# Patient Record
Sex: FEMALE | Race: WHITE | NOT HISPANIC OR LATINO | Employment: OTHER | URBAN - METROPOLITAN AREA
[De-identification: names, ages, dates, MRNs, and addresses within clinical notes are randomized per-mention and may not be internally consistent; named-entity substitution may affect disease eponyms.]

---

## 2017-05-10 RX ORDER — ASPIRIN 81 MG/1
81 TABLET ORAL
COMMUNITY

## 2017-05-10 RX ORDER — FENOFIBRATE 160 MG/1
160 TABLET ORAL EVERY EVENING
COMMUNITY
End: 2019-12-20 | Stop reason: SDUPTHER

## 2017-05-10 RX ORDER — IBUPROFEN 200 MG
200 TABLET ORAL EVERY 8 HOURS PRN
COMMUNITY
End: 2018-08-30

## 2017-05-10 RX ORDER — OLMESARTAN MEDOXOMIL AND HYDROCHLOROTHIAZIDE 40/25 40; 25 MG/1; MG/1
1 TABLET ORAL EVERY MORNING
COMMUNITY
End: 2020-01-14 | Stop reason: SDUPTHER

## 2017-05-10 RX ORDER — DIAPER,BRIEF,INFANT-TODD,DISP
EACH MISCELLANEOUS 2 TIMES DAILY
COMMUNITY
End: 2018-08-30

## 2017-05-10 RX ORDER — AMLODIPINE BESYLATE 5 MG/1
5 TABLET ORAL EVERY MORNING
COMMUNITY
End: 2019-12-20 | Stop reason: SDUPTHER

## 2017-05-15 ENCOUNTER — ANESTHESIA EVENT (OUTPATIENT)
Dept: PERIOP | Facility: AMBULARY SURGERY CENTER | Age: 77
End: 2017-05-15
Payer: MEDICARE

## 2017-05-15 ENCOUNTER — ANESTHESIA (OUTPATIENT)
Dept: PERIOP | Facility: AMBULARY SURGERY CENTER | Age: 77
End: 2017-05-15
Payer: MEDICARE

## 2017-05-15 ENCOUNTER — HOSPITAL ENCOUNTER (OUTPATIENT)
Facility: AMBULARY SURGERY CENTER | Age: 77
Setting detail: OUTPATIENT SURGERY
Discharge: HOME | End: 2017-05-15
Attending: OPHTHALMOLOGY | Admitting: OPHTHALMOLOGY
Payer: MEDICARE

## 2017-05-15 VITALS
HEART RATE: 75 BPM | WEIGHT: 170 LBS | OXYGEN SATURATION: 96 % | RESPIRATION RATE: 20 BRPM | HEIGHT: 69 IN | TEMPERATURE: 96.3 F | DIASTOLIC BLOOD PRESSURE: 68 MMHG | BODY MASS INDEX: 25.18 KG/M2 | SYSTOLIC BLOOD PRESSURE: 154 MMHG

## 2017-05-15 LAB — GLUCOSE SERPL-MCNC: 120 MG/DL (ref 65–140)

## 2017-05-15 PROCEDURE — V2632 POST CHMBR INTRAOCULAR LENS: HCPCS | Performed by: OPHTHALMOLOGY

## 2017-05-15 PROCEDURE — 82948 REAGENT STRIP/BLOOD GLUCOSE: CPT

## 2017-05-15 DEVICE — IOL SN60WF 22.0: Type: IMPLANTABLE DEVICE | Site: EYE | Status: FUNCTIONAL

## 2017-05-15 RX ORDER — TETRACAINE HYDROCHLORIDE 5 MG/ML
1 SOLUTION OPHTHALMIC ONCE
Status: COMPLETED | OUTPATIENT
Start: 2017-05-15 | End: 2017-05-15

## 2017-05-15 RX ORDER — KETOROLAC TROMETHAMINE 5 MG/ML
1 SOLUTION OPHTHALMIC 4 TIMES DAILY
Qty: 6 ML | Refills: 0
Start: 2017-05-15 | End: 2017-06-26 | Stop reason: HOSPADM

## 2017-05-15 RX ORDER — MIDAZOLAM HYDROCHLORIDE 1 MG/ML
INJECTION INTRAMUSCULAR; INTRAVENOUS AS NEEDED
Status: DISCONTINUED | OUTPATIENT
Start: 2017-05-15 | End: 2017-05-15 | Stop reason: SURG

## 2017-05-15 RX ORDER — LIDOCAINE HYDROCHLORIDE 20 MG/ML
1 JELLY TOPICAL
Status: COMPLETED | OUTPATIENT
Start: 2017-05-15 | End: 2017-05-15

## 2017-05-15 RX ORDER — LIDOCAINE HYDROCHLORIDE 10 MG/ML
INJECTION, SOLUTION EPIDURAL; INFILTRATION; INTRACAUDAL; PERINEURAL AS NEEDED
Status: DISCONTINUED | OUTPATIENT
Start: 2017-05-15 | End: 2017-05-15 | Stop reason: HOSPADM

## 2017-05-15 RX ORDER — CIPROFLOXACIN HYDROCHLORIDE 3.5 MG/ML
1 SOLUTION/ DROPS TOPICAL 4 TIMES DAILY
Qty: 6 ML | Refills: 0
Start: 2017-05-15 | End: 2017-06-14

## 2017-05-15 RX ORDER — GATIFLOXACIN 5 MG/ML
SOLUTION/ DROPS OPHTHALMIC AS NEEDED
Status: DISCONTINUED | OUTPATIENT
Start: 2017-05-15 | End: 2017-05-15 | Stop reason: HOSPADM

## 2017-05-15 RX ORDER — TETRACAINE HYDROCHLORIDE 5 MG/ML
SOLUTION OPHTHALMIC AS NEEDED
Status: DISCONTINUED | OUTPATIENT
Start: 2017-05-15 | End: 2017-05-15 | Stop reason: HOSPADM

## 2017-05-15 RX ORDER — KETOROLAC TROMETHAMINE 5 MG/ML
1 SOLUTION OPHTHALMIC
Status: DISCONTINUED | OUTPATIENT
Start: 2017-05-15 | End: 2017-05-15 | Stop reason: HOSPADM

## 2017-05-15 RX ORDER — CYCLOPENTOLATE HYDROCHLORIDE 10 MG/ML
1 SOLUTION/ DROPS OPHTHALMIC
Status: COMPLETED | OUTPATIENT
Start: 2017-05-15 | End: 2017-05-15

## 2017-05-15 RX ORDER — PHENYLEPHRINE HCL 2.5 %
1 DROPS OPHTHALMIC (EYE)
Status: COMPLETED | OUTPATIENT
Start: 2017-05-15 | End: 2017-05-15

## 2017-05-15 RX ADMIN — PHENYLEPHRINE HYDROCHLORIDE 1 DROP: 25 SOLUTION/ DROPS OPHTHALMIC at 09:21

## 2017-05-15 RX ADMIN — LIDOCAINE HYDROCHLORIDE 1 APPLICATION: 20 JELLY TOPICAL at 09:36

## 2017-05-15 RX ADMIN — LIDOCAINE HYDROCHLORIDE 1 APPLICATION: 20 JELLY TOPICAL at 10:06

## 2017-05-15 RX ADMIN — MIDAZOLAM HYDROCHLORIDE 2 MG: 1 INJECTION, SOLUTION INTRAMUSCULAR; INTRAVENOUS at 10:17

## 2017-05-15 RX ADMIN — CYCLOPENTOLATE HYDROCHLORIDE 1 DROP: 10 SOLUTION/ DROPS OPHTHALMIC at 09:36

## 2017-05-15 RX ADMIN — TETRACAINE HYDROCHLORIDE 1 DROP: 5 SOLUTION OPHTHALMIC at 09:21

## 2017-05-15 RX ADMIN — CYCLOPENTOLATE HYDROCHLORIDE 1 DROP: 10 SOLUTION/ DROPS OPHTHALMIC at 10:06

## 2017-05-15 RX ADMIN — KETOROLAC TROMETHAMINE 1 DROP: 5 SOLUTION OPHTHALMIC at 09:21

## 2017-05-15 RX ADMIN — LIDOCAINE HYDROCHLORIDE 1 APPLICATION: 20 JELLY TOPICAL at 09:51

## 2017-05-15 RX ADMIN — CYCLOPENTOLATE HYDROCHLORIDE 1 DROP: 10 SOLUTION/ DROPS OPHTHALMIC at 09:51

## 2017-05-15 RX ADMIN — LIDOCAINE HYDROCHLORIDE 1 APPLICATION: 20 JELLY TOPICAL at 09:21

## 2017-05-15 RX ADMIN — PHENYLEPHRINE HYDROCHLORIDE 1 DROP: 25 SOLUTION/ DROPS OPHTHALMIC at 09:51

## 2017-05-15 RX ADMIN — KETOROLAC TROMETHAMINE 1 DROP: 5 SOLUTION OPHTHALMIC at 09:51

## 2017-05-15 RX ADMIN — KETOROLAC TROMETHAMINE 1 DROP: 5 SOLUTION OPHTHALMIC at 09:36

## 2017-05-15 RX ADMIN — KETOROLAC TROMETHAMINE 1 DROP: 5 SOLUTION OPHTHALMIC at 10:06

## 2017-05-15 RX ADMIN — CYCLOPENTOLATE HYDROCHLORIDE 1 DROP: 10 SOLUTION/ DROPS OPHTHALMIC at 09:21

## 2017-05-15 RX ADMIN — PHENYLEPHRINE HYDROCHLORIDE 1 DROP: 25 SOLUTION/ DROPS OPHTHALMIC at 10:06

## 2017-05-15 RX ADMIN — PHENYLEPHRINE HYDROCHLORIDE 1 DROP: 25 SOLUTION/ DROPS OPHTHALMIC at 09:36

## 2017-05-30 ENCOUNTER — ALLSCRIPTS OFFICE VISIT (OUTPATIENT)
Dept: OTHER | Facility: OTHER | Age: 77
End: 2017-05-30

## 2017-05-30 ENCOUNTER — HOSPITAL ENCOUNTER (OUTPATIENT)
Dept: RADIOLOGY | Facility: CLINIC | Age: 77
Discharge: HOME | End: 2017-05-30
Payer: MEDICARE

## 2017-05-30 DIAGNOSIS — S39.012A STRAIN OF MUSCLE, FASCIA AND TENDON OF LOWER BACK, INITIAL ENCOUNTER: ICD-10-CM

## 2017-05-30 DIAGNOSIS — M54.50 LOW BACK PAIN: ICD-10-CM

## 2017-05-30 PROCEDURE — 72100 X-RAY EXAM L-S SPINE 2/3 VWS: CPT

## 2017-06-07 ENCOUNTER — GENERIC CONVERSION - ENCOUNTER (OUTPATIENT)
Dept: OTHER | Facility: OTHER | Age: 77
End: 2017-06-07

## 2017-06-07 ENCOUNTER — APPOINTMENT (OUTPATIENT)
Dept: PHYSICAL THERAPY | Facility: CLINIC | Age: 77
End: 2017-06-07
Payer: MEDICARE

## 2017-06-07 DIAGNOSIS — S39.012A STRAIN OF MUSCLE, FASCIA AND TENDON OF LOWER BACK, INITIAL ENCOUNTER: ICD-10-CM

## 2017-06-07 PROCEDURE — G8979 MOBILITY GOAL STATUS: HCPCS

## 2017-06-07 PROCEDURE — G8978 MOBILITY CURRENT STATUS: HCPCS

## 2017-06-07 PROCEDURE — 97161 PT EVAL LOW COMPLEX 20 MIN: CPT

## 2017-06-13 ENCOUNTER — APPOINTMENT (OUTPATIENT)
Dept: PHYSICAL THERAPY | Facility: CLINIC | Age: 77
End: 2017-06-13
Payer: MEDICARE

## 2017-06-13 PROCEDURE — 97110 THERAPEUTIC EXERCISES: CPT

## 2017-06-16 ENCOUNTER — APPOINTMENT (OUTPATIENT)
Dept: PHYSICAL THERAPY | Facility: CLINIC | Age: 77
End: 2017-06-16
Payer: MEDICARE

## 2017-06-16 PROCEDURE — 97140 MANUAL THERAPY 1/> REGIONS: CPT

## 2017-06-16 PROCEDURE — 97110 THERAPEUTIC EXERCISES: CPT

## 2017-06-21 ENCOUNTER — APPOINTMENT (OUTPATIENT)
Dept: PHYSICAL THERAPY | Facility: CLINIC | Age: 77
End: 2017-06-21
Payer: MEDICARE

## 2017-06-21 PROCEDURE — 97110 THERAPEUTIC EXERCISES: CPT

## 2017-06-21 PROCEDURE — 97140 MANUAL THERAPY 1/> REGIONS: CPT

## 2017-06-23 ENCOUNTER — APPOINTMENT (OUTPATIENT)
Dept: PHYSICAL THERAPY | Facility: CLINIC | Age: 77
End: 2017-06-23
Payer: MEDICARE

## 2017-06-26 ENCOUNTER — HOSPITAL ENCOUNTER (OUTPATIENT)
Facility: AMBULARY SURGERY CENTER | Age: 77
Setting detail: OUTPATIENT SURGERY
Discharge: HOME | End: 2017-06-26
Attending: OPHTHALMOLOGY | Admitting: OPHTHALMOLOGY
Payer: MEDICARE

## 2017-06-26 ENCOUNTER — ANESTHESIA EVENT (OUTPATIENT)
Dept: PERIOP | Facility: AMBULARY SURGERY CENTER | Age: 77
End: 2017-06-26
Payer: MEDICARE

## 2017-06-26 ENCOUNTER — ANESTHESIA (OUTPATIENT)
Dept: PERIOP | Facility: AMBULARY SURGERY CENTER | Age: 77
End: 2017-06-26
Payer: MEDICARE

## 2017-06-26 VITALS
DIASTOLIC BLOOD PRESSURE: 60 MMHG | RESPIRATION RATE: 18 BRPM | TEMPERATURE: 97.2 F | OXYGEN SATURATION: 94 % | SYSTOLIC BLOOD PRESSURE: 126 MMHG | HEART RATE: 67 BPM

## 2017-06-26 LAB — GLUCOSE SERPL-MCNC: 133 MG/DL (ref 65–140)

## 2017-06-26 PROCEDURE — V2632 POST CHMBR INTRAOCULAR LENS: HCPCS | Performed by: OPHTHALMOLOGY

## 2017-06-26 PROCEDURE — 82948 REAGENT STRIP/BLOOD GLUCOSE: CPT

## 2017-06-26 DEVICE — IOL SN60WF 22.5: Type: IMPLANTABLE DEVICE | Site: EYE | Status: FUNCTIONAL

## 2017-06-26 RX ORDER — TETRACAINE HYDROCHLORIDE 5 MG/ML
1 SOLUTION OPHTHALMIC ONCE
Status: COMPLETED | OUTPATIENT
Start: 2017-06-26 | End: 2017-06-26

## 2017-06-26 RX ORDER — CIPROFLOXACIN HYDROCHLORIDE 3.5 MG/ML
1 SOLUTION/ DROPS TOPICAL 4 TIMES DAILY
Qty: 5 ML | Refills: 0
Start: 2017-06-26 | End: 2018-10-24

## 2017-06-26 RX ORDER — CYCLOPENTOLATE HYDROCHLORIDE 10 MG/ML
1 SOLUTION/ DROPS OPHTHALMIC
Status: COMPLETED | OUTPATIENT
Start: 2017-06-26 | End: 2017-06-26

## 2017-06-26 RX ORDER — MIDAZOLAM HYDROCHLORIDE 1 MG/ML
INJECTION INTRAMUSCULAR; INTRAVENOUS AS NEEDED
Status: DISCONTINUED | OUTPATIENT
Start: 2017-06-26 | End: 2017-06-26 | Stop reason: SURG

## 2017-06-26 RX ORDER — GATIFLOXACIN 5 MG/ML
SOLUTION/ DROPS OPHTHALMIC AS NEEDED
Status: DISCONTINUED | OUTPATIENT
Start: 2017-06-26 | End: 2017-06-26 | Stop reason: HOSPADM

## 2017-06-26 RX ORDER — TETRACAINE HYDROCHLORIDE 5 MG/ML
SOLUTION OPHTHALMIC AS NEEDED
Status: DISCONTINUED | OUTPATIENT
Start: 2017-06-26 | End: 2017-06-26 | Stop reason: HOSPADM

## 2017-06-26 RX ORDER — BALANCED SALT SOLUTION 6.4; .75; .48; .3; 3.9; 1.7 MG/ML; MG/ML; MG/ML; MG/ML; MG/ML; MG/ML
SOLUTION OPHTHALMIC AS NEEDED
Status: DISCONTINUED | OUTPATIENT
Start: 2017-06-26 | End: 2017-06-26 | Stop reason: HOSPADM

## 2017-06-26 RX ORDER — PHENYLEPHRINE HCL 2.5 %
1 DROPS OPHTHALMIC (EYE)
Status: COMPLETED | OUTPATIENT
Start: 2017-06-26 | End: 2017-06-26

## 2017-06-26 RX ORDER — KETOROLAC TROMETHAMINE 5 MG/ML
1 SOLUTION OPHTHALMIC
Status: DISCONTINUED | OUTPATIENT
Start: 2017-06-27 | End: 2017-06-26 | Stop reason: HOSPADM

## 2017-06-26 RX ORDER — KETOROLAC TROMETHAMINE 5 MG/ML
1 SOLUTION OPHTHALMIC 4 TIMES DAILY
Qty: 5 ML | Refills: 0
Start: 2017-06-26 | End: 2018-10-24

## 2017-06-26 RX ORDER — LIDOCAINE HYDROCHLORIDE 20 MG/ML
1 JELLY TOPICAL
Status: COMPLETED | OUTPATIENT
Start: 2017-06-26 | End: 2017-06-26

## 2017-06-26 RX ORDER — LIDOCAINE HYDROCHLORIDE 10 MG/ML
INJECTION, SOLUTION EPIDURAL; INFILTRATION; INTRACAUDAL; PERINEURAL AS NEEDED
Status: DISCONTINUED | OUTPATIENT
Start: 2017-06-26 | End: 2017-06-26 | Stop reason: HOSPADM

## 2017-06-26 RX ADMIN — CYCLOPENTOLATE HYDROCHLORIDE 1 DROP: 10 SOLUTION/ DROPS OPHTHALMIC at 09:00

## 2017-06-26 RX ADMIN — KETOROLAC TROMETHAMINE 1 DROP: 5 SOLUTION OPHTHALMIC at 09:41

## 2017-06-26 RX ADMIN — LIDOCAINE HYDROCHLORIDE 1 APPLICATION: 20 JELLY TOPICAL at 09:00

## 2017-06-26 RX ADMIN — KETOROLAC TROMETHAMINE 1 DROP: 5 SOLUTION OPHTHALMIC at 09:00

## 2017-06-26 RX ADMIN — LIDOCAINE HYDROCHLORIDE 1 APPLICATION: 20 JELLY TOPICAL at 09:30

## 2017-06-26 RX ADMIN — KETOROLAC TROMETHAMINE 1 DROP: 5 SOLUTION OPHTHALMIC at 09:30

## 2017-06-26 RX ADMIN — KETOROLAC TROMETHAMINE 1 DROP: 5 SOLUTION OPHTHALMIC at 09:15

## 2017-06-26 RX ADMIN — PHENYLEPHRINE HYDROCHLORIDE 1 DROP: 25 SOLUTION/ DROPS OPHTHALMIC at 09:15

## 2017-06-26 RX ADMIN — CYCLOPENTOLATE HYDROCHLORIDE 1 DROP: 10 SOLUTION/ DROPS OPHTHALMIC at 09:30

## 2017-06-26 RX ADMIN — PHENYLEPHRINE HYDROCHLORIDE 1 DROP: 25 SOLUTION/ DROPS OPHTHALMIC at 09:00

## 2017-06-26 RX ADMIN — TETRACAINE HYDROCHLORIDE 1 DROP: 5 SOLUTION OPHTHALMIC at 09:00

## 2017-06-26 RX ADMIN — LIDOCAINE HYDROCHLORIDE 1 APPLICATION: 20 JELLY TOPICAL at 09:15

## 2017-06-26 RX ADMIN — CYCLOPENTOLATE HYDROCHLORIDE 1 DROP: 10 SOLUTION/ DROPS OPHTHALMIC at 09:41

## 2017-06-26 RX ADMIN — LIDOCAINE HYDROCHLORIDE 1 APPLICATION: 20 JELLY TOPICAL at 09:41

## 2017-06-26 RX ADMIN — PHENYLEPHRINE HYDROCHLORIDE 1 DROP: 25 SOLUTION/ DROPS OPHTHALMIC at 09:29

## 2017-06-26 RX ADMIN — PHENYLEPHRINE HYDROCHLORIDE 1 DROP: 25 SOLUTION/ DROPS OPHTHALMIC at 09:41

## 2017-06-26 RX ADMIN — CYCLOPENTOLATE HYDROCHLORIDE 1 DROP: 10 SOLUTION/ DROPS OPHTHALMIC at 09:15

## 2017-06-26 RX ADMIN — MIDAZOLAM HYDROCHLORIDE 2 MG: 1 INJECTION, SOLUTION INTRAMUSCULAR; INTRAVENOUS at 09:56

## 2017-07-03 ENCOUNTER — APPOINTMENT (OUTPATIENT)
Dept: PHYSICAL THERAPY | Facility: CLINIC | Age: 77
End: 2017-07-03
Payer: MEDICARE

## 2017-07-05 ENCOUNTER — APPOINTMENT (OUTPATIENT)
Dept: PHYSICAL THERAPY | Facility: CLINIC | Age: 77
End: 2017-07-05
Payer: MEDICARE

## 2017-07-05 PROCEDURE — 97110 THERAPEUTIC EXERCISES: CPT

## 2017-07-10 ENCOUNTER — APPOINTMENT (OUTPATIENT)
Dept: PHYSICAL THERAPY | Facility: CLINIC | Age: 77
End: 2017-07-10
Payer: MEDICARE

## 2017-07-10 PROCEDURE — G8980 MOBILITY D/C STATUS: HCPCS

## 2017-07-10 PROCEDURE — 97110 THERAPEUTIC EXERCISES: CPT

## 2017-07-10 PROCEDURE — 97112 NEUROMUSCULAR REEDUCATION: CPT

## 2017-07-10 PROCEDURE — G8979 MOBILITY GOAL STATUS: HCPCS

## 2017-07-12 ENCOUNTER — APPOINTMENT (OUTPATIENT)
Dept: PHYSICAL THERAPY | Facility: CLINIC | Age: 77
End: 2017-07-12
Payer: MEDICARE

## 2017-07-14 ENCOUNTER — ALLSCRIPTS OFFICE VISIT (OUTPATIENT)
Dept: OTHER | Facility: OTHER | Age: 77
End: 2017-07-14

## 2017-10-25 ENCOUNTER — ALLSCRIPTS OFFICE VISIT (OUTPATIENT)
Dept: OTHER | Facility: OTHER | Age: 77
End: 2017-10-25

## 2017-10-27 NOTE — PROGRESS NOTES
Assessment  1  Osteoarthritis of left knee (715 96) (M17 12)   2  Primary osteoarthritis of both knees (715 16) (M17 0)    Plan      Radha Kim has several complaints of joint pain at multiple levels  We had a lengthy discussion and I feel that this is related to her recent diagnosis of hypothyroidism  She has also been evaluated by Rheumatology but deferred their treatment as she fears for side effects of the medication that she offered  She is requesting prednisone for her joint pain but I do not recommend this due to the active urinary tract infection  She does have severe arthritis in both the left and right knee  The left wrist is most likely arthritic as well  I offered to provide her a prescription for physical therapy for her knees and wrist  She deferred this as well as she does not feel physical therapy helps her  In addition I offered her injections for her knee arthritis  but she deferred this as well  I recommended that she return to Rheumatology and reconsider that treatment  Discussion/Summary  The patient was counseled regarding diagnostic results,-- instructions for management,-- risk factor reductions,-- prognosis,-- patient and family education,-- impressions,-- risks and benefits of treatment options,-- importance of compliance with treatment  Chief Complaint  1  Pain    History of Present Illness      Radha Kim is a 79-year-old female visiting today with complaints of multiple joint pain  She states last week she needed to carry to heavy bags of groceries and the next day she felt as though she could not get out of bed  She had pain in bilateral shoulders, hips, thighs, knees and left wrist   she also has complaints of recent weight gain  She recently started the medication of levathyroxine sodium prescribed by her PCP for a thyroid disorder  Her mild-to-moderate joint pain is described as a persistent ache and weakness    She is somewhat better with rest and worse with physical activity  Adi Sweet is requesting prednisone for her body aches  Her medical history includes an active urinary tract infection  Review of Systems    Constitutional: No fever, no chills, feels well, no tiredness, no recent weight gain or loss  Eyes: No complaints of eyesight problems, no red eyes  ENT: no loss of hearing, no nosebleeds, no sore throat  Cardiovascular: No complaints of chest pain, no palpitations, no leg claudication or lower extremity edema  Respiratory: no compliants of shortness of breath, no wheezing, no cough  Gastrointestinal: no complaints of abdominal pain, no constipation, no nausea or diarrhea, no vomiting, no bloody stools  Genitourinary: no complaints of dysuria, no incontinence  Musculoskeletal: as noted in HPI  Integumentary: no complaints of skin rash or lesion, no itching or dry skin, no skin wounds  Neurological: no complaints of headache, no confusion, no numbness or tingling, no dizziness  Endocrine: No complaints of muscle weakness, no feelings of weakness, no frequent urination, no excessive thirst    Psychiatric: No suicidal thoughts, no anxiety, no feelings of depression  ROS reviewed  Active Problems  1  Arthritis (716 90)   2  Back strain, initial encounter (847 9) (S39 012A)   3  Benign essential hypertension (401 1)   4  Diabetes (250 00)   5  Knee pain, chronic, left (445 18,029 98) (M25 562,G89 29)   6  Leg weakness, bilateral (729 89) (R29 898)   7  Low back pain (724 2) (M54 5)   8  Osteoarthritis of left knee (715 96) (M17 12)   9  Primary osteoarthritis of both knees (715 16) (M17 0)   10  Trigger finger (727 03) (M65 30)    Past Medical History    The active problems and past medical history were reviewed and updated today  Surgical History   · History of Appendectomy   · History of Cataract Surgery   · History of Tonsillectomy    The surgical history was reviewed and updated today         Family History  Mother    · Family history of Osteoporosis (733 00) (M81 0)  Maternal Grandmother    · Family history of Arthritis (266 90) (M19 90)    The family history was reviewed and updated today  Social History   · Alcohol use (V49 89) (Z78 9)   · Never a smoker  The social history was reviewed and updated today  Current Meds   1  Aspir-Low 81 MG Oral Tablet Delayed Release Recorded   2  Benicar HCT 40-25 MG Oral Tablet; Therapy: [id number] to (Evaluate:67Inu9481) Recorded   3  Keflex 500 MG Oral Capsule Recorded   4  Levaquin 500 MG Oral Tablet Recorded   5  MetFORMIN HCl - 500 MG Oral Tablet Recorded   6  Norvasc 5 MG Oral Tablet Recorded   7  Tricor 145 MG Oral Tablet Recorded    The medication list was reviewed and updated today  Allergies  1  No Known Drug Allergies    Vitals  Signs   Heart Rate: 83, L Brachial Artery  Systolic: 855, LUE, Sitting  Diastolic: 61, LUE, Sitting  Height: 5 ft 9 in  Weight: 180 lb   BMI Calculated: 26 58  BSA Calculated: 1 98    Physical Exam    Left Wrist: Appearance: Normal except swelling (posterior )  Tenderness: None  ROM: Full except as noted: Flexion: restricted AROM 45 degrees  Extension: restricted AROM Forty-five degrees  Motor: Normal  Special Tests: Negative except  Left Knee: Appearance: Normal except swelling  Tenderness: None except the lateral joint line-- and-- medial joint line  ROM: Full  Motor: Normal  Flexion was 5/5  Extension was 5/5  Special Test: Negative except positive laxity on Varus Stress (1), but-- no laxity on Valgus Stress  Right Knee: Appearance: Normal except swelling  Tenderness: None except the lateral joint line-- and-- medial joint line  ROM: Full except as noted: Motor: Normal except as noted: Special Test: Negative except     Constitutional - General appearance: Normal    Musculoskeletal - Gait and station: Normal -- Digits and nails: Normal -- Muscle strength/tone: Normal -- Lower extremity compartments: Normal    Cardiovascular - Pulses: Normal -- Examination of extremities for edema and/or varicosities: Normal    Skin - Skin and subcutaneous tissue: Normal    Neurologic - Sensation: Normal -- Lower extremity peripheral neuro exam: Normal    Psychiatric - Orientation to person, place, and time: Normal -- Mood and affect: Normal    Eyes   Conjunctiva and lids: Normal     Pupils and irises: Normal        Results/Data  I personally reviewed the films/images/results in the office today  My interpretation follows  X-ray Review Previous x-rays of both knees taken in 2016 were reviewed and demonstrate severe osteoarthritis  Attending Note  Scribe Attestation:   Scribe Attestation Shubham Peoples am acting as a scribe in the presence of the attending physician while the attending physician examines the patient  Physician Attestation:   Albert Cochran MD personally performed the services described in this documentation as scribed in my presence, and it is both accurate and complete        Signatures   Electronically signed by : Tiffanie Peoples, LAT; Oct 25 2017 12:45PM EST                       (Author)    Electronically signed by : ELSIE Neves ; Oct 26 2017  8:18AM EST                       (Author)

## 2018-01-12 VITALS
WEIGHT: 180 LBS | DIASTOLIC BLOOD PRESSURE: 61 MMHG | HEIGHT: 69 IN | HEART RATE: 83 BPM | SYSTOLIC BLOOD PRESSURE: 151 MMHG | BODY MASS INDEX: 26.66 KG/M2

## 2018-01-16 NOTE — PROGRESS NOTES
Assessment    1  Primary osteoarthritis of both knees (227 16) (M17 0)    Plan  Primary osteoarthritis of both knees    · Dexamethasone Sodium Phosphate 120 MG/30ML Injection Solution      Miki Haines was given a steroid injection for her left knee today, as this one is the worst  She was offered one for the right as well, but was not interested in this  We did discuss physical therapy for her knees, but since she has an inguinal hernia she is not able to do exercises that cause her to strain, and thus this is not a good option  She can continue advil as needed  We will see her back as needed for this  Discussion/Summary  The patient was counseled regarding diagnostic results, instructions for management, prognosis, patient and family education, impressions, importance of compliance with treatment  Chief Complaint    1  Knee Pain    History of Present Illness   Miki Haines is a 67year old female who presents today with a new issue of bilateral knee pain, the left being worse than the right  She states this began many years ago  SHe used to see another provider for her left knee and had both steroid and joint lubricant injections  These did help  She thinks her last injection was probably 4-5 years ago  She notes pain diffusely about both knees, which is worse with activity and is better with rest and advil  Her pain does not radiate  SHe note swelling about the knees as well  She has trouble getting up from a seated position as well due to her knees  Review of Systems    Constitutional: No fever, no chills, feels well, no tiredness, no recent weight gain or loss  Eyes: No complaints of eyesight problems, no red eyes  ENT: no loss of hearing, no nosebleeds, no sore throat  Cardiovascular: No complaints of chest pain, no palpitations, no leg claudication or lower extremity edema  Respiratory: no compliants of shortness of breath, no wheezing, no cough     Gastrointestinal: no complaints of abdominal pain, no constipation, no nausea or diarrhea, no vomiting, no bloody stools  Genitourinary: no complaints of dysuria, no incontinence  Musculoskeletal: as noted in HPI  Integumentary: no complaints of skin rash or lesion, no itching or dry skin, no skin wounds  Neurological: no complaints of headache, no confusion, no numbness or tingling, no dizziness  Endocrine: No complaints of muscle weakness, no feelings of weakness, no frequent urination, no excessive thirst    Psychiatric: No suicidal thoughts, no anxiety, no feelings of depression  Active Problems    1  Arthritis (716 90)   2  Benign essential hypertension (401 1)   3  Diabetes (250 00)   4  Knee pain, chronic, left (652 58,850 68) (M25 562,G89 29)   5  Leg weakness, bilateral (729 89) (M62 81)   6  Trigger finger (727 03) (M65 30)    Past Medical History    The active problems and past medical history were reviewed and updated today  Surgical History    · History of Appendectomy   · History of Tonsillectomy    The surgical history was reviewed and updated today  Family History    · Family history of Osteoporosis (733 00) (M81 0)    · Family history of Arthritis (716 90) (M19 90)    The family history was reviewed and updated today  Social History    · Alcohol use (V49 89) (F10 99)   · Never a smoker  The social history was reviewed and updated today  Current Meds   1  Aspir-Low 81 MG Oral Tablet Delayed Release Recorded   2  Benicar HCT 40-25 MG Oral Tablet; Therapy: [id number] to (Evaluate:64Yrh3616) Recorded   3  Keflex 500 MG Oral Capsule Recorded   4  MetFORMIN HCl - 500 MG Oral Tablet Recorded   5  Norvasc 5 MG Oral Tablet Recorded   6  Tricor 145 MG Oral Tablet Recorded    The medication list was reviewed and updated today  Allergies    1  No Known Drug Allergies    Physical Exam    Right Knee: Appearance: Normal except an effusion grade 1+ and joint hypertrophy   Tenderness: lateral joint line and medial joint line  Palpatory findings include crepitus  ROM: Full  Motor: Normal  Special Test: no laxity on Valgus Stress and no laxity on Varus Stress  Left Knee: Appearance: Normal except an effusion grade 1+ and joint hypertrophy  Tenderness: lateral joint line and medial joint line  Palpatory findings include crepitus  ROM: Full  Motor: Normal  Special Test: no laxity on Valgus Stress and no laxity on Varus Stress  Constitutional - General appearance: Normal    Musculoskeletal - Gait and station: Normal  Digits and nails: Normal  Muscle strength/tone: Normal  Lower extremity compartments: Normal    Cardiovascular - Pulses: Normal  Examination of extremities for edema and/or varicosities: Normal    Lymphatic - Palpation of lymph nodes in other areas: Normal  no right femoral node enlargement and no left femoral node enlargement  Skin - Skin and subcutaneous tissue: Normal    Neurologic - Sensation: Normal  Lower extremity peripheral neuro exam: Normal    Psychiatric - Orientation to person, place, and time: Normal  Mood and affect: Normal    Eyes   Conjunctiva and lids: Normal     Pupils and irises: Normal        Results/Data    Views: AP and lateral views and a sunrise view of both knees  Findings:  Bilateral knee arthritis, worse about the lateral compartment than the medial compartment  Procedure    Procedure: Injection of the left knee joint  Indication:  Joint pain and Osteoarthritis  Potential complications include bleeding, infection and allergic reaction  Risk, benefits and alternatives were discussed with the patient  Verbal consent was obtained prior to the procedure  Betadine was used to prep the area  ethyl chloride spray was used as a topical anesthetic  Using sterile technique, the aspiration/injection needle was then directed from a Anterolateral aspect  A 22-gauge was used to inject 4cc of 1% Lidocaine and 1mL of 4 mg/mL dexamethasone  A bandage was applied   the patient tolerated the procedure well  Complications: none  Follow-up in the office as needed  Attending Note  Collaborating Physician Note: Collaborating Note: I interviewed and examined the patient, I supervised the Advanced Practitioner and I agree with the Advanced Practitioner note  I discussed the case with the Advanced Practitioner and reviewed the AP note      Signatures   Electronically signed by :  Irineo Mccallum [facility]; Jan 20 2016 10:38AM EST                       (Author)    Electronically signed by : ELSIE Kirkpatrick ; Jan 20 2016  1:22PM EST                       (Author)

## 2018-01-24 NOTE — PROGRESS NOTES
Assessment    1  Primary osteoarthritis of both knees (715 16) (M17 0)   2  Trigger finger (727 03) (M65 30)    Plan  Trigger finger    · Kenalog 40 MG/ML Injection Suspension (Triamcinolone Acetonide)      Molly Leo unfortunately is continuing to struggle with her knee arthritis  We gave her some home exercises for this and also advised ice  As for the trigger finger, she was given another steroid injection today as she had responded well to her last one about year ago  We will see her back as needed for both issues  She is not interested in any surgery at this point  Discussion/Summary  The patient was counseled regarding diagnostic results, instructions for management, prognosis, patient and family education, impressions  Chief Complaint    1  Finger Problem   2  Knee Pain    History of Present Illness    Molly Leo presents today for follow-up of her right knee  She had euflexxa injections back in June and had done well up until about 3 weeks ago when she took a trip to [address] [place] [address] [place] and was doing a lot of walking and getting up and down at the casino  He knee started to bother her again  She notes pain diffusely about the knee, which is worse with getting up from a seated position  Walking does not bother her much  She notes swelling and stiffness but denies any mechanical symptoms about the knee  She has been taking advil as the mobic did not help her  The advil does help some  She also notes a return of her right ring trigger finger  She last had a steroid injection for this almost a year ago, which helped her  She did well up until recently when her pain and triggering returned  Review of Systems    Constitutional: No fever, no chills, feels well, no tiredness, no recent weight gain or loss  Eyes: No complaints of eyesight problems, no red eyes  ENT: no loss of hearing, no nosebleeds, no sore throat     Cardiovascular: No complaints of chest pain, no palpitations, no leg claudication or lower extremity edema  Respiratory: no compliants of shortness of breath, no wheezing, no cough  Gastrointestinal: no complaints of abdominal pain, no constipation, no nausea or diarrhea, no vomiting, no bloody stools  Genitourinary: no complaints of dysuria, no incontinence  Musculoskeletal: as noted in HPI  Integumentary: no complaints of skin rash or lesion, no itching or dry skin, no skin wounds  Neurological: no complaints of headache, no confusion, no numbness or tingling, no dizziness  Endocrine: No complaints of muscle weakness, no feelings of weakness, no frequent urination, no excessive thirst    Psychiatric: anxiety, but not suicidal and no depression  Active Problems    1  Arthritis (716 90)   2  Benign essential hypertension (401 1)   3  Diabetes (250 00)   4  Knee pain, chronic, left (743 96,283 82) (M25 562,G89 29)   5  Leg weakness, bilateral (729 89) (M62 81)   6  Primary osteoarthritis of both knees (715 16) (M17 0)   7  Trigger finger (727 03) (M65 30)    Past Medical History    The active problems and past medical history were reviewed and updated today  Surgical History    · History of Appendectomy   · History of Tonsillectomy    The surgical history was reviewed and updated today  Family History  Mother    · Family history of Osteoporosis (733 00) (M81 0)  Maternal Grandmother    · Family history of Arthritis (716 90) (M19 90)    The family history was reviewed and updated today  Social History    · Alcohol use (V49 89) (Z78 9)   · Never a smoker  The social history was reviewed and updated today  Current Meds   1  Aspir-Low 81 MG Oral Tablet Delayed Release Recorded   2  Benicar HCT 40-25 MG Oral Tablet; Therapy: [id number] to (Evaluate:64Oge6677) Recorded   3  Keflex 500 MG Oral Capsule Recorded   4  Levaquin 500 MG Oral Tablet Recorded   5  MetFORMIN HCl - 500 MG Oral Tablet Recorded   6  Norvasc 5 MG Oral Tablet Recorded   7   Tricor 145 MG Oral Tablet Recorded    The medication list was reviewed and updated today  Allergies    1  No Known Drug Allergies    Vitals  Signs    Systolic: 659, Sitting  Diastolic: 66, Sitting  Heart Rate: 84  Pulse Quality: Normal  Height: 5 ft 6 in  Weight: 163 lb   BMI Calculated: 26 31  BSA Calculated: 1 83    Physical Exam    Right Knee: Appearance: an effusion grade 1+  Tenderness: None not over the lateral joint line and not over the medial joint line  Flexion: restricted AROM 110 degrees  Extension: restricted AROM -2 degrees  Motor: Normal  Special Test: no laxity on Valgus Stress and no laxity on Varus Stress  Right Fourth Digit/Hand: Appearance: Normal except 4th digit Trigger finger  Tenderness over A1 pulley  ROM: Full except as noted: MCP flexion: painful restricted active range of motion  PIP flexion: painful restricted AROM  Motor: Normal    Constitutional - General appearance: Normal    Musculoskeletal - Gait and station: Normal  Muscle strength/tone: Normal  Lower extremity compartments: Normal    Cardiovascular - Pulses: Normal  Examination of extremities for edema and/or varicosities: Normal    Lymphatic - Palpation of lymph nodes in other areas: Normal  no right femoral node enlargement  Skin - Skin and subcutaneous tissue: Normal    Neurologic - Sensation: Normal  Lower extremity peripheral neuro exam: Normal    Psychiatric - Orientation to person, place, and time: Normal  Mood and affect: Normal    Eyes   Conjunctiva and lids: Normal     Pupils and irises: Normal        Procedure    Procedure: Injection of the on the right Ring finger flexor tendon sheath  Indication: Trigger finger  Risk, benefits and alternatives were discussed with the patient  Verbal consent was obtained prior to the procedure  Prior to the start of the procedure a time out was taken and the identity of the patient was confirmed via name and date of birth with the patient   The correct site and the procedure to be performed were confirmed and the site marked as appropriate  The correct side was confirmed if applicable  The positioning of the patient was verified  The availability of the correct equipment was verified  Procedure Note: A 25-gauge was used to inject 1/2 mL of 1% Lidocaine and 1/2 mL of 40mg/mL triamcinolone  A bandage was applied  Post-Procedure: the patient tolerated the procedure well  Complications: None  Follow-up in the office as needed  Attending Note  Collaborating Physician Note: Collaborating Note: I interviewed and examined the patient, I supervised the Advanced Practitioner and I agree with the Advanced Practitioner note  I discussed the case with the Advanced Practitioner and reviewed the AP note      Signatures   Electronically signed by :  Susan Mello [facility]; Jul 27 2016  2:01PM EST                       (Author)    Electronically signed by : ELSIE Boyle ; Jul 27 2016  5:14PM EST                       (Author)

## 2018-02-12 ENCOUNTER — TELEPHONE (OUTPATIENT)
Dept: OBGYN CLINIC | Facility: HOSPITAL | Age: 78
End: 2018-02-12

## 2018-02-12 NOTE — TELEPHONE ENCOUNTER
Patient sees Dr Marie Brought  She is having spasms in her back and wants a call back from the doctor  I offered her an appointment tomorrow in [address] [place] [address] or Friday in [place] and she declined both

## 2018-02-13 NOTE — TELEPHONE ENCOUNTER
Patient states she had this before, she said pain goes underneather the shoulder blade, down to left lower back to the waist   States its a spasm pain, (like a wave) coming and going quickly, states it lasts 5-6 secs then goes away and comes back again  Explains she is a little better today  She does not know how to help her spasm pain  She took left of percocet  She said that is the only thing that takes it away  Heating pad helps  I advised possibly maybe seeing Dr Santy Lawler? She had an XRAY which showed an old fracture  She said sometimes the spams she won't get for a year but when she does get it, she is in extreme pain  Please advise

## 2018-02-13 NOTE — TELEPHONE ENCOUNTER
Tried to call patient back and left message to advised Libby wanted me to get some more information on what is going on with her back  Advised that the Dr himself will not be able to call her for some time due to his tight schedule  I advised for her to call me back to give me a little more information on whats going on with her back

## 2018-02-13 NOTE — TELEPHONE ENCOUNTER
Can you please call her to ask her what her issue is with her back    I will unfortunately not be able to speak with her for quite some time due to a very tight schedule

## 2018-02-14 NOTE — TELEPHONE ENCOUNTER
She should do the exercises she was previously taught from physical therapy for the muscle spasms around the scapula  She also should put some moist heat on her shoulder blade to help with spasms

## 2018-02-15 NOTE — TELEPHONE ENCOUNTER
Spoke with the patient and she is aware and she stated she never went to PT for this issue and was wondering if you have a print out of exercises she can do and ?

## 2018-02-16 NOTE — TELEPHONE ENCOUNTER
We can give her exercises from our patient rooms for her shoulder  We can also give her a Thera-Band

## 2018-02-25 ENCOUNTER — APPOINTMENT (EMERGENCY)
Dept: RADIOLOGY | Facility: HOSPITAL | Age: 78
End: 2018-02-25
Payer: MEDICARE

## 2018-02-25 ENCOUNTER — HOSPITAL ENCOUNTER (EMERGENCY)
Facility: HOSPITAL | Age: 78
Discharge: HOME | End: 2018-02-25
Admitting: EMERGENCY MEDICINE
Payer: MEDICARE

## 2018-02-25 VITALS
SYSTOLIC BLOOD PRESSURE: 191 MMHG | DIASTOLIC BLOOD PRESSURE: 86 MMHG | TEMPERATURE: 98.3 F | OXYGEN SATURATION: 95 % | BODY MASS INDEX: 25.92 KG/M2 | WEIGHT: 175 LBS | HEART RATE: 80 BPM | HEIGHT: 69 IN | RESPIRATION RATE: 16 BRPM

## 2018-02-25 DIAGNOSIS — M62.830 MUSCLE SPASM OF BACK: Primary | ICD-10-CM

## 2018-02-25 PROCEDURE — 99283 EMERGENCY DEPT VISIT LOW MDM: CPT

## 2018-02-25 PROCEDURE — 72100 X-RAY EXAM L-S SPINE 2/3 VWS: CPT

## 2018-02-25 RX ORDER — TRAMADOL HYDROCHLORIDE 50 MG/1
50 TABLET ORAL EVERY 6 HOURS PRN
Qty: 8 TABLET | Refills: 0 | Status: SHIPPED | OUTPATIENT
Start: 2018-02-25 | End: 2018-02-27

## 2018-02-25 RX ORDER — TRAMADOL HYDROCHLORIDE 50 MG/1
50 TABLET ORAL ONCE
Status: COMPLETED | OUTPATIENT
Start: 2018-02-25 | End: 2018-02-25

## 2018-02-25 RX ADMIN — TRAMADOL HYDROCHLORIDE 50 MG: 50 TABLET, FILM COATED ORAL at 16:29

## 2018-02-25 NOTE — ED PROVIDER NOTES
History  Chief Complaint   Patient presents with    Back Pain     c/o left sided back pain that started today     Patient is a 69-year-old female presenting today with left-sided back pain that started today that comes and goes and is spasm like ranking 6/10  States that the pain feels better after heating pad as well as massaging the area  Has had this pain before in the past however typically presents itself on the right part of her back  Otherwise has been feeling well without any complaints  Denies any falls  Lives at home with her  gets around without difficulty  Denies changes in urination, bladder or bowel retention or incontinence, numbness, paresthesias, saddle anesthesia, weakness, fevers, nausea, vomiting, diarrhea, abdominal pain  Addendum: patient states that she remembers the pain beginning after she was attempting to get out of a chair while at a restaurant and had some difficulty as there were no arm rests  Prior to Admission Medications   Prescriptions Last Dose Informant Patient Reported? Taking?    Cranberry-Vitamin C-Probiotic (AZO CRANBERRY PO)   Yes No   Sig: Take by mouth every morning   Multiple Vitamins-Minerals (CENTRUM SILVER ADULT 50+ PO)   Yes No   Sig: Take by mouth daily with breakfast   amLODIPine (NORVASC) 5 mg tablet   Yes No   Sig: Take 5 mg by mouth every morning   aspirin (ECOTRIN LOW STRENGTH) 81 mg EC tablet   Yes No   Sig: Take 81 mg by mouth daily with breakfast   ciprofloxacin (CILOXAN) 0 3 % ophthalmic solution   No No   Sig: Administer 1 drop to the right eye 4 (four) times a day   fenofibrate (TRIGLIDE) 160 MG tablet   Yes No   Sig: Take 160 mg by mouth every evening   hydrocortisone 1 % cream   Yes No   Sig: Apply topically 2 (two) times a day Vaginal/ externally    ibuprofen (MOTRIN) 200 mg tablet   Yes No   Sig: Take 200 mg by mouth every 8 (eight) hours as needed for mild pain   ketorolac (ACULAR) 0 5 % ophthalmic solution   No No   Sig: Administer 1 drop to the right eye 4 (four) times a day   metFORMIN (GLUCOPHAGE) 500 mg tablet   Yes No   Sig: Take 500 mg by mouth 2 (two) times a day with meals Takes 2 tabs for pm dose= 1000mg   olmesartan-hydrochlorothiazide (BENICAR HCT) 40-25 MG per tablet   Yes No   Sig: Take 1 tablet by mouth every morning      Facility-Administered Medications: None       Past Medical History:   Diagnosis Date    Anemia     Anxiety     Arthritis     Chronic UTI     better since urethral stretching    Diabetes mellitus ([address] )     type 2    GERD (gastroesophageal reflux disease)     diet controlled    High triglycerides     Hypertension     Irritable bowel syndrome        Past Surgical History:   Procedure Laterality Date    APPENDECTOMY      age 14   Danny Pert GUM SURGERY      tumor removal benign x3    [place] REMV CATARACT EXTRACAP,INSERT LENS Left 5/15/2017    Procedure: EXTRACTION EXTRACAPSULAR CATARACT PHACO INTRAOCULAR LENS (IOL); Surgeon: Leatha Choudhary MD;  Location: [facility] MAIN OR;  Service: Ophthalmology     [facility] [address] Right 6/26/2017    Procedure: EXTRACTION EXTRACAPSULAR CATARACT PHACO INTRAOCULAR LENS (IOL); Surgeon: Leatha Choudhary MD;  Location: [facility] MAIN OR;  Service: Ophthalmology    TONSILLECTOMY         Family History   Problem Relation Age of Onset    Early death Mother      CHF    Early death Father      MI    Cancer Sister      stomach lymphoma    No Known Problems Son      I have reviewed and agree with the history as documented  Social History   Substance Use Topics    Smoking status: Former Smoker     Packs/day: 1 00     Years: 10 00     Quit date: 2010    Smokeless tobacco: Never Used    Alcohol use Yes      Comment: rarely        Review of Systems   Constitutional: Negative  Negative for chills, fever and unexpected weight change  Able to walk without difficulty  Denies IV drug use     HENT: Negative  Respiratory: Negative    Negative for cough, chest tightness, shortness of breath and wheezing  Cardiovascular: Negative  Negative for chest pain and palpitations  Gastrointestinal: Negative  Negative for abdominal pain, constipation, diarrhea, nausea and vomiting  Genitourinary: Negative  Negative for difficulty urinating, dysuria, flank pain, frequency, hematuria and urgency  Denies numbness, tingling in the groin  Musculoskeletal: Positive for back pain  Negative for arthralgias, gait problem, joint swelling, myalgias, neck pain and neck stiffness  Skin: Negative  Negative for color change  Neurological: Negative  Negative for dizziness, tremors, weakness, light-headedness, numbness and headaches  Denies numbness  Denies shooting pain down arms/ legs  All other systems reviewed and are negative  Physical Exam  ED Triage Vitals [02/25/18 1529]   Temperature Pulse Respirations Blood Pressure SpO2   98 3 °F (36 8 °C) 80 16 (!) 191/86 95 %      Temp Source Heart Rate Source Patient Position - Orthostatic VS BP Location FiO2 (%)   Tympanic Monitor Sitting Right arm --      Pain Score       Worst Possible Pain           Orthostatic Vital Signs  Vitals:    02/25/18 1529   BP: (!) 191/86   Pulse: 80   Patient Position - Orthostatic VS: Sitting       Physical Exam   Constitutional: She is oriented to person, place, and time  She appears well-developed and well-nourished  HENT:   Head: Normocephalic and atraumatic  Eyes: Conjunctivae are normal    Neck: Normal range of motion  Cardiovascular: Normal rate, regular rhythm, normal heart sounds and intact distal pulses  Pulmonary/Chest: Effort normal and breath sounds normal  No respiratory distress  She has no wheezes  She has no rales  She exhibits no tenderness  spo2 is 95% indicating adequate oxygenation  Abdominal: Soft  Bowel sounds are normal  She exhibits no distension and no mass  There is no tenderness  There is no rebound and no guarding  No hernia  Musculoskeletal:        Arms:  Neurological: She is alert and oriented to person, place, and time  Skin: Skin is warm and dry  Capillary refill takes less than 2 seconds  Nursing note and vitals reviewed  ED Medications  Medications   traMADol (ULTRAM) tablet 50 mg (50 mg Oral Given 2/25/18 5093)       Diagnostic Studies  Results Reviewed     None                 XR lumbar spine 2 or 3 views    (Results Pending)              Procedures  Procedures       Phone Contacts  ED Phone Contact    ED Course  ED Course                                MDM  Number of Diagnoses or Management Options  Diagnosis management comments: Patient good relief with tramadol, will prescribe very short course, informed not to drive or operate heavy machinery while taking  Ambulating emergency depart without difficulty  Patient is informed to return to the emergency department for worsening of symptoms and was given proper education regarding their diagnosis and symptoms  Otherwise the patient is informed to follow up with their primary care doctor for re-evaluation  The patient verbalizes understanding and agrees with above assessment and plan  All questions were answered  Please Note: Fluency Direct voice recognition software may have been used in the creation of this document  Wrong words or sound a like substitutions may have occurred due to the inherent limitations of the voice software             Amount and/or Complexity of Data Reviewed  Tests in the radiology section of CPT®: reviewed and ordered  Review and summarize past medical records: yes  Independent visualization of images, tracings, or specimens: yes      CritCare Time    Disposition  Final diagnoses:   Muscle spasm of back     Time reflects when diagnosis was documented in both MDM as applicable and the Disposition within this note     Time User Action Codes Description Comment    2/25/2018  5:09 PM Randell Meals Add [[id number] [id number]] Muscle spasm of back ED Disposition     ED Disposition Condition Comment    Discharge  Siena Jackson discharge to home/self care  Condition at discharge: Good        Follow-up Information     Follow up With Specialties Details Why Contact Info Additional [address] Emergency Department Emergency Medicine Go to If symptoms worsen such as numbness or tingling of the groin, weakness, fevers  [address] [facility] ED, [facility], [place] [address] Internal Medicine Schedule an appointment as soon as possible for a visit As needed if symptoms persist  [address] [place] [address] Rd  [phone]           Patient's Medications   Discharge Prescriptions    TRAMADOL (ULTRAM) 50 MG TABLET    Take 1 tablet (50 mg total) by mouth every 6 (six) hours as needed for moderate pain for up to 2 days       Start Date: 2/25/2018 End Date: 2/27/2018       Order Dose: 50 mg       Quantity: 8 tablet    Refills: 0     No discharge procedures on file      ED Provider  Electronically Signed by           Sally Johnson PA-C  02/25/18 [address] [place] [address],[address] [place], [place]  02/25/18 1037

## 2018-02-25 NOTE — DISCHARGE INSTRUCTIONS
Muscle Spasm   WHAT YOU NEED TO KNOW:   A muscle spasm is a sudden contraction of any muscle or group of muscles  A muscle cramp is a painful muscle spasm  Muscle cramps commonly occur after intense exercise or during pregnancy  They may also be caused by certain medications, dehydration, low calcium or magnesium levels, or another medical condition  DISCHARGE INSTRUCTIONS:   Medicines: You may need the following:  · NSAIDs  help decrease swelling and pain or fever  This medicine is available with or without a doctor's order  NSAIDs can cause stomach bleeding or kidney problems in certain people  If you take blood thinner medicine, always ask your healthcare provider if NSAIDs are safe for you  Always read the medicine label and follow directions  · Take your medicine as directed  Contact your healthcare provider if you think your medicine is not helping or if you have side effects  Tell him of her if you are allergic to any medicine  Keep a list of the medicines, vitamins, and herbs you take  Include the amounts, and when and why you take them  Bring the list or the pill bottles to follow-up visits  Carry your medicine list with you in case of an emergency  Follow up with your healthcare provider as directed: You may need other tests or treatment  You may also be referred to a physical therapist or other specialist  Write down your questions so you remember to ask them during your visits  Self-care:   · Stretch  your muscle to help relieve the cramp  It may be helpful to keep your muscle in the stretched position until the cramp is gone  · Apply heat  to help decrease pain and muscle spasms  Apply heat on the area for 20 to 30 minutes every 2 hours for as many days as directed  · Apply ice  to help decrease swelling and pain  Ice may also help prevent tissue damage  Use an ice pack, or put crushed ice in a plastic bag   Cover it with a towel and place it on your muscle for 15 to 20 minutes every hour or as directed  · Drink more liquids  to help prevent muscle cramps caused by dehydration  Sports drinks may help replace electrolytes you lose through sweat during exercise  Ask your healthcare provider how much liquid to drink each day and which liquids are best for you  · Eat healthy foods , such as fruits, vegetables, whole grains, low-fat dairy products, and lean proteins (meat, beans, and fish)  If you are pregnant, ask your healthcare provider about foods that are high in magnesium and sodium  They may help to relieve cramps during pregnancy  · Massage your muscle  to help relieve the cramp  · Take frequent deep breaths  until the cramp feels better  Lie down while you take the deep breaths so you do not get dizzy or lightheaded  Contact your healthcare provider if:   · You have signs of dehydration, such as a headache, dark yellow urine, dry eyes or mouth, or a fast heartbeat  · You have questions or concerns about your condition or care  Return to the emergency department if:   · You have warmth, swelling, or redness in the cramping muscle  · You have frequent or unrelieved muscle cramps in several different muscles  · You have muscle cramps with numbness, tingling, and burning in your hands and feet  © 2017 [facility] Information is for End User's use only and may not be sold, redistributed or otherwise used for commercial purposes  All illustrations and images included in CareNotes® are the copyrighted property of [employer]  or Josue Degroot  The above information is an  only  It is not intended as medical advice for individual conditions or treatments  Talk to your doctor, nurse or pharmacist before following any medical regimen to see if it is safe and effective for you

## 2018-03-09 ENCOUNTER — TELEPHONE (OUTPATIENT)
Dept: OBGYN CLINIC | Facility: CLINIC | Age: 78
End: 2018-03-09

## 2018-03-09 NOTE — TELEPHONE ENCOUNTER
Dr Elliott Church patient    Abby Loza would like to talk to [place]  She is scheduled to follow up with her back pain on 3/16 but would like to speak to her prior to  Best contact #[phone]    Thank you

## 2018-03-10 ENCOUNTER — HOSPITAL ENCOUNTER (EMERGENCY)
Facility: HOSPITAL | Age: 78
Discharge: HOME | End: 2018-03-10
Payer: MEDICARE

## 2018-03-10 ENCOUNTER — APPOINTMENT (EMERGENCY)
Dept: RADIOLOGY | Facility: HOSPITAL | Age: 78
End: 2018-03-10
Payer: MEDICARE

## 2018-03-10 VITALS
SYSTOLIC BLOOD PRESSURE: 163 MMHG | DIASTOLIC BLOOD PRESSURE: 88 MMHG | HEART RATE: 78 BPM | OXYGEN SATURATION: 98 % | RESPIRATION RATE: 18 BRPM | BODY MASS INDEX: 25.7 KG/M2 | WEIGHT: 174 LBS | TEMPERATURE: 97.8 F

## 2018-03-10 DIAGNOSIS — N39.0 UTI (URINARY TRACT INFECTION): ICD-10-CM

## 2018-03-10 DIAGNOSIS — M62.830 MUSCLE SPASM OF BACK: Primary | ICD-10-CM

## 2018-03-10 LAB
ANION GAP SERPL CALCULATED.3IONS-SCNC: 11 MMOL/L (ref 4–13)
APTT PPP: 27 SECONDS (ref 23–35)
BACTERIA UR QL AUTO: ABNORMAL /HPF
BASOPHILS # BLD AUTO: 0.1 THOUSANDS/ΜL (ref 0–0.1)
BASOPHILS NFR BLD AUTO: 1 % (ref 0–1)
BILIRUB UR QL STRIP: NEGATIVE
BUN SERPL-MCNC: 23 MG/DL (ref 5–25)
CALCIUM SERPL-MCNC: 9.5 MG/DL (ref 8.3–10.1)
CHLORIDE SERPL-SCNC: 99 MMOL/L (ref 100–108)
CK SERPL-CCNC: 37 U/L (ref 26–192)
CLARITY UR: CLEAR
CO2 SERPL-SCNC: 24 MMOL/L (ref 21–32)
COLOR UR: ABNORMAL
CREAT SERPL-MCNC: 0.85 MG/DL (ref 0.6–1.3)
EOSINOPHIL # BLD AUTO: 0.1 THOUSAND/ΜL (ref 0–0.61)
EOSINOPHIL NFR BLD AUTO: 1 % (ref 0–6)
ERYTHROCYTE [DISTWIDTH] IN BLOOD BY AUTOMATED COUNT: 14.3 % (ref 11.6–15.1)
GFR SERPL CREATININE-BSD FRML MDRD: 66 ML/MIN/1.73SQ M
GLUCOSE SERPL-MCNC: 120 MG/DL (ref 65–140)
GLUCOSE UR STRIP-MCNC: NEGATIVE MG/DL
HCT VFR BLD AUTO: 37.4 % (ref 37–47)
HGB BLD-MCNC: 11.9 G/DL (ref 12–16)
HGB UR QL STRIP.AUTO: NEGATIVE
INR PPP: 0.94 (ref 0.86–1.16)
KETONES UR STRIP-MCNC: NEGATIVE MG/DL
LEUKOCYTE ESTERASE UR QL STRIP: ABNORMAL
LIPASE SERPL-CCNC: 171 U/L (ref 73–393)
LYMPHOCYTES # BLD AUTO: 2.2 THOUSANDS/ΜL (ref 0.6–4.47)
LYMPHOCYTES NFR BLD AUTO: 23 % (ref 14–44)
MCH RBC QN AUTO: 27.4 PG (ref 27–31)
MCHC RBC AUTO-ENTMCNC: 31.9 G/DL (ref 31.4–37.4)
MCV RBC AUTO: 86 FL (ref 82–98)
MONOCYTES # BLD AUTO: 1 THOUSAND/ΜL (ref 0.17–1.22)
MONOCYTES NFR BLD AUTO: 11 % (ref 4–12)
NEUTROPHILS # BLD AUTO: 6.2 THOUSANDS/ΜL (ref 1.85–7.62)
NEUTS SEG NFR BLD AUTO: 64 % (ref 43–75)
NITRITE UR QL STRIP: POSITIVE
NON-SQ EPI CELLS URNS QL MICRO: ABNORMAL /HPF
NRBC BLD AUTO-RTO: 0 /100 WBCS
PH UR STRIP.AUTO: 7 [PH] (ref 5–9)
PLATELET # BLD AUTO: 308 THOUSANDS/UL (ref 130–400)
PMV BLD AUTO: 8.3 FL (ref 8.9–12.7)
POTASSIUM SERPL-SCNC: 4.2 MMOL/L (ref 3.5–5.3)
PROT UR STRIP-MCNC: NEGATIVE MG/DL
PROTHROMBIN TIME: 9.9 SECONDS (ref 9.4–11.7)
RBC # BLD AUTO: 4.34 MILLION/UL (ref 4.2–5.4)
RBC #/AREA URNS AUTO: ABNORMAL /HPF
SODIUM SERPL-SCNC: 134 MMOL/L (ref 136–145)
SP GR UR STRIP.AUTO: <=1.005 (ref 1–1.03)
TROPONIN I SERPL-MCNC: <0.02 NG/ML
TROPONIN I SERPL-MCNC: <0.02 NG/ML
UROBILINOGEN UR QL STRIP.AUTO: 0.2 E.U./DL
WBC # BLD AUTO: 9.7 THOUSAND/UL (ref 4.8–10.8)
WBC #/AREA URNS AUTO: ABNORMAL /HPF

## 2018-03-10 PROCEDURE — 71045 X-RAY EXAM CHEST 1 VIEW: CPT

## 2018-03-10 PROCEDURE — 93005 ELECTROCARDIOGRAM TRACING: CPT

## 2018-03-10 PROCEDURE — 80048 BASIC METABOLIC PNL TOTAL CA: CPT | Performed by: PHYSICIAN ASSISTANT

## 2018-03-10 PROCEDURE — 85025 COMPLETE CBC W/AUTO DIFF WBC: CPT | Performed by: PHYSICIAN ASSISTANT

## 2018-03-10 PROCEDURE — 96374 THER/PROPH/DIAG INJ IV PUSH: CPT

## 2018-03-10 PROCEDURE — 72070 X-RAY EXAM THORAC SPINE 2VWS: CPT

## 2018-03-10 PROCEDURE — 71275 CT ANGIOGRAPHY CHEST: CPT

## 2018-03-10 PROCEDURE — 85610 PROTHROMBIN TIME: CPT | Performed by: PHYSICIAN ASSISTANT

## 2018-03-10 PROCEDURE — 96361 HYDRATE IV INFUSION ADD-ON: CPT

## 2018-03-10 PROCEDURE — 83690 ASSAY OF LIPASE: CPT | Performed by: PHYSICIAN ASSISTANT

## 2018-03-10 PROCEDURE — 82550 ASSAY OF CK (CPK): CPT | Performed by: PHYSICIAN ASSISTANT

## 2018-03-10 PROCEDURE — 85730 THROMBOPLASTIN TIME PARTIAL: CPT | Performed by: PHYSICIAN ASSISTANT

## 2018-03-10 PROCEDURE — 36415 COLL VENOUS BLD VENIPUNCTURE: CPT | Performed by: PHYSICIAN ASSISTANT

## 2018-03-10 PROCEDURE — 84484 ASSAY OF TROPONIN QUANT: CPT | Performed by: PHYSICIAN ASSISTANT

## 2018-03-10 PROCEDURE — 81001 URINALYSIS AUTO W/SCOPE: CPT | Performed by: PHYSICIAN ASSISTANT

## 2018-03-10 PROCEDURE — 99285 EMERGENCY DEPT VISIT HI MDM: CPT

## 2018-03-10 RX ORDER — CIPROFLOXACIN 500 MG/1
500 TABLET, FILM COATED ORAL ONCE
Status: COMPLETED | OUTPATIENT
Start: 2018-03-10 | End: 2018-03-10

## 2018-03-10 RX ORDER — LIDOCAINE 50 MG/G
1 PATCH TOPICAL DAILY
Qty: 10 PATCH | Refills: 0 | Status: SHIPPED | OUTPATIENT
Start: 2018-03-10 | End: 2018-08-30

## 2018-03-10 RX ORDER — KETOROLAC TROMETHAMINE 30 MG/ML
15 INJECTION, SOLUTION INTRAMUSCULAR; INTRAVENOUS ONCE
Status: COMPLETED | OUTPATIENT
Start: 2018-03-10 | End: 2018-03-10

## 2018-03-10 RX ORDER — CYCLOBENZAPRINE HCL 10 MG
10 TABLET ORAL 2 TIMES DAILY
Qty: 6 TABLET | Refills: 0 | Status: SHIPPED | OUTPATIENT
Start: 2018-03-10 | End: 2018-08-30

## 2018-03-10 RX ORDER — CYCLOBENZAPRINE HCL 10 MG
10 TABLET ORAL ONCE
Status: COMPLETED | OUTPATIENT
Start: 2018-03-10 | End: 2018-03-10

## 2018-03-10 RX ORDER — CIPROFLOXACIN 500 MG/1
500 TABLET, FILM COATED ORAL EVERY 12 HOURS SCHEDULED
Qty: 14 TABLET | Refills: 0 | Status: SHIPPED | OUTPATIENT
Start: 2018-03-10 | End: 2018-03-17

## 2018-03-10 RX ORDER — LIDOCAINE 50 MG/G
1 PATCH TOPICAL ONCE
Status: DISCONTINUED | OUTPATIENT
Start: 2018-03-10 | End: 2018-03-10 | Stop reason: HOSPADM

## 2018-03-10 RX ORDER — OXYCODONE HYDROCHLORIDE AND ACETAMINOPHEN 5; 325 MG/1; MG/1
1 TABLET ORAL ONCE
Status: COMPLETED | OUTPATIENT
Start: 2018-03-10 | End: 2018-03-10

## 2018-03-10 RX ADMIN — SODIUM CHLORIDE 1000 ML: 0.9 INJECTION, SOLUTION INTRAVENOUS at 12:56

## 2018-03-10 RX ADMIN — LIDOCAINE 1 PATCH: 50 PATCH TOPICAL at 17:48

## 2018-03-10 RX ADMIN — IOHEXOL 85 ML: 350 INJECTION, SOLUTION INTRAVENOUS at 16:50

## 2018-03-10 RX ADMIN — CYCLOBENZAPRINE HYDROCHLORIDE 10 MG: 10 TABLET, FILM COATED ORAL at 15:01

## 2018-03-10 RX ADMIN — OXYCODONE HYDROCHLORIDE AND ACETAMINOPHEN 1 TABLET: 5; 325 TABLET ORAL at 17:03

## 2018-03-10 RX ADMIN — CIPROFLOXACIN 500 MG: 500 TABLET, FILM COATED ORAL at 17:48

## 2018-03-10 RX ADMIN — KETOROLAC TROMETHAMINE 15 MG: 30 INJECTION, SOLUTION INTRAMUSCULAR at 14:31

## 2018-03-10 NOTE — DISCHARGE INSTRUCTIONS

## 2018-03-10 NOTE — ED PROVIDER NOTES
History  Chief Complaint   Patient presents with    Chest Pain     Pt reports intermittent chest "ache" started on her way in today  Pt was initally coming in for back pain  58-year-old female, history of HTN, HLD, anemia, IBS, presenting today with mid-lower back pain over the past 2 days that began after she lifted her   Pain gradually worsened, comes and goes  States that pain reach 9/10 that is nonradiating  States that she also felt pull in her left upper chest that comes and goes only lasting few seconds and is "achy" and not a pain sensation and is nonradiating that has been ongoing for the past week  No diaphoresis  No chest heaviness or pressure  Chest discomfort does not radiate to the back  Is unsure if this is related to anxiety  Has been under increased stress recently  Currently does not have any back or chest pain  States that she has a chronic urinary tract infection however over the past 2 days has been going to the bathroom more frequently however this ended today  Patient relays that she has had this back pain before in the past and was seen here and given tramadol, states that it did not help, however her pain completely went away for a few weeks  Has an appointment with ortho on Friday  Continues to request percocet multiple times during her stay  Denies numbness, paresthesias, weakness, fevers, nausea, vomiting, shortness of breath, wheezing, weakness, fatigue, fever, cough  Prior to Admission Medications   Prescriptions Last Dose Informant Patient Reported? Taking?    Cranberry-Vitamin C-Probiotic (AZO CRANBERRY PO)   Yes No   Sig: Take by mouth every morning   Multiple Vitamins-Minerals (CENTRUM SILVER ADULT 50+ PO)   Yes No   Sig: Take by mouth daily with breakfast   amLODIPine (NORVASC) 5 mg tablet   Yes No   Sig: Take 5 mg by mouth every morning   aspirin (ECOTRIN LOW STRENGTH) 81 mg EC tablet   Yes No   Sig: Take 81 mg by mouth daily with breakfast ciprofloxacin (CILOXAN) 0 3 % ophthalmic solution   No No   Sig: Administer 1 drop to the right eye 4 (four) times a day   fenofibrate (TRIGLIDE) 160 MG tablet   Yes No   Sig: Take 160 mg by mouth every evening   hydrocortisone 1 % cream   Yes No   Sig: Apply topically 2 (two) times a day Vaginal/ externally    ibuprofen (MOTRIN) 200 mg tablet   Yes No   Sig: Take 200 mg by mouth every 8 (eight) hours as needed for mild pain   ketorolac (ACULAR) 0 5 % ophthalmic solution   No No   Sig: Administer 1 drop to the right eye 4 (four) times a day   metFORMIN (GLUCOPHAGE) 500 mg tablet   Yes No   Sig: Take 500 mg by mouth 2 (two) times a day with meals Takes 2 tabs for pm dose= 1000mg   olmesartan-hydrochlorothiazide (BENICAR HCT) 40-25 MG per tablet   Yes No   Sig: Take 1 tablet by mouth every morning      Facility-Administered Medications: None       Past Medical History:   Diagnosis Date    Anemia     Anxiety     Arthritis     Chronic UTI     better since urethral stretching    Diabetes mellitus (HCC)     type 2    GERD (gastroesophageal reflux disease)     diet controlled    High triglycerides     Hypertension     Irritable bowel syndrome        Past Surgical History:   Procedure Laterality Date    APPENDECTOMY      age 14   Ardeth Needs GUM SURGERY      tumor removal benign x3    ME REMV CATARACT EXTRACAP,INSERT LENS Left 5/15/2017    Procedure: EXTRACTION EXTRACAPSULAR CATARACT PHACO INTRAOCULAR LENS (IOL); Surgeon: Madyson Rush MD;  Location: [facility] MAIN OR;  Service: Ophthalmology     [address] Right 6/26/2017    Procedure: EXTRACTION EXTRACAPSULAR CATARACT PHACO INTRAOCULAR LENS (IOL);   Surgeon: Madyson Rush MD;  Location: [facility] MAIN OR;  Service: Ophthalmology    TONSILLECTOMY         Family History   Problem Relation Age of Onset    Early death Mother      CHF    Early death Father      MI    Cancer Sister      stomach lymphoma    No Known Problems Son      I have reviewed and agree with the history as documented  Social History   Substance Use Topics    Smoking status: Former Smoker     Packs/day: 1 00     Years: 10 00     Quit date: 2010    Smokeless tobacco: Never Used    Alcohol use Yes      Comment: rarely        Review of Systems   Constitutional: Negative  Negative for chills, fever and unexpected weight change  Able to walk without difficulty  Denies IV drug use     HENT: Negative  Eyes: Negative  Respiratory: Negative  Negative for cough, chest tightness, shortness of breath and wheezing  Cardiovascular: Negative  Negative for chest pain and palpitations  Chest ache   Gastrointestinal: Negative  Negative for abdominal pain, constipation, diarrhea, nausea and vomiting  Genitourinary: Positive for decreased urine volume and frequency  Negative for difficulty urinating, dyspareunia, dysuria, enuresis, flank pain, genital sores, hematuria, menstrual problem, pelvic pain, urgency, vaginal bleeding, vaginal discharge and vaginal pain  Denies numbness, tingling in the groin  Musculoskeletal: Positive for back pain  Negative for arthralgias, neck pain and neck stiffness  Skin: Negative  Negative for color change  Neurological: Negative  Negative for dizziness, tremors, weakness, light-headedness, numbness and headaches  Denies numbness  Denies shooting pain down arms/ legs  All other systems reviewed and are negative        Physical Exam  ED Triage Vitals   Temperature Pulse Respirations Blood Pressure SpO2   03/10/18 1215 03/10/18 1216 03/10/18 1216 03/10/18 1216 03/10/18 1216   (!) 96 8 °F (36 °C) 86 19 162/72 96 %      Temp Source Heart Rate Source Patient Position - Orthostatic VS BP Location FiO2 (%)   03/10/18 1215 03/10/18 1216 03/10/18 1216 03/10/18 1216 --   Temporal Monitor Lying Right arm       Pain Score       03/10/18 1215       9           Orthostatic Vital Signs  Vitals:    03/10/18 1216 03/10/18 1420 03/10/18 1547 03/10/18 1753   BP: 162/72 (!) 178/78  163/88   Pulse: 86 76 72 78   Patient Position - Orthostatic VS: Lying  Lying Lying       Physical Exam   Constitutional: She is oriented to person, place, and time  She appears well-developed and well-nourished  Currently asymptomatic  HENT:   Head: Normocephalic and atraumatic  Eyes: Conjunctivae and EOM are normal  Pupils are equal, round, and reactive to light  Neck: Normal range of motion  Neck supple  Cardiovascular: Normal rate, regular rhythm, normal heart sounds and intact distal pulses  Pulmonary/Chest: Effort normal and breath sounds normal  No respiratory distress  She has no wheezes  She has no rales  She exhibits no tenderness  spo2 is 98% indicating adequate oxygenation  Abdominal: Soft  Bowel sounds are normal  She exhibits no distension and no mass  There is no tenderness  There is no rebound and no guarding  No hernia  Musculoskeletal:        Arms:  Neurological: She is alert and oriented to person, place, and time  Skin: Skin is warm and dry  Capillary refill takes less than 2 seconds  Nursing note and vitals reviewed        ED Medications  Medications   lidocaine (LIDODERM) 5 % patch 1 patch (1 patch Transdermal Medication Applied 3/10/18 1748)   sodium chloride 0 9 % bolus 1,000 mL (0 mL Intravenous Stopped 3/10/18 1617)   ketorolac (TORADOL) injection 15 mg (15 mg Intravenous Given 3/10/18 1431)   cyclobenzaprine (FLEXERIL) tablet 10 mg (10 mg Oral Given 3/10/18 1501)   oxyCODONE-acetaminophen (PERCOCET) 5-325 mg per tablet 1 tablet (1 tablet Oral Given 3/10/18 1703)   iohexol (OMNIPAQUE) 350 MG/ML injection (MULTI-DOSE) 85 mL (85 mL Intravenous Given 3/10/18 1650)   ciprofloxacin (CIPRO) tablet 500 mg (500 mg Oral Given 3/10/18 1748)       Diagnostic Studies  Results Reviewed     Procedure Component Value Units Date/Time    Troponin I [[id number]]  (Normal) Collected:  03/10/18 1551    Lab Status:  Final result Specimen:  Blood from Arm, Left Updated:  03/10/18 1616     Troponin I <0 02 ng/mL     Narrative:         Siemens Chemistry analyzer 99% cutoff is > 0 04 ng/mL in network labs    o cTnI 99% cutoff is useful only when applied to patients in the clinical setting of myocardial ischemia  o cTnI 99% cutoff should be interpreted in the context of clinical history, ECG findings and possibly cardiac imaging to establish correct diagnosis  o cTnI 99% cutoff may be suggestive but clearly not indicative of a coronary event without the clinical setting of myocardial ischemia      Urine Microscopic [[id number]]  (Abnormal) Collected:  03/10/18 1401    Lab Status:  Final result Specimen:  Urine from Urine, Clean Catch Updated:  03/10/18 1429     RBC, UA None Seen /hpf      WBC, UA 0-5 /hpf      Epithelial Cells Occasional /hpf      Bacteria, UA Innumerable (A) /hpf     UA w Reflex to Microscopic [[id number]]  (Abnormal) Collected:  03/10/18 1401    Lab Status:  Final result Specimen:  Urine from Urine, Clean Catch Updated:  03/10/18 1407     Color, UA Light Yellow     Clarity, UA Clear     Specific Gravity, UA <=1 005     pH, UA 7 0     Leukocytes, UA Moderate (A)     Nitrite, UA Positive (A)     Protein, UA Negative mg/dl      Glucose, UA Negative mg/dl      Ketones, UA Negative mg/dl      Urobilinogen, UA 0 2 E U /dl      Bilirubin, UA Negative     Blood, UA Negative    Basic metabolic panel [[id number]]  (Abnormal) Collected:  03/10/18 1256    Lab Status:  Final result Specimen:  Blood from Arm, Left Updated:  03/10/18 1328     Sodium 134 (L) mmol/L      Potassium 4 2 mmol/L      Chloride 99 (L) mmol/L      CO2 24 mmol/L      Anion Gap 11 mmol/L      BUN 23 mg/dL      Creatinine 0 85 mg/dL      Glucose 120 mg/dL      Calcium 9 5 mg/dL      eGFR 66 ml/min/1 73sq m     Narrative:         National Kidney Disease Education Program recommendations are as follows:  GFR calculation is accurate only with a steady state creatinine  Chronic Kidney disease less than 60 ml/min/1 73 sq  meters  Kidney failure less than 15 ml/min/1 73 sq  meters  Lipase [[id number]]  (Normal) Collected:  03/10/18 1256    Lab Status:  Final result Specimen:  Blood from Arm, Left Updated:  03/10/18 1328     Lipase 171 u/L     CK Total with Reflex CKMB [[id number]]  (Normal) Collected:  03/10/18 1256    Lab Status:  Final result Specimen:  Blood from Arm, Left Updated:  03/10/18 1328     Total CK 37 U/L     Troponin I [[id number]]  (Normal) Collected:  03/10/18 1256    Lab Status:  Final result Specimen:  Blood from Arm, Left Updated:  03/10/18 1327     Troponin I <0 02 ng/mL     Narrative:         Siemens Chemistry analyzer 99% cutoff is > 0 04 ng/mL in network labs    o cTnI 99% cutoff is useful only when applied to patients in the clinical setting of myocardial ischemia  o cTnI 99% cutoff should be interpreted in the context of clinical history, ECG findings and possibly cardiac imaging to establish correct diagnosis  o cTnI 99% cutoff may be suggestive but clearly not indicative of a coronary event without the clinical setting of myocardial ischemia  Protime-INR [[id number]]  (Normal) Collected:  03/10/18 1256    Lab Status:  Final result Specimen:  Blood from Arm, Left Updated:  03/10/18 1319     Protime 9 9 seconds      INR 0 94    APTT [[id number]]  (Normal) Collected:  03/10/18 1256    Lab Status:  Final result Specimen:  Blood from Arm, Left Updated:  03/10/18 1319     PTT 27 seconds     Narrative:          Therapeutic Heparin Range = 60-90 seconds    CBC and differential [[id number]]  (Abnormal) Collected:  03/10/18 1256    Lab Status:  Final result Specimen:  Blood from Arm, Left Updated:  03/10/18 1305     WBC 9 70 Thousand/uL      RBC 4 34 Million/uL      Hemoglobin 11 9 (L) g/dL      Hematocrit 37 4 %      MCV 86 fL      MCH 27 4 pg      MCHC 31 9 g/dL      RDW 14 3 %      MPV 8 3 (L) fL      Platelets 989 Thousands/uL      nRBC 0 /100 WBCs      Neutrophils Relative 64 %      Lymphocytes Relative 23 %      Monocytes Relative 11 %      Eosinophils Relative 1 %      Basophils Relative 1 %      Neutrophils Absolute 6 20 Thousands/µL      Lymphocytes Absolute 2 20 Thousands/µL      Monocytes Absolute 1 00 Thousand/µL      Eosinophils Absolute 0 10 Thousand/µL      Basophils Absolute 0 10 Thousands/µL                  CTA ED chest PE study   Final Result by Otilia Torres DO (03/10 1709)   No central pulmonary embolism  Atelectatic changes most pronounced in right lung base                       Workstation performed: [id number]         XR spine thoracic 2 vw    (Results Pending)   XR chest 1 view    (Results Pending)              Procedures  ECG 12 Lead Documentation  Date/Time: 3/10/2018 12:26 PM  Performed by: Yasmine Payan  Authorized by: Yasmine Payan     Indications / Diagnosis:  Chest discomfort   ECG reviewed by me, the ED Provider: yes    Patient location:  ED  Previous ECG:     Previous ECG:  Unavailable    Comparison to cardiac monitor: Yes    Interpretation:     Interpretation: abnormal    Rate:     ECG rate:  78    ECG rate assessment: normal    Rhythm:     Rhythm: sinus rhythm and A-V block      Rhythm comment:  1st degree  Ectopy:     Ectopy: none    QRS:     QRS axis:  Normal    QRS intervals:  Normal  Conduction:     Conduction: normal    ST segments:     ST segments:  Normal  T waves:     T waves: normal             Phone Contacts  ED Phone Contact    ED Course  ED Course as of Mar 10 1809   Sat Mar 10, 2018   1558 Called for reading           HEART Risk Score    Flowsheet Row Most Recent Value   History  0 Filed at: 03/10/2018 1758   ECG  0 Filed at: 03/10/2018 1758   Age  2 Filed at: 03/10/2018 1758   Risk Factors  1 Filed at: 03/10/2018 1758   Troponin  0 Filed at: 03/10/2018 1758   Heart Score Risk Calculator   History  0 Filed at: 03/10/2018 1758   ECG  0 Filed at: 03/10/2018 1758   Age  2 Filed at: 03/10/2018 1758   Risk Factors  1 Filed at: 03/10/2018 1758   Troponin  0 Filed at: 03/10/2018 1758   HEART Score  3 Filed at: 03/10/2018 1758   HEART Score  3 Filed at: 03/10/2018 1758                            MDM  Number of Diagnoses or Management Options  Muscle spasm of back:   UTI (urinary tract infection):   Diagnosis management comments: Discussed with patient results of the lab tests and imaging studies  Tolerated Flexeril well, will write very short course, informed not to drive or operate heavy machinery while taking  Will also write cipro for UTI and lidocaine patch  Continues to ask for percocet, I discussed with patient hospital policy  Ambulating in the ED without difficulty  She has taken this medication without difficulty in the past  Will have her f/u with her pcp for re-evaluation and discussed that she will need to get stress test in future  This is a weekend and cannot admit for stress test  Repeat troponin was negative  Likely muscle spasm and UTI as she is recently symptomatic  Patient is informed to return to the emergency department for worsening of symptoms and was given proper education regarding their diagnosis and symptoms  Otherwise the patient is informed to follow up with their primary care doctor for re-evaluation  The patient verbalizes understanding and agrees with above assessment and plan  All questions were answered  Please Note: Fluency Direct voice recognition software may have been used in the creation of this document  Wrong words or sound a like substitutions may have occurred due to the inherent limitations of the voice software             Amount and/or Complexity of Data Reviewed  Clinical lab tests: ordered and reviewed  Tests in the radiology section of CPT®: ordered and reviewed  Review and summarize past medical records: yes  Discuss the patient with other providers: yes (Dr Lew Ponce )  Independent visualization of images, tracings, or specimens: yes      CritCare Time    Disposition  Final diagnoses: Muscle spasm of back   UTI (urinary tract infection)     Time reflects when diagnosis was documented in both MDM as applicable and the Disposition within this note     Time User Action Codes Description Comment    3/10/2018  5:38 PM Sita Thompson Add [N67 [id number]] Muscle spasm of back     3/10/2018  5:38 PM Sita Thompson Add [N39 0] UTI (urinary tract infection)       ED Disposition     ED Disposition Condition Comment    Discharge  Leah Bhatt discharge to home/self care  Condition at discharge: Good        Follow-up Information     Follow up With Specialties Details Why Contact Info Additional [address] Emergency Department Emergency Medicine Go to If symptoms worsen such as fevers, difficulty breathing, chest pain    [address] [facility], [place], [place], [place] [address] Internal Medicine Schedule an appointment as soon as possible for a visit in 2 days  [address] [facility]  [phone]           Discharge Medication List as of 3/10/2018  5:40 PM      START taking these medications    Details   ciprofloxacin (CIPRO) 500 mg tablet Take 1 tablet (500 mg total) by mouth every 12 (twelve) hours for 7 days, Starting Sat 3/10/2018, Until Sat 3/17/2018, Print      cyclobenzaprine (FLEXERIL) 10 mg tablet Take 1 tablet (10 mg total) by mouth 2 (two) times a day for 3 days, Starting Sat 3/10/2018, Until Tue 3/13/2018, Print      lidocaine (LIDODERM) 5 % Place 1 patch on the skin daily for 10 days Remove & Discard patch within 12 hours or as directed by MD, Starting Sat 3/10/2018, Until Tue 3/20/2018, Print         CONTINUE these medications which have NOT CHANGED    Details   amLODIPine (NORVASC) 5 mg tablet Take 5 mg by mouth every morning, Until Discontinued, Historical Med      aspirin (ECOTRIN LOW STRENGTH) 81 mg EC tablet Take 81 mg by mouth daily with breakfast, Until Discontinued, Historical Med      ciprofloxacin (CILOXAN) 0 3 % ophthalmic solution Administer 1 drop to the right eye 4 (four) times a day, Starting Mon 6/26/2017, No Print      Cranberry-Vitamin C-Probiotic (AZO CRANBERRY PO) Take by mouth every morning, Until Discontinued, Historical Med      fenofibrate (TRIGLIDE) 160 MG tablet Take 160 mg by mouth every evening, Until Discontinued, Historical Med      hydrocortisone 1 % cream Apply topically 2 (two) times a day Vaginal/ externally , Until Discontinued, Historical Med      ibuprofen (MOTRIN) 200 mg tablet Take 200 mg by mouth every 8 (eight) hours as needed for mild pain, Until Discontinued, Historical Med      ketorolac (ACULAR) 0 5 % ophthalmic solution Administer 1 drop to the right eye 4 (four) times a day, Starting Mon 6/26/2017, No Print      metFORMIN (GLUCOPHAGE) 500 mg tablet Take 500 mg by mouth 2 (two) times a day with meals Takes 2 tabs for pm dose= 1000mg, Until Discontinued, Historical Med      Multiple Vitamins-Minerals (CENTRUM SILVER ADULT 50+ PO) Take by mouth daily with breakfast, Until Discontinued, Historical Med      olmesartan-hydrochlorothiazide (BENICAR HCT) 40-25 MG per tablet Take 1 tablet by mouth every morning, Until Discontinued, Historical Med           No discharge procedures on file      ED Provider  Electronically Signed by           Laura Jung PA-C  03/10/18 5598

## 2018-03-12 LAB
ATRIAL RATE: 78 BPM
P AXIS: 28 DEGREES
PR INTERVAL: 232 MS
QRS AXIS: 85 DEGREES
QRSD INTERVAL: 100 MS
QT INTERVAL: 406 MS
QTC INTERVAL: 462 MS
T WAVE AXIS: 24 DEGREES
VENTRICULAR RATE: 78 BPM

## 2018-03-12 PROCEDURE — 93010 ELECTROCARDIOGRAM REPORT: CPT | Performed by: INTERNAL MEDICINE

## 2018-03-14 ENCOUNTER — TRANSCRIBE ORDERS (OUTPATIENT)
Dept: ADMINISTRATIVE | Facility: HOSPITAL | Age: 78
End: 2018-03-14

## 2018-03-14 DIAGNOSIS — R10.9 ABDOMINAL PAIN, UNSPECIFIED ABDOMINAL LOCATION: Primary | ICD-10-CM

## 2018-03-16 ENCOUNTER — OFFICE VISIT (OUTPATIENT)
Dept: OBGYN CLINIC | Facility: CLINIC | Age: 78
End: 2018-03-16
Payer: MEDICARE

## 2018-03-16 VITALS
DIASTOLIC BLOOD PRESSURE: 62 MMHG | HEIGHT: 69 IN | BODY MASS INDEX: 25.59 KG/M2 | SYSTOLIC BLOOD PRESSURE: 148 MMHG | WEIGHT: 172.8 LBS | HEART RATE: 83 BPM

## 2018-03-16 DIAGNOSIS — S29.019A THORACIC MYOFASCIAL STRAIN, INITIAL ENCOUNTER: Primary | ICD-10-CM

## 2018-03-16 PROCEDURE — 99214 OFFICE O/P EST MOD 30 MIN: CPT | Performed by: ORTHOPAEDIC SURGERY

## 2018-03-16 RX ORDER — LEVOTHYROXINE SODIUM 0.03 MG/1
TABLET ORAL
COMMUNITY
Start: 2017-11-07 | End: 2019-05-10 | Stop reason: DRUGHIGH

## 2018-03-16 NOTE — PROGRESS NOTES
H&P Exam - Orthopedics   Preston Gonzalez 65 y o  female MRN: [id number]  Unit/Bed#:  Encounter: [id number]    Assessment/Plan     Assessment:  Thoracic and lumbar strain  Plan:  Vamsi Kraft has significant strain in the thoracic and lumbar spine  She does also have significant arthritis  I believe that there may be an indirect correlation with her abdominal issues as she does have significant abdominal hernia and this is having her belly protrude quite a bit anteriorly  She is going to have that reviewed by physician on Tuesday next week  I will see her back as needed  I did give her some back specific exercises  I do not recommend use the cortisone cream that she was using previously is she does appear to have a reaction to that  History of Present Illness   HPI:  Preston Gonzalez is a 65 y o  female who presents with low-back pain and midback pain  It has been ongoing for least 6 weeks  It is intermittent in nature  She has taken some Percocet for which helped her  She states that the pain is a sharp and moderate and intermittent discomfort  Flexeril has not helped her  She was seen at the hospital where x-rays of the thoracic and lumbar spine were taken that demonstrated significant degenerative changes throughout  There is also an old compression fracture at L2  A CT scan done of her chest demonstrated no obvious pulmonary abnormality  She denies any numbness or tingling in the lower extremities       Review of Systems   Constitutional: Negative for chills, fever and unexpected weight change  HENT: Negative for hearing loss, nosebleeds and sore throat  Eyes: Negative for pain, redness and visual disturbance  Respiratory: Negative for cough, shortness of breath and wheezing  Cardiovascular: Negative for chest pain, palpitations and leg swelling  Gastrointestinal: Negative for abdominal pain, nausea and vomiting  Endocrine: Negative for polydipsia and polyuria     Genitourinary: Negative for dysuria and hematuria  Skin: Positive for rash  Negative for wound  Neurological: Negative for dizziness, numbness and headaches  Psychiatric/Behavioral: Negative for decreased concentration and suicidal ideas  The patient is not nervous/anxious  Historical Information   Past Medical History:   Diagnosis Date    Anemia     Anxiety     Arthritis     Chronic UTI     better since urethral stretching    Diabetes mellitus ([address] )     type 2    GERD (gastroesophageal reflux disease)     diet controlled    High triglycerides     Hypertension     Irritable bowel syndrome      Past Surgical History:   Procedure Laterality Date    APPENDECTOMY      age 15    GUM SURGERY      tumor removal benign x3    OR REMV CATARACT EXTRACAP,INSERT LENS Left 5/15/2017    Procedure: EXTRACTION EXTRACAPSULAR CATARACT PHACO INTRAOCULAR LENS (IOL); Surgeon: Mindi Emery MD;  Location: [facility] MAIN OR;  Service: Ophthalmology     [facility] [address] Right 6/26/2017    Procedure: EXTRACTION EXTRACAPSULAR CATARACT PHACO INTRAOCULAR LENS (IOL);   Surgeon: Mindi Emery MD;  Location: [facility] MAIN OR;  Service: Ophthalmology    TONSILLECTOMY       Social History   History   Alcohol Use    Yes     Comment: rarely     History   Drug Use No     History   Smoking Status    Former Smoker    Packs/day: 1 00    Years: 10 00    Quit date: 2010   Smokeless Tobacco    Never Used     Family History:   Family History   Problem Relation Age of Onset   [address] Early death Mother      CHF    Early death Father      MI    Cancer Sister      stomach lymphoma    No Known Problems Son        Meds/Allergies   all medications and allergies reviewed  Allergies   Allergen Reactions    Amoxicillin GI Intolerance    Lactose GI Intolerance    Lidocaine      Lidocaine patch    Sulfa Antibiotics GI Intolerance       Objective   Vitals: Blood pressure 148/62, pulse 83, height 5' 9" (1 753 m), weight 78 4 kg (172 lb 12 8 oz), not currently breastfeeding  ,Body mass index is 25 52 kg/m²  [unfilled]    [unfilled]    Invasive Devices          No matching active lines, drains, or airways          Physical Exam   Constitutional: She is oriented to person, place, and time  She appears well-developed and well-nourished  HENT:   Head: Normocephalic and atraumatic  Eyes: Conjunctivae and EOM are normal    Neck: Normal range of motion  Cardiovascular: Intact distal pulses  Pulmonary/Chest: Effort normal  No respiratory distress  Neurological: She is alert and oriented to person, place, and time  Skin: Skin is warm and dry  Psychiatric: She has a normal mood and affect  Her behavior is normal      Back Exam     Tenderness   The patient is experiencing tenderness in the lumbar and thoracic  Range of Motion   Extension: abnormal   Flexion: abnormal     Muscle Strength   Right Quadriceps:  5/5   Left Quadriceps:  5/5   Right Hamstrings:  5/5   Left Hamstrings:  5/5     Tests   Straight leg raise right: negative  Straight leg raise left: negative    Other   Gait: normal   Erythema: back redness    Comments:  Sensation light touch intact throughout the bilateral lower extremities and motor function intact throughout bilateral lower extremities            Lab Results:   Imaging: I have personally reviewed pertinent films in PACS  EKG, Pathology, and Other Studies: I have personally reviewed pertinent films in PACS x-rays of the thoracic and lumbar spine demonstrates severe degenerative changes throughout and an old compression fracture at L2    The CT scan of the lungs demonstrate no obvious abnormality    Code Status: [unfilled]  Advance Directive and Living Will:      Power of :    POLST:

## 2018-03-16 NOTE — PATIENT INSTRUCTIONS
Core Strengthening Exercises   WHAT YOU NEED TO KNOW:   Your core includes the muscles of your lower back, hip, pelvis, and abdomen  Core strengthening exercises help heal and strengthen these muscles  This helps prevent another injury, and keeps your pelvis, spine, and hips in the correct position  DISCHARGE INSTRUCTIONS:   Contact your healthcare provider if:   · You have sharp or worsening pain during exercise or at rest     · You have questions or concerns about your shoulder exercises  Safety tips:  Talk to your healthcare provider before you start an exercise program  A physical therapist can teach you how to do core strengthening exercises safely  · Do the exercises on a mat or firm surface  A firm surface will support your spine and avoid low back pain  Do not do these exercises on a bed  · Move slowly and smoothly  Avoid fast or jerky motions  · Stop if you feel pain  Core exercises should not feel painful  Stop if you feel pain  · Breathe normally during core exercises  Do not hold your breath  This may cause an increase in blood pressure and prevent muscle strengthening  Your healthcare provider will tell you when to inhale and exhale during the exercise  · Begin all of your exercises with abdominal bracing  Abdominal bracing helps warm up your core muscles  You can also practice abdominal bracing throughout the day while you are sitting or standing  Lie on your back with your knees bent and feet flat on the floor  Place your arms in a relaxed position beside your body  Tighten your abdominal muscles  Pull your belly button in and up toward your spine  Hold for 5 seconds  Relax your muscles  Repeat 10 times  Core strengthening exercises: Your healthcare provider will tell you how often to do these exercises  The provider will also tell you how many repetitions of each exercise you should do  Hold each exercise for 5 seconds or as directed   As you get stronger, increase your hold to 10 to 15 seconds  You can do some of these exercises on a stability ball, or with a weight  Ask your healthcare provider how to use a stability ball or weight for these exercises:  · Bent leg side bridge:  Lie on one side with your legs, hips, and shoulders in a straight line  Prop yourself up onto your forearm so your elbow is directly below your shoulder  Bend your knees to 90°  Lift your hips off the floor  Balance yourself on your forearm and the side of your knee  Hold this position  Repeat on the other side  · Straight leg side bridge:  Lie on one side with your legs, hips, and shoulders in a straight line  Prop yourself up onto your forearm so your elbow is directly below your shoulder  Your top leg can rest in front of your bottom leg for support  Lift your hips off the floor  Balance yourself on your forearm and the outside of your flexed foot  Do not let your ankle bend sideways  Hold this position  Repeat on the other side  · Superman:  Lie on your stomach  Extend your arms forward on the floor  Tighten your abdominal muscles and lift your right hand and left leg off the floor  Hold this position  Slowly return to the starting position  Tighten your abdominal muscles and lift your left hand and right leg off the floor  Hold this position  Slowly return to the starting position  · Curl up:  Lie on your back with your knees bent and feet flat on the floor  Place your hands, palms down, underneath your lower back  Next, with your elbows on the floor, lift your shoulders and chest 2 to 3 inches off the floor  Keep your head in line with your shoulders  Hold this position  Slowly return to the starting position  · Straight leg raises:  Lie on your back with one leg straight  Bend the other knee and place your foot flat on the floor  Tighten your abdominal muscles  Keep your leg straight and slowly lift it straight up 6 to 12 inches off the floor   Hold this position  Lower your leg slowly  Do as many repetitions as directed on this side  Repeat with the other leg  · Plank:  Lie on your stomach  Bend your elbows and place your forearms flat on the floor  Lift your chest, stomach, and knees off the floor  Make sure your elbows are below your shoulders  Your body should be in a straight line  Do not let your hips or lower back sink to the ground  Squeeze your abdominal muscles together and hold for 15 seconds  To make this exercise harder, hold for 30 seconds or lift 1 leg at a time  · Bicycles:  Lie on your back  Bend both knees and bring them toward your chest  Your calves should be parallel to the floor  Place the palms of your hands on the back of your head  Straighten your right leg and keep it lifted 2 inches off the floor  Raise your head and shoulders off the floor and twist towards your left  Keep your head and shoulders lifted  Bend your right knee while you straighten your left leg  Keep your left leg 2 inches off the floor  Twist your head and chest towards the left leg  Continue to straighten 1 leg at a time and twist        Follow up with your healthcare provider as directed:  Write down your questions so you remember to ask them during your visits  © 2017 [address] [employer] Information is for End User's use only and may not be sold, redistributed or otherwise used for commercial purposes  All illustrations and images included in CareNotes® are the copyrighted property of [employer]  or [address]  The above information is an  only  It is not intended as medical advice for individual conditions or treatments  Talk to your doctor, nurse or pharmacist before following any medical regimen to see if it is safe and effective for you

## 2018-03-20 ENCOUNTER — HOSPITAL ENCOUNTER (OUTPATIENT)
Dept: RADIOLOGY | Facility: HOSPITAL | Age: 78
Discharge: HOME | End: 2018-03-20
Attending: INTERNAL MEDICINE
Payer: MEDICARE

## 2018-03-20 DIAGNOSIS — R10.9 ABDOMINAL PAIN, UNSPECIFIED ABDOMINAL LOCATION: ICD-10-CM

## 2018-03-20 PROCEDURE — 74177 CT ABD & PELVIS W/CONTRAST: CPT

## 2018-03-20 RX ADMIN — IOHEXOL 100 ML: 350 INJECTION, SOLUTION INTRAVENOUS at 10:05

## 2018-03-22 ENCOUNTER — TELEPHONE (OUTPATIENT)
Dept: OBGYN CLINIC | Facility: CLINIC | Age: 78
End: 2018-03-22

## 2018-03-22 NOTE — TELEPHONE ENCOUNTER
The CT scan did demonstrate the abdominal hernia and also demonstrated arthritis and the chronic compression fracture in the lumbar spine  I believe these both are affecting her low and mid back pain  She should follow up with Dr Yohana Azul for the hernia and other abdominal findings on the CT scan  She may follow up with our spine specialists for her degenerative disease in the lumbar spine  Please call her and let her know this

## 2018-03-22 NOTE — TELEPHONE ENCOUNTER
PATIENT CALLED AND WOULD LIKE TO SEE WHAT YOU SEE IN HER CT SCAN YOU RECOMMEND FOR HER TO GET DONE OF HER ABDOMEN  LOOKS LIKE DR Dung Hewitt  SHE WOULD LIKE YOUR OPINION STATES YOU ADVISED HER TO GET THIS AND LOOK INTO THIS CONCERNING HER BACK PAIN  PLEASE ADVISE

## 2018-08-30 ENCOUNTER — APPOINTMENT (EMERGENCY)
Dept: RADIOLOGY | Facility: HOSPITAL | Age: 78
End: 2018-08-30
Payer: MEDICARE

## 2018-08-30 ENCOUNTER — HOSPITAL ENCOUNTER (EMERGENCY)
Facility: HOSPITAL | Age: 78
Discharge: HOME | End: 2018-08-30
Attending: EMERGENCY MEDICINE | Admitting: EMERGENCY MEDICINE
Payer: MEDICARE

## 2018-08-30 VITALS
HEART RATE: 77 BPM | WEIGHT: 179 LBS | DIASTOLIC BLOOD PRESSURE: 67 MMHG | BODY MASS INDEX: 26.43 KG/M2 | OXYGEN SATURATION: 95 % | SYSTOLIC BLOOD PRESSURE: 149 MMHG | RESPIRATION RATE: 18 BRPM | TEMPERATURE: 97.7 F

## 2018-08-30 DIAGNOSIS — R11.10 VOMITING: Primary | ICD-10-CM

## 2018-08-30 LAB
ANION GAP SERPL CALCULATED.3IONS-SCNC: 9 MMOL/L (ref 4–13)
BASOPHILS # BLD AUTO: 0.05 THOUSANDS/ΜL (ref 0–0.1)
BASOPHILS NFR BLD AUTO: 0 % (ref 0–1)
BUN SERPL-MCNC: 25 MG/DL (ref 5–25)
CALCIUM SERPL-MCNC: 9.3 MG/DL (ref 8.3–10.1)
CHLORIDE SERPL-SCNC: 98 MMOL/L (ref 100–108)
CO2 SERPL-SCNC: 26 MMOL/L (ref 21–32)
CREAT SERPL-MCNC: 0.89 MG/DL (ref 0.6–1.3)
EOSINOPHIL # BLD AUTO: 0.05 THOUSAND/ΜL (ref 0–0.61)
EOSINOPHIL NFR BLD AUTO: 0 % (ref 0–6)
ERYTHROCYTE [DISTWIDTH] IN BLOOD BY AUTOMATED COUNT: 13.6 % (ref 11.6–15.1)
GFR SERPL CREATININE-BSD FRML MDRD: 62 ML/MIN/1.73SQ M
GLUCOSE SERPL-MCNC: 140 MG/DL (ref 65–140)
HCT VFR BLD AUTO: 39.4 % (ref 34.8–46.1)
HGB BLD-MCNC: 12.6 G/DL (ref 11.5–15.4)
IMM GRANULOCYTES # BLD AUTO: 0.08 THOUSAND/UL (ref 0–0.2)
IMM GRANULOCYTES NFR BLD AUTO: 1 % (ref 0–2)
LYMPHOCYTES # BLD AUTO: 0.95 THOUSANDS/ΜL (ref 0.6–4.47)
LYMPHOCYTES NFR BLD AUTO: 7 % (ref 14–44)
MCH RBC QN AUTO: 28.2 PG (ref 26.8–34.3)
MCHC RBC AUTO-ENTMCNC: 32 G/DL (ref 31.4–37.4)
MCV RBC AUTO: 88 FL (ref 82–98)
MONOCYTES # BLD AUTO: 0.75 THOUSAND/ΜL (ref 0.17–1.22)
MONOCYTES NFR BLD AUTO: 6 % (ref 4–12)
NEUTROPHILS # BLD AUTO: 11.35 THOUSANDS/ΜL (ref 1.85–7.62)
NEUTS SEG NFR BLD AUTO: 86 % (ref 43–75)
NRBC BLD AUTO-RTO: 0 /100 WBCS
PLATELET # BLD AUTO: 355 THOUSANDS/UL (ref 149–390)
PMV BLD AUTO: 10.2 FL (ref 8.9–12.7)
POTASSIUM SERPL-SCNC: 3.8 MMOL/L (ref 3.5–5.3)
RBC # BLD AUTO: 4.47 MILLION/UL (ref 3.81–5.12)
SODIUM SERPL-SCNC: 133 MMOL/L (ref 136–145)
TROPONIN I SERPL-MCNC: <0.02 NG/ML
WBC # BLD AUTO: 13.23 THOUSAND/UL (ref 4.31–10.16)

## 2018-08-30 PROCEDURE — 71046 X-RAY EXAM CHEST 2 VIEWS: CPT

## 2018-08-30 PROCEDURE — 84484 ASSAY OF TROPONIN QUANT: CPT | Performed by: EMERGENCY MEDICINE

## 2018-08-30 PROCEDURE — 85025 COMPLETE CBC W/AUTO DIFF WBC: CPT | Performed by: EMERGENCY MEDICINE

## 2018-08-30 PROCEDURE — 96374 THER/PROPH/DIAG INJ IV PUSH: CPT

## 2018-08-30 PROCEDURE — 93005 ELECTROCARDIOGRAM TRACING: CPT

## 2018-08-30 PROCEDURE — 80048 BASIC METABOLIC PNL TOTAL CA: CPT | Performed by: EMERGENCY MEDICINE

## 2018-08-30 PROCEDURE — 99284 EMERGENCY DEPT VISIT MOD MDM: CPT

## 2018-08-30 PROCEDURE — 36415 COLL VENOUS BLD VENIPUNCTURE: CPT | Performed by: EMERGENCY MEDICINE

## 2018-08-30 RX ORDER — ONDANSETRON 2 MG/ML
4 INJECTION INTRAMUSCULAR; INTRAVENOUS ONCE
Status: COMPLETED | OUTPATIENT
Start: 2018-08-30 | End: 2018-08-30

## 2018-08-30 RX ADMIN — ONDANSETRON 4 MG: 2 INJECTION INTRAMUSCULAR; INTRAVENOUS at 18:07

## 2018-08-30 NOTE — ED PROVIDER NOTES
History  Chief Complaint   Patient presents with    Vomiting     took augmentin for UTI about 2 hours ago and has vomited 3 times since, mostly bile but did has a blood tinge in emesis     HPI    This is a 62-year-old female that presents today with vomiting  Patient states she just had a physical performed which was normal  States she found that she has a urinary tract infection was given Augmentin  Patient states she did not know that it had amoxicillin which she is allergic to  States normally when she takes amoxicillin she has an upset stomach  States about 1 hour after taking Augmentin she started having nausea and wanted to vomit  She states she forced herself to vomit and has been vomiting yellow fluid  States at 1 episode she noticed some small amount of less than tsp size of blood dark  Since then she states she has been fine no vomiting no nausea  States she was worried and called her family doctor who told her to come to the emergency department  No previous history of vomiting blood  Denies any chest pain or shortness of breath  No abdominal pain  No weakness numbness or tingling  67 year female that presents today with vomiting  Will get lab work chest x-ray to rule out any [place]  Currently patient is stable     Prior to Admission Medications   Prescriptions Last Dose Informant Patient Reported? Taking?    Cranberry-Vitamin C-Probiotic (AZO CRANBERRY PO)   Yes Yes   Sig: Take by mouth every morning   Multiple Vitamins-Minerals (CENTRUM SILVER ADULT 50+ PO)   Yes Yes   Sig: Take by mouth daily with breakfast   amLODIPine (NORVASC) 5 mg tablet   Yes Yes   Sig: Take 5 mg by mouth every morning   aspirin (ECOTRIN LOW STRENGTH) 81 mg EC tablet   Yes Yes   Sig: Take 81 mg by mouth daily with breakfast   ciprofloxacin (CILOXAN) 0 3 % ophthalmic solution   No Yes   Sig: Administer 1 drop to the right eye 4 (four) times a day   fenofibrate (TRIGLIDE) 160 MG tablet   Yes Yes   Sig: Take 160 mg by mouth every evening   ketorolac (ACULAR) 0 5 % ophthalmic solution   No Yes   Sig: Administer 1 drop to the right eye 4 (four) times a day   levothyroxine 25 mcg tablet   Yes Yes   metFORMIN (GLUCOPHAGE) 500 mg tablet   Yes Yes   Sig: Take 500 mg by mouth 2 (two) times a day with meals Takes 2 tabs for pm dose= 1000mg   olmesartan-hydrochlorothiazide (BENICAR HCT) 40-25 MG per tablet   Yes Yes   Sig: Take 1 tablet by mouth every morning      Facility-Administered Medications: None       Past Medical History:   Diagnosis Date    Anemia     Anxiety     Arthritis     Chronic UTI     better since urethral stretching    [address] mellitus [address] [address] )     type 2    GERD (gastroesophageal reflux disease)     diet controlled    High triglycerides     Hypertension     Irritable bowel syndrome        Past Surgical History:   Procedure Laterality Date    APPENDECTOMY      age 14   Breanna Roles GUM SURGERY      tumor removal benign x3    VT REMV CATARACT EXTRACAP,INSERT LENS Left 5/15/2017    Procedure: EXTRACTION EXTRACAPSULAR CATARACT PHACO INTRAOCULAR LENS (IOL); Surgeon: Tia Hogan MD;  Location: [facility] MAIN OR;  Service: Ophthalmology     [address] 6/26/2017    Procedure: EXTRACTION EXTRACAPSULAR CATARACT PHACO INTRAOCULAR LENS (IOL); Surgeon: Tia Hogan MD;  Location: [facility] MAIN OR;  Service: Ophthalmology    TONSILLECTOMY         Family History   Problem Relation Age of Onset    Early death Mother         CHF    Early death Father         MI    Cancer Sister         stomach lymphoma    No Known Problems Son      I have reviewed and agree with the history as documented  Social History   Substance Use Topics    Smoking status: Former Smoker     Packs/day: 1 00     Years: 10 00     Quit date: 2010    Smokeless tobacco: Never Used    Alcohol use Yes      Comment: rarely        Review of Systems   Constitutional: Negative  Negative for diaphoresis and fever     HENT: Negative  Respiratory: Negative  Negative for cough, shortness of breath and wheezing  Cardiovascular: Negative  Negative for chest pain, palpitations and leg swelling  Gastrointestinal: Positive for nausea and vomiting  Negative for abdominal distention and abdominal pain  Genitourinary: Negative  Musculoskeletal: Negative  Skin: Negative  Neurological: Negative  Psychiatric/Behavioral: Negative  All other systems reviewed and are negative  Physical Exam  Physical Exam   Constitutional: She is oriented to person, place, and time  She appears well-developed and well-nourished  No distress  HENT:   Head: Normocephalic and atraumatic  Eyes: EOM are normal  Pupils are equal, round, and reactive to light  Neck: Normal range of motion  Neck supple  Cardiovascular: Normal rate, regular rhythm and normal heart sounds  No murmur heard  Pulmonary/Chest: Effort normal and breath sounds normal  No respiratory distress  She has no wheezes  Abdominal: Soft  Bowel sounds are normal  She exhibits no distension  There is no tenderness  There is no rebound and no guarding  Musculoskeletal: Normal range of motion  She exhibits no edema or deformity  Neurological: She is alert and oriented to person, place, and time  Skin: Skin is warm and dry  She is not diaphoretic  Psychiatric: She has a normal mood and affect  Vitals reviewed        Vital Signs  ED Triage Vitals [08/30/18 1729]   Temperature Pulse Respirations Blood Pressure SpO2   97 7 °F (36 5 °C) 86 18 (!) 177/79 96 %      Temp Source Heart Rate Source Patient Position - Orthostatic VS BP Location FiO2 (%)   Oral Monitor Lying Right arm --      Pain Score       No Pain           Vitals:    08/30/18 1729 08/30/18 1850   BP: (!) 177/79 149/67   Pulse: 86 77   Patient Position - Orthostatic VS: Lying Lying       Visual Acuity      ED Medications  Medications   ondansetron (ZOFRAN) injection 4 mg (4 mg Intravenous Given 8/30/18 1807)       Diagnostic Studies  Results Reviewed     Procedure Component Value Units Date/Time    Troponin I [[id number]]  (Normal) Collected:  08/30/18 1745    Lab Status:  Final result Specimen:  Blood from Arm, Right Updated:  08/30/18 1811     Troponin I <0 02 ng/mL     Basic metabolic panel [[id number]]  (Abnormal) Collected:  08/30/18 1745    Lab Status:  Final result Specimen:  Blood from Arm, Right Updated:  08/30/18 1801     Sodium 133 (L) mmol/L      Potassium 3 8 mmol/L      Chloride 98 (L) mmol/L      CO2 26 mmol/L      ANION GAP 9 mmol/L      BUN 25 mg/dL      Creatinine 0 89 mg/dL      Glucose 140 mg/dL      Calcium 9 3 mg/dL      eGFR 62 ml/min/1 73sq m     Narrative:         National Kidney Disease Education Program recommendations are as follows:  GFR calculation is accurate only with a steady state creatinine  Chronic Kidney disease less than 60 ml/min/1 73 sq  meters  Kidney failure less than 15 ml/min/1 73 sq  meters  CBC and differential [[id number]]  (Abnormal) Collected:  08/30/18 1745    Lab Status:  Final result Specimen:  Blood from Arm, Right Updated:  08/30/18 1750     WBC 13 23 (H) Thousand/uL      RBC 4 47 Million/uL      Hemoglobin 12 6 g/dL      Hematocrit 39 4 %      MCV 88 fL      MCH 28 2 pg      MCHC 32 0 g/dL      RDW 13 6 %      MPV 10 2 fL      Platelets 632 Thousands/uL      nRBC 0 /100 WBCs      Neutrophils Relative 86 (H) %      Immat GRANS % 1 %      Lymphocytes Relative 7 (L) %      Monocytes Relative 6 %      Eosinophils Relative 0 %      Basophils Relative 0 %      Neutrophils Absolute 11 35 (H) Thousands/µL      Immature Grans Absolute 0 08 Thousand/uL      Lymphocytes Absolute 0 95 Thousands/µL      Monocytes Absolute 0 75 Thousand/µL      Eosinophils Absolute 0 05 Thousand/µL      Basophils Absolute 0 05 Thousands/µL                  XR chest 2 views   Final Result by Preston Mueller MD (08/30 1905)      No acute cardiopulmonary disease              Workstation performed: [id number]                    Procedures  Procedures       Phone Contacts  ED Phone Contact    ED Course  ED Course as of Aug 30 2351   Thu Aug 30, 2018   1590 Procedure Note: EKG  Date/Time: 08/30/18 6:22 PM   Performed by: Sowmya Choudhary by: Radha Jenkins   Indications / Diagnosis: vomiting  ECG reviewed by me, the ED Provider: yes   The EKG demonstrates:  Rhythm: normal sinus  Intervals: normal intervals  Axis: normal axis  QRS/Blocks: normal QRS  ST Changes: No acute ST Changes, no STD/SHILPA  Nonspecific t wave changes unchanged from prior      1835 I discussed with patient regarding admission but she states she does not want to stay here  States she has a  to take care of who has dementia  Patient states she is feeling fine she had that 1 episode of slight blood but has resolved  States she no longer feels nauseous she has known chest pain or shortness of breath  I believe the patient can be discharged home patient is low risk does not take any blood thinners  Patient has no esophageal or aortic surgery in the past   Patient does not have any bleeding disorders  Patient has normal vitals and normal lab work  I discussed with patient to return if she has another episodes of hematemesis  9915 Discussed to followup with pcp for medication changes                                 MDM  CritCare Time    Disposition  Final diagnoses:   Vomiting     Time reflects when diagnosis was documented in both MDM as applicable and the Disposition within this note     Time User Action Codes Description Comment    8/30/2018  6:41 PM Josue López  8  [R11 10] Vomiting       ED Disposition     ED Disposition Condition Comment    Discharge  Vernal Girt discharge to home/self care      Condition at discharge: Good        Follow-up Information     Follow up With Specialties Details Why Contact Info Additional [address] Internal Medicine Schedule an appointment as soon as possible for a visit  [address]  [facility] Emergency Medicine Go to if another episode of vomiting  [address] [facility] ED, [place], [place], [id number]          Discharge Medication List as of 8/30/2018  6:42 PM      CONTINUE these medications which have NOT CHANGED    Details   amLODIPine (NORVASC) 5 mg tablet Take 5 mg by mouth every morning, Until Discontinued, Historical Med      aspirin (ECOTRIN LOW STRENGTH) 81 mg EC tablet Take 81 mg by mouth daily with breakfast, Until Discontinued, Historical Med      ciprofloxacin (CILOXAN) 0 3 % ophthalmic solution Administer 1 drop to the right eye 4 (four) times a day, Starting Mon 6/26/2017, No Print      Cranberry-Vitamin C-Probiotic (AZO CRANBERRY PO) Take by mouth every morning, Until Discontinued, Historical Med      fenofibrate (TRIGLIDE) 160 MG tablet Take 160 mg by mouth every evening, Until Discontinued, Historical Med      ketorolac (ACULAR) 0 5 % ophthalmic solution Administer 1 drop to the right eye 4 (four) times a day, Starting Mon 6/26/2017, No Print      levothyroxine 25 mcg tablet Starting Tue 11/7/2017, Historical Med      metFORMIN (GLUCOPHAGE) 500 mg tablet Take 500 mg by mouth 2 (two) times a day with meals Takes 2 tabs for pm dose= 1000mg, Until Discontinued, Historical Med      Multiple Vitamins-Minerals (CENTRUM SILVER ADULT 50+ PO) Take by mouth daily with breakfast, Until Discontinued, Historical Med      olmesartan-hydrochlorothiazide (BENICAR HCT) 40-25 MG per tablet Take 1 tablet by mouth every morning, Until Discontinued, Historical Med           No discharge procedures on file      ED Provider  Electronically Signed by           Donnie Olmos MD  08/30/18 7892

## 2018-08-30 NOTE — DISCHARGE INSTRUCTIONS
Acute Nausea and Vomiting   WHAT YOU NEED TO KNOW:   Acute nausea and vomiting start suddenly, worsen quickly, and last a short time  DISCHARGE INSTRUCTIONS:   Return to the emergency department if:   · You see blood in your vomit or your bowel movements  · You have sudden, severe pain in your chest and upper abdomen after hard vomiting or retching  · You have swelling in your neck and chest      · You are dizzy, cold, and thirsty and your eyes and mouth are dry  · You are urinating very little or not at all  · You have muscle weakness, leg cramps, and trouble breathing  · Your heart is beating much faster than normal      · You continue to vomit for more than 48 hours  Contact your healthcare provider if:   · You have frequent dry heaves (vomiting but nothing comes out)  · Your nausea and vomiting does not get better or go away after you use medicine  · You have questions or concerns about your condition or treatment  Medicines: You may need any of the following:  · Medicines  may be given to calm your stomach and stop your vomiting  You may also need medicines to help you feel more relaxed or to stop nausea and vomiting caused by motion sickness  · Gastrointestinal stimulants  are used to help empty your stomach and bowels  This may help decrease nausea and vomiting  · Take your medicine as directed  Contact your healthcare provider if you think your medicine is not helping or if you have side effects  Tell him or her if you are allergic to any medicine  Keep a list of the medicines, vitamins, and herbs you take  Include the amounts, and when and why you take them  Bring the list or the pill bottles to follow-up visits  Carry your medicine list with you in case of an emergency  Prevent or manage acute nausea and vomiting:   · Do not drink alcohol  Alcohol may upset or irritate your stomach  Too much alcohol can also cause acute nausea and vomiting  · Control stress    Headaches due to stress may cause nausea and vomiting  Find ways to relax and manage your stress  Get more rest and sleep  · Drink more liquids as directed  Vomiting can lead to dehydration  It is important to drink more liquids to help replace lost body fluids  Ask your healthcare provider how much liquid to drink each day and which liquids are best for you  Your provider may recommend that you drink an oral rehydration solution (ORS)  ORS contains water, salts, and sugar that are needed to replace the lost body fluids  Ask what kind of ORS to use, how much to drink, and where to get it  · Eat smaller meals, more often  Eat small amounts of food every 2 to 3 hours, even if you are not hungry  Food in your stomach may decrease your nausea  · Talk to your healthcare provider before you take over-the-counter (OTC) medicines  These medicines can cause serious problems if you use certain other medicines, or you have a medical condition  You may have problems if you use too much or use them for longer than the label says  Follow directions on the label carefully  Follow up with your healthcare provider as directed:  Write down your questions so you remember to ask them during your follow-up visits  © 2017 [facility] Information is for End User's use only and may not be sold, redistributed or otherwise used for commercial purposes  All illustrations and images included in CareNotes® are the copyrighted property of A D A M , Inc  or Josue Degroot  The above information is an  only  It is not intended as medical advice for individual conditions or treatments  Talk to your doctor, nurse or pharmacist before following any medical regimen to see if it is safe and effective for you

## 2018-08-31 LAB
ATRIAL RATE: 83 BPM
P AXIS: 50 DEGREES
PR INTERVAL: 172 MS
QRS AXIS: 97 DEGREES
QRSD INTERVAL: 110 MS
QT INTERVAL: 396 MS
QTC INTERVAL: 465 MS
T WAVE AXIS: -6 DEGREES
VENTRICULAR RATE: 83 BPM

## 2018-08-31 PROCEDURE — 93010 ELECTROCARDIOGRAM REPORT: CPT | Performed by: INTERNAL MEDICINE

## 2018-10-24 ENCOUNTER — OFFICE VISIT (OUTPATIENT)
Dept: OBGYN CLINIC | Facility: CLINIC | Age: 78
End: 2018-10-24
Payer: MEDICARE

## 2018-10-24 VITALS — HEIGHT: 69 IN | WEIGHT: 178.2 LBS | BODY MASS INDEX: 26.39 KG/M2

## 2018-10-24 DIAGNOSIS — M47.9 OSTEOARTHRITIS OF LOWER BACK: Primary | ICD-10-CM

## 2018-10-24 PROCEDURE — 99214 OFFICE O/P EST MOD 30 MIN: CPT | Performed by: ORTHOPAEDIC SURGERY

## 2018-10-24 NOTE — PROGRESS NOTES
Assessment/Plan:  1  Osteoarthritis of lower back         Scribe Attestation    I,:   Meli Donna am acting as a scribe while in the presence of the attending physician :        I,:   Juan Francisco Duran MD personally performed the services described in this documentation    as scribed in my presence :              Rodri Simmons upon examination does demonstrate signs symptoms consistent with musculoskeletal pain of the paraspinals likely due to the arthritis in her lumbar spine  She does demonstrate tenderness to the right sided paraspinals around the thoracic lumbar region  She does have a history of a chronic compression fracture at the L2 level as well as a Schmorl node at L3  She does demonstrate functional range of motion such as for flexion and extension as well as rotation  With no exacerbation of symptoms  I do feel that conservative measures of physical therapy for her back will be beneficial   I provided her with a prescription for physical therapy today  I would also like her to follow up with our pain in spine specialist Dr Liu in 4 weeks  For further consult in regards to her back pain  In the meantime she can take over-the-counter medications to help with her pain I did note to her that I cannot prescribe her any narcotics or other controlled substances such as oxycodone, Flexeril, or tramadol as our office policy and state law dictates that I cannot provide this outside of post surgery  I will see Rodri Simmons back on an as-needed basis  Subjective:   Antonella Vivas is a 65 y o  female who presents for follow-up evaluation of her thoracic and lumbar spine  She was previously seen in February of this year  She states she is experiencing similar symptoms that she was back then and describes them as an intermittent and moderate to severe sharp spasm pain is that will radiate from her right scapular region in radiate inferiorly into the right lumbar spine    She states that the pain will last for a few seconds that subside  She notes that no specific activity exacerbates her symptoms and is dependent on position  She states that the only thing that relieves her symptoms is oxycodone  However she is unable to acquire as state law prohibits her from receiving this without a surgery or fractures  It also does not make a difference whether she is weight-bearing or nonweightbearing  She denies any numbness or tingling into the lower extremity  She was prescribed physical therapy in the past however was not happy with the clinic and did not return  Review of Systems   Constitutional: Negative for chills, fever and unexpected weight change  HENT: Negative for hearing loss, nosebleeds and sore throat  Eyes: Negative for pain, redness and visual disturbance  Respiratory: Negative for cough, shortness of breath and wheezing  Cardiovascular: Negative for chest pain, palpitations and leg swelling  Gastrointestinal: Negative for abdominal pain, nausea and vomiting  Endocrine: Negative for polydipsia and polyuria  Genitourinary: Negative for dysuria and hematuria  Musculoskeletal: Positive for myalgias  See HPI   Skin: Negative for rash and wound  Neurological: Negative for dizziness, numbness and headaches  Psychiatric/Behavioral: Negative for decreased concentration and suicidal ideas  The patient is not nervous/anxious            Past Medical History:   Diagnosis Date    Anemia     Anxiety     Arthritis     Chronic UTI     better since urethral stretching    Diabetes mellitus ([address] )     type 2    GERD (gastroesophageal reflux disease)     diet controlled    High triglycerides     Hypertension     Irritable bowel syndrome        Past Surgical History:   Procedure Laterality Date    APPENDECTOMY      age 15    GUM SURGERY      tumor removal benign x3    AR REMV CATARACT EXTRACAP,INSERT LENS Left 5/15/2017    Procedure: EXTRACTION EXTRACAPSULAR CATARACT PHACO INTRAOCULAR LENS (IOL); Surgeon: Andrew Murillo MD;  Location: [facility] MAIN OR;  Service: Ophthalmology     [address] Right 6/26/2017    Procedure: EXTRACTION EXTRACAPSULAR CATARACT PHACO INTRAOCULAR LENS (IOL); Surgeon: Andrew Murillo MD;  Location: [facility] MAIN OR;  Service: Ophthalmology    TONSILLECTOMY         Family History   Problem Relation Age of Onset    Early death Mother         CHF    Early death Father         MI    Cancer Sister         stomach lymphoma    No Known Problems Son        Social History     Occupational History    Not on file       Social History Main Topics    Smoking status: Former Smoker     Packs/day: 1 00     Years: 10 00     Quit date: 2010    Smokeless tobacco: Never Used    Alcohol use Yes      Comment: rarely    Drug use: No    Sexual activity: Not on file         Current Outpatient Prescriptions:     amLODIPine (NORVASC) 5 mg tablet, Take 5 mg by mouth every morning, Disp: , Rfl:     aspirin (ECOTRIN LOW STRENGTH) 81 mg EC tablet, Take 81 mg by mouth daily with breakfast, Disp: , Rfl:     Cranberry-Vitamin C-Probiotic (AZO CRANBERRY PO), Take by mouth every morning, Disp: , Rfl:     fenofibrate (TRIGLIDE) 160 MG tablet, Take 160 mg by mouth every evening, Disp: , Rfl:     Glycerin-Polysorbate 80 (REFRESH DRY EYE THERAPY OP), Apply to eye, Disp: , Rfl:     levothyroxine 25 mcg tablet, , Disp: , Rfl:     metFORMIN (GLUCOPHAGE) 500 mg tablet, Take 500 mg by mouth 2 (two) times a day with meals Takes 2 tabs for pm dose= 1000mg, Disp: , Rfl:     Multiple Vitamins-Minerals (CENTRUM SILVER ADULT 50+ PO), Take by mouth daily with breakfast, Disp: , Rfl:     olmesartan-hydrochlorothiazide (BENICAR HCT) 40-25 MG per tablet, Take 1 tablet by mouth every morning, Disp: , Rfl:     Allergies   Allergen Reactions    Amoxicillin GI Intolerance    Lactose GI Intolerance    Lidocaine      Lidocaine patch    Sulfa Antibiotics GI Intolerance Objective: There were no vitals filed for this visit  Right Ankle Exam     Range of Motion   Dorsiflexion: 30     Muscle Strength   Dorsiflexion:  5/5  Plantar flexion:  5/5    Comments:  1+ pulse      Left Ankle Exam     Range of Motion   Dorsiflexion: 30     Muscle Strength   Dorsiflexion:  5/5   Plantar flexion:  5/5     Comments:  1+ pulse      Right Knee Exam     Range of Motion   Extension: 0   Flexion: 140     Other   Sensation: normal    Comments:  Knee flexion :5/5  Knee extension: 5/5      Left Knee Exam     Range of Motion   Extension: 0   Flexion: 140     Other   Sensation: normal    Comments:  Knee flexion: 5/5  Knee Extension: 5/5      Back Exam     Tenderness   Back tenderness location: Mild tenderness the right thoracic region along the paraspinal muscles  No pain to palpation along the spine  Range of Motion   Extension: 60   Flexion: 90   Rotation Right: normal   Rotation Left: normal     Muscle Strength   Right Quadriceps:  5/5   Left Quadriceps:  5/5   Right Hamstrings:  5/5           Strength/Myotome Testing     Left Ankle/Foot   Dorsiflexion: 5  Plantar flexion: 5    Right Ankle/Foot   Dorsiflexion: 5  Plantar flexion: 5      Physical Exam   Constitutional: She is oriented to person, place, and time  She appears well-developed and well-nourished  HENT:   Head: Normocephalic and atraumatic  Eyes: Conjunctivae are normal    Neck: Normal range of motion  Cardiovascular: Normal rate  Pulmonary/Chest: Effort normal    Musculoskeletal:   As noted in HPI   Neurological: She is alert and oriented to person, place, and time  Skin: Skin is warm and dry  Psychiatric: She has a normal mood and affect  Her behavior is normal  Judgment and thought content normal    Nursing note and vitals reviewed

## 2018-12-19 ENCOUNTER — OFFICE VISIT (OUTPATIENT)
Dept: OBGYN CLINIC | Facility: CLINIC | Age: 78
End: 2018-12-19
Payer: MEDICARE

## 2018-12-19 VITALS
HEART RATE: 84 BPM | WEIGHT: 179 LBS | DIASTOLIC BLOOD PRESSURE: 67 MMHG | BODY MASS INDEX: 26.51 KG/M2 | HEIGHT: 69 IN | SYSTOLIC BLOOD PRESSURE: 153 MMHG

## 2018-12-19 DIAGNOSIS — M17.12 PRIMARY OSTEOARTHRITIS OF LEFT KNEE: Primary | ICD-10-CM

## 2018-12-19 PROCEDURE — 20610 DRAIN/INJ JOINT/BURSA W/O US: CPT | Performed by: ORTHOPAEDIC SURGERY

## 2018-12-19 PROCEDURE — 99213 OFFICE O/P EST LOW 20 MIN: CPT | Performed by: ORTHOPAEDIC SURGERY

## 2018-12-19 RX ORDER — DEXAMETHASONE SODIUM PHOSPHATE 100 MG/10ML
40 INJECTION INTRAMUSCULAR; INTRAVENOUS
Status: COMPLETED | OUTPATIENT
Start: 2018-12-19 | End: 2018-12-19

## 2018-12-19 RX ORDER — BUPIVACAINE HYDROCHLORIDE 2.5 MG/ML
4 INJECTION, SOLUTION INFILTRATION; PERINEURAL
Status: COMPLETED | OUTPATIENT
Start: 2018-12-19 | End: 2018-12-19

## 2018-12-19 RX ADMIN — DEXAMETHASONE SODIUM PHOSPHATE 40 MG: 100 INJECTION INTRAMUSCULAR; INTRAVENOUS at 14:31

## 2018-12-19 RX ADMIN — BUPIVACAINE HYDROCHLORIDE 4 ML: 2.5 INJECTION, SOLUTION INFILTRATION; PERINEURAL at 14:31

## 2018-12-19 NOTE — PROGRESS NOTES
Assessment/Plan:  1  Primary osteoarthritis of left knee         The patient was given repeat steroid injection today she did respond well to this in the past   We did discuss Euflexxa injections in the future should she not improve symptomatically with this injection  She will call if she would like these injections  We will see her back as needed  Subjective:   Aimee Johnston is a 77 y o  female who presents today for follow-up of her left knee  She has known arthritis of this knee and did have a steroid injection about a year ago  She states this did help her  She notes ongoing pain diffusely about the left knee which is worse with activity and slightly better with rest   She notes stiffness and creaking of the knee as well  She denies any paresthesias of the lower extremity      Review of Systems   Constitutional: Negative for chills, fever and unexpected weight change  HENT: Negative for hearing loss, nosebleeds and sore throat  Eyes: Negative for pain, redness and visual disturbance  Respiratory: Negative for cough, shortness of breath and wheezing  Cardiovascular: Negative for chest pain, palpitations and leg swelling  Gastrointestinal: Negative for abdominal pain, nausea and vomiting  Endocrine: Negative for polydipsia and polyuria  Genitourinary: Negative for dysuria and hematuria  Musculoskeletal:        See HPI   Skin: Negative for rash and wound  Neurological: Negative for dizziness, numbness and headaches  Psychiatric/Behavioral: Negative for decreased concentration and suicidal ideas  The patient is not nervous/anxious            Past Medical History:   Diagnosis Date    Anemia     Anxiety     Arthritis     Chronic UTI     better since urethral stretching    Diabetes mellitus ([address] )     type 2    GERD (gastroesophageal reflux disease)     diet controlled    High triglycerides     Hypertension     Irritable bowel syndrome        Past Surgical History:   Procedure Laterality Date    APPENDECTOMY      age 15    GUM SURGERY      tumor removal benign x3    SD REMV CATARACT EXTRACAP,INSERT LENS Left 5/15/2017    Procedure: EXTRACTION EXTRACAPSULAR CATARACT PHACO INTRAOCULAR LENS (IOL); Surgeon: Filomena Mckay MD;  Location: [facility] MAIN OR;  Service: Ophthalmology     [address] [facility] [address] Right 6/26/2017    Procedure: EXTRACTION EXTRACAPSULAR CATARACT PHACO INTRAOCULAR LENS (IOL); Surgeon: Filomena Mckay MD;  Location: [facility] MAIN OR;  Service: Ophthalmology    TONSILLECTOMY         Family History   Problem Relation Age of Onset    Early death Mother         CHF    Early death Father         MI    Cancer Sister         stomach lymphoma    No Known Problems Son        Social History     Occupational History    Not on file       Social History Main Topics    Smoking status: Former Smoker     Packs/day: 1 00     Years: 10 00     Quit date: 2010    Smokeless tobacco: Never Used    Alcohol use Yes      Comment: rarely    Drug use: No    Sexual activity: Not on file         Current Outpatient Prescriptions:     amLODIPine (NORVASC) 5 mg tablet, Take 5 mg by mouth every morning, Disp: , Rfl:     aspirin (ECOTRIN LOW STRENGTH) 81 mg EC tablet, Take 81 mg by mouth daily with breakfast, Disp: , Rfl:     Cranberry-Vitamin C-Probiotic (AZO CRANBERRY PO), Take by mouth every morning, Disp: , Rfl:     fenofibrate (TRIGLIDE) 160 MG tablet, Take 160 mg by mouth every evening, Disp: , Rfl:     Glycerin-Polysorbate 80 (REFRESH DRY EYE THERAPY OP), Apply to eye, Disp: , Rfl:     levothyroxine 25 mcg tablet, , Disp: , Rfl:     metFORMIN (GLUCOPHAGE) 500 mg tablet, Take 500 mg by mouth 2 (two) times a day with meals Takes 2 tabs for pm dose= 1000mg, Disp: , Rfl:     Multiple Vitamins-Minerals (CENTRUM SILVER ADULT 50+ PO), Take by mouth daily with breakfast, Disp: , Rfl:     olmesartan-hydrochlorothiazide (BENICAR HCT) 40-25 MG per tablet, Take 1 tablet by mouth every morning, Disp: , Rfl:     Allergies   Allergen Reactions    Amoxicillin GI Intolerance    Lactose GI Intolerance    Lidocaine      Lidocaine patch    Sulfa Antibiotics GI Intolerance       Objective: There were no vitals filed for this visit  Left Knee Exam     Tenderness   The patient is experiencing tenderness in the medial joint line  Range of Motion   Extension: normal   Flexion: normal     Tests   Varus: negative  Valgus: negative    Other   Erythema: absent  Sensation: normal  Pulse: present  Effusion: effusion present          Observations   Left Knee   Positive for effusion  Physical Exam   Constitutional: She is oriented to person, place, and time  She appears well-developed and well-nourished  No distress  HENT:   Head: Normocephalic and atraumatic  Eyes: Conjunctivae and EOM are normal  No scleral icterus  Neck: No JVD present  Cardiovascular: Normal rate and intact distal pulses  Pulmonary/Chest: Effort normal  No respiratory distress  Abdominal: Soft  She exhibits no distension  Musculoskeletal:        Left knee: She exhibits effusion  Neurological: She is alert and oriented to person, place, and time  Coordination normal    Skin: Skin is warm  Psychiatric: She has a normal mood and affect       Large joint arthrocentesis  Date/Time: 12/19/2018 2:31 PM  Consent given by: patient  Site marked: site marked  Timeout: Immediately prior to procedure a time out was called to verify the correct patient, procedure, equipment, support staff and site/side marked as required   Supporting Documentation  Indications: pain   Procedure Details  Location: knee - L knee  Preparation: Patient was prepped and draped in the usual sterile fashion  Needle size: 22 G  Ultrasound guidance: no  Approach: anterolateral  Medications administered: 40 mg dexamethasone 100 mg/10 mL; 4 mL bupivacaine 0 25 %    Patient tolerance: patient tolerated the procedure well with no immediate complications  Dressing:  Sterile dressing applied

## 2018-12-31 ENCOUNTER — OFFICE VISIT (OUTPATIENT)
Dept: URGENT CARE | Facility: CLINIC | Age: 78
End: 2018-12-31
Payer: MEDICARE

## 2018-12-31 VITALS
SYSTOLIC BLOOD PRESSURE: 150 MMHG | RESPIRATION RATE: 18 BRPM | HEIGHT: 69 IN | HEART RATE: 90 BPM | OXYGEN SATURATION: 100 % | TEMPERATURE: 99.9 F | WEIGHT: 174.6 LBS | BODY MASS INDEX: 25.86 KG/M2 | DIASTOLIC BLOOD PRESSURE: 58 MMHG

## 2018-12-31 DIAGNOSIS — J01.90 ACUTE NON-RECURRENT SINUSITIS, UNSPECIFIED LOCATION: Primary | ICD-10-CM

## 2018-12-31 PROCEDURE — 99213 OFFICE O/P EST LOW 20 MIN: CPT | Performed by: PHYSICIAN ASSISTANT

## 2018-12-31 RX ORDER — FLUTICASONE PROPIONATE 50 MCG
2 SPRAY, SUSPENSION (ML) NASAL DAILY
Qty: 16 G | Refills: 0 | Status: SHIPPED | OUTPATIENT
Start: 2018-12-31 | End: 2019-10-16 | Stop reason: ALTCHOICE

## 2018-12-31 RX ORDER — AZITHROMYCIN 250 MG/1
TABLET, FILM COATED ORAL
Qty: 6 TABLET | Refills: 0 | Status: SHIPPED | OUTPATIENT
Start: 2018-12-31 | End: 2019-01-04

## 2018-12-31 NOTE — PROGRESS NOTES
[facility]        NAME: Paula Fuller is a 77 y o  female  : 1940    MRN: [id number]  DATE: 2019  TIME: 1:42 PM    Assessment and Plan   Acute non-recurrent sinusitis, unspecified location [J01 90]  1  Acute non-recurrent sinusitis, unspecified location  azithromycin (ZITHROMAX) 250 mg tablet    fluticasone (FLONASE) 50 mcg/act nasal spray         Patient Instructions     Discussed condition with pt  She has one week of symptoms which have not improved despite OTC symptomatic management  I will treat her with combination of Z-pack and Flonase and discussed hydration, rest, continuing with OTC cold meds, and observation  Follow up with PCP in 3-5 days  Proceed to  ER if symptoms worsen  Chief Complaint     Chief Complaint   Patient presents with   Florencio Slimmer Like Symptoms     Since  - hoarse voice, nasal congestion with PND, sl  tickle in throat and dry cough  R ear blocked  Noted mid upper back pain yesterday  Denies SOB/wheeze  Taking Robitussin DM and Coricidin HP   Cough    Earache         History of Present Illness       Pt presents with almost one week history of what started out as hoarseness, slight cough, PND, nasal congestion, blocked right ear, back pain  Cough is dry  Denies fever, chills, N/V/D  She has taken Coricidin HBP and Robitussin  Denies hx of asthma/allergies  Does not smoke  She has received the flu shot  Review of Systems   Review of Systems   Constitutional: Negative  HENT: Positive for congestion, ear pain (Right), postnasal drip and voice change  Negative for sore throat  Respiratory: Positive for cough  Cardiovascular: Negative  Gastrointestinal: Negative  Genitourinary: Negative  Musculoskeletal: Positive for back pain           Current Medications       Current Outpatient Prescriptions:     amLODIPine (NORVASC) 5 mg tablet, Take 5 mg by mouth every morning, Disp: , Rfl:     aspirin (ECOTRIN LOW STRENGTH) 81 mg EC tablet, Take 81 mg by mouth daily with breakfast, Disp: , Rfl:     Cranberry-Vitamin C-Probiotic (AZO CRANBERRY PO), Take by mouth every morning, Disp: , Rfl:     fenofibrate (TRIGLIDE) 160 MG tablet, Take 160 mg by mouth every evening, Disp: , Rfl:     Glycerin-Polysorbate 80 (REFRESH DRY EYE THERAPY OP), Apply to eye, Disp: , Rfl:     levothyroxine 25 mcg tablet, , Disp: , Rfl:     metFORMIN (GLUCOPHAGE) 500 mg tablet, Take 500 mg by mouth 2 (two) times a day with meals Takes 2 tabs for pm dose= 1000mg, Disp: , Rfl:     Multiple Vitamins-Minerals (CENTRUM SILVER ADULT 50+ PO), Take by mouth daily with breakfast, Disp: , Rfl:     olmesartan-hydrochlorothiazide (BENICAR HCT) 40-25 MG per tablet, Take 1 tablet by mouth every morning, Disp: , Rfl:     azithromycin (ZITHROMAX) 250 mg tablet, Take 2 tablets day 1 with food, then 1 tablet daily days 2-5 with food  , Disp: 6 tablet, Rfl: 0    fluticasone (FLONASE) 50 mcg/act nasal spray, 2 sprays into each nostril daily, Disp: 16 g, Rfl: 0    Current Allergies     Allergies as of 12/31/2018 - Reviewed 12/31/2018   Allergen Reaction Noted    Amoxicillin GI Intolerance 06/26/2017    Lactose GI Intolerance 06/19/2017    Sulfa antibiotics GI Intolerance 06/26/2017    Lidocaine Rash 03/16/2018            The following portions of the patient's history were reviewed and updated as appropriate: allergies, current medications, past family history, past medical history, past social history, past surgical history and problem list      Past Medical History:   Diagnosis Date    Anemia     Anxiety     Arthritis     Chronic UTI     better since urethral stretching    Diabetes mellitus ([facility] [address] )     type 2    GERD (gastroesophageal reflux disease)     diet controlled    High triglycerides     Hypertension     Irritable bowel syndrome        Past Surgical History:   Procedure Laterality Date    APPENDECTOMY      age 15    GUM SURGERY      tumor removal benign x3    [place] [address] CATARACT EXTRACAP,INSERT LENS Left 5/15/2017    Procedure: EXTRACTION EXTRACAPSULAR CATARACT PHACO INTRAOCULAR LENS (IOL); Surgeon: Magdalena Monsalve MD;  Location: [facility] MAIN OR;  Service: Ophthalmology     [address] Right 6/26/2017    Procedure: EXTRACTION EXTRACAPSULAR CATARACT PHACO INTRAOCULAR LENS (IOL); Surgeon: Magdalena Monsalve MD;  Location: [facility] MAIN OR;  Service: Ophthalmology    TONSILLECTOMY         Family History   Problem Relation Age of Onset    Early death Mother         CHF    Early death Father         MI    Cancer Sister         stomach lymphoma    No Known Problems Son          Medications have been verified  Objective   /58 (BP Location: Left arm, Patient Position: Sitting, Cuff Size: Standard)   Pulse 90   Temp 99 9 °F (37 7 °C) (Tympanic)   Resp 18   Ht 5' 9" (1 753 m)   Wt 79 2 kg (174 lb 9 6 oz)   SpO2 100%   BMI 25 78 kg/m²        Physical Exam     Physical Exam   Constitutional: She is oriented to person, place, and time  She appears well-developed and well-nourished  No distress  HENT:   Right Ear: Hearing, tympanic membrane, external ear and ear canal normal    Left Ear: Hearing, tympanic membrane, external ear and ear canal normal    Nose: Mucosal edema (B/L boggy erythematous turbinates) present  Mouth/Throat: Mucous membranes are normal  Posterior oropharyngeal erythema (PND) present  No oropharyngeal exudate  Neck: Neck supple  Cardiovascular: Normal rate, regular rhythm and normal heart sounds  Pulmonary/Chest: Effort normal and breath sounds normal    Lymphadenopathy:     She has no cervical adenopathy  Neurological: She is alert and oriented to person, place, and time  Psychiatric: She has a normal mood and affect  Vitals reviewed

## 2019-04-10 ENCOUNTER — OFFICE VISIT (OUTPATIENT)
Dept: AUDIOLOGY | Facility: CLINIC | Age: 79
End: 2019-04-10
Payer: MEDICARE

## 2019-04-10 DIAGNOSIS — H90.3 SENSORY HEARING LOSS, BILATERAL: Primary | ICD-10-CM

## 2019-04-10 PROCEDURE — 92557 COMPREHENSIVE HEARING TEST: CPT | Performed by: AUDIOLOGIST

## 2019-04-10 PROCEDURE — 92567 TYMPANOMETRY: CPT | Performed by: AUDIOLOGIST

## 2019-04-30 ENCOUNTER — OFFICE VISIT (OUTPATIENT)
Dept: OBGYN CLINIC | Facility: CLINIC | Age: 79
End: 2019-04-30
Payer: MEDICARE

## 2019-04-30 ENCOUNTER — APPOINTMENT (OUTPATIENT)
Dept: RADIOLOGY | Facility: CLINIC | Age: 79
End: 2019-04-30
Payer: MEDICARE

## 2019-04-30 ENCOUNTER — TELEPHONE (OUTPATIENT)
Dept: OBGYN CLINIC | Facility: CLINIC | Age: 79
End: 2019-04-30

## 2019-04-30 VITALS
HEIGHT: 69 IN | WEIGHT: 179 LBS | BODY MASS INDEX: 26.51 KG/M2 | DIASTOLIC BLOOD PRESSURE: 65 MMHG | SYSTOLIC BLOOD PRESSURE: 166 MMHG

## 2019-04-30 DIAGNOSIS — S90.02XA CONTUSION OF LEFT ANKLE, INITIAL ENCOUNTER: ICD-10-CM

## 2019-04-30 DIAGNOSIS — M79.672 PAIN IN LEFT FOOT: ICD-10-CM

## 2019-04-30 DIAGNOSIS — M79.89 PAIN AND SWELLING OF TOE OF LEFT FOOT: Primary | ICD-10-CM

## 2019-04-30 DIAGNOSIS — M79.675 PAIN AND SWELLING OF TOE OF LEFT FOOT: Primary | ICD-10-CM

## 2019-04-30 PROCEDURE — 73610 X-RAY EXAM OF ANKLE: CPT

## 2019-04-30 PROCEDURE — 99214 OFFICE O/P EST MOD 30 MIN: CPT | Performed by: ORTHOPAEDIC SURGERY

## 2019-05-01 DIAGNOSIS — M17.0 PRIMARY OSTEOARTHRITIS OF BOTH KNEES: Primary | ICD-10-CM

## 2019-05-03 ENCOUNTER — TELEPHONE (OUTPATIENT)
Dept: OBGYN CLINIC | Facility: OTHER | Age: 79
End: 2019-05-03

## 2019-05-06 ENCOUNTER — TRANSCRIBE ORDERS (OUTPATIENT)
Dept: ADMINISTRATIVE | Facility: HOSPITAL | Age: 79
End: 2019-05-06

## 2019-05-06 DIAGNOSIS — R60.9 EDEMA, UNSPECIFIED TYPE: Primary | ICD-10-CM

## 2019-05-09 ENCOUNTER — HOSPITAL ENCOUNTER (OUTPATIENT)
Dept: RADIOLOGY | Facility: HOSPITAL | Age: 79
Discharge: HOME | End: 2019-05-09
Attending: INTERNAL MEDICINE
Payer: MEDICARE

## 2019-05-09 DIAGNOSIS — R60.9 EDEMA, UNSPECIFIED TYPE: ICD-10-CM

## 2019-05-09 PROCEDURE — 93971 EXTREMITY STUDY: CPT | Performed by: SURGERY

## 2019-05-09 PROCEDURE — 93971 EXTREMITY STUDY: CPT

## 2019-05-10 ENCOUNTER — OFFICE VISIT (OUTPATIENT)
Dept: OBGYN CLINIC | Facility: CLINIC | Age: 79
End: 2019-05-10
Payer: MEDICARE

## 2019-05-10 ENCOUNTER — TELEPHONE (OUTPATIENT)
Dept: OBGYN CLINIC | Facility: CLINIC | Age: 79
End: 2019-05-10

## 2019-05-10 VITALS
WEIGHT: 178.6 LBS | SYSTOLIC BLOOD PRESSURE: 140 MMHG | BODY MASS INDEX: 26.45 KG/M2 | DIASTOLIC BLOOD PRESSURE: 63 MMHG | HEIGHT: 69 IN | HEART RATE: 84 BPM

## 2019-05-10 DIAGNOSIS — S90.02XA CONTUSION OF LEFT ANKLE, INITIAL ENCOUNTER: Primary | ICD-10-CM

## 2019-05-10 PROCEDURE — 99213 OFFICE O/P EST LOW 20 MIN: CPT | Performed by: ORTHOPAEDIC SURGERY

## 2019-05-10 RX ORDER — LEVOTHYROXINE SODIUM 0.05 MG/1
TABLET ORAL
COMMUNITY
Start: 2019-04-06 | End: 2019-11-14 | Stop reason: SDUPTHER

## 2019-05-22 ENCOUNTER — OFFICE VISIT (OUTPATIENT)
Dept: OBGYN CLINIC | Facility: CLINIC | Age: 79
End: 2019-05-22
Payer: MEDICARE

## 2019-05-22 VITALS
HEART RATE: 78 BPM | HEIGHT: 69 IN | DIASTOLIC BLOOD PRESSURE: 70 MMHG | WEIGHT: 176.6 LBS | SYSTOLIC BLOOD PRESSURE: 161 MMHG | BODY MASS INDEX: 26.16 KG/M2

## 2019-05-22 DIAGNOSIS — M25.572 PAIN AND SWELLING OF ANKLE, LEFT: ICD-10-CM

## 2019-05-22 DIAGNOSIS — M17.0 PRIMARY OSTEOARTHRITIS OF BOTH KNEES: Primary | ICD-10-CM

## 2019-05-22 DIAGNOSIS — M25.472 PAIN AND SWELLING OF ANKLE, LEFT: ICD-10-CM

## 2019-05-22 PROCEDURE — 99213 OFFICE O/P EST LOW 20 MIN: CPT | Performed by: PHYSICIAN ASSISTANT

## 2019-05-22 PROCEDURE — 20610 DRAIN/INJ JOINT/BURSA W/O US: CPT | Performed by: PHYSICIAN ASSISTANT

## 2019-05-22 RX ORDER — HYALURONATE SODIUM 10 MG/ML
20 SYRINGE (ML) INTRAARTICULAR
Status: COMPLETED | OUTPATIENT
Start: 2019-05-22 | End: 2019-05-22

## 2019-05-22 RX ADMIN — Medication 20 MG: at 10:03

## 2019-05-22 RX ADMIN — Medication 20 MG: at 10:04

## 2019-05-29 ENCOUNTER — OFFICE VISIT (OUTPATIENT)
Dept: OBGYN CLINIC | Facility: CLINIC | Age: 79
End: 2019-05-29
Payer: MEDICARE

## 2019-05-29 VITALS
HEART RATE: 81 BPM | BODY MASS INDEX: 26.1 KG/M2 | HEIGHT: 69 IN | DIASTOLIC BLOOD PRESSURE: 61 MMHG | SYSTOLIC BLOOD PRESSURE: 148 MMHG | WEIGHT: 176.2 LBS

## 2019-05-29 DIAGNOSIS — L03.116 CELLULITIS OF LEFT ANKLE: ICD-10-CM

## 2019-05-29 DIAGNOSIS — M17.0 PRIMARY OSTEOARTHRITIS OF BOTH KNEES: Primary | ICD-10-CM

## 2019-05-29 PROCEDURE — 99213 OFFICE O/P EST LOW 20 MIN: CPT | Performed by: PHYSICIAN ASSISTANT

## 2019-05-29 PROCEDURE — 20610 DRAIN/INJ JOINT/BURSA W/O US: CPT | Performed by: PHYSICIAN ASSISTANT

## 2019-05-29 RX ORDER — AMOXICILLIN AND CLAVULANATE POTASSIUM 500; 125 MG/1; MG/1
TABLET, FILM COATED ORAL
COMMUNITY
Start: 2019-05-28 | End: 2019-08-06

## 2019-05-29 RX ORDER — HYALURONATE SODIUM 10 MG/ML
20 SYRINGE (ML) INTRAARTICULAR
Status: COMPLETED | OUTPATIENT
Start: 2019-05-29 | End: 2019-05-29

## 2019-05-29 RX ADMIN — Medication 20 MG: at 10:43

## 2019-05-30 ENCOUNTER — HOSPITAL ENCOUNTER (OUTPATIENT)
Dept: RADIOLOGY | Facility: HOSPITAL | Age: 79
Discharge: HOME | End: 2019-05-30
Payer: MEDICARE

## 2019-05-30 DIAGNOSIS — M25.472 PAIN AND SWELLING OF ANKLE, LEFT: ICD-10-CM

## 2019-05-30 DIAGNOSIS — M25.572 PAIN AND SWELLING OF ANKLE, LEFT: ICD-10-CM

## 2019-05-30 PROCEDURE — 73721 MRI JNT OF LWR EXTRE W/O DYE: CPT

## 2019-06-03 ENCOUNTER — TELEPHONE (OUTPATIENT)
Dept: OBGYN CLINIC | Facility: CLINIC | Age: 79
End: 2019-06-03

## 2019-06-04 ENCOUNTER — TRANSCRIBE ORDERS (OUTPATIENT)
Dept: LAB | Facility: CLINIC | Age: 79
End: 2019-06-04

## 2019-06-04 ENCOUNTER — APPOINTMENT (OUTPATIENT)
Dept: LAB | Facility: CLINIC | Age: 79
End: 2019-06-04
Payer: MEDICARE

## 2019-06-04 DIAGNOSIS — L03.116 CELLULITIS OF LEFT FOOT: Primary | ICD-10-CM

## 2019-06-04 DIAGNOSIS — L03.116 CELLULITIS OF LEFT FOOT: ICD-10-CM

## 2019-06-04 LAB
ERYTHROCYTE [DISTWIDTH] IN BLOOD BY AUTOMATED COUNT: 13.7 % (ref 11.6–15.1)
ERYTHROCYTE [SEDIMENTATION RATE] IN BLOOD: 34 MM/HOUR (ref 0–20)
HCT VFR BLD AUTO: 37.5 % (ref 34.8–46.1)
HGB BLD-MCNC: 11.7 G/DL (ref 11.5–15.4)
MCH RBC QN AUTO: 27.5 PG (ref 26.8–34.3)
MCHC RBC AUTO-ENTMCNC: 31.2 G/DL (ref 31.4–37.4)
MCV RBC AUTO: 88 FL (ref 82–98)
PLATELET # BLD AUTO: 337 THOUSANDS/UL (ref 149–390)
PMV BLD AUTO: 10.7 FL (ref 8.9–12.7)
RBC # BLD AUTO: 4.25 MILLION/UL (ref 3.81–5.12)
WBC # BLD AUTO: 7.33 THOUSAND/UL (ref 4.31–10.16)

## 2019-06-04 PROCEDURE — 85027 COMPLETE CBC AUTOMATED: CPT

## 2019-06-04 PROCEDURE — 85652 RBC SED RATE AUTOMATED: CPT

## 2019-06-04 PROCEDURE — 36415 COLL VENOUS BLD VENIPUNCTURE: CPT

## 2019-06-05 ENCOUNTER — OFFICE VISIT (OUTPATIENT)
Dept: OBGYN CLINIC | Facility: CLINIC | Age: 79
End: 2019-06-05
Payer: MEDICARE

## 2019-06-05 VITALS
SYSTOLIC BLOOD PRESSURE: 151 MMHG | HEIGHT: 69 IN | DIASTOLIC BLOOD PRESSURE: 66 MMHG | HEART RATE: 85 BPM | WEIGHT: 176.8 LBS | BODY MASS INDEX: 26.19 KG/M2

## 2019-06-05 DIAGNOSIS — M79.605 PAIN AND SWELLING OF LEFT LOWER EXTREMITY: ICD-10-CM

## 2019-06-05 DIAGNOSIS — M25.572 PAIN AND SWELLING OF ANKLE, LEFT: ICD-10-CM

## 2019-06-05 DIAGNOSIS — M25.472 PAIN AND SWELLING OF ANKLE, LEFT: ICD-10-CM

## 2019-06-05 DIAGNOSIS — M17.0 PRIMARY OSTEOARTHRITIS OF BOTH KNEES: Primary | ICD-10-CM

## 2019-06-05 DIAGNOSIS — M79.89 PAIN AND SWELLING OF LEFT LOWER EXTREMITY: ICD-10-CM

## 2019-06-05 PROCEDURE — 20610 DRAIN/INJ JOINT/BURSA W/O US: CPT | Performed by: ORTHOPAEDIC SURGERY

## 2019-06-05 PROCEDURE — 99214 OFFICE O/P EST MOD 30 MIN: CPT | Performed by: ORTHOPAEDIC SURGERY

## 2019-06-05 RX ORDER — HYALURONATE SODIUM 10 MG/ML
20 SYRINGE (ML) INTRAARTICULAR
Status: COMPLETED | OUTPATIENT
Start: 2019-06-05 | End: 2019-06-05

## 2019-06-05 RX ADMIN — Medication 20 MG: at 13:48

## 2019-06-05 RX ADMIN — Medication 20 MG: at 13:49

## 2019-06-14 ENCOUNTER — CONSULT (OUTPATIENT)
Dept: VASCULAR SURGERY | Facility: CLINIC | Age: 79
End: 2019-06-14
Payer: MEDICARE

## 2019-06-14 VITALS
DIASTOLIC BLOOD PRESSURE: 68 MMHG | TEMPERATURE: 97.1 F | RESPIRATION RATE: 16 BRPM | BODY MASS INDEX: 25.92 KG/M2 | WEIGHT: 175 LBS | HEART RATE: 80 BPM | SYSTOLIC BLOOD PRESSURE: 126 MMHG | HEIGHT: 69 IN

## 2019-06-14 DIAGNOSIS — M79.605 PAIN AND SWELLING OF LEFT LOWER EXTREMITY: ICD-10-CM

## 2019-06-14 DIAGNOSIS — I83.892 VARICOSE VEINS OF LEFT LOWER EXTREMITY WITH EDEMA: Primary | ICD-10-CM

## 2019-06-14 DIAGNOSIS — M79.89 PAIN AND SWELLING OF LEFT LOWER EXTREMITY: ICD-10-CM

## 2019-06-14 PROBLEM — E78.2 MIXED HYPERLIPIDEMIA: Status: ACTIVE | Noted: 2018-05-24

## 2019-06-14 PROBLEM — E13.9 DM (DIABETES MELLITUS), SECONDARY (HCC): Status: ACTIVE | Noted: 2018-05-24

## 2019-06-14 PROCEDURE — 99214 OFFICE O/P EST MOD 30 MIN: CPT | Performed by: NURSE PRACTITIONER

## 2019-08-06 ENCOUNTER — HOSPITAL ENCOUNTER (EMERGENCY)
Facility: HOSPITAL | Age: 79
Discharge: HOME | End: 2019-08-06
Attending: EMERGENCY MEDICINE | Admitting: EMERGENCY MEDICINE
Payer: MEDICARE

## 2019-08-06 VITALS
RESPIRATION RATE: 18 BRPM | WEIGHT: 175 LBS | BODY MASS INDEX: 25.84 KG/M2 | SYSTOLIC BLOOD PRESSURE: 132 MMHG | DIASTOLIC BLOOD PRESSURE: 63 MMHG | OXYGEN SATURATION: 95 % | TEMPERATURE: 97.7 F | HEART RATE: 85 BPM

## 2019-08-06 DIAGNOSIS — R60.9 DEPENDENT EDEMA: Primary | ICD-10-CM

## 2019-08-06 PROCEDURE — 99283 EMERGENCY DEPT VISIT LOW MDM: CPT

## 2019-08-06 RX ORDER — SULFAMETHOXAZOLE AND TRIMETHOPRIM 800; 160 MG/1; MG/1
1 TABLET ORAL EVERY 12 HOURS SCHEDULED
COMMUNITY
End: 2019-09-05

## 2019-08-06 NOTE — DISCHARGE INSTRUCTIONS
Please keep affected extremity elevated above your heart when at rest    Please wear compression stockings  Please follow-up with your PCP for long term care

## 2019-08-06 NOTE — ED PROVIDER NOTES
History  Chief Complaint   Patient presents with    Ankle Swelling     States had injured left ankle 6 months ago , getting in car , door closed on ankle  Seen by ortho , FMD, and vascular doctor and had MRI and doppler  Continues with redness and swelling  Seen for UTI at urologist yesterday and is on bactrim which she thinks helped leg       History provided by:  Patient and medical records   used: No    Ankle Swelling   Location:  Ankle  Injury: no    Ankle location:  L ankle  Pain details:     Quality:  Aching    Radiates to:  Does not radiate    Severity:  Moderate    Onset quality:  Gradual    Timing:  Intermittent    Progression:  Waxing and waning  Chronicity:  Recurrent  Dislocation: no    Prior injury to area:  Yes  Relieved by: elevation  Exacerbated by: walking/sitting  Associated symptoms: swelling    Associated symptoms: no back pain, no decreased ROM, no fatigue, no fever, no neck pain, no numbness, no stiffness and no tingling        Prior to Admission Medications   Prescriptions Last Dose Informant Patient Reported? Taking?    Cranberry-Vitamin C-Probiotic (AZO CRANBERRY PO) Not Taking at Unknown time Self Yes No   Sig: Take by mouth every morning   Glycerin-Polysorbate 80 (REFRESH DRY EYE THERAPY OP) Not Taking at Unknown time Self Yes No   Sig: Apply to eye   Multiple Vitamins-Minerals (CENTRUM SILVER ADULT 50+ PO) Not Taking at Unknown time Self Yes No   Sig: Take by mouth daily with breakfast   amLODIPine (NORVASC) 5 mg tablet 2019 at Unknown time Self Yes Yes   Sig: Take 5 mg by mouth every morning   aspirin (ECOTRIN LOW STRENGTH) 81 mg EC tablet 2019 at Unknown time Self Yes Yes   Sig: Take 81 mg by mouth daily with breakfast   fenofibrate (TRIGLIDE) 160 MG tablet 2019 at Unknown time Self Yes Yes   Sig: Take 160 mg by mouth every evening   fluticasone (FLONASE) 50 mcg/act nasal spray Not Taking at Unknown time Self No No   Si sprays into each nostril daily   Patient not taking: Reported on 6/14/2019   levothyroxine 50 mcg tablet 8/6/2019 at Unknown time Self Yes Yes   metFORMIN (GLUCOPHAGE) 500 mg tablet 8/6/2019 at 0800 Self Yes Yes   Sig: Take 500 mg by mouth 2 (two) times a day with meals Takes 2 tabs for pm dose= 1000mg   olmesartan-hydrochlorothiazide (BENICAR HCT) 40-25 MG per tablet 8/6/2019 at Unknown time Self Yes Yes   Sig: Take 1 tablet by mouth every morning   sulfamethoxazole-trimethoprim (BACTRIM DS) 800-160 mg per tablet 8/6/2019 at 0800 Self Yes Yes   Sig: Take 1 tablet by mouth every 12 (twelve) hours      Facility-Administered Medications: None       Past Medical History:   Diagnosis Date    Anemia     Anxiety     Arthritis     Chronic UTI     better since urethral stretching    Diabetes mellitus (HCC)     type 2    GERD (gastroesophageal reflux disease)     diet controlled    High triglycerides     Hypertension     Irritable bowel syndrome     Primary osteoarthritis of both knees 1/20/2016       Past Surgical History:   Procedure Laterality Date    APPENDECTOMY      age 14   [employer] GUM SURGERY      tumor removal benign x3    DE REMV CATARACT EXTRACAP,INSERT LENS Left 5/15/2017    Procedure: EXTRACTION EXTRACAPSULAR CATARACT PHACO INTRAOCULAR LENS (IOL); Surgeon: Xin Winkler MD;  Location: [facility] MAIN OR;  Service: Ophthalmology     [address] Right 6/26/2017    Procedure: EXTRACTION EXTRACAPSULAR CATARACT PHACO INTRAOCULAR LENS (IOL); Surgeon: Xin Winkler MD;  Location: [facility] OR;  Service: Ophthalmology    TONSILLECTOMY         Family History   Problem Relation Age of Onset    Early death Mother         CHF    Early death Father         MI    Cancer Sister         stomach lymphoma    No Known Problems Son      I have reviewed and agree with the history as documented      Social History     Tobacco Use    Smoking status: Former Smoker     Packs/day: 1 00     Years: 10 00     Pack years: 10 00 Types: Cigarettes     Last attempt to quit:      Years since quittin 6    Smokeless tobacco: Never Used   Substance Use Topics    Alcohol use: Yes     Alcohol/week: 1 0 standard drinks     Types: 1 Standard drinks or equivalent per week     Comment: rarely    Drug use: No        Review of Systems   Constitutional: Negative for activity change, appetite change, chills, diaphoresis, fatigue and fever  HENT: Negative for congestion, dental problem, ear discharge, facial swelling, nosebleeds, rhinorrhea, sinus pressure and trouble swallowing  Eyes: Negative for photophobia, discharge, itching and visual disturbance  Respiratory: Negative for choking, chest tightness and shortness of breath  Cardiovascular: Positive for leg swelling  Negative for chest pain and palpitations  Gastrointestinal: Negative for abdominal distention, abdominal pain, constipation, diarrhea, nausea and vomiting  Endocrine: Negative for polydipsia and polyphagia  Genitourinary: Negative for decreased urine volume, difficulty urinating, dysuria, flank pain, frequency, hematuria and vaginal discharge  Musculoskeletal: Negative for back pain, gait problem, joint swelling, neck pain, neck stiffness and stiffness  Skin: Negative for color change and rash  Neurological: Negative for dizziness, facial asymmetry, speech difficulty, weakness and light-headedness  Psychiatric/Behavioral: Negative for agitation and behavioral problems  The patient is not nervous/anxious and is not hyperactive  All other systems reviewed and are negative  Physical Exam  Physical Exam   Constitutional: She is oriented to person, place, and time  She appears well-developed and well-nourished  No distress  HENT:   Head: Normocephalic and atraumatic  Eyes: Pupils are equal, round, and reactive to light  EOM are normal    Neck: Normal range of motion  Neck supple     Cardiovascular: Normal rate, regular rhythm and normal heart sounds  No murmur heard  Pulmonary/Chest: Effort normal and breath sounds normal  No respiratory distress  She has no wheezes  She has no rales  Abdominal: Soft  Bowel sounds are normal  She exhibits no distension  There is no tenderness  There is no rebound and no guarding  Musculoskeletal: Normal range of motion  She exhibits no edema or deformity  Feet:    Lymphadenopathy:     She has no cervical adenopathy  Neurological: She is alert and oriented to person, place, and time  No cranial nerve deficit  She exhibits normal muscle tone  Coordination normal    Skin: Capillary refill takes less than 2 seconds  No rash noted  No erythema  Psychiatric: She has a normal mood and affect  Her behavior is normal    Nursing note and vitals reviewed  Vital Signs  ED Triage Vitals [08/06/19 1111]   Temperature Pulse Respirations Blood Pressure SpO2   97 7 °F (36 5 °C) 85 18 132/63 95 %      Temp Source Heart Rate Source Patient Position - Orthostatic VS BP Location FiO2 (%)   Oral Monitor Sitting Left arm --      Pain Score       No Pain           Vitals:    08/06/19 1111   BP: 132/63   Pulse: 85   Patient Position - Orthostatic VS: Sitting         Visual Acuity      ED Medications  Medications - No data to display    Diagnostic Studies  Results Reviewed     None                 No orders to display              Procedures  Procedures       ED Course                               MDM  Number of Diagnoses or Management Options  Dependent edema: new and does not require workup  Diagnosis management comments: Patient with months of intermittent left ankle swelling/pain after having ankle slammed in car door  VSS  No redness/warmth  No signs of septic joint  No calf swelling to suggest DVT  Reviewed previous work-up which reveals MRI, XR, u/s  No acute findings  Started bactrim by PCP yesterday  No new features  Has seen ortho, vascular    Has been recommended compression and elevation but she does not consistently do this  She states that compression stockings hurt her  She is ambulatory  No emergent symptoms  Extensive previous work-up and referrals  No SOB or acute features of CHF, PE  Suspect related to dependent edema  Recommended conservative therapy and continued outpatient follow-up  No ED work-up is indicated at this point due to extensive previous work-up in absence of new symptoms  Amount and/or Complexity of Data Reviewed  Clinical lab tests: reviewed  Tests in the radiology section of CPT®: reviewed  Tests in the medicine section of CPT®: reviewed    Risk of Complications, Morbidity, and/or Mortality  Presenting problems: moderate  Diagnostic procedures: moderate  Management options: moderate    Patient Progress  Patient progress: stable      Disposition  Final diagnoses:   Dependent edema     Time reflects when diagnosis was documented in both MDM as applicable and the Disposition within this note     Time User Action Codes Description Comment    8/6/2019 12:00 PM Nito Rodriguez Add [R60 9] Dependent edema       ED Disposition     ED Disposition Condition Date/Time Comment    Discharge Stable Tue Aug 6, 2019 12:00 PM Celia Marking discharge to home/self care              Follow-up Information     Follow up With Specialties Details Why Contact [facility] Schedule an appointment as soon as possible for a visit in 1 week As needed, chronic edema [id number] [address]  [phone]            Discharge Medication List as of 8/6/2019 12:03 PM      CONTINUE these medications which have NOT CHANGED    Details   amLODIPine (NORVASC) 5 mg tablet Take 5 mg by mouth every morning, Historical Med      aspirin (ECOTRIN LOW STRENGTH) 81 mg EC tablet Take 81 mg by mouth daily with breakfast, Historical Med      fenofibrate (TRIGLIDE) 160 MG tablet Take 160 mg by mouth every evening, Historical Med      levothyroxine 50 mcg tablet Starting Sat 4/6/2019, Historical Med      metFORMIN (GLUCOPHAGE) 500 mg tablet Take 500 mg by mouth 2 (two) times a day with meals Takes 2 tabs for pm dose= 1000mg, Historical Med      olmesartan-hydrochlorothiazide (BENICAR HCT) 40-25 MG per tablet Take 1 tablet by mouth every morning, Historical Med      sulfamethoxazole-trimethoprim (BACTRIM DS) 800-160 mg per tablet Take 1 tablet by mouth every 12 (twelve) hours, Historical Med      Cranberry-Vitamin C-Probiotic (AZO CRANBERRY PO) Take by mouth every morning, Historical Med      fluticasone (FLONASE) 50 mcg/act nasal spray 2 sprays into each nostril daily, Starting Mon 12/31/2018, Normal      Glycerin-Polysorbate 80 (REFRESH DRY EYE THERAPY OP) Apply to eye, Historical Med      Multiple Vitamins-Minerals (CENTRUM SILVER ADULT 50+ PO) Take by mouth daily with breakfast, Historical Med           No discharge procedures on file      ED Provider  Electronically Signed by           Yvan Adorno MD  08/06/19 4197

## 2019-08-15 ENCOUNTER — TRANSCRIBE ORDERS (OUTPATIENT)
Dept: ADMINISTRATIVE | Facility: HOSPITAL | Age: 79
End: 2019-08-15

## 2019-08-15 DIAGNOSIS — K74.60 CIRRHOSIS OF LIVER WITHOUT ASCITES, UNSPECIFIED HEPATIC CIRRHOSIS TYPE (HCC): Primary | ICD-10-CM

## 2019-08-16 PROBLEM — M53.3 SACRAL BACK PAIN: Status: ACTIVE | Noted: 2019-08-16

## 2019-08-16 PROBLEM — M25.572 LEFT ANKLE PAIN: Status: ACTIVE | Noted: 2019-08-16

## 2019-08-16 PROBLEM — N39.0 URINARY TRACT INFECTION: Status: ACTIVE | Noted: 2019-08-16

## 2019-08-16 PROBLEM — E72.10 DISORDER OF SULFUR-BEARING AMINO ACID METABOLISM (HCC): Status: ACTIVE | Noted: 2017-05-08

## 2019-08-16 PROBLEM — M54.50 LOW BACK PAIN: Status: ACTIVE | Noted: 2017-05-30

## 2019-08-16 PROBLEM — I67.2 CEREBRAL ATHEROSCLEROSIS: Status: ACTIVE | Noted: 2017-03-25

## 2019-08-19 ENCOUNTER — HOSPITAL ENCOUNTER (OUTPATIENT)
Dept: RADIOLOGY | Facility: HOSPITAL | Age: 79
Discharge: HOME | End: 2019-08-19
Attending: INTERNAL MEDICINE
Payer: MEDICARE

## 2019-08-19 DIAGNOSIS — K74.60 CIRRHOSIS OF LIVER WITHOUT ASCITES, UNSPECIFIED HEPATIC CIRRHOSIS TYPE (HCC): ICD-10-CM

## 2019-08-19 PROCEDURE — 76705 ECHO EXAM OF ABDOMEN: CPT

## 2019-09-02 PROBLEM — I10 HYPERTENSION: Status: ACTIVE | Noted: 2019-09-02

## 2019-09-02 PROBLEM — K58.9 IRRITABLE BOWEL SYNDROME: Status: ACTIVE | Noted: 2019-09-02

## 2019-09-02 PROBLEM — M17.12 OSTEOARTHRITIS OF LEFT KNEE: Status: ACTIVE | Noted: 2017-07-14

## 2019-09-02 PROBLEM — R60.9 EDEMA: Status: ACTIVE | Noted: 2017-03-25

## 2019-09-02 PROBLEM — I34.0 MR (MITRAL REGURGITATION): Status: ACTIVE | Noted: 2017-05-08

## 2019-09-02 PROBLEM — E11.9 DIABETES MELLITUS (HCC): Status: ACTIVE | Noted: 2018-05-24

## 2019-09-02 PROBLEM — K74.60 CIRRHOSIS OF LIVER (HCC): Status: ACTIVE | Noted: 2019-09-02

## 2019-10-16 ENCOUNTER — OFFICE VISIT (OUTPATIENT)
Dept: OBGYN CLINIC | Facility: CLINIC | Age: 79
End: 2019-10-16
Payer: MEDICARE

## 2019-10-16 VITALS
DIASTOLIC BLOOD PRESSURE: 59 MMHG | HEART RATE: 87 BPM | HEIGHT: 69 IN | SYSTOLIC BLOOD PRESSURE: 171 MMHG | BODY MASS INDEX: 25.39 KG/M2 | WEIGHT: 171.4 LBS

## 2019-10-16 DIAGNOSIS — M25.472 PAIN AND SWELLING OF ANKLE, LEFT: ICD-10-CM

## 2019-10-16 DIAGNOSIS — M17.12 PRIMARY OSTEOARTHRITIS OF LEFT KNEE: Primary | ICD-10-CM

## 2019-10-16 DIAGNOSIS — R60.0 LEG EDEMA, LEFT: ICD-10-CM

## 2019-10-16 DIAGNOSIS — M79.89 PAIN AND SWELLING OF TOE OF LEFT FOOT: ICD-10-CM

## 2019-10-16 DIAGNOSIS — M25.572 PAIN AND SWELLING OF ANKLE, LEFT: ICD-10-CM

## 2019-10-16 DIAGNOSIS — M79.675 PAIN AND SWELLING OF TOE OF LEFT FOOT: ICD-10-CM

## 2019-10-16 PROCEDURE — 99213 OFFICE O/P EST LOW 20 MIN: CPT | Performed by: ORTHOPAEDIC SURGERY

## 2019-10-16 PROCEDURE — 20610 DRAIN/INJ JOINT/BURSA W/O US: CPT | Performed by: ORTHOPAEDIC SURGERY

## 2019-10-16 RX ORDER — LIDOCAINE HYDROCHLORIDE 10 MG/ML
4 INJECTION, SOLUTION INFILTRATION; PERINEURAL
Status: COMPLETED | OUTPATIENT
Start: 2019-10-16 | End: 2019-10-16

## 2019-10-16 RX ORDER — DEXAMETHASONE SODIUM PHOSPHATE 100 MG/10ML
40 INJECTION INTRAMUSCULAR; INTRAVENOUS
Status: COMPLETED | OUTPATIENT
Start: 2019-10-16 | End: 2019-10-16

## 2019-10-16 RX ADMIN — LIDOCAINE HYDROCHLORIDE 4 ML: 10 INJECTION, SOLUTION INFILTRATION; PERINEURAL at 15:28

## 2019-10-16 RX ADMIN — DEXAMETHASONE SODIUM PHOSPHATE 40 MG: 100 INJECTION INTRAMUSCULAR; INTRAVENOUS at 15:28

## 2019-10-16 NOTE — PROGRESS NOTES
Assessment/Plan:  1  Primary osteoarthritis of left knee     2  Pain and swelling of toe of left foot     3  Pain and swelling of ankle, left     4  Leg edema, left       As for the knee, the patient was given a steroid injection today  She did not have good results with Euflexxa injections and has responded well to steroid injections in the past   As for the left ankle, she has had an extensive workup without any significant findings  She has extensive ongoing edema of the lower leg, ankle, and foot  I do not have any obvious etiology for this at this time  She has seen multiple providers already  There are no signs of any cellulitis on examination today  I do not think her peroneal tendon tear is contributing to her current issue, and thus I do not think this should be surgically addressed  At this time, I am referring her to our foot and ankle specialist, Dr Koki Patel, for further evaluation and treatment  Subjective:   Farida Sweeney is a 77 y o  female who presents today for follow-up of her left ankle  She has seen multiple providers for this since seeing us  She did see vascular surgery did not feel her varicosities or contributing to her ankle pain and swelling  She did also see Podiatry who do not have any idea how to treat her, or the cause of her symptoms  She did also visit with her primary care doctor, as well as an emergency department visit since we last saw her for this same issue  No additional testing had been performed  She notes ongoing diffuse swelling about the foot, ankle, and lower shin  This seems to worse with activity and slightly better in the morning before she gets up  Her pain is mostly about the lateral ankle and lower leg  She still cannot fit into regular shoes due to her swelling  She notes that the swelling does improve when she is asleep at night and has the foot elevated  This returns as soon as she puts it in a dependent position    She does note good sensation of left lower extremity though she is diabetic  She has never been diagnosed with neuropathy  She denies any calf pain or cramping  MRI of the ankle performed 5 months ago showed diffuse subcutaneous edema as well as a questionable small tear of the peroneal tendon  She also notes ongoing pain about the left knee  We have been treating her conservatively for arthritis of this knee  Euflexxa injections did not help her  Review of Systems   Constitutional: Positive for activity change  Negative for chills, fever and unexpected weight change  HENT: Negative for hearing loss, nosebleeds and sore throat  Eyes: Negative for pain, redness and visual disturbance  Respiratory: Negative for cough, shortness of breath and wheezing  Cardiovascular: Negative for chest pain, palpitations and leg swelling  Gastrointestinal: Negative for abdominal pain, nausea and vomiting  Endocrine: Negative for polydipsia and polyuria  Genitourinary: Negative for dysuria and hematuria  Musculoskeletal:        See HPI   Skin: Negative for rash and wound  Neurological: Negative for dizziness, numbness and headaches  Psychiatric/Behavioral: Negative for decreased concentration and suicidal ideas  The patient is not nervous/anxious  Past Medical History:   Diagnosis Date    Anemia     Anxiety     Arthritis     Chronic UTI     better since urethral stretching    Cirrhosis of liver ([facility] [address] ) 9/2/2019    Diabetes mellitus ([facility] [address] )     type 2    GERD (gastroesophageal reflux disease)     diet controlled    High triglycerides     Hypertension     Irritable bowel syndrome     Primary osteoarthritis of both knees 1/20/2016       Past Surgical History:   Procedure Laterality Date    APPENDECTOMY      age 14   [facility] GUM SURGERY      tumor removal benign x3    TX REMV CATARACT EXTRACAP,INSERT LENS Left 5/15/2017    Procedure: EXTRACTION EXTRACAPSULAR CATARACT PHACO INTRAOCULAR LENS (IOL);   Surgeon: Bertram Stoddard Kay Pelaez MD;  Location: [place] [address] MAIN OR;  Service: Ophthalmology     [address] Right 2017    Procedure: EXTRACTION EXTRACAPSULAR CATARACT PHACO INTRAOCULAR LENS (IOL); Surgeon: Scott Tse MD;  [facility];  Service: Ophthalmology    TONSILLECTOMY         Family History   Problem Relation Age of Onset    Early death Mother         CHF    Early death Father         MI    Cancer Sister         stomach lymphoma    No Known Problems Son        Social History     Occupational History    Not on file   Tobacco Use    Smoking status: Former Smoker     Packs/day: 1      Years: 10      Pack years: 10      Types: Cigarettes     Last attempt to quit:      Years since quittin 7    Smokeless tobacco: Never Used   Substance and Sexual Activity    Alcohol use:  Yes     Alcohol/week: 1 0 standard drinks     Types: 1 Standard drinks or equivalent per week     Comment: rarely    Drug use: No    Sexual activity: Not on file         Current Outpatient Medications:     amLODIPine (NORVASC) 5 mg tablet, Take 5 mg by mouth every morning, Disp: , Rfl:     aspirin (ECOTRIN LOW STRENGTH) 81 mg EC tablet, Take 81 mg by mouth daily with breakfast, Disp: , Rfl:     fenofibrate (TRIGLIDE) 160 MG tablet, Take 160 mg by mouth every evening, Disp: , Rfl:     Glycerin-Polysorbate 80 (REFRESH DRY EYE THERAPY OP), Apply to eye, Disp: , Rfl:     levothyroxine 50 mcg tablet, , Disp: , Rfl:     metFORMIN (GLUCOPHAGE) 500 mg tablet, Take 500 mg by mouth Takes 2 tabs for pm dose= 1000mg , Disp: , Rfl:     Multiple Vitamins-Minerals (CENTRUM SILVER ADULT 50+ PO), Take by mouth daily with breakfast, Disp: , Rfl:     olmesartan-hydrochlorothiazide (BENICAR HCT) 40-25 MG per tablet, Take 1 tablet by mouth every morning, Disp: , Rfl:     Allergies   Allergen Reactions    Amoxicillin GI Intolerance    Lactose GI Intolerance    Sulfa Antibiotics GI Intolerance    Lidocaine Rash Lidocaine patch       Objective:  Vitals:    10/16/19 1516   BP: (!) 171/59   Pulse: 87       Ortho Exam    Physical Exam   Constitutional: She is oriented to person, place, and time  She appears well-developed and well-nourished  No distress  HENT:   Head: Normocephalic and atraumatic  Eyes: Conjunctivae and EOM are normal  No scleral icterus  Neck: No JVD present  Cardiovascular: Normal rate and intact distal pulses  Pulmonary/Chest: Effort normal  No respiratory distress  Neurological: She is alert and oriented to person, place, and time  Coordination normal    Skin: Skin is warm  Psychiatric: She has a normal mood and affect  Left ankle:  Diffuse 2+ pitting edema of the lower shin extending into the ankle and dorsum of the foot  Diffuse tenderness of the lower shin both medially and laterally as well as the lateral ankle diffusely  Diffuse restrictions in range of motion  No erythema appreciated today  Sensation intact left lower extremity  2+ DP pulse  No calf tenderness      Large joint arthrocentesis: L knee  Date/Time: 10/16/2019 3:28 PM  Consent given by: patient  Site marked: site marked  Timeout: Immediately prior to procedure a time out was called to verify the correct patient, procedure, equipment, support staff and site/side marked as required   Supporting Documentation  Indications: pain   Procedure Details  Location: knee - L knee  Preparation: Patient was prepped and draped in the usual sterile fashion  Needle size: 22 G  Ultrasound guidance: no  Approach: anterolateral  Medications administered: 40 mg dexamethasone 100 mg/10 mL; 4 mL lidocaine 1 %    Patient tolerance: patient tolerated the procedure well with no immediate complications  Dressing:  Sterile dressing applied

## 2019-10-22 ENCOUNTER — OFFICE VISIT (OUTPATIENT)
Dept: OBGYN CLINIC | Facility: CLINIC | Age: 79
End: 2019-10-22
Payer: MEDICARE

## 2019-10-22 ENCOUNTER — APPOINTMENT (OUTPATIENT)
Dept: RADIOLOGY | Facility: AMBULARY SURGERY CENTER | Age: 79
End: 2019-10-22
Attending: ORTHOPAEDIC SURGERY
Payer: MEDICARE

## 2019-10-22 VITALS
HEIGHT: 69 IN | SYSTOLIC BLOOD PRESSURE: 179 MMHG | DIASTOLIC BLOOD PRESSURE: 53 MMHG | BODY MASS INDEX: 25.03 KG/M2 | WEIGHT: 169 LBS | HEART RATE: 80 BPM

## 2019-10-22 DIAGNOSIS — I87.8 VENOUS STASIS OF LOWER EXTREMITY: ICD-10-CM

## 2019-10-22 DIAGNOSIS — M25.572 PAIN, JOINT, ANKLE AND FOOT, LEFT: ICD-10-CM

## 2019-10-22 DIAGNOSIS — I89.0 LYMPHEDEMA OF LEFT LOWER EXTREMITY: Primary | ICD-10-CM

## 2019-10-22 PROCEDURE — 73610 X-RAY EXAM OF ANKLE: CPT

## 2019-10-22 PROCEDURE — 99213 OFFICE O/P EST LOW 20 MIN: CPT | Performed by: ORTHOPAEDIC SURGERY

## 2019-10-22 RX ORDER — OLMESARTAN MEDOXOMIL 40 MG/1
TABLET ORAL
COMMUNITY
Start: 2019-10-03 | End: 2020-01-14 | Stop reason: ALTCHOICE

## 2019-10-22 RX ORDER — HYDROCHLOROTHIAZIDE 25 MG/1
TABLET ORAL
COMMUNITY
Start: 2019-10-01 | End: 2020-01-14 | Stop reason: ALTCHOICE

## 2019-10-22 NOTE — PROGRESS NOTES
ELSIE Kraft  Attending, Orthopaedic Surgery  Foot and [address] [facility] [address] [facility]      ORTHOPAEDIC FOOT AND ANKLE CLINIC VISIT     Assessment:     Encounter Diagnoses   Name Primary?  Pain, joint, ankle and foot, left     Lymphedema of left lower extremity Yes    Venous stasis of lower extremity             Plan:   · The patient verbalized understanding of exam findings and treatment plan  We engaged in the shared decision-making process and treatment options were discussed at length with the patient  Surgical and conservative management discussed today along with risks and benefits  · The pain she is experiencing is diffuse about the lateral lower leg and ankle  Her pain is secondary to lymphedema  The exact cause is unclear  · We will refer her to the lymphedema PT clinic for further management   · Recommend wearing compression stockings with 20-30 mm Hg and elevation for swelling control  She states that she has not been able to wear these due to discomfort  Return if symptoms worsen or fail to improve  History of Present Illness:   Chief Complaint:   Chief Complaint   Patient presents with    Left Ankle - Pain     Abhishek Palacios is a 77 y o  female who is being seen for left ankle pain and swelling  Patient reports this pain has been ongoing for about 6 months  In January (9 months ago) she had a car door hit her ankle but she did not experience pain in the ankle until months later  Pain is localized at diffuse lower leg and lateral ankle with minimal radiating and described as sharp and severe  She does have history of varicose veins  She was seen by a vascular surgeon who ruled DVT and recommended compression stockings  Patient did not wish to pursue surgical intervention regarding her varicose veins  Patient denies numbness, tingling or radicular pain  Denies history of neuropathy    Patient does not smoke, does not have diabetes and does not take blood thinners  Patient denies family history of anesthesia complications and has not had any complications with anesthesia  Pain/symptom timing:  Worse during the day when active  Pain/symptom context:  Worse with activites and work  Pain/symptom modifying factors:  Rest makes better, activities make worse  Pain/symptom associated signs/symptoms: none    Prior treatment   · NSAIDsYes   · Injections No   · Bracing/Orthotics No    · Physical Therapy No     Orthopedic Surgical History:   See below     Past Medical, Surgical and Social History:  Past Medical History:  has a past medical history of Anemia, Anxiety, Arthritis, Chronic UTI, Cirrhosis of liver ([facility] [address] ) (9/2/2019), Diabetes mellitus ([facility] [address] ), GERD (gastroesophageal reflux disease), High triglycerides, Hypertension, Irritable bowel syndrome, and Primary osteoarthritis of both knees (1/20/2016)  Problem List: does not have any pertinent problems on file  Past Surgical History:  has a past surgical history that includes Gum surgery; Tonsillectomy; Appendectomy; pr remv cataract extracap,insert lens (Left, 5/15/2017); and pr remv cataract extracap,insert lens (Right, 6/26/2017)  Family History: family history includes Cancer in her sister; Early death in her father and mother; No Known Problems in her son  Social History:  reports that she quit smoking about 9 years ago  Her smoking use included cigarettes  She has a 10 00 pack-year smoking history  She has never used smokeless tobacco  She reports that she drinks about 1 0 standard drinks of alcohol per week  She reports that she does not use drugs  Current Medications: has a current medication list which includes the following prescription(s): amlodipine, aspirin, fenofibrate, glycerin-polysorbate 80, hydrochlorothiazide, levothyroxine, metformin, multiple vitamins-minerals, olmesartan, and olmesartan-hydrochlorothiazide  Allergies: is allergic to amoxicillin; lactose; sulfa antibiotics; and lidocaine  Review of Systems:  General- denies fever/chills  HEENT- denies hearing loss or sore throat  Eyes- denies eye pain or visual disturbances, denies red eyes  Respiratory- denies cough or SOB  Cardio- denies chest pain or palpitations  GI- denies abdominal pain  Endocrine- denies urinary frequency  Urinary- denies pain with urination  Musculoskeletal- Negative except noted above  Skin- denies rashes or wounds  Neurological- denies dizziness or headache  Psychiatric- denies anxiety or difficulty concentrating    Physical Exam:   BP (!) 179/53 (BP Location: Right arm, Patient Position: Sitting, Cuff Size: Adult)   Pulse 80   Ht 5' 9" (1 753 m)   Wt 76 7 kg (169 lb)   BMI 24 96 kg/m²   General/Constitutional: No apparent distress: well-nourished and well developed  Eyes: normal ocular motion  Lymphatic: No appreciable lymphadenopathy  Respiratory: Non-labored breathing  Vascular: No edema, swelling or tenderness, except as noted in detailed exam   Integumentary: No impressive skin lesions present, except as noted in detailed exam   Neuro: No ataxia or tremors noted  Psych: Normal mood and affect, oriented to person, place and time  Appropriate affect  Musculoskeletal: Normal, except as noted in detailed exam and in HPI  Examination    Left    Gait Normal   Musculoskeletal Tender to palpation diffusely about the lower leg and lateral ankle in the area of edema    Skin Multiple varicose veins noted  Moderate edema noted of the lower leg, ankle, and foot    Nails Normal    Range of Motion  10 degrees dorsiflexion, 30 degrees plantarflexion  Subtalar motion: normal    Stability Stable    Muscle Strength 5/5 tibialis anterior  5/5 gastrocnemius-soleus  5/5 posterior tibialis  5/5 peroneal/eversion strength   No pain with resisted peroneal strength testing  5/5 EHL  5/5 FHL    Neurologic Normal    Sensation Intact to light touch throughout sural, saphenous, superficial peroneal, deep peroneal and medial/lateral plantar nerve distributions  Topping-Brijesh 5 07 filament (10g) testing deferred  Cardiovascular Brisk capillary refill < 2 seconds,intact DP and PT pulses    Special Tests None      Imaging Studies:   3 views of the left ankle were taken, reviewed and interpreted independently that demonstrate no evidence of fracture, dislocation, or any other significant bony abnormalities     Scribe Attestation    I,:   Bob Garcia PA-C am acting as a scribe while in the presence of the attending physician :        I,:   Filomena Mckeon MD personally performed the services described in this documentation    as scribed in my presence :              Dossie Chars Lachman, MD  Foot & Ankle Surgery   Department of [place] [address]      I personally performed the service  Dossie Chars Lachman, MD

## 2019-12-19 ENCOUNTER — TRANSCRIBE ORDERS (OUTPATIENT)
Dept: LAB | Facility: CLINIC | Age: 79
End: 2019-12-19

## 2019-12-19 ENCOUNTER — APPOINTMENT (OUTPATIENT)
Dept: LAB | Facility: CLINIC | Age: 79
End: 2019-12-19
Payer: MEDICARE

## 2019-12-19 DIAGNOSIS — I10 ESSENTIAL HYPERTENSION: ICD-10-CM

## 2019-12-19 DIAGNOSIS — E78.00 HYPERCHOLESTEREMIA: ICD-10-CM

## 2019-12-19 DIAGNOSIS — E11.21 TYPE 2 DIABETES MELLITUS WITH DIABETIC NEPHROPATHY, WITHOUT LONG-TERM CURRENT USE OF INSULIN (HCC): ICD-10-CM

## 2019-12-19 LAB
ALBUMIN SERPL BCP-MCNC: 4.1 G/DL (ref 3.5–5)
ALP SERPL-CCNC: 86 U/L (ref 46–116)
ALT SERPL W P-5'-P-CCNC: 23 U/L (ref 12–78)
ANION GAP SERPL CALCULATED.3IONS-SCNC: 4 MMOL/L (ref 4–13)
AST SERPL W P-5'-P-CCNC: 14 U/L (ref 5–45)
BILIRUB SERPL-MCNC: 0.36 MG/DL (ref 0.2–1)
BUN SERPL-MCNC: 22 MG/DL (ref 5–25)
CALCIUM SERPL-MCNC: 9.7 MG/DL (ref 8.3–10.1)
CHLORIDE SERPL-SCNC: 104 MMOL/L (ref 100–108)
CHOLEST SERPL-MCNC: 192 MG/DL (ref 50–200)
CO2 SERPL-SCNC: 29 MMOL/L (ref 21–32)
CREAT SERPL-MCNC: 0.79 MG/DL (ref 0.6–1.3)
ERYTHROCYTE [DISTWIDTH] IN BLOOD BY AUTOMATED COUNT: 13.6 % (ref 11.6–15.1)
EST. AVERAGE GLUCOSE BLD GHB EST-MCNC: 151 MG/DL
GFR SERPL CREATININE-BSD FRML MDRD: 71 ML/MIN/1.73SQ M
GLUCOSE P FAST SERPL-MCNC: 136 MG/DL (ref 65–99)
HBA1C MFR BLD: 6.9 % (ref 4.2–6.3)
HCT VFR BLD AUTO: 38.1 % (ref 34.8–46.1)
HDLC SERPL-MCNC: 39 MG/DL
HGB BLD-MCNC: 11.7 G/DL (ref 11.5–15.4)
LDLC SERPL CALC-MCNC: 115 MG/DL (ref 0–100)
MCH RBC QN AUTO: 27.5 PG (ref 26.8–34.3)
MCHC RBC AUTO-ENTMCNC: 30.7 G/DL (ref 31.4–37.4)
MCV RBC AUTO: 90 FL (ref 82–98)
NONHDLC SERPL-MCNC: 153 MG/DL
PLATELET # BLD AUTO: 318 THOUSANDS/UL (ref 149–390)
PMV BLD AUTO: 11.7 FL (ref 8.9–12.7)
POTASSIUM SERPL-SCNC: 4.5 MMOL/L (ref 3.5–5.3)
PROT SERPL-MCNC: 7.8 G/DL (ref 6.4–8.2)
RBC # BLD AUTO: 4.25 MILLION/UL (ref 3.81–5.12)
SODIUM SERPL-SCNC: 137 MMOL/L (ref 136–145)
TRIGL SERPL-MCNC: 188 MG/DL
WBC # BLD AUTO: 7.63 THOUSAND/UL (ref 4.31–10.16)

## 2019-12-19 PROCEDURE — 85027 COMPLETE CBC AUTOMATED: CPT

## 2019-12-19 PROCEDURE — 36415 COLL VENOUS BLD VENIPUNCTURE: CPT

## 2019-12-19 PROCEDURE — 80061 LIPID PANEL: CPT

## 2019-12-19 PROCEDURE — 83036 HEMOGLOBIN GLYCOSYLATED A1C: CPT

## 2019-12-19 PROCEDURE — 80053 COMPREHEN METABOLIC PANEL: CPT

## 2019-12-20 PROBLEM — K21.9 GERD (GASTROESOPHAGEAL REFLUX DISEASE): Status: ACTIVE | Noted: 2019-12-20

## 2020-01-01 ENCOUNTER — APPOINTMENT (OUTPATIENT)
Dept: PHYSICAL THERAPY | Facility: CLINIC | Age: 80
End: 2020-01-01
Payer: MEDICARE

## 2020-01-01 ENCOUNTER — EVALUATION (OUTPATIENT)
Dept: PHYSICAL THERAPY | Facility: CLINIC | Age: 80
End: 2020-01-01
Payer: MEDICARE

## 2020-01-01 ENCOUNTER — TELEPHONE (OUTPATIENT)
Dept: PHYSICAL THERAPY | Facility: CLINIC | Age: 80
End: 2020-01-01

## 2020-01-01 ENCOUNTER — OFFICE VISIT (OUTPATIENT)
Dept: PHYSICAL THERAPY | Facility: CLINIC | Age: 80
End: 2020-01-01
Payer: MEDICARE

## 2020-01-01 ENCOUNTER — APPOINTMENT (OUTPATIENT)
Dept: LAB | Facility: HOSPITAL | Age: 80
End: 2020-01-01
Attending: INTERNAL MEDICINE
Payer: MEDICARE

## 2020-01-01 ENCOUNTER — HOSPITAL ENCOUNTER (EMERGENCY)
Facility: HOSPITAL | Age: 80
Discharge: HOME | End: 2020-10-27
Attending: EMERGENCY MEDICINE | Admitting: EMERGENCY MEDICINE
Payer: MEDICARE

## 2020-01-01 ENCOUNTER — HOSPITAL ENCOUNTER (OUTPATIENT)
Dept: RADIOLOGY | Facility: HOSPITAL | Age: 80
Discharge: HOME | End: 2020-08-17
Attending: INTERNAL MEDICINE
Payer: MEDICARE

## 2020-01-01 ENCOUNTER — APPOINTMENT (OUTPATIENT)
Dept: LAB | Facility: CLINIC | Age: 80
End: 2020-01-01
Payer: MEDICARE

## 2020-01-01 ENCOUNTER — TRANSCRIBE ORDERS (OUTPATIENT)
Dept: ADMINISTRATIVE | Facility: HOSPITAL | Age: 80
End: 2020-01-01

## 2020-01-01 ENCOUNTER — APPOINTMENT (EMERGENCY)
Dept: RADIOLOGY | Facility: HOSPITAL | Age: 80
End: 2020-01-01
Payer: MEDICARE

## 2020-01-01 VITALS
HEART RATE: 64 BPM | DIASTOLIC BLOOD PRESSURE: 70 MMHG | OXYGEN SATURATION: 95 % | BODY MASS INDEX: 26.94 KG/M2 | TEMPERATURE: 97.4 F | WEIGHT: 177.2 LBS | SYSTOLIC BLOOD PRESSURE: 152 MMHG | RESPIRATION RATE: 21 BRPM

## 2020-01-01 DIAGNOSIS — K74.60 CIRRHOSIS OF LIVER WITHOUT ASCITES, UNSPECIFIED HEPATIC CIRRHOSIS TYPE (HCC): ICD-10-CM

## 2020-01-01 DIAGNOSIS — E16.2 HYPOGLYCEMIA: ICD-10-CM

## 2020-01-01 DIAGNOSIS — R29.898 WEAKNESS OF BOTH LEGS: Primary | ICD-10-CM

## 2020-01-01 DIAGNOSIS — E78.2 MIXED HYPERLIPIDEMIA: ICD-10-CM

## 2020-01-01 DIAGNOSIS — R61 NIGHT SWEAT: ICD-10-CM

## 2020-01-01 DIAGNOSIS — E11.9 TYPE 2 DIABETES MELLITUS WITHOUT COMPLICATION, WITHOUT LONG-TERM CURRENT USE OF INSULIN (HCC): ICD-10-CM

## 2020-01-01 DIAGNOSIS — U07.1 COVID-19: ICD-10-CM

## 2020-01-01 DIAGNOSIS — E03.9 ACQUIRED HYPOTHYROIDISM: ICD-10-CM

## 2020-01-01 DIAGNOSIS — I10 ESSENTIAL HYPERTENSION: ICD-10-CM

## 2020-01-01 DIAGNOSIS — N39.0 UTI (URINARY TRACT INFECTION): Primary | ICD-10-CM

## 2020-01-01 DIAGNOSIS — E53.8 VITAMIN B12 DEFICIENCY: ICD-10-CM

## 2020-01-01 DIAGNOSIS — M79.18 PAIN OF PARASPINAL MUSCLE: ICD-10-CM

## 2020-01-01 LAB
AFP-TM SERPL-MCNC: 1.8 NG/ML (ref 0.5–8)
ALBUMIN SERPL BCP-MCNC: 3.4 G/DL (ref 3.5–5)
ALBUMIN SERPL BCP-MCNC: 3.4 G/DL (ref 3.5–5)
ALBUMIN SERPL BCP-MCNC: 3.9 G/DL (ref 3.5–5)
ALP SERPL-CCNC: 82 U/L (ref 46–116)
ALP SERPL-CCNC: 89 U/L (ref 46–116)
ALP SERPL-CCNC: 92 U/L (ref 46–116)
ALT SERPL W P-5'-P-CCNC: 22 U/L (ref 12–78)
ALT SERPL W P-5'-P-CCNC: 25 U/L (ref 12–78)
ALT SERPL W P-5'-P-CCNC: 26 U/L (ref 12–78)
AMMONIA PLAS-SCNC: <10 UMOL/L (ref 11–35)
ANION GAP SERPL CALCULATED.3IONS-SCNC: 10 MMOL/L (ref 4–13)
ANION GAP SERPL CALCULATED.3IONS-SCNC: 5 MMOL/L (ref 4–13)
ANION GAP SERPL CALCULATED.3IONS-SCNC: 6 MMOL/L (ref 4–13)
AST SERPL W P-5'-P-CCNC: 15 U/L (ref 5–45)
AST SERPL W P-5'-P-CCNC: 15 U/L (ref 5–45)
AST SERPL W P-5'-P-CCNC: 17 U/L (ref 5–45)
ATRIAL RATE: 68 BPM
BACTERIA UR CULT: ABNORMAL
BACTERIA UR CULT: ABNORMAL
BACTERIA UR QL AUTO: ABNORMAL /HPF
BASOPHILS # BLD AUTO: 0.11 THOUSANDS/ΜL (ref 0–0.1)
BASOPHILS NFR BLD AUTO: 2 % (ref 0–1)
BILIRUB SERPL-MCNC: 0.3 MG/DL (ref 0.2–1)
BILIRUB SERPL-MCNC: 0.3 MG/DL (ref 0.2–1)
BILIRUB SERPL-MCNC: 0.42 MG/DL (ref 0.2–1)
BILIRUB UR QL STRIP: NEGATIVE
BUN SERPL-MCNC: 22 MG/DL (ref 5–25)
BUN SERPL-MCNC: 26 MG/DL (ref 5–25)
BUN SERPL-MCNC: 26 MG/DL (ref 5–25)
CALCIUM ALBUM COR SERPL-MCNC: 9 MG/DL (ref 8.3–10.1)
CALCIUM SERPL-MCNC: 8.5 MG/DL (ref 8.3–10.1)
CALCIUM SERPL-MCNC: 8.9 MG/DL (ref 8.3–10.1)
CALCIUM SERPL-MCNC: 9.2 MG/DL (ref 8.3–10.1)
CHLORIDE SERPL-SCNC: 100 MMOL/L (ref 100–108)
CHLORIDE SERPL-SCNC: 101 MMOL/L (ref 100–108)
CHLORIDE SERPL-SCNC: 103 MMOL/L (ref 100–108)
CHOLEST SERPL-MCNC: 219 MG/DL (ref 50–200)
CLARITY UR: ABNORMAL
CO2 SERPL-SCNC: 25 MMOL/L (ref 21–32)
CO2 SERPL-SCNC: 27 MMOL/L (ref 21–32)
CO2 SERPL-SCNC: 30 MMOL/L (ref 21–32)
COLOR UR: ABNORMAL
CREAT SERPL-MCNC: 0.75 MG/DL (ref 0.6–1.3)
CREAT SERPL-MCNC: 0.89 MG/DL (ref 0.6–1.3)
CREAT SERPL-MCNC: 0.9 MG/DL (ref 0.6–1.3)
EOSINOPHIL # BLD AUTO: 0.11 THOUSAND/ΜL (ref 0–0.61)
EOSINOPHIL NFR BLD AUTO: 2 % (ref 0–6)
ERYTHROCYTE [DISTWIDTH] IN BLOOD BY AUTOMATED COUNT: 13.7 % (ref 11.6–15.1)
ERYTHROCYTE [DISTWIDTH] IN BLOOD BY AUTOMATED COUNT: 13.9 % (ref 11.6–15.1)
EST. AVERAGE GLUCOSE BLD GHB EST-MCNC: 154 MG/DL
FRUCTOSAMINE SERPL-SCNC: 252 UMOL/L (ref 0–285)
GFR SERPL CREATININE-BSD FRML MDRD: 61 ML/MIN/1.73SQ M
GFR SERPL CREATININE-BSD FRML MDRD: 61 ML/MIN/1.73SQ M
GFR SERPL CREATININE-BSD FRML MDRD: 76 ML/MIN/1.73SQ M
GLUCOSE P FAST SERPL-MCNC: 131 MG/DL (ref 65–99)
GLUCOSE SERPL-MCNC: 111 MG/DL (ref 65–140)
GLUCOSE SERPL-MCNC: 143 MG/DL (ref 65–140)
GLUCOSE UR STRIP-MCNC: NEGATIVE MG/DL
HBA1C MFR BLD: 7 %
HCT VFR BLD AUTO: 37.2 % (ref 34.8–46.1)
HCT VFR BLD AUTO: 38.7 % (ref 34.8–46.1)
HDLC SERPL-MCNC: 45 MG/DL
HGB BLD-MCNC: 11.5 G/DL (ref 11.5–15.4)
HGB BLD-MCNC: 12.4 G/DL (ref 11.5–15.4)
HGB UR QL STRIP.AUTO: NEGATIVE
IMM GRANULOCYTES # BLD AUTO: 0.05 THOUSAND/UL (ref 0–0.2)
IMM GRANULOCYTES NFR BLD AUTO: 1 % (ref 0–2)
KETONES UR STRIP-MCNC: NEGATIVE MG/DL
LDLC SERPL CALC-MCNC: 133 MG/DL (ref 0–100)
LEFT EYE DIABETIC RETINOPATHY: NORMAL
LEUKOCYTE ESTERASE UR QL STRIP: ABNORMAL
LIPASE SERPL-CCNC: 149 U/L (ref 73–393)
LYMPHOCYTES # BLD AUTO: 1.44 THOUSANDS/ΜL (ref 0.6–4.47)
LYMPHOCYTES NFR BLD AUTO: 19 % (ref 14–44)
MCH RBC QN AUTO: 27.6 PG (ref 26.8–34.3)
MCH RBC QN AUTO: 28.4 PG (ref 26.8–34.3)
MCHC RBC AUTO-ENTMCNC: 30.9 G/DL (ref 31.4–37.4)
MCHC RBC AUTO-ENTMCNC: 32 G/DL (ref 31.4–37.4)
MCV RBC AUTO: 89 FL (ref 82–98)
MCV RBC AUTO: 89 FL (ref 82–98)
MONOCYTES # BLD AUTO: 0.78 THOUSAND/ΜL (ref 0.17–1.22)
MONOCYTES NFR BLD AUTO: 10 % (ref 4–12)
MUCOUS THREADS UR QL AUTO: ABNORMAL
NEUTROPHILS # BLD AUTO: 5.03 THOUSANDS/ΜL (ref 1.85–7.62)
NEUTS SEG NFR BLD AUTO: 66 % (ref 43–75)
NITRITE UR QL STRIP: POSITIVE
NON-SQ EPI CELLS URNS QL MICRO: ABNORMAL /HPF
NONHDLC SERPL-MCNC: 174 MG/DL
NRBC BLD AUTO-RTO: 0 /100 WBCS
P AXIS: 69 DEGREES
PH UR STRIP.AUTO: 6 [PH]
PLATELET # BLD AUTO: 270 THOUSANDS/UL (ref 149–390)
PLATELET # BLD AUTO: 290 THOUSANDS/UL (ref 149–390)
PMV BLD AUTO: 10.4 FL (ref 8.9–12.7)
PMV BLD AUTO: 9.9 FL (ref 8.9–12.7)
POTASSIUM SERPL-SCNC: 3.8 MMOL/L (ref 3.5–5.3)
POTASSIUM SERPL-SCNC: 4.2 MMOL/L (ref 3.5–5.3)
POTASSIUM SERPL-SCNC: 4.5 MMOL/L (ref 3.5–5.3)
PR INTERVAL: 212 MS
PROT SERPL-MCNC: 7 G/DL (ref 6.4–8.2)
PROT SERPL-MCNC: 7.3 G/DL (ref 6.4–8.2)
PROT SERPL-MCNC: 7.4 G/DL (ref 6.4–8.2)
PROT UR STRIP-MCNC: ABNORMAL MG/DL
QRS AXIS: 89 DEGREES
QRSD INTERVAL: 108 MS
QT INTERVAL: 422 MS
QTC INTERVAL: 448 MS
RBC # BLD AUTO: 4.16 MILLION/UL (ref 3.81–5.12)
RBC # BLD AUTO: 4.36 MILLION/UL (ref 3.81–5.12)
RBC #/AREA URNS AUTO: ABNORMAL /HPF
RIGHT EYE DIABETIC RETINOPATHY: NORMAL
SARS-COV-2 RNA SPEC QL NAA+PROBE: NOT DETECTED
SODIUM SERPL-SCNC: 135 MMOL/L (ref 136–145)
SODIUM SERPL-SCNC: 136 MMOL/L (ref 136–145)
SODIUM SERPL-SCNC: 136 MMOL/L (ref 136–145)
SP GR UR STRIP.AUTO: 1.02 (ref 1–1.03)
T WAVE AXIS: 9 DEGREES
TRIGL SERPL-MCNC: 207 MG/DL
TROPONIN I SERPL-MCNC: <0.02 NG/ML
TSH SERPL DL<=0.05 MIU/L-ACNC: 1.41 UIU/ML (ref 0.36–3.74)
UROBILINOGEN UR QL STRIP.AUTO: 0.2 E.U./DL
VENTRICULAR RATE: 68 BPM
VIT B12 SERPL-MCNC: 218 PG/ML (ref 100–900)
WBC # BLD AUTO: 7.29 THOUSAND/UL (ref 4.31–10.16)
WBC # BLD AUTO: 7.52 THOUSAND/UL (ref 4.31–10.16)
WBC #/AREA URNS AUTO: ABNORMAL /HPF

## 2020-01-01 PROCEDURE — 99284 EMERGENCY DEPT VISIT MOD MDM: CPT

## 2020-01-01 PROCEDURE — 97112 NEUROMUSCULAR REEDUCATION: CPT | Performed by: PHYSICAL THERAPIST

## 2020-01-01 PROCEDURE — 82105 ALPHA-FETOPROTEIN SERUM: CPT

## 2020-01-01 PROCEDURE — 83036 HEMOGLOBIN GLYCOSYLATED A1C: CPT

## 2020-01-01 PROCEDURE — 76705 ECHO EXAM OF ABDOMEN: CPT

## 2020-01-01 PROCEDURE — 97162 PT EVAL MOD COMPLEX 30 MIN: CPT | Performed by: PHYSICAL THERAPIST

## 2020-01-01 PROCEDURE — 87086 URINE CULTURE/COLONY COUNT: CPT | Performed by: EMERGENCY MEDICINE

## 2020-01-01 PROCEDURE — 36415 COLL VENOUS BLD VENIPUNCTURE: CPT

## 2020-01-01 PROCEDURE — 71046 X-RAY EXAM CHEST 2 VIEWS: CPT

## 2020-01-01 PROCEDURE — 80053 COMPREHEN METABOLIC PANEL: CPT

## 2020-01-01 PROCEDURE — 97110 THERAPEUTIC EXERCISES: CPT | Performed by: PHYSICAL THERAPIST

## 2020-01-01 PROCEDURE — 36415 COLL VENOUS BLD VENIPUNCTURE: CPT | Performed by: EMERGENCY MEDICINE

## 2020-01-01 PROCEDURE — 80061 LIPID PANEL: CPT

## 2020-01-01 PROCEDURE — 81001 URINALYSIS AUTO W/SCOPE: CPT | Performed by: EMERGENCY MEDICINE

## 2020-01-01 PROCEDURE — 82607 VITAMIN B-12: CPT

## 2020-01-01 PROCEDURE — 99285 EMERGENCY DEPT VISIT HI MDM: CPT | Performed by: EMERGENCY MEDICINE

## 2020-01-01 PROCEDURE — 93005 ELECTROCARDIOGRAM TRACING: CPT

## 2020-01-01 PROCEDURE — U0003 INFECTIOUS AGENT DETECTION BY NUCLEIC ACID (DNA OR RNA); SEVERE ACUTE RESPIRATORY SYNDROME CORONAVIRUS 2 (SARS-COV-2) (CORONAVIRUS DISEASE [COVID-19]), AMPLIFIED PROBE TECHNIQUE, MAKING USE OF HIGH THROUGHPUT TECHNOLOGIES AS DESCRIBED BY CMS-2020-01-R: HCPCS | Performed by: INTERNAL MEDICINE

## 2020-01-01 PROCEDURE — 87186 SC STD MICRODIL/AGAR DIL: CPT | Performed by: EMERGENCY MEDICINE

## 2020-01-01 PROCEDURE — 87077 CULTURE AEROBIC IDENTIFY: CPT | Performed by: EMERGENCY MEDICINE

## 2020-01-01 PROCEDURE — 83690 ASSAY OF LIPASE: CPT | Performed by: EMERGENCY MEDICINE

## 2020-01-01 PROCEDURE — 82985 ASSAY OF GLYCATED PROTEIN: CPT

## 2020-01-01 PROCEDURE — 85027 COMPLETE CBC AUTOMATED: CPT

## 2020-01-01 PROCEDURE — 84443 ASSAY THYROID STIM HORMONE: CPT

## 2020-01-01 PROCEDURE — 84484 ASSAY OF TROPONIN QUANT: CPT | Performed by: EMERGENCY MEDICINE

## 2020-01-01 PROCEDURE — 80053 COMPREHEN METABOLIC PANEL: CPT | Performed by: EMERGENCY MEDICINE

## 2020-01-01 PROCEDURE — 85025 COMPLETE CBC W/AUTO DIFF WBC: CPT | Performed by: EMERGENCY MEDICINE

## 2020-01-01 PROCEDURE — 82140 ASSAY OF AMMONIA: CPT

## 2020-01-01 PROCEDURE — 93010 ELECTROCARDIOGRAM REPORT: CPT | Performed by: INTERNAL MEDICINE

## 2020-01-01 RX ORDER — NITROFURANTOIN 25; 75 MG/1; MG/1
100 CAPSULE ORAL 2 TIMES DAILY
Qty: 10 CAPSULE | Refills: 0 | Status: SHIPPED | OUTPATIENT
Start: 2020-01-01 | End: 2020-01-01

## 2020-01-01 RX ORDER — CYCLOBENZAPRINE HCL 10 MG
10 TABLET ORAL DAILY PRN
Qty: 7 TABLET | Refills: 0 | Status: SHIPPED | OUTPATIENT
Start: 2020-01-01 | End: 2020-01-01

## 2020-01-01 RX ORDER — NITROFURANTOIN 25; 75 MG/1; MG/1
100 CAPSULE ORAL ONCE
Status: COMPLETED | OUTPATIENT
Start: 2020-01-01 | End: 2020-01-01

## 2020-01-01 RX ADMIN — NITROFURANTOIN (MONOHYDRATE/MACROCRYSTALS) 100 MG: 75; 25 CAPSULE ORAL at 18:26

## 2020-01-07 PROBLEM — J20.8 ACUTE BRONCHITIS DUE TO OTHER SPECIFIED ORGANISMS: Status: ACTIVE | Noted: 2020-01-07

## 2020-01-07 PROBLEM — J06.9 VIRAL UPPER RESPIRATORY TRACT INFECTION: Status: ACTIVE | Noted: 2020-01-07

## 2020-01-07 PROBLEM — F41.9 ANXIETY: Status: ACTIVE | Noted: 2020-01-07

## 2020-01-14 PROBLEM — F32.1 CURRENT MODERATE EPISODE OF MAJOR DEPRESSIVE DISORDER WITHOUT PRIOR EPISODE (HCC): Status: ACTIVE | Noted: 2020-01-14

## 2020-01-14 PROBLEM — F41.1 GAD (GENERALIZED ANXIETY DISORDER): Status: ACTIVE | Noted: 2020-01-14

## 2020-02-10 PROBLEM — J06.9 VIRAL UPPER RESPIRATORY TRACT INFECTION: Status: RESOLVED | Noted: 2020-01-07 | Resolved: 2020-02-10

## 2020-02-10 PROBLEM — J20.8 ACUTE BRONCHITIS DUE TO OTHER SPECIFIED ORGANISMS: Status: RESOLVED | Noted: 2020-01-07 | Resolved: 2020-02-10

## 2020-02-19 ENCOUNTER — OFFICE VISIT (OUTPATIENT)
Dept: OBGYN CLINIC | Facility: CLINIC | Age: 80
End: 2020-02-19
Payer: MEDICARE

## 2020-02-19 VITALS
SYSTOLIC BLOOD PRESSURE: 173 MMHG | DIASTOLIC BLOOD PRESSURE: 63 MMHG | HEART RATE: 83 BPM | HEIGHT: 69 IN | BODY MASS INDEX: 25 KG/M2 | WEIGHT: 168.8 LBS

## 2020-02-19 DIAGNOSIS — M17.12 PRIMARY OSTEOARTHRITIS OF LEFT KNEE: Primary | ICD-10-CM

## 2020-02-19 PROCEDURE — 3008F BODY MASS INDEX DOCD: CPT | Performed by: ORTHOPAEDIC SURGERY

## 2020-02-19 PROCEDURE — 3077F SYST BP >= 140 MM HG: CPT | Performed by: ORTHOPAEDIC SURGERY

## 2020-02-19 PROCEDURE — 20610 DRAIN/INJ JOINT/BURSA W/O US: CPT | Performed by: ORTHOPAEDIC SURGERY

## 2020-02-19 PROCEDURE — 99214 OFFICE O/P EST MOD 30 MIN: CPT | Performed by: ORTHOPAEDIC SURGERY

## 2020-02-19 PROCEDURE — 1036F TOBACCO NON-USER: CPT | Performed by: ORTHOPAEDIC SURGERY

## 2020-02-19 PROCEDURE — 1160F RVW MEDS BY RX/DR IN RCRD: CPT | Performed by: ORTHOPAEDIC SURGERY

## 2020-02-19 PROCEDURE — 3078F DIAST BP <80 MM HG: CPT | Performed by: ORTHOPAEDIC SURGERY

## 2020-02-19 RX ORDER — DEXAMETHASONE SODIUM PHOSPHATE 100 MG/10ML
40 INJECTION INTRAMUSCULAR; INTRAVENOUS
Status: COMPLETED | OUTPATIENT
Start: 2020-02-19 | End: 2020-02-19

## 2020-02-19 RX ORDER — LIDOCAINE HYDROCHLORIDE 10 MG/ML
4 INJECTION, SOLUTION INFILTRATION; PERINEURAL
Status: COMPLETED | OUTPATIENT
Start: 2020-02-19 | End: 2020-02-19

## 2020-02-19 RX ADMIN — DEXAMETHASONE SODIUM PHOSPHATE 40 MG: 100 INJECTION INTRAMUSCULAR; INTRAVENOUS at 16:16

## 2020-02-19 RX ADMIN — LIDOCAINE HYDROCHLORIDE 4 ML: 10 INJECTION, SOLUTION INFILTRATION; PERINEURAL at 16:16

## 2020-02-19 NOTE — PROGRESS NOTES
Assessment/Plan:  1  Primary osteoarthritis of left knee         Scribe Attestation    I,:   Tim Vasquez am acting as a scribe while in the presence of the attending physician :        I,:   Christian Snowden MD personally performed the services described in this documentation    as scribed in my presence :          Stephie Edmonds upon examination does suffer from symptoms associated with an exacerbation of her osteoarthritis of the left knee  She does demonstrate a 2+ effusion on clinical exam today she is restricted into flexion and does demonstrate significant crepitus through range of motion of the knee into flexion and extension  She is stable however to stress testing of the cruciate, and collateral ligaments at 0°, 30°, and 90°  As she has had success with a corticosteroid injection in the past with the last being in October 2019 I do find reasonable to provide her with a repeat steroid injection into the knee today  She did opt for this treatment option and tolerated the injection well with no complications  She was advised to ice her knee tonight, and tomorrow as she may be sore from the injection itself  I did also note that she should allow at least 2 weeks for the steroid to take full anti-inflammatory effect  Stephie Edmonds did verbalize understanding to all the information provided to her today, and will follow up on an as-needed basis      Large joint arthrocentesis: L knee  Date/Time: 2/19/2020 4:16 PM  Consent given by: patient  Site marked: site marked  Timeout: Immediately prior to procedure a time out was called to verify the correct patient, procedure, equipment, support staff and site/side marked as required   Supporting Documentation  Indications: pain   Procedure Details  Location: knee - L knee  Preparation: Patient was prepped and draped in the usual sterile fashion  Needle size: 22 G  Ultrasound guidance: no  Approach: anterolateral  Medications administered: 4 mL lidocaine 1 %; 40 mg dexamethasone 100 mg/10 mL    Patient tolerance: patient tolerated the procedure well with no immediate complications  Dressing:  Sterile dressing applied          Subjective:   Yessi Sams is a [de-identified] y o  female who presents to the office today for follow-up evaluation of her left knee  She does have history of osteoarthritis the knee and has had multiple injections including corticosteroid injections, as well as viscosupplementation injections  She notes that the viscosupplementation injections did not provide her with any relief and she did have relief with a corticosteroid injection given in October 2019  She does note that she will have a painful clicking into her knee that will result in increased pain  She notes that she will walk with a straight leg after this clicking and pain until it subsides  She denies any recent traumatic event resulting in any increased painful symptoms  She also denies any bouts of instability resulting in a buckling or giving way sensation into her knee  Today Deadra Bosworth denies any distal paresthesias  Review of Systems   Constitutional: Negative for activity change, chills, fever and unexpected weight change  HENT: Negative for hearing loss, nosebleeds and sore throat  Eyes: Negative for pain, redness and visual disturbance  Respiratory: Negative for cough, shortness of breath and wheezing  Cardiovascular: Negative for chest pain, palpitations and leg swelling  Gastrointestinal: Negative for abdominal pain, nausea and vomiting  Endocrine: Negative for polydipsia and polyuria  Genitourinary: Negative for dysuria and hematuria  Musculoskeletal: Positive for arthralgias and myalgias  See HPI   Skin: Negative for rash and wound  Neurological: Negative for dizziness, numbness and headaches  Psychiatric/Behavioral: Negative for decreased concentration and suicidal ideas  The patient is not nervous/anxious            Past Medical History:   Diagnosis Date    Anemia     Anxiety     Arthritis     Chronic UTI     better since urethral stretching    Cirrhosis of liver ([facility] [address] ) 9/2/2019    Current moderate episode of major depressive disorder without prior episode ([facility] [address] ) 1/14/2020    Diabetes mellitus ([facility] [address] )     type 2    GERD (gastroesophageal reflux disease)     diet controlled    High triglycerides     Hypertension     Irritable bowel syndrome     Primary osteoarthritis of both knees 1/20/2016       Past Surgical History:   Procedure Laterality Date    APPENDECTOMY      age 14   [facility] GUM SURGERY      tumor removal benign x3    ME XCAPSL CTRC RMVL INSJ IO LENS PROSTH W/O ECP Left 5/15/2017    Procedure: EXTRACTION EXTRACAPSULAR CATARACT PHACO INTRAOCULAR LENS (IOL); Surgeon: Anabela Kwan MD;  Location: [facility] MAIN OR;  Service: Ophthalmology    ME XCAPSL CTRC RMVL INSJ IO LENS PROSTH W/O ECP Right 6/26/2017    Procedure: EXTRACTION EXTRACAPSULAR CATARACT PHACO INTRAOCULAR LENS (IOL); Surgeon: Anabela Kwan MD;  Location: [facility] MAIN OR;  Service: Ophthalmology    TONSILLECTOMY         Family History   Problem Relation Age of Onset    Early death Mother         CHF    Early death Father         MI    Cancer Sister         stomach lymphoma    No Known Problems Son        Social History     Occupational History    Not on file   Tobacco Use    Smoking status: Former Smoker     Packs/day: 1 00     Years: 10 00     Pack years: 10 00     Types: Cigarettes     Last attempt to quit: 2010     Years since quitting: 10 1    Smokeless tobacco: Never Used   Substance and Sexual Activity    Alcohol use:  Yes     Alcohol/week: 1 0 standard drinks     Types: 1 Standard drinks or equivalent per week     Comment: rarely    Drug use: No    Sexual activity: Not on file         Current Outpatient Medications:     amLODIPine (NORVASC) 5 mg tablet, Take 1 tablet (5 mg total) by mouth every morning, Disp: 90 tablet, Rfl: 1    aspirin (ECOTRIN LOW STRENGTH) 81 mg EC tablet, Take 81 mg by mouth daily with breakfast, Disp: , Rfl:     busPIRone (BUSPAR) 5 mg tablet, Take 1 tablet (5 mg total) by mouth 2 (two) times a day, Disp: 60 tablet, Rfl: 5    fenofibrate (TRIGLIDE) 160 MG tablet, Take 1 tablet (160 mg total) by mouth every evening, Disp: 90 tablet, Rfl: 1    Glycerin-Polysorbate 80 (REFRESH DRY EYE THERAPY OP), Apply to eye, Disp: , Rfl:     levothyroxine 50 mcg tablet, Take 1 tablet (50 mcg total) by mouth daily, Disp: 90 tablet, Rfl: 1    metFORMIN (GLUCOPHAGE) 500 mg tablet, Take one in AM and two in PM, Disp: 270 tablet, Rfl: 1    Multiple Vitamins-Minerals (CENTRUM SILVER ADULT 50+ PO), Take by mouth daily with breakfast, Disp: , Rfl:     olmesartan-hydrochlorothiazide (BENICAR HCT) 40-25 MG per tablet, Take 1 tablet by mouth every morning, Disp: 90 tablet, Rfl: 1    Allergies   Allergen Reactions    Amoxicillin GI Intolerance    Lactose GI Intolerance    Sulfa Antibiotics GI Intolerance    Lidocaine Rash     Lidocaine patch       Objective:  Vitals:    02/19/20 1541   BP: (!) 173/63   Pulse: 83       Left Knee Exam     Tenderness   Left knee tenderness location: poplueteal fossa  Range of Motion   Extension: 0   Flexion: 100     Tests   Tyrell:  Medial - negative Lateral - negative  Varus: negative Valgus: negative    Other   Erythema: absent  Scars: absent  Sensation: normal  Pulse: present  Swelling: moderate (knee)            Physical Exam   Constitutional: She is oriented to person, place, and time  She appears well-developed and well-nourished  HENT:   Head: Normocephalic and atraumatic  Eyes: Conjunctivae are normal  Right eye exhibits no discharge  Left eye exhibits no discharge  Neck: Normal range of motion  Neck supple  Cardiovascular: Normal rate and intact distal pulses  Pulmonary/Chest: Effort normal  No respiratory distress  Musculoskeletal:   As noted in HPI   Neurological: She is alert and oriented to person, place, and time  Skin: Skin is warm and dry  Psychiatric: She has a normal mood and affect  Her behavior is normal  Judgment and thought content normal    Vitals reviewed

## 2020-05-13 PROBLEM — E03.8 OTHER SPECIFIED HYPOTHYROIDISM: Status: ACTIVE | Noted: 2020-05-13

## 2020-05-13 PROBLEM — N39.0 URINARY TRACT INFECTION: Status: RESOLVED | Noted: 2019-08-16 | Resolved: 2020-05-13

## 2020-06-23 ENCOUNTER — APPOINTMENT (OUTPATIENT)
Dept: RADIOLOGY | Facility: CLINIC | Age: 80
End: 2020-06-23
Payer: MEDICARE

## 2020-06-23 ENCOUNTER — OFFICE VISIT (OUTPATIENT)
Dept: OBGYN CLINIC | Facility: CLINIC | Age: 80
End: 2020-06-23
Payer: MEDICARE

## 2020-06-23 VITALS
DIASTOLIC BLOOD PRESSURE: 67 MMHG | SYSTOLIC BLOOD PRESSURE: 164 MMHG | HEART RATE: 67 BPM | TEMPERATURE: 98.1 F | BODY MASS INDEX: 24.93 KG/M2 | HEIGHT: 69 IN

## 2020-06-23 DIAGNOSIS — M17.12 PRIMARY OSTEOARTHRITIS OF LEFT KNEE: ICD-10-CM

## 2020-06-23 DIAGNOSIS — M17.12 PRIMARY OSTEOARTHRITIS OF LEFT KNEE: Primary | ICD-10-CM

## 2020-06-23 DIAGNOSIS — M17.0 PRIMARY OSTEOARTHRITIS OF BOTH KNEES: ICD-10-CM

## 2020-06-23 PROCEDURE — 3077F SYST BP >= 140 MM HG: CPT | Performed by: ORTHOPAEDIC SURGERY

## 2020-06-23 PROCEDURE — 1160F RVW MEDS BY RX/DR IN RCRD: CPT | Performed by: ORTHOPAEDIC SURGERY

## 2020-06-23 PROCEDURE — 73562 X-RAY EXAM OF KNEE 3: CPT

## 2020-06-23 PROCEDURE — 99214 OFFICE O/P EST MOD 30 MIN: CPT | Performed by: ORTHOPAEDIC SURGERY

## 2020-06-23 PROCEDURE — 1036F TOBACCO NON-USER: CPT | Performed by: ORTHOPAEDIC SURGERY

## 2020-06-23 PROCEDURE — 3078F DIAST BP <80 MM HG: CPT | Performed by: ORTHOPAEDIC SURGERY

## 2020-06-24 ENCOUNTER — OFFICE VISIT (OUTPATIENT)
Dept: OBGYN CLINIC | Facility: CLINIC | Age: 80
End: 2020-06-24
Payer: MEDICARE

## 2020-06-24 ENCOUNTER — APPOINTMENT (OUTPATIENT)
Dept: RADIOLOGY | Facility: CLINIC | Age: 80
End: 2020-06-24
Payer: MEDICARE

## 2020-06-24 VITALS
HEART RATE: 69 BPM | HEIGHT: 68 IN | TEMPERATURE: 97 F | WEIGHT: 166.6 LBS | DIASTOLIC BLOOD PRESSURE: 62 MMHG | BODY MASS INDEX: 25.25 KG/M2 | SYSTOLIC BLOOD PRESSURE: 166 MMHG

## 2020-06-24 DIAGNOSIS — M17.0 PRIMARY OSTEOARTHRITIS OF BOTH KNEES: Primary | ICD-10-CM

## 2020-06-24 DIAGNOSIS — G89.29 CHRONIC PAIN OF LEFT KNEE: ICD-10-CM

## 2020-06-24 DIAGNOSIS — M25.562 CHRONIC PAIN OF LEFT KNEE: ICD-10-CM

## 2020-06-24 DIAGNOSIS — M25.561 RIGHT KNEE PAIN, UNSPECIFIED CHRONICITY: ICD-10-CM

## 2020-06-24 PROCEDURE — 1160F RVW MEDS BY RX/DR IN RCRD: CPT | Performed by: ORTHOPAEDIC SURGERY

## 2020-06-24 PROCEDURE — 3078F DIAST BP <80 MM HG: CPT | Performed by: ORTHOPAEDIC SURGERY

## 2020-06-24 PROCEDURE — 73560 X-RAY EXAM OF KNEE 1 OR 2: CPT

## 2020-06-24 PROCEDURE — 1036F TOBACCO NON-USER: CPT | Performed by: ORTHOPAEDIC SURGERY

## 2020-06-24 PROCEDURE — 99214 OFFICE O/P EST MOD 30 MIN: CPT | Performed by: ORTHOPAEDIC SURGERY

## 2020-06-24 PROCEDURE — 3077F SYST BP >= 140 MM HG: CPT | Performed by: ORTHOPAEDIC SURGERY

## 2020-06-24 PROCEDURE — 3008F BODY MASS INDEX DOCD: CPT | Performed by: ORTHOPAEDIC SURGERY

## 2020-06-24 PROCEDURE — 20610 DRAIN/INJ JOINT/BURSA W/O US: CPT | Performed by: ORTHOPAEDIC SURGERY

## 2020-06-24 RX ORDER — TRIAMCINOLONE ACETONIDE 40 MG/ML
80 INJECTION, SUSPENSION INTRA-ARTICULAR; INTRAMUSCULAR
Status: COMPLETED | OUTPATIENT
Start: 2020-06-24 | End: 2020-06-24

## 2020-06-24 RX ORDER — BUPIVACAINE HYDROCHLORIDE 5 MG/ML
6 INJECTION, SOLUTION EPIDURAL; INTRACAUDAL
Status: COMPLETED | OUTPATIENT
Start: 2020-06-24 | End: 2020-06-24

## 2020-06-24 RX ADMIN — BUPIVACAINE HYDROCHLORIDE 6 ML: 5 INJECTION, SOLUTION EPIDURAL; INTRACAUDAL at 11:48

## 2020-06-24 RX ADMIN — TRIAMCINOLONE ACETONIDE 80 MG: 40 INJECTION, SUSPENSION INTRA-ARTICULAR; INTRAMUSCULAR at 11:48

## 2020-06-29 PROBLEM — F43.21 GRIEF: Status: ACTIVE | Noted: 2020-06-29

## 2020-06-29 PROBLEM — L89.152 PRESSURE INJURY OF SACRAL REGION, STAGE 2 (HCC): Status: ACTIVE | Noted: 2020-06-29

## 2020-07-10 ENCOUNTER — APPOINTMENT (OUTPATIENT)
Dept: LAB | Facility: CLINIC | Age: 80
End: 2020-07-10
Payer: MEDICARE

## 2020-07-10 DIAGNOSIS — K74.60 CIRRHOSIS OF LIVER WITHOUT ASCITES, UNSPECIFIED HEPATIC CIRRHOSIS TYPE (HCC): ICD-10-CM

## 2020-07-10 DIAGNOSIS — F41.1 GAD (GENERALIZED ANXIETY DISORDER): ICD-10-CM

## 2020-07-10 DIAGNOSIS — I10 ESSENTIAL HYPERTENSION: ICD-10-CM

## 2020-07-10 DIAGNOSIS — E11.9 TYPE 2 DIABETES MELLITUS WITHOUT COMPLICATION, WITHOUT LONG-TERM CURRENT USE OF INSULIN (HCC): ICD-10-CM

## 2020-07-10 DIAGNOSIS — E03.8 OTHER SPECIFIED HYPOTHYROIDISM: ICD-10-CM

## 2020-07-10 DIAGNOSIS — K58.9 IRRITABLE BOWEL SYNDROME WITHOUT DIARRHEA: ICD-10-CM

## 2020-07-10 LAB
ALBUMIN SERPL BCP-MCNC: 3.7 G/DL (ref 3.5–5)
ALP SERPL-CCNC: 84 U/L (ref 46–116)
ALT SERPL W P-5'-P-CCNC: 18 U/L (ref 12–78)
ANION GAP SERPL CALCULATED.3IONS-SCNC: 6 MMOL/L (ref 4–13)
AST SERPL W P-5'-P-CCNC: 11 U/L (ref 5–45)
BILIRUB SERPL-MCNC: 0.38 MG/DL (ref 0.2–1)
BUN SERPL-MCNC: 28 MG/DL (ref 5–25)
CALCIUM SERPL-MCNC: 9.2 MG/DL (ref 8.3–10.1)
CHLORIDE SERPL-SCNC: 100 MMOL/L (ref 100–108)
CHOLEST SERPL-MCNC: 204 MG/DL (ref 50–200)
CO2 SERPL-SCNC: 29 MMOL/L (ref 21–32)
CREAT SERPL-MCNC: 0.77 MG/DL (ref 0.6–1.3)
CREAT UR-MCNC: 24.7 MG/DL
ERYTHROCYTE [DISTWIDTH] IN BLOOD BY AUTOMATED COUNT: 14.3 % (ref 11.6–15.1)
EST. AVERAGE GLUCOSE BLD GHB EST-MCNC: 163 MG/DL
GFR SERPL CREATININE-BSD FRML MDRD: 73 ML/MIN/1.73SQ M
GLUCOSE P FAST SERPL-MCNC: 119 MG/DL (ref 65–99)
HBA1C MFR BLD: 7.3 %
HCT VFR BLD AUTO: 39.4 % (ref 34.8–46.1)
HDLC SERPL-MCNC: 52 MG/DL
HGB BLD-MCNC: 12.4 G/DL (ref 11.5–15.4)
LDLC SERPL CALC-MCNC: 125 MG/DL (ref 0–100)
MCH RBC QN AUTO: 27.9 PG (ref 26.8–34.3)
MCHC RBC AUTO-ENTMCNC: 31.5 G/DL (ref 31.4–37.4)
MCV RBC AUTO: 89 FL (ref 82–98)
MICROALBUMIN UR-MCNC: 227 MG/L (ref 0–20)
MICROALBUMIN/CREAT 24H UR: 919 MG/G CREATININE (ref 0–30)
NONHDLC SERPL-MCNC: 152 MG/DL
PLATELET # BLD AUTO: 313 THOUSANDS/UL (ref 149–390)
PMV BLD AUTO: 11.1 FL (ref 8.9–12.7)
POTASSIUM SERPL-SCNC: 4.1 MMOL/L (ref 3.5–5.3)
PROT SERPL-MCNC: 7.2 G/DL (ref 6.4–8.2)
RBC # BLD AUTO: 4.44 MILLION/UL (ref 3.81–5.12)
SODIUM SERPL-SCNC: 135 MMOL/L (ref 136–145)
TRIGL SERPL-MCNC: 137 MG/DL
TSH SERPL DL<=0.05 MIU/L-ACNC: 2.97 UIU/ML (ref 0.36–3.74)
WBC # BLD AUTO: 7.76 THOUSAND/UL (ref 4.31–10.16)

## 2020-07-10 PROCEDURE — 84443 ASSAY THYROID STIM HORMONE: CPT

## 2020-07-10 PROCEDURE — 83036 HEMOGLOBIN GLYCOSYLATED A1C: CPT

## 2020-07-10 PROCEDURE — 82570 ASSAY OF URINE CREATININE: CPT

## 2020-07-10 PROCEDURE — 82043 UR ALBUMIN QUANTITATIVE: CPT

## 2020-07-10 PROCEDURE — 80061 LIPID PANEL: CPT

## 2020-07-10 PROCEDURE — 85027 COMPLETE CBC AUTOMATED: CPT

## 2020-07-10 PROCEDURE — 36415 COLL VENOUS BLD VENIPUNCTURE: CPT

## 2020-07-10 PROCEDURE — 80053 COMPREHEN METABOLIC PANEL: CPT

## 2020-08-06 PROBLEM — E16.2 HYPOGLYCEMIA: Status: ACTIVE | Noted: 2020-01-01

## 2020-08-06 PROBLEM — R61 NIGHT SWEAT: Status: ACTIVE | Noted: 2020-01-01

## 2020-08-18 PROBLEM — R29.898 WEAKNESS OF BOTH LEGS: Status: ACTIVE | Noted: 2020-01-01

## 2020-09-22 PROBLEM — R41.3 MEMORY IMPAIRMENT: Status: ACTIVE | Noted: 2020-01-01

## 2020-09-22 PROBLEM — R35.0 URINARY FREQUENCY: Status: ACTIVE | Noted: 2020-01-01

## 2020-10-27 PROBLEM — M54.6 ACUTE LEFT-SIDED THORACIC BACK PAIN: Status: ACTIVE | Noted: 2020-01-01

## 2020-10-27 PROBLEM — R07.89 OTHER CHEST PAIN: Status: ACTIVE | Noted: 2020-01-01

## 2020-12-01 PROBLEM — R61 NIGHT SWEAT: Status: RESOLVED | Noted: 2020-01-01 | Resolved: 2020-01-01

## 2020-12-01 PROBLEM — R07.89 OTHER CHEST PAIN: Status: RESOLVED | Noted: 2020-01-01 | Resolved: 2020-01-01

## 2020-12-28 PROBLEM — U07.1 COVID-19: Status: ACTIVE | Noted: 2020-01-01

## 2021-01-01 ENCOUNTER — HOSPITAL ENCOUNTER (INPATIENT)
Facility: HOSPITAL | Age: 81
LOS: 5 days | Discharge: NURSING, REHAB OR LONG-TERM CARE | DRG: 481 | End: 2021-03-29
Attending: EMERGENCY MEDICINE | Admitting: FAMILY MEDICINE
Payer: MEDICARE

## 2021-01-01 ENCOUNTER — OFFICE VISIT (OUTPATIENT)
Dept: OBGYN CLINIC | Facility: CLINIC | Age: 81
End: 2021-01-01
Payer: MEDICARE

## 2021-01-01 ENCOUNTER — PATIENT OUTREACH (OUTPATIENT)
Dept: CASE MANAGEMENT | Facility: OTHER | Age: 81
End: 2021-01-01

## 2021-01-01 ENCOUNTER — TELEPHONE (OUTPATIENT)
Dept: OBGYN CLINIC | Facility: HOSPITAL | Age: 81
End: 2021-01-01

## 2021-01-01 ENCOUNTER — PATIENT OUTREACH (OUTPATIENT)
Dept: CASE MANAGEMENT | Facility: HOSPITAL | Age: 81
End: 2021-01-01

## 2021-01-01 ENCOUNTER — TELEPHONE (OUTPATIENT)
Dept: OTHER | Facility: OTHER | Age: 81
End: 2021-01-01

## 2021-01-01 ENCOUNTER — TELEMEDICINE (OUTPATIENT)
Dept: INTERNAL MEDICINE CLINIC | Facility: CLINIC | Age: 81
End: 2021-01-01
Payer: MEDICARE

## 2021-01-01 ENCOUNTER — TRANSCRIBE ORDERS (OUTPATIENT)
Dept: ADMINISTRATIVE | Facility: HOSPITAL | Age: 81
End: 2021-01-01

## 2021-01-01 ENCOUNTER — OFFICE VISIT (OUTPATIENT)
Dept: NEUROLOGY | Facility: CLINIC | Age: 81
End: 2021-01-01
Payer: MEDICARE

## 2021-01-01 ENCOUNTER — TELEPHONE (OUTPATIENT)
Dept: NEUROLOGY | Facility: CLINIC | Age: 81
End: 2021-01-01

## 2021-01-01 ENCOUNTER — TELEPHONE (OUTPATIENT)
Dept: OBGYN CLINIC | Facility: MEDICAL CENTER | Age: 81
End: 2021-01-01

## 2021-01-01 ENCOUNTER — APPOINTMENT (INPATIENT)
Dept: NEUROLOGY | Facility: HOSPITAL | Age: 81
DRG: 101 | End: 2021-01-01
Attending: PSYCHIATRY & NEUROLOGY
Payer: MEDICARE

## 2021-01-01 ENCOUNTER — OFFICE VISIT (OUTPATIENT)
Dept: OBGYN CLINIC | Facility: CLINIC | Age: 81
End: 2021-01-01

## 2021-01-01 ENCOUNTER — APPOINTMENT (EMERGENCY)
Dept: RADIOLOGY | Facility: HOSPITAL | Age: 81
DRG: 481 | End: 2021-01-01
Payer: MEDICARE

## 2021-01-01 ENCOUNTER — HOSPITAL ENCOUNTER (INPATIENT)
Facility: HOSPITAL | Age: 81
LOS: 3 days | Discharge: NURSING, REHAB OR LONG-TERM CARE | DRG: 101 | End: 2021-02-06
Attending: EMERGENCY MEDICINE | Admitting: FAMILY MEDICINE
Payer: MEDICARE

## 2021-01-01 ENCOUNTER — APPOINTMENT (INPATIENT)
Dept: RADIOLOGY | Facility: HOSPITAL | Age: 81
DRG: 101 | End: 2021-01-01
Payer: MEDICARE

## 2021-01-01 ENCOUNTER — TELEMEDICINE (OUTPATIENT)
Dept: NEPHROLOGY | Facility: CLINIC | Age: 81
End: 2021-01-01
Payer: MEDICARE

## 2021-01-01 ENCOUNTER — TELEPHONE (OUTPATIENT)
Dept: INTERNAL MEDICINE CLINIC | Facility: CLINIC | Age: 81
End: 2021-01-01

## 2021-01-01 ENCOUNTER — TELEPHONE (OUTPATIENT)
Dept: NEPHROLOGY | Facility: CLINIC | Age: 81
End: 2021-01-01

## 2021-01-01 ENCOUNTER — APPOINTMENT (INPATIENT)
Dept: NON INVASIVE DIAGNOSTICS | Facility: HOSPITAL | Age: 81
DRG: 101 | End: 2021-01-01
Payer: MEDICARE

## 2021-01-01 ENCOUNTER — APPOINTMENT (INPATIENT)
Dept: RADIOLOGY | Facility: HOSPITAL | Age: 81
DRG: 481 | End: 2021-01-01
Payer: MEDICARE

## 2021-01-01 ENCOUNTER — TELEPHONE (OUTPATIENT)
Dept: ADMINISTRATIVE | Facility: OTHER | Age: 81
End: 2021-01-01

## 2021-01-01 ENCOUNTER — ANESTHESIA EVENT (INPATIENT)
Dept: PERIOP | Facility: HOSPITAL | Age: 81
DRG: 481 | End: 2021-01-01
Payer: MEDICARE

## 2021-01-01 ENCOUNTER — APPOINTMENT (EMERGENCY)
Dept: RADIOLOGY | Facility: HOSPITAL | Age: 81
DRG: 101 | End: 2021-01-01
Payer: MEDICARE

## 2021-01-01 ENCOUNTER — HOSPITAL ENCOUNTER (OUTPATIENT)
Dept: RADIOLOGY | Facility: HOSPITAL | Age: 81
Discharge: HOME | End: 2021-01-13
Attending: PODIATRIST
Payer: MEDICARE

## 2021-01-01 ENCOUNTER — APPOINTMENT (OUTPATIENT)
Dept: RADIOLOGY | Facility: CLINIC | Age: 81
End: 2021-01-01
Payer: COMMERCIAL

## 2021-01-01 ENCOUNTER — ANESTHESIA (INPATIENT)
Dept: PERIOP | Facility: HOSPITAL | Age: 81
DRG: 481 | End: 2021-01-01
Payer: MEDICARE

## 2021-01-01 ENCOUNTER — APPOINTMENT (OUTPATIENT)
Dept: RADIOLOGY | Facility: CLINIC | Age: 81
End: 2021-01-01
Payer: MEDICARE

## 2021-01-01 ENCOUNTER — OFFICE VISIT (OUTPATIENT)
Dept: INTERNAL MEDICINE CLINIC | Facility: CLINIC | Age: 81
End: 2021-01-01
Payer: MEDICARE

## 2021-01-01 VITALS
SYSTOLIC BLOOD PRESSURE: 140 MMHG | BODY MASS INDEX: 25.48 KG/M2 | TEMPERATURE: 97.5 F | HEART RATE: 68 BPM | HEIGHT: 69 IN | RESPIRATION RATE: 16 BRPM | DIASTOLIC BLOOD PRESSURE: 65 MMHG | WEIGHT: 172 LBS | OXYGEN SATURATION: 96 %

## 2021-01-01 VITALS
WEIGHT: 172 LBS | TEMPERATURE: 96 F | DIASTOLIC BLOOD PRESSURE: 70 MMHG | HEIGHT: 69 IN | HEART RATE: 74 BPM | BODY MASS INDEX: 25.48 KG/M2 | SYSTOLIC BLOOD PRESSURE: 128 MMHG

## 2021-01-01 VITALS
WEIGHT: 172 LBS | BODY MASS INDEX: 25.48 KG/M2 | DIASTOLIC BLOOD PRESSURE: 64 MMHG | SYSTOLIC BLOOD PRESSURE: 140 MMHG | TEMPERATURE: 97.3 F | HEIGHT: 69 IN | OXYGEN SATURATION: 99 % | HEART RATE: 68 BPM

## 2021-01-01 VITALS
BODY MASS INDEX: 26.13 KG/M2 | DIASTOLIC BLOOD PRESSURE: 74 MMHG | HEIGHT: 69 IN | SYSTOLIC BLOOD PRESSURE: 162 MMHG | WEIGHT: 176.4 LBS | HEART RATE: 66 BPM

## 2021-01-01 VITALS — HEIGHT: 69 IN | BODY MASS INDEX: 25.48 KG/M2 | WEIGHT: 172 LBS

## 2021-01-01 VITALS
HEIGHT: 69 IN | HEART RATE: 70 BPM | DIASTOLIC BLOOD PRESSURE: 67 MMHG | OXYGEN SATURATION: 94 % | TEMPERATURE: 98.3 F | RESPIRATION RATE: 17 BRPM | SYSTOLIC BLOOD PRESSURE: 154 MMHG | WEIGHT: 174.16 LBS | BODY MASS INDEX: 25.8 KG/M2

## 2021-01-01 VITALS — BODY MASS INDEX: 26.07 KG/M2 | HEIGHT: 68 IN | WEIGHT: 172 LBS

## 2021-01-01 VITALS
WEIGHT: 172 LBS | HEIGHT: 69 IN | SYSTOLIC BLOOD PRESSURE: 130 MMHG | DIASTOLIC BLOOD PRESSURE: 80 MMHG | BODY MASS INDEX: 25.48 KG/M2 | HEART RATE: 70 BPM

## 2021-01-01 VITALS — DIASTOLIC BLOOD PRESSURE: 71 MMHG | SYSTOLIC BLOOD PRESSURE: 115 MMHG | TEMPERATURE: 96.3 F | HEART RATE: 65 BPM

## 2021-01-01 DIAGNOSIS — E78.2 MIXED HYPERLIPIDEMIA: ICD-10-CM

## 2021-01-01 DIAGNOSIS — Z87.81 HISTORY OF FEMUR FRACTURE: ICD-10-CM

## 2021-01-01 DIAGNOSIS — E03.8 OTHER SPECIFIED HYPOTHYROIDISM: ICD-10-CM

## 2021-01-01 DIAGNOSIS — E11.9 TYPE 2 DIABETES MELLITUS WITHOUT COMPLICATION, WITHOUT LONG-TERM CURRENT USE OF INSULIN (HCC): ICD-10-CM

## 2021-01-01 DIAGNOSIS — R26.2 AMBULATORY DYSFUNCTION: ICD-10-CM

## 2021-01-01 DIAGNOSIS — M25.562 CHRONIC PAIN OF BOTH KNEES: ICD-10-CM

## 2021-01-01 DIAGNOSIS — R56.9 SEIZURE (HCC): ICD-10-CM

## 2021-01-01 DIAGNOSIS — F43.21 GRIEF: ICD-10-CM

## 2021-01-01 DIAGNOSIS — K74.60 CIRRHOSIS OF LIVER WITHOUT ASCITES, UNSPECIFIED HEPATIC CIRRHOSIS TYPE (HCC): ICD-10-CM

## 2021-01-01 DIAGNOSIS — I10 ESSENTIAL HYPERTENSION: ICD-10-CM

## 2021-01-01 DIAGNOSIS — M79.605 PAIN IN LEFT LEG: ICD-10-CM

## 2021-01-01 DIAGNOSIS — Z87.898 HISTORY OF SEIZURE: Primary | ICD-10-CM

## 2021-01-01 DIAGNOSIS — E11.21 TYPE 2 DIABETES MELLITUS WITH DIABETIC NEPHROPATHY, WITHOUT LONG-TERM CURRENT USE OF INSULIN (HCC): ICD-10-CM

## 2021-01-01 DIAGNOSIS — M25.461 BILATERAL KNEE EFFUSIONS: ICD-10-CM

## 2021-01-01 DIAGNOSIS — R41.82 ALTERED MENTAL STATUS, UNSPECIFIED ALTERED MENTAL STATUS TYPE: ICD-10-CM

## 2021-01-01 DIAGNOSIS — E87.1 HYPONATREMIA: ICD-10-CM

## 2021-01-01 DIAGNOSIS — S72.001D CLOSED FRACTURE OF RIGHT HIP WITH ROUTINE HEALING, SUBSEQUENT ENCOUNTER: ICD-10-CM

## 2021-01-01 DIAGNOSIS — E87.1 HYPONATREMIA: Primary | ICD-10-CM

## 2021-01-01 DIAGNOSIS — N39.0 UTI (URINARY TRACT INFECTION): ICD-10-CM

## 2021-01-01 DIAGNOSIS — R41.3 SHORT-TERM MEMORY LOSS: ICD-10-CM

## 2021-01-01 DIAGNOSIS — S72.001A CLOSED FRACTURE OF RIGHT HIP, INITIAL ENCOUNTER (HCC): Primary | ICD-10-CM

## 2021-01-01 DIAGNOSIS — I89.0 LYMPHEDEMA OF LEFT LOWER EXTREMITY: Primary | ICD-10-CM

## 2021-01-01 DIAGNOSIS — K21.9 GASTROESOPHAGEAL REFLUX DISEASE WITHOUT ESOPHAGITIS: Primary | ICD-10-CM

## 2021-01-01 DIAGNOSIS — S72.001A CLOSED RIGHT HIP FRACTURE (HCC): ICD-10-CM

## 2021-01-01 DIAGNOSIS — R29.898 WEAKNESS OF BOTH LEGS: ICD-10-CM

## 2021-01-01 DIAGNOSIS — F41.9 ANXIETY: ICD-10-CM

## 2021-01-01 DIAGNOSIS — E55.9 VITAMIN D DEFICIENCY, UNSPECIFIED: ICD-10-CM

## 2021-01-01 DIAGNOSIS — S72.001A CLOSED FRACTURE OF RIGHT HIP, INITIAL ENCOUNTER (HCC): ICD-10-CM

## 2021-01-01 DIAGNOSIS — R53.81 PHYSICAL DECONDITIONING: ICD-10-CM

## 2021-01-01 DIAGNOSIS — R41.82 ALTERED MENTAL STATUS: Primary | ICD-10-CM

## 2021-01-01 DIAGNOSIS — M79.604 PAIN IN RIGHT LEG: ICD-10-CM

## 2021-01-01 DIAGNOSIS — E78.00 HYPERCHOLESTEREMIA: ICD-10-CM

## 2021-01-01 DIAGNOSIS — S72.009A HIP FRACTURE (HCC): Primary | ICD-10-CM

## 2021-01-01 DIAGNOSIS — I10 BENIGN ESSENTIAL HYPERTENSION: ICD-10-CM

## 2021-01-01 DIAGNOSIS — R47.01 APHASIA: ICD-10-CM

## 2021-01-01 DIAGNOSIS — M25.572 PAIN, JOINT, ANKLE AND FOOT, LEFT: ICD-10-CM

## 2021-01-01 DIAGNOSIS — R41.3 MEMORY IMPAIRMENT: ICD-10-CM

## 2021-01-01 DIAGNOSIS — D64.9 ANEMIA, UNSPECIFIED TYPE: ICD-10-CM

## 2021-01-01 DIAGNOSIS — F41.1 GAD (GENERALIZED ANXIETY DISORDER): ICD-10-CM

## 2021-01-01 DIAGNOSIS — M25.462 BILATERAL KNEE EFFUSIONS: ICD-10-CM

## 2021-01-01 DIAGNOSIS — G89.29 CHRONIC PAIN OF BOTH KNEES: ICD-10-CM

## 2021-01-01 DIAGNOSIS — Z23 ENCOUNTER FOR IMMUNIZATION: ICD-10-CM

## 2021-01-01 DIAGNOSIS — Z47.89 AFTERCARE FOLLOWING SURGERY OF THE MUSCULOSKELETAL SYSTEM: Primary | ICD-10-CM

## 2021-01-01 DIAGNOSIS — M17.0 PRIMARY OSTEOARTHRITIS OF BOTH KNEES: Primary | ICD-10-CM

## 2021-01-01 DIAGNOSIS — I34.0 NONRHEUMATIC MITRAL VALVE REGURGITATION: ICD-10-CM

## 2021-01-01 DIAGNOSIS — F32.1 CURRENT MODERATE EPISODE OF MAJOR DEPRESSIVE DISORDER WITHOUT PRIOR EPISODE (HCC): ICD-10-CM

## 2021-01-01 DIAGNOSIS — M25.561 CHRONIC PAIN OF BOTH KNEES: ICD-10-CM

## 2021-01-01 DIAGNOSIS — M53.3 SACRAL BACK PAIN: ICD-10-CM

## 2021-01-01 DIAGNOSIS — Z91.89 LACK OF MOTIVATION: ICD-10-CM

## 2021-01-01 DIAGNOSIS — E16.2 HYPOGLYCEMIA: ICD-10-CM

## 2021-01-01 DIAGNOSIS — R94.01 EEG ABNORMAL: Primary | ICD-10-CM

## 2021-01-01 DIAGNOSIS — M54.50 ACUTE LEFT-SIDED LOW BACK PAIN WITHOUT SCIATICA: ICD-10-CM

## 2021-01-01 DIAGNOSIS — I63.81 LACUNAR INFARCTION (HCC): ICD-10-CM

## 2021-01-01 DIAGNOSIS — M79.604 PAIN IN RIGHT LEG: Primary | ICD-10-CM

## 2021-01-01 DIAGNOSIS — R35.0 URINARY FREQUENCY: ICD-10-CM

## 2021-01-01 LAB
ABO GROUP BLD BPU: NORMAL
ABO GROUP BLD: NORMAL
ABO GROUP BLD: NORMAL
ALBUMIN SERPL BCP-MCNC: 2.6 G/DL (ref 3.5–5)
ALBUMIN SERPL BCP-MCNC: 3.4 G/DL (ref 3.5–5)
ALBUMIN SERPL BCP-MCNC: 3.6 G/DL (ref 3.5–5)
ALP SERPL-CCNC: 116 U/L (ref 46–116)
ALP SERPL-CCNC: 58 U/L (ref 46–116)
ALP SERPL-CCNC: 88 U/L (ref 46–116)
ALT SERPL W P-5'-P-CCNC: 21 U/L (ref 12–78)
ALT SERPL W P-5'-P-CCNC: 27 U/L (ref 12–78)
ALT SERPL W P-5'-P-CCNC: 35 U/L (ref 12–78)
AMMONIA PLAS-SCNC: <10 UMOL/L (ref 11–35)
ANION GAP SERPL CALCULATED.3IONS-SCNC: 10 MMOL/L (ref 4–13)
ANION GAP SERPL CALCULATED.3IONS-SCNC: 10 MMOL/L (ref 4–13)
ANION GAP SERPL CALCULATED.3IONS-SCNC: 11 MMOL/L (ref 4–13)
ANION GAP SERPL CALCULATED.3IONS-SCNC: 12 MMOL/L (ref 4–13)
ANION GAP SERPL CALCULATED.3IONS-SCNC: 8 MMOL/L (ref 4–13)
ANION GAP SERPL CALCULATED.3IONS-SCNC: 8 MMOL/L (ref 4–13)
ANION GAP SERPL CALCULATED.3IONS-SCNC: 9 MMOL/L (ref 4–13)
APAP SERPL-MCNC: <2 UG/ML (ref 10–20)
APTT PPP: 27 SECONDS (ref 23–37)
AST SERPL W P-5'-P-CCNC: 14 U/L (ref 5–45)
AST SERPL W P-5'-P-CCNC: 14 U/L (ref 5–45)
AST SERPL W P-5'-P-CCNC: 22 U/L (ref 5–45)
ATRIAL RATE: 64 BPM
ATRIAL RATE: 66 BPM
ATRIAL RATE: 66 BPM
ATRIAL RATE: 68 BPM
ATRIAL RATE: 69 BPM
ATRIAL RATE: 69 BPM
ATRIAL RATE: 72 BPM
ATRIAL RATE: 75 BPM
ATRIAL RATE: 76 BPM
ATRIAL RATE: 76 BPM
BACTERIA BLD CULT: NORMAL
BACTERIA BLD CULT: NORMAL
BACTERIA UR CULT: ABNORMAL
BACTERIA UR CULT: ABNORMAL
BACTERIA UR CULT: NORMAL
BACTERIA UR QL AUTO: ABNORMAL /HPF
BACTERIA UR QL AUTO: ABNORMAL /HPF
BASOPHILS # BLD AUTO: 0.01 THOUSANDS/ΜL (ref 0–0.1)
BASOPHILS # BLD AUTO: 0.02 THOUSANDS/ΜL (ref 0–0.1)
BASOPHILS # BLD AUTO: 0.03 THOUSANDS/ΜL (ref 0–0.1)
BASOPHILS # BLD AUTO: 0.05 THOUSANDS/ΜL (ref 0–0.1)
BASOPHILS # BLD AUTO: 0.09 THOUSANDS/ΜL (ref 0–0.1)
BASOPHILS # BLD AUTO: 0.11 THOUSANDS/ΜL (ref 0–0.1)
BASOPHILS # BLD AUTO: 0.11 THOUSANDS/ΜL (ref 0–0.1)
BASOPHILS NFR BLD AUTO: 0 % (ref 0–1)
BASOPHILS NFR BLD AUTO: 1 % (ref 0–1)
BILIRUB SERPL-MCNC: 0.3 MG/DL (ref 0.2–1)
BILIRUB SERPL-MCNC: 0.4 MG/DL (ref 0.2–1)
BILIRUB SERPL-MCNC: 0.5 MG/DL (ref 0.2–1)
BILIRUB UR QL STRIP: NEGATIVE
BILIRUB UR QL STRIP: NEGATIVE
BLD GP AB SCN SERPL QL: NEGATIVE
BPU ID: NORMAL
BUN SERPL-MCNC: 17 MG/DL (ref 5–25)
BUN SERPL-MCNC: 18 MG/DL (ref 5–25)
BUN SERPL-MCNC: 27 MG/DL (ref 5–25)
BUN SERPL-MCNC: 30 MG/DL (ref 5–25)
BUN SERPL-MCNC: 31 MG/DL (ref 5–25)
BUN SERPL-MCNC: 34 MG/DL (ref 5–25)
BUN SERPL-MCNC: 36 MG/DL (ref 5–25)
BUN SERPL-MCNC: 38 MG/DL (ref 5–25)
BUN SERPL-MCNC: 39 MG/DL (ref 5–25)
BUN SERPL-MCNC: 41 MG/DL (ref 5–25)
BUN SERPL-MCNC: 45 MG/DL (ref 5–25)
CALCIUM ALBUM COR SERPL-MCNC: 8.1 MG/DL (ref 8.3–10.1)
CALCIUM ALBUM COR SERPL-MCNC: 9.3 MG/DL (ref 8.3–10.1)
CALCIUM SERPL-MCNC: 7 MG/DL (ref 8.3–10.1)
CALCIUM SERPL-MCNC: 8.1 MG/DL (ref 8.3–10.1)
CALCIUM SERPL-MCNC: 8.4 MG/DL (ref 8.3–10.1)
CALCIUM SERPL-MCNC: 8.5 MG/DL (ref 8.3–10.1)
CALCIUM SERPL-MCNC: 8.5 MG/DL (ref 8.3–10.1)
CALCIUM SERPL-MCNC: 8.6 MG/DL (ref 8.3–10.1)
CALCIUM SERPL-MCNC: 8.8 MG/DL (ref 8.3–10.1)
CALCIUM SERPL-MCNC: 8.8 MG/DL (ref 8.3–10.1)
CALCIUM SERPL-MCNC: 8.9 MG/DL (ref 8.3–10.1)
CALCIUM SERPL-MCNC: 8.9 MG/DL (ref 8.3–10.1)
CALCIUM SERPL-MCNC: 9 MG/DL (ref 8.3–10.1)
CHLORIDE SERPL-SCNC: 100 MMOL/L (ref 100–108)
CHLORIDE SERPL-SCNC: 100 MMOL/L (ref 100–108)
CHLORIDE SERPL-SCNC: 102 MMOL/L (ref 100–108)
CHLORIDE SERPL-SCNC: 93 MMOL/L (ref 100–108)
CHLORIDE SERPL-SCNC: 95 MMOL/L (ref 100–108)
CHLORIDE SERPL-SCNC: 96 MMOL/L (ref 100–108)
CHLORIDE SERPL-SCNC: 97 MMOL/L (ref 100–108)
CHLORIDE SERPL-SCNC: 99 MMOL/L (ref 100–108)
CHOLEST SERPL-MCNC: 213 MG/DL (ref 50–200)
CLARITY UR: ABNORMAL
CLARITY UR: CLEAR
CO2 SERPL-SCNC: 19 MMOL/L (ref 21–32)
CO2 SERPL-SCNC: 20 MMOL/L (ref 21–32)
CO2 SERPL-SCNC: 20 MMOL/L (ref 21–32)
CO2 SERPL-SCNC: 22 MMOL/L (ref 21–32)
CO2 SERPL-SCNC: 23 MMOL/L (ref 21–32)
CO2 SERPL-SCNC: 24 MMOL/L (ref 21–32)
CO2 SERPL-SCNC: 24 MMOL/L (ref 21–32)
CO2 SERPL-SCNC: 26 MMOL/L (ref 21–32)
CO2 SERPL-SCNC: 26 MMOL/L (ref 21–32)
COLOR UR: ABNORMAL
COLOR UR: YELLOW
CORTIS AM PEAK SERPL-MCNC: 3.7 UG/DL (ref 4.2–22.4)
CORTIS SERPL-MCNC: 28.4 UG/DL
CORTIS SERPL-MCNC: 30.1 UG/DL
CORTIS SERPL-MCNC: 6.7 UG/DL
CREAT SERPL-MCNC: 0.74 MG/DL (ref 0.6–1.3)
CREAT SERPL-MCNC: 0.83 MG/DL (ref 0.6–1.3)
CREAT SERPL-MCNC: 0.86 MG/DL (ref 0.6–1.3)
CREAT SERPL-MCNC: 0.9 MG/DL (ref 0.6–1.3)
CREAT SERPL-MCNC: 0.94 MG/DL (ref 0.6–1.3)
CREAT SERPL-MCNC: 1.03 MG/DL (ref 0.6–1.3)
CREAT SERPL-MCNC: 1.08 MG/DL (ref 0.6–1.3)
CREAT SERPL-MCNC: 1.1 MG/DL (ref 0.6–1.3)
CREAT SERPL-MCNC: 1.12 MG/DL (ref 0.6–1.3)
CREAT SERPL-MCNC: 1.14 MG/DL (ref 0.6–1.3)
CREAT SERPL-MCNC: 1.38 MG/DL (ref 0.6–1.3)
CROSSMATCH: NORMAL
EOSINOPHIL # BLD AUTO: 0.01 THOUSAND/ΜL (ref 0–0.61)
EOSINOPHIL # BLD AUTO: 0.07 THOUSAND/ΜL (ref 0–0.61)
EOSINOPHIL # BLD AUTO: 0.11 THOUSAND/ΜL (ref 0–0.61)
EOSINOPHIL # BLD AUTO: 0.11 THOUSAND/ΜL (ref 0–0.61)
EOSINOPHIL # BLD AUTO: 0.15 THOUSAND/ΜL (ref 0–0.61)
EOSINOPHIL # BLD AUTO: 0.19 THOUSAND/ΜL (ref 0–0.61)
EOSINOPHIL # BLD AUTO: 0.25 THOUSAND/ΜL (ref 0–0.61)
EOSINOPHIL NFR BLD AUTO: 0 % (ref 0–6)
EOSINOPHIL NFR BLD AUTO: 1 % (ref 0–6)
EOSINOPHIL NFR BLD AUTO: 2 % (ref 0–6)
EOSINOPHIL NFR BLD AUTO: 2 % (ref 0–6)
EOSINOPHIL NFR BLD AUTO: 3 % (ref 0–6)
ERYTHROCYTE [DISTWIDTH] IN BLOOD BY AUTOMATED COUNT: 13.2 % (ref 11.6–15.1)
ERYTHROCYTE [DISTWIDTH] IN BLOOD BY AUTOMATED COUNT: 13.3 % (ref 11.6–15.1)
ERYTHROCYTE [DISTWIDTH] IN BLOOD BY AUTOMATED COUNT: 13.3 % (ref 11.6–15.1)
ERYTHROCYTE [DISTWIDTH] IN BLOOD BY AUTOMATED COUNT: 14.1 % (ref 11.6–15.1)
ERYTHROCYTE [DISTWIDTH] IN BLOOD BY AUTOMATED COUNT: 14.4 % (ref 11.6–15.1)
ERYTHROCYTE [DISTWIDTH] IN BLOOD BY AUTOMATED COUNT: 14.4 % (ref 11.6–15.1)
ERYTHROCYTE [DISTWIDTH] IN BLOOD BY AUTOMATED COUNT: 14.9 % (ref 11.6–15.1)
EST. AVERAGE GLUCOSE BLD GHB EST-MCNC: 160 MG/DL
ETHANOL SERPL-MCNC: <3 MG/DL (ref 0–3)
FLUAV RNA RESP QL NAA+PROBE: NEGATIVE
FLUBV RNA RESP QL NAA+PROBE: NEGATIVE
GFR SERPL CREATININE-BSD FRML MDRD: 36 ML/MIN/1.73SQ M
GFR SERPL CREATININE-BSD FRML MDRD: 45 ML/MIN/1.73SQ M
GFR SERPL CREATININE-BSD FRML MDRD: 46 ML/MIN/1.73SQ M
GFR SERPL CREATININE-BSD FRML MDRD: 47 ML/MIN/1.73SQ M
GFR SERPL CREATININE-BSD FRML MDRD: 48 ML/MIN/1.73SQ M
GFR SERPL CREATININE-BSD FRML MDRD: 51 ML/MIN/1.73SQ M
GFR SERPL CREATININE-BSD FRML MDRD: 57 ML/MIN/1.73SQ M
GFR SERPL CREATININE-BSD FRML MDRD: 60 ML/MIN/1.73SQ M
GFR SERPL CREATININE-BSD FRML MDRD: 64 ML/MIN/1.73SQ M
GFR SERPL CREATININE-BSD FRML MDRD: 66 ML/MIN/1.73SQ M
GFR SERPL CREATININE-BSD FRML MDRD: 76 ML/MIN/1.73SQ M
GLUCOSE SERPL-MCNC: 119 MG/DL (ref 65–140)
GLUCOSE SERPL-MCNC: 120 MG/DL (ref 65–140)
GLUCOSE SERPL-MCNC: 121 MG/DL (ref 65–140)
GLUCOSE SERPL-MCNC: 122 MG/DL (ref 65–140)
GLUCOSE SERPL-MCNC: 123 MG/DL (ref 65–140)
GLUCOSE SERPL-MCNC: 125 MG/DL (ref 65–140)
GLUCOSE SERPL-MCNC: 127 MG/DL (ref 65–140)
GLUCOSE SERPL-MCNC: 133 MG/DL (ref 65–140)
GLUCOSE SERPL-MCNC: 138 MG/DL (ref 65–140)
GLUCOSE SERPL-MCNC: 139 MG/DL (ref 65–140)
GLUCOSE SERPL-MCNC: 140 MG/DL (ref 65–140)
GLUCOSE SERPL-MCNC: 140 MG/DL (ref 65–140)
GLUCOSE SERPL-MCNC: 141 MG/DL (ref 65–140)
GLUCOSE SERPL-MCNC: 143 MG/DL (ref 65–140)
GLUCOSE SERPL-MCNC: 143 MG/DL (ref 65–140)
GLUCOSE SERPL-MCNC: 144 MG/DL (ref 65–140)
GLUCOSE SERPL-MCNC: 145 MG/DL (ref 65–140)
GLUCOSE SERPL-MCNC: 146 MG/DL (ref 65–140)
GLUCOSE SERPL-MCNC: 151 MG/DL (ref 65–140)
GLUCOSE SERPL-MCNC: 152 MG/DL (ref 65–140)
GLUCOSE SERPL-MCNC: 153 MG/DL (ref 65–140)
GLUCOSE SERPL-MCNC: 154 MG/DL (ref 65–140)
GLUCOSE SERPL-MCNC: 154 MG/DL (ref 65–140)
GLUCOSE SERPL-MCNC: 158 MG/DL (ref 65–140)
GLUCOSE SERPL-MCNC: 159 MG/DL (ref 65–140)
GLUCOSE SERPL-MCNC: 160 MG/DL (ref 65–140)
GLUCOSE SERPL-MCNC: 165 MG/DL (ref 65–140)
GLUCOSE SERPL-MCNC: 170 MG/DL (ref 65–140)
GLUCOSE SERPL-MCNC: 170 MG/DL (ref 65–140)
GLUCOSE SERPL-MCNC: 175 MG/DL (ref 65–140)
GLUCOSE SERPL-MCNC: 176 MG/DL (ref 65–140)
GLUCOSE SERPL-MCNC: 176 MG/DL (ref 65–140)
GLUCOSE SERPL-MCNC: 177 MG/DL (ref 65–140)
GLUCOSE SERPL-MCNC: 178 MG/DL (ref 65–140)
GLUCOSE SERPL-MCNC: 184 MG/DL (ref 65–140)
GLUCOSE SERPL-MCNC: 189 MG/DL (ref 65–140)
GLUCOSE SERPL-MCNC: 190 MG/DL (ref 65–140)
GLUCOSE SERPL-MCNC: 192 MG/DL (ref 65–140)
GLUCOSE SERPL-MCNC: 194 MG/DL (ref 65–140)
GLUCOSE SERPL-MCNC: 196 MG/DL (ref 65–140)
GLUCOSE SERPL-MCNC: 198 MG/DL (ref 65–140)
GLUCOSE SERPL-MCNC: 208 MG/DL (ref 65–140)
GLUCOSE SERPL-MCNC: 210 MG/DL (ref 65–140)
GLUCOSE SERPL-MCNC: 223 MG/DL (ref 65–140)
GLUCOSE SERPL-MCNC: 237 MG/DL (ref 65–140)
GLUCOSE UR STRIP-MCNC: NEGATIVE MG/DL
GLUCOSE UR STRIP-MCNC: NEGATIVE MG/DL
HBA1C MFR BLD: 7.2 %
HCT VFR BLD AUTO: 23.9 % (ref 34.8–46.1)
HCT VFR BLD AUTO: 25.8 % (ref 34.8–46.1)
HCT VFR BLD AUTO: 26.4 % (ref 34.8–46.1)
HCT VFR BLD AUTO: 27.2 % (ref 34.8–46.1)
HCT VFR BLD AUTO: 33.9 % (ref 34.8–46.1)
HCT VFR BLD AUTO: 38 % (ref 34.8–46.1)
HCT VFR BLD AUTO: 40.1 % (ref 34.8–46.1)
HCT VFR BLD AUTO: 40.3 % (ref 34.8–46.1)
HCT VFR BLD AUTO: 41.2 % (ref 34.8–46.1)
HDLC SERPL-MCNC: 36 MG/DL
HGB BLD-MCNC: 10.6 G/DL (ref 11.5–15.4)
HGB BLD-MCNC: 12.1 G/DL (ref 11.5–15.4)
HGB BLD-MCNC: 12.3 G/DL (ref 11.5–15.4)
HGB BLD-MCNC: 12.5 G/DL (ref 11.5–15.4)
HGB BLD-MCNC: 12.9 G/DL (ref 11.5–15.4)
HGB BLD-MCNC: 7.5 G/DL (ref 11.5–15.4)
HGB BLD-MCNC: 8 G/DL (ref 11.5–15.4)
HGB BLD-MCNC: 8.2 G/DL (ref 11.5–15.4)
HGB BLD-MCNC: 8.3 G/DL (ref 11.5–15.4)
HGB UR QL STRIP.AUTO: NEGATIVE
HGB UR QL STRIP.AUTO: NEGATIVE
IMM GRANULOCYTES # BLD AUTO: 0.08 THOUSAND/UL (ref 0–0.2)
IMM GRANULOCYTES # BLD AUTO: 0.09 THOUSAND/UL (ref 0–0.2)
IMM GRANULOCYTES # BLD AUTO: 0.1 THOUSAND/UL (ref 0–0.2)
IMM GRANULOCYTES # BLD AUTO: 0.1 THOUSAND/UL (ref 0–0.2)
IMM GRANULOCYTES # BLD AUTO: 0.13 THOUSAND/UL (ref 0–0.2)
IMM GRANULOCYTES # BLD AUTO: 0.18 THOUSAND/UL (ref 0–0.2)
IMM GRANULOCYTES # BLD AUTO: 0.21 THOUSAND/UL (ref 0–0.2)
IMM GRANULOCYTES NFR BLD AUTO: 1 % (ref 0–2)
IMM GRANULOCYTES NFR BLD AUTO: 2 % (ref 0–2)
IMM GRANULOCYTES NFR BLD AUTO: 2 % (ref 0–2)
INR PPP: 0.97 (ref 0.84–1.19)
KETONES UR STRIP-MCNC: NEGATIVE MG/DL
KETONES UR STRIP-MCNC: NEGATIVE MG/DL
LDLC SERPL CALC-MCNC: 129 MG/DL (ref 0–100)
LEUKOCYTE ESTERASE UR QL STRIP: NEGATIVE
LEUKOCYTE ESTERASE UR QL STRIP: NEGATIVE
LIPASE SERPL-CCNC: 217 U/L (ref 73–393)
LYMPHOCYTES # BLD AUTO: 0.76 THOUSANDS/ΜL (ref 0.6–4.47)
LYMPHOCYTES # BLD AUTO: 0.82 THOUSANDS/ΜL (ref 0.6–4.47)
LYMPHOCYTES # BLD AUTO: 0.85 THOUSANDS/ΜL (ref 0.6–4.47)
LYMPHOCYTES # BLD AUTO: 1.19 THOUSANDS/ΜL (ref 0.6–4.47)
LYMPHOCYTES # BLD AUTO: 1.25 THOUSANDS/ΜL (ref 0.6–4.47)
LYMPHOCYTES # BLD AUTO: 1.84 THOUSANDS/ΜL (ref 0.6–4.47)
LYMPHOCYTES # BLD AUTO: 1.99 THOUSANDS/ΜL (ref 0.6–4.47)
LYMPHOCYTES NFR BLD AUTO: 13 % (ref 14–44)
LYMPHOCYTES NFR BLD AUTO: 17 % (ref 14–44)
LYMPHOCYTES NFR BLD AUTO: 21 % (ref 14–44)
LYMPHOCYTES NFR BLD AUTO: 22 % (ref 14–44)
LYMPHOCYTES NFR BLD AUTO: 8 % (ref 14–44)
MAGNESIUM SERPL-MCNC: 1.6 MG/DL (ref 1.6–2.6)
MAGNESIUM SERPL-MCNC: 2 MG/DL (ref 1.6–2.6)
MAGNESIUM SERPL-MCNC: 2.2 MG/DL (ref 1.6–2.6)
MCH RBC QN AUTO: 27 PG (ref 26.8–34.3)
MCH RBC QN AUTO: 27.2 PG (ref 26.8–34.3)
MCH RBC QN AUTO: 27.3 PG (ref 26.8–34.3)
MCH RBC QN AUTO: 27.3 PG (ref 26.8–34.3)
MCH RBC QN AUTO: 27.5 PG (ref 26.8–34.3)
MCH RBC QN AUTO: 27.9 PG (ref 26.8–34.3)
MCH RBC QN AUTO: 28.3 PG (ref 26.8–34.3)
MCHC RBC AUTO-ENTMCNC: 30.7 G/DL (ref 31.4–37.4)
MCHC RBC AUTO-ENTMCNC: 31 G/DL (ref 31.4–37.4)
MCHC RBC AUTO-ENTMCNC: 31.1 G/DL (ref 31.4–37.4)
MCHC RBC AUTO-ENTMCNC: 31.3 G/DL (ref 31.4–37.4)
MCHC RBC AUTO-ENTMCNC: 31.3 G/DL (ref 31.4–37.4)
MCHC RBC AUTO-ENTMCNC: 31.4 G/DL (ref 31.4–37.4)
MCHC RBC AUTO-ENTMCNC: 31.8 G/DL (ref 31.4–37.4)
MCV RBC AUTO: 87 FL (ref 82–98)
MCV RBC AUTO: 88 FL (ref 82–98)
MCV RBC AUTO: 90 FL (ref 82–98)
MONOCYTES # BLD AUTO: 0.64 THOUSAND/ΜL (ref 0.17–1.22)
MONOCYTES # BLD AUTO: 0.67 THOUSAND/ΜL (ref 0.17–1.22)
MONOCYTES # BLD AUTO: 0.8 THOUSAND/ΜL (ref 0.17–1.22)
MONOCYTES # BLD AUTO: 0.81 THOUSAND/ΜL (ref 0.17–1.22)
MONOCYTES # BLD AUTO: 0.84 THOUSAND/ΜL (ref 0.17–1.22)
MONOCYTES # BLD AUTO: 0.86 THOUSAND/ΜL (ref 0.17–1.22)
MONOCYTES # BLD AUTO: 0.95 THOUSAND/ΜL (ref 0.17–1.22)
MONOCYTES NFR BLD AUTO: 12 % (ref 4–12)
MONOCYTES NFR BLD AUTO: 6 % (ref 4–12)
MONOCYTES NFR BLD AUTO: 7 % (ref 4–12)
MONOCYTES NFR BLD AUTO: 9 % (ref 4–12)
NEUTROPHILS # BLD AUTO: 4.97 THOUSANDS/ΜL (ref 1.85–7.62)
NEUTROPHILS # BLD AUTO: 5.59 THOUSANDS/ΜL (ref 1.85–7.62)
NEUTROPHILS # BLD AUTO: 6.08 THOUSANDS/ΜL (ref 1.85–7.62)
NEUTROPHILS # BLD AUTO: 7.72 THOUSANDS/ΜL (ref 1.85–7.62)
NEUTROPHILS # BLD AUTO: 8.07 THOUSANDS/ΜL (ref 1.85–7.62)
NEUTROPHILS # BLD AUTO: 8.37 THOUSANDS/ΜL (ref 1.85–7.62)
NEUTROPHILS # BLD AUTO: 8.7 THOUSANDS/ΜL (ref 1.85–7.62)
NEUTS SEG NFR BLD AUTO: 65 % (ref 43–75)
NEUTS SEG NFR BLD AUTO: 65 % (ref 43–75)
NEUTS SEG NFR BLD AUTO: 68 % (ref 43–75)
NEUTS SEG NFR BLD AUTO: 77 % (ref 43–75)
NEUTS SEG NFR BLD AUTO: 80 % (ref 43–75)
NEUTS SEG NFR BLD AUTO: 82 % (ref 43–75)
NEUTS SEG NFR BLD AUTO: 82 % (ref 43–75)
NITRITE UR QL STRIP: NEGATIVE
NITRITE UR QL STRIP: NEGATIVE
NON-SQ EPI CELLS URNS QL MICRO: ABNORMAL /HPF
NON-SQ EPI CELLS URNS QL MICRO: ABNORMAL /HPF
NRBC BLD AUTO-RTO: 0 /100 WBCS
OSMOLALITY UR/SERPL-RTO: 284 MMOL/KG (ref 282–298)
OSMOLALITY UR: 578 MMOL/KG
P AXIS: -5 DEGREES
P AXIS: -8 DEGREES
P AXIS: 1 DEGREES
P AXIS: 15 DEGREES
P AXIS: 16 DEGREES
P AXIS: 21 DEGREES
P AXIS: 38 DEGREES
P AXIS: 84 DEGREES
P AXIS: 9 DEGREES
PH UR STRIP.AUTO: 6.5 [PH]
PH UR STRIP.AUTO: 7 [PH]
PLATELET # BLD AUTO: 213 THOUSANDS/UL (ref 149–390)
PLATELET # BLD AUTO: 214 THOUSANDS/UL (ref 149–390)
PLATELET # BLD AUTO: 248 THOUSANDS/UL (ref 149–390)
PLATELET # BLD AUTO: 284 THOUSANDS/UL (ref 149–390)
PLATELET # BLD AUTO: 295 THOUSANDS/UL (ref 149–390)
PLATELET # BLD AUTO: 306 THOUSANDS/UL (ref 149–390)
PLATELET # BLD AUTO: 355 THOUSANDS/UL (ref 149–390)
PMV BLD AUTO: 10 FL (ref 8.9–12.7)
PMV BLD AUTO: 10.1 FL (ref 8.9–12.7)
PMV BLD AUTO: 10.1 FL (ref 8.9–12.7)
PMV BLD AUTO: 10.4 FL (ref 8.9–12.7)
PMV BLD AUTO: 10.4 FL (ref 8.9–12.7)
PMV BLD AUTO: 10.5 FL (ref 8.9–12.7)
PMV BLD AUTO: 9.8 FL (ref 8.9–12.7)
POTASSIUM SERPL-SCNC: 4 MMOL/L (ref 3.5–5.3)
POTASSIUM SERPL-SCNC: 4.1 MMOL/L (ref 3.5–5.3)
POTASSIUM SERPL-SCNC: 4.2 MMOL/L (ref 3.5–5.3)
POTASSIUM SERPL-SCNC: 4.3 MMOL/L (ref 3.5–5.3)
POTASSIUM SERPL-SCNC: 4.6 MMOL/L (ref 3.5–5.3)
POTASSIUM SERPL-SCNC: 4.8 MMOL/L (ref 3.5–5.3)
POTASSIUM SERPL-SCNC: 5 MMOL/L (ref 3.5–5.3)
POTASSIUM SERPL-SCNC: 5 MMOL/L (ref 3.5–5.3)
POTASSIUM SERPL-SCNC: 5.1 MMOL/L (ref 3.5–5.3)
POTASSIUM SERPL-SCNC: 5.2 MMOL/L (ref 3.5–5.3)
POTASSIUM SERPL-SCNC: 5.3 MMOL/L (ref 3.5–5.3)
PR INTERVAL: 136 MS
PR INTERVAL: 194 MS
PR INTERVAL: 196 MS
PR INTERVAL: 200 MS
PR INTERVAL: 220 MS
PR INTERVAL: 228 MS
PR INTERVAL: 230 MS
PR INTERVAL: 232 MS
PR INTERVAL: 242 MS
PR INTERVAL: 266 MS
PROT SERPL-MCNC: 5.2 G/DL (ref 6.4–8.2)
PROT SERPL-MCNC: 7 G/DL (ref 6.4–8.2)
PROT SERPL-MCNC: 7 G/DL (ref 6.4–8.2)
PROT UR STRIP-MCNC: ABNORMAL MG/DL
PROT UR STRIP-MCNC: ABNORMAL MG/DL
PROTHROMBIN TIME: 12.8 SECONDS (ref 11.6–14.5)
QRS AXIS: -46 DEGREES
QRS AXIS: 104 DEGREES
QRS AXIS: 104 DEGREES
QRS AXIS: 75 DEGREES
QRS AXIS: 77 DEGREES
QRS AXIS: 89 DEGREES
QRS AXIS: 98 DEGREES
QRS AXIS: 99 DEGREES
QRSD INTERVAL: 106 MS
QRSD INTERVAL: 114 MS
QRSD INTERVAL: 116 MS
QRSD INTERVAL: 120 MS
QRSD INTERVAL: 164 MS
QT INTERVAL: 404 MS
QT INTERVAL: 406 MS
QT INTERVAL: 408 MS
QT INTERVAL: 410 MS
QT INTERVAL: 412 MS
QT INTERVAL: 412 MS
QT INTERVAL: 416 MS
QT INTERVAL: 416 MS
QT INTERVAL: 430 MS
QT INTERVAL: 446 MS
QTC INTERVAL: 431 MS
QTC INTERVAL: 436 MS
QTC INTERVAL: 439 MS
QTC INTERVAL: 441 MS
QTC INTERVAL: 442 MS
QTC INTERVAL: 443 MS
QTC INTERVAL: 446 MS
QTC INTERVAL: 453 MS
QTC INTERVAL: 454 MS
QTC INTERVAL: 501 MS
RBC # BLD AUTO: 2.65 MILLION/UL (ref 3.81–5.12)
RBC # BLD AUTO: 3 MILLION/UL (ref 3.81–5.12)
RBC # BLD AUTO: 3.85 MILLION/UL (ref 3.81–5.12)
RBC # BLD AUTO: 4.34 MILLION/UL (ref 3.81–5.12)
RBC # BLD AUTO: 4.55 MILLION/UL (ref 3.81–5.12)
RBC # BLD AUTO: 4.59 MILLION/UL (ref 3.81–5.12)
RBC # BLD AUTO: 4.72 MILLION/UL (ref 3.81–5.12)
RBC #/AREA URNS AUTO: ABNORMAL /HPF
RBC #/AREA URNS AUTO: ABNORMAL /HPF
RH BLD: POSITIVE
RH BLD: POSITIVE
RSV RNA RESP QL NAA+PROBE: NEGATIVE
SALICYLATES SERPL-MCNC: <3 MG/DL (ref 3–20)
SARS-COV-2 RNA RESP QL NAA+PROBE: NEGATIVE
SODIUM 24H UR-SCNC: 75 MOL/L
SODIUM SERPL-SCNC: 126 MMOL/L (ref 136–145)
SODIUM SERPL-SCNC: 127 MMOL/L (ref 136–145)
SODIUM SERPL-SCNC: 128 MMOL/L (ref 136–145)
SODIUM SERPL-SCNC: 128 MMOL/L (ref 136–145)
SODIUM SERPL-SCNC: 130 MMOL/L (ref 136–145)
SODIUM SERPL-SCNC: 130 MMOL/L (ref 136–145)
SODIUM SERPL-SCNC: 131 MMOL/L (ref 136–145)
SODIUM SERPL-SCNC: 131 MMOL/L (ref 136–145)
SODIUM SERPL-SCNC: 132 MMOL/L (ref 136–145)
SODIUM SERPL-SCNC: 134 MMOL/L (ref 136–145)
SODIUM SERPL-SCNC: 134 MMOL/L (ref 136–145)
SP GR UR STRIP.AUTO: 1.01 (ref 1–1.03)
SP GR UR STRIP.AUTO: 1.02 (ref 1–1.03)
SPECIMEN EXPIRATION DATE: NORMAL
T WAVE AXIS: -29 DEGREES
T WAVE AXIS: -32 DEGREES
T WAVE AXIS: -32 DEGREES
T WAVE AXIS: -33 DEGREES
T WAVE AXIS: -36 DEGREES
T WAVE AXIS: -38 DEGREES
T WAVE AXIS: -43 DEGREES
T WAVE AXIS: 1 DEGREES
T WAVE AXIS: 120 DEGREES
T WAVE AXIS: 18 DEGREES
TRIGL SERPL-MCNC: 240 MG/DL
TROPONIN I SERPL-MCNC: 0.02 NG/ML
TROPONIN I SERPL-MCNC: 0.02 NG/ML
TROPONIN I SERPL-MCNC: <0.02 NG/ML
TSH SERPL DL<=0.05 MIU/L-ACNC: 1.04 UIU/ML (ref 0.36–3.74)
TSH SERPL DL<=0.05 MIU/L-ACNC: 3.33 UIU/ML (ref 0.36–3.74)
UNIT DISPENSE STATUS: NORMAL
UNIT PRODUCT CODE: NORMAL
UNIT RH: NORMAL
URATE SERPL-MCNC: 6.6 MG/DL (ref 2–6.8)
UROBILINOGEN UR QL STRIP.AUTO: 0.2 E.U./DL
UROBILINOGEN UR QL STRIP.AUTO: 0.2 E.U./DL
VENTRICULAR RATE: 64 BPM
VENTRICULAR RATE: 66 BPM
VENTRICULAR RATE: 66 BPM
VENTRICULAR RATE: 68 BPM
VENTRICULAR RATE: 69 BPM
VENTRICULAR RATE: 69 BPM
VENTRICULAR RATE: 72 BPM
VENTRICULAR RATE: 75 BPM
VENTRICULAR RATE: 76 BPM
VENTRICULAR RATE: 76 BPM
WBC # BLD AUTO: 10.49 THOUSAND/UL (ref 4.31–10.16)
WBC # BLD AUTO: 10.57 THOUSAND/UL (ref 4.31–10.16)
WBC # BLD AUTO: 7.23 THOUSAND/UL (ref 4.31–10.16)
WBC # BLD AUTO: 8.69 THOUSAND/UL (ref 4.31–10.16)
WBC # BLD AUTO: 9.27 THOUSAND/UL (ref 4.31–10.16)
WBC # BLD AUTO: 9.84 THOUSAND/UL (ref 4.31–10.16)
WBC # BLD AUTO: 9.88 THOUSAND/UL (ref 4.31–10.16)
WBC #/AREA URNS AUTO: ABNORMAL /HPF
WBC #/AREA URNS AUTO: ABNORMAL /HPF

## 2021-01-01 PROCEDURE — 84484 ASSAY OF TROPONIN QUANT: CPT | Performed by: PHYSICIAN ASSISTANT

## 2021-01-01 PROCEDURE — 86901 BLOOD TYPING SEROLOGIC RH(D): CPT | Performed by: FAMILY MEDICINE

## 2021-01-01 PROCEDURE — 27245 TREAT THIGH FRACTURE: CPT | Performed by: PHYSICIAN ASSISTANT

## 2021-01-01 PROCEDURE — 82948 REAGENT STRIP/BLOOD GLUCOSE: CPT

## 2021-01-01 PROCEDURE — 85025 COMPLETE CBC W/AUTO DIFF WBC: CPT | Performed by: PHYSICIAN ASSISTANT

## 2021-01-01 PROCEDURE — 85014 HEMATOCRIT: CPT | Performed by: FAMILY MEDICINE

## 2021-01-01 PROCEDURE — 80143 DRUG ASSAY ACETAMINOPHEN: CPT | Performed by: EMERGENCY MEDICINE

## 2021-01-01 PROCEDURE — 70551 MRI BRAIN STEM W/O DYE: CPT

## 2021-01-01 PROCEDURE — 80179 DRUG ASSAY SALICYLATE: CPT | Performed by: EMERGENCY MEDICINE

## 2021-01-01 PROCEDURE — 90670 PCV13 VACCINE IM: CPT | Performed by: INTERNAL MEDICINE

## 2021-01-01 PROCEDURE — 99223 1ST HOSP IP/OBS HIGH 75: CPT | Performed by: PSYCHIATRY & NEUROLOGY

## 2021-01-01 PROCEDURE — 73502 X-RAY EXAM HIP UNI 2-3 VIEWS: CPT | Performed by: ORTHOPAEDIC SURGERY

## 2021-01-01 PROCEDURE — 99232 SBSQ HOSP IP/OBS MODERATE 35: CPT | Performed by: INTERNAL MEDICINE

## 2021-01-01 PROCEDURE — 93306 TTE W/DOPPLER COMPLETE: CPT | Performed by: INTERNAL MEDICINE

## 2021-01-01 PROCEDURE — 73502 X-RAY EXAM HIP UNI 2-3 VIEWS: CPT

## 2021-01-01 PROCEDURE — 84484 ASSAY OF TROPONIN QUANT: CPT | Performed by: NURSE ANESTHETIST, CERTIFIED REGISTERED

## 2021-01-01 PROCEDURE — 93005 ELECTROCARDIOGRAM TRACING: CPT

## 2021-01-01 PROCEDURE — 30233N1 TRANSFUSION OF NONAUTOLOGOUS RED BLOOD CELLS INTO PERIPHERAL VEIN, PERCUTANEOUS APPROACH: ICD-10-PCS | Performed by: FAMILY MEDICINE

## 2021-01-01 PROCEDURE — 96374 THER/PROPH/DIAG INJ IV PUSH: CPT

## 2021-01-01 PROCEDURE — 99232 SBSQ HOSP IP/OBS MODERATE 35: CPT | Performed by: PSYCHIATRY & NEUROLOGY

## 2021-01-01 PROCEDURE — 80053 COMPREHEN METABOLIC PANEL: CPT | Performed by: EMERGENCY MEDICINE

## 2021-01-01 PROCEDURE — 97530 THERAPEUTIC ACTIVITIES: CPT

## 2021-01-01 PROCEDURE — 82533 TOTAL CORTISOL: CPT | Performed by: INTERNAL MEDICINE

## 2021-01-01 PROCEDURE — 97163 PT EVAL HIGH COMPLEX 45 MIN: CPT

## 2021-01-01 PROCEDURE — 99213 OFFICE O/P EST LOW 20 MIN: CPT | Performed by: ORTHOPAEDIC SURGERY

## 2021-01-01 PROCEDURE — 83735 ASSAY OF MAGNESIUM: CPT | Performed by: EMERGENCY MEDICINE

## 2021-01-01 PROCEDURE — 85018 HEMOGLOBIN: CPT | Performed by: FAMILY MEDICINE

## 2021-01-01 PROCEDURE — 87040 BLOOD CULTURE FOR BACTERIA: CPT | Performed by: FAMILY MEDICINE

## 2021-01-01 PROCEDURE — 99214 OFFICE O/P EST MOD 30 MIN: CPT | Performed by: NURSE PRACTITIONER

## 2021-01-01 PROCEDURE — 99223 1ST HOSP IP/OBS HIGH 75: CPT | Performed by: FAMILY MEDICINE

## 2021-01-01 PROCEDURE — 97110 THERAPEUTIC EXERCISES: CPT

## 2021-01-01 PROCEDURE — 99285 EMERGENCY DEPT VISIT HI MDM: CPT

## 2021-01-01 PROCEDURE — G0009 ADMIN PNEUMOCOCCAL VACCINE: HCPCS | Performed by: INTERNAL MEDICINE

## 2021-01-01 PROCEDURE — C1713 ANCHOR/SCREW BN/BN,TIS/BN: HCPCS | Performed by: ORTHOPAEDIC SURGERY

## 2021-01-01 PROCEDURE — 83036 HEMOGLOBIN GLYCOSYLATED A1C: CPT | Performed by: PHYSICIAN ASSISTANT

## 2021-01-01 PROCEDURE — 99239 HOSP IP/OBS DSCHRG MGMT >30: CPT | Performed by: FAMILY MEDICINE

## 2021-01-01 PROCEDURE — 0241U HB NFCT DS VIR RESP RNA 4 TRGT: CPT | Performed by: EMERGENCY MEDICINE

## 2021-01-01 PROCEDURE — 87186 SC STD MICRODIL/AGAR DIL: CPT | Performed by: EMERGENCY MEDICINE

## 2021-01-01 PROCEDURE — 92526 ORAL FUNCTION THERAPY: CPT

## 2021-01-01 PROCEDURE — 80048 BASIC METABOLIC PNL TOTAL CA: CPT | Performed by: FAMILY MEDICINE

## 2021-01-01 PROCEDURE — 36415 COLL VENOUS BLD VENIPUNCTURE: CPT | Performed by: PHYSICIAN ASSISTANT

## 2021-01-01 PROCEDURE — 99215 OFFICE O/P EST HI 40 MIN: CPT | Performed by: PSYCHIATRY & NEUROLOGY

## 2021-01-01 PROCEDURE — 80053 COMPREHEN METABOLIC PANEL: CPT | Performed by: NURSE ANESTHETIST, CERTIFIED REGISTERED

## 2021-01-01 PROCEDURE — 83935 ASSAY OF URINE OSMOLALITY: CPT | Performed by: FAMILY MEDICINE

## 2021-01-01 PROCEDURE — 80048 BASIC METABOLIC PNL TOTAL CA: CPT | Performed by: PHYSICIAN ASSISTANT

## 2021-01-01 PROCEDURE — P9016 RBC LEUKOCYTES REDUCED: HCPCS

## 2021-01-01 PROCEDURE — 97167 OT EVAL HIGH COMPLEX 60 MIN: CPT

## 2021-01-01 PROCEDURE — 99285 EMERGENCY DEPT VISIT HI MDM: CPT | Performed by: EMERGENCY MEDICINE

## 2021-01-01 PROCEDURE — 99233 SBSQ HOSP IP/OBS HIGH 50: CPT | Performed by: INTERNAL MEDICINE

## 2021-01-01 PROCEDURE — 99222 1ST HOSP IP/OBS MODERATE 55: CPT | Performed by: INTERNAL MEDICINE

## 2021-01-01 PROCEDURE — 99024 POSTOP FOLLOW-UP VISIT: CPT | Performed by: ORTHOPAEDIC SURGERY

## 2021-01-01 PROCEDURE — 70450 CT HEAD/BRAIN W/O DYE: CPT

## 2021-01-01 PROCEDURE — 81001 URINALYSIS AUTO W/SCOPE: CPT | Performed by: PHYSICIAN ASSISTANT

## 2021-01-01 PROCEDURE — 99223 1ST HOSP IP/OBS HIGH 75: CPT | Performed by: INTERNAL MEDICINE

## 2021-01-01 PROCEDURE — 99232 SBSQ HOSP IP/OBS MODERATE 35: CPT | Performed by: FAMILY MEDICINE

## 2021-01-01 PROCEDURE — 93010 ELECTROCARDIOGRAM REPORT: CPT | Performed by: INTERNAL MEDICINE

## 2021-01-01 PROCEDURE — 85730 THROMBOPLASTIN TIME PARTIAL: CPT | Performed by: EMERGENCY MEDICINE

## 2021-01-01 PROCEDURE — G1004 CDSM NDSC: HCPCS

## 2021-01-01 PROCEDURE — 1123F ACP DISCUSS/DSCN MKR DOCD: CPT | Performed by: EMERGENCY MEDICINE

## 2021-01-01 PROCEDURE — 36415 COLL VENOUS BLD VENIPUNCTURE: CPT | Performed by: EMERGENCY MEDICINE

## 2021-01-01 PROCEDURE — 84484 ASSAY OF TROPONIN QUANT: CPT | Performed by: EMERGENCY MEDICINE

## 2021-01-01 PROCEDURE — 87077 CULTURE AEROBIC IDENTIFY: CPT | Performed by: EMERGENCY MEDICINE

## 2021-01-01 PROCEDURE — 73590 X-RAY EXAM OF LOWER LEG: CPT

## 2021-01-01 PROCEDURE — 20610 DRAIN/INJ JOINT/BURSA W/O US: CPT | Performed by: ORTHOPAEDIC SURGERY

## 2021-01-01 PROCEDURE — 99239 HOSP IP/OBS DSCHRG MGMT >30: CPT | Performed by: INTERNAL MEDICINE

## 2021-01-01 PROCEDURE — 85025 COMPLETE CBC W/AUTO DIFF WBC: CPT | Performed by: INTERNAL MEDICINE

## 2021-01-01 PROCEDURE — 99215 OFFICE O/P EST HI 40 MIN: CPT | Performed by: INTERNAL MEDICINE

## 2021-01-01 PROCEDURE — 80048 BASIC METABOLIC PNL TOTAL CA: CPT | Performed by: INTERNAL MEDICINE

## 2021-01-01 PROCEDURE — 96365 THER/PROPH/DIAG IV INF INIT: CPT

## 2021-01-01 PROCEDURE — 86900 BLOOD TYPING SEROLOGIC ABO: CPT | Performed by: FAMILY MEDICINE

## 2021-01-01 PROCEDURE — 95816 EEG AWAKE AND DROWSY: CPT

## 2021-01-01 PROCEDURE — 87086 URINE CULTURE/COLONY COUNT: CPT | Performed by: PHYSICIAN ASSISTANT

## 2021-01-01 PROCEDURE — 80061 LIPID PANEL: CPT | Performed by: PHYSICIAN ASSISTANT

## 2021-01-01 PROCEDURE — 97535 SELF CARE MNGMENT TRAINING: CPT

## 2021-01-01 PROCEDURE — 84300 ASSAY OF URINE SODIUM: CPT | Performed by: FAMILY MEDICINE

## 2021-01-01 PROCEDURE — 93880 EXTRACRANIAL BILAT STUDY: CPT | Performed by: SURGERY

## 2021-01-01 PROCEDURE — C1769 GUIDE WIRE: HCPCS | Performed by: ORTHOPAEDIC SURGERY

## 2021-01-01 PROCEDURE — 71045 X-RAY EXAM CHEST 1 VIEW: CPT

## 2021-01-01 PROCEDURE — G9197 ORDER FOR CEPH: HCPCS | Performed by: ORTHOPAEDIC SURGERY

## 2021-01-01 PROCEDURE — 83735 ASSAY OF MAGNESIUM: CPT | Performed by: PHYSICIAN ASSISTANT

## 2021-01-01 PROCEDURE — 93970 EXTREMITY STUDY: CPT

## 2021-01-01 PROCEDURE — 93306 TTE W/DOPPLER COMPLETE: CPT

## 2021-01-01 PROCEDURE — 82533 TOTAL CORTISOL: CPT | Performed by: PHYSICIAN ASSISTANT

## 2021-01-01 PROCEDURE — 86920 COMPATIBILITY TEST SPIN: CPT

## 2021-01-01 PROCEDURE — 86850 RBC ANTIBODY SCREEN: CPT | Performed by: FAMILY MEDICINE

## 2021-01-01 PROCEDURE — 84443 ASSAY THYROID STIM HORMONE: CPT | Performed by: EMERGENCY MEDICINE

## 2021-01-01 PROCEDURE — 93880 EXTRACRANIAL BILAT STUDY: CPT

## 2021-01-01 PROCEDURE — 0QH746Z INSERTION OF INTRAMEDULLARY INTERNAL FIXATION DEVICE INTO LEFT UPPER FEMUR, PERCUTANEOUS ENDOSCOPIC APPROACH: ICD-10-PCS | Performed by: ORTHOPAEDIC SURGERY

## 2021-01-01 PROCEDURE — 83690 ASSAY OF LIPASE: CPT | Performed by: EMERGENCY MEDICINE

## 2021-01-01 PROCEDURE — 85025 COMPLETE CBC W/AUTO DIFF WBC: CPT | Performed by: EMERGENCY MEDICINE

## 2021-01-01 PROCEDURE — 85610 PROTHROMBIN TIME: CPT | Performed by: EMERGENCY MEDICINE

## 2021-01-01 PROCEDURE — 81001 URINALYSIS AUTO W/SCOPE: CPT | Performed by: EMERGENCY MEDICINE

## 2021-01-01 PROCEDURE — 82533 TOTAL CORTISOL: CPT | Performed by: FAMILY MEDICINE

## 2021-01-01 PROCEDURE — 87086 URINE CULTURE/COLONY COUNT: CPT | Performed by: EMERGENCY MEDICINE

## 2021-01-01 PROCEDURE — 83930 ASSAY OF BLOOD OSMOLALITY: CPT | Performed by: FAMILY MEDICINE

## 2021-01-01 PROCEDURE — 82140 ASSAY OF AMMONIA: CPT | Performed by: EMERGENCY MEDICINE

## 2021-01-01 PROCEDURE — 99024 POSTOP FOLLOW-UP VISIT: CPT | Performed by: PHYSICIAN ASSISTANT

## 2021-01-01 PROCEDURE — 95813 EEG EXTND MNTR 61-119 MIN: CPT | Performed by: PSYCHIATRY & NEUROLOGY

## 2021-01-01 PROCEDURE — 84443 ASSAY THYROID STIM HORMONE: CPT | Performed by: INTERNAL MEDICINE

## 2021-01-01 PROCEDURE — 84550 ASSAY OF BLOOD/URIC ACID: CPT | Performed by: PHYSICIAN ASSISTANT

## 2021-01-01 PROCEDURE — 82077 ASSAY SPEC XCP UR&BREATH IA: CPT | Performed by: EMERGENCY MEDICINE

## 2021-01-01 PROCEDURE — 97116 GAIT TRAINING THERAPY: CPT

## 2021-01-01 PROCEDURE — 99213 OFFICE O/P EST LOW 20 MIN: CPT | Performed by: INTERNAL MEDICINE

## 2021-01-01 PROCEDURE — 93970 EXTREMITY STUDY: CPT | Performed by: SURGERY

## 2021-01-01 PROCEDURE — 27245 TREAT THIGH FRACTURE: CPT | Performed by: ORTHOPAEDIC SURGERY

## 2021-01-01 PROCEDURE — 84484 ASSAY OF TROPONIN QUANT: CPT | Performed by: NURSE PRACTITIONER

## 2021-01-01 PROCEDURE — 99223 1ST HOSP IP/OBS HIGH 75: CPT | Performed by: ORTHOPAEDIC SURGERY

## 2021-01-01 PROCEDURE — 83735 ASSAY OF MAGNESIUM: CPT | Performed by: FAMILY MEDICINE

## 2021-01-01 PROCEDURE — 85025 COMPLETE CBC W/AUTO DIFF WBC: CPT | Performed by: FAMILY MEDICINE

## 2021-01-01 DEVICE — 5.0MM TI LOCKING SCREW W/T25 STARDRIVE 42MM F/IM NAIL-STER: Type: IMPLANTABLE DEVICE | Site: FEMUR | Status: FUNCTIONAL

## 2021-01-01 DEVICE — 11MM/130 DEG TI CANN TFNA 380MM/RIGHT - STERILE
Type: IMPLANTABLE DEVICE | Site: FEMUR | Status: FUNCTIONAL
Brand: TFN-ADVANCE

## 2021-01-01 DEVICE — TFNA FENESTRATED HELICAL BLADE 110MM - STERILE
Type: IMPLANTABLE DEVICE | Site: FEMUR | Status: FUNCTIONAL
Brand: TFN-ADVANCE

## 2021-01-01 DEVICE — 5.0MM TI LOCKING SCREW W/T25 STARDRIVE 44MM F/IM NAIL-STER: Type: IMPLANTABLE DEVICE | Site: FEMUR | Status: FUNCTIONAL

## 2021-01-01 RX ORDER — GABAPENTIN 100 MG/1
100 CAPSULE ORAL EVERY 8 HOURS
COMMUNITY

## 2021-01-01 RX ORDER — LOPERAMIDE HYDROCHLORIDE 2 MG/1
2 CAPSULE ORAL EVERY 2 HOUR PRN
COMMUNITY

## 2021-01-01 RX ORDER — FERROUS SULFATE 325(65) MG
325 TABLET ORAL
COMMUNITY

## 2021-01-01 RX ORDER — METOPROLOL SUCCINATE 25 MG/1
25 TABLET, EXTENDED RELEASE ORAL DAILY
Status: DISCONTINUED | OUTPATIENT
Start: 2021-01-01 | End: 2021-01-01

## 2021-01-01 RX ORDER — FENOFIBRATE 48 MG/1
168 TABLET, COATED ORAL EVERY EVENING
Status: DISCONTINUED | OUTPATIENT
Start: 2021-01-01 | End: 2021-01-01 | Stop reason: HOSPADM

## 2021-01-01 RX ORDER — MELOXICAM 15 MG/1
TABLET ORAL
COMMUNITY
Start: 2021-01-01 | End: 2021-01-01 | Stop reason: HOSPADM

## 2021-01-01 RX ORDER — HEPARIN SODIUM 5000 [USP'U]/ML
5000 INJECTION, SOLUTION INTRAVENOUS; SUBCUTANEOUS EVERY 8 HOURS SCHEDULED
Status: DISCONTINUED | OUTPATIENT
Start: 2021-01-01 | End: 2021-01-01 | Stop reason: HOSPADM

## 2021-01-01 RX ORDER — SODIUM CHLORIDE 9 MG/ML
INJECTION, SOLUTION INTRAVENOUS CONTINUOUS PRN
Status: DISCONTINUED | OUTPATIENT
Start: 2021-01-01 | End: 2021-01-01

## 2021-01-01 RX ORDER — SODIUM CHLORIDE 9 MG/ML
75 INJECTION, SOLUTION INTRAVENOUS CONTINUOUS
Status: DISCONTINUED | OUTPATIENT
Start: 2021-01-01 | End: 2021-01-01

## 2021-01-01 RX ORDER — PROPOFOL 10 MG/ML
INJECTION, EMULSION INTRAVENOUS CONTINUOUS PRN
Status: DISCONTINUED | OUTPATIENT
Start: 2021-01-01 | End: 2021-01-01

## 2021-01-01 RX ORDER — POLYETHYLENE GLYCOL 3350 17 G/17G
17 POWDER, FOR SOLUTION ORAL DAILY
Status: DISCONTINUED | OUTPATIENT
Start: 2021-01-01 | End: 2021-01-01 | Stop reason: HOSPADM

## 2021-01-01 RX ORDER — DONEPEZIL HYDROCHLORIDE 5 MG/1
5 TABLET, FILM COATED ORAL
Qty: 30 TABLET | Refills: 0 | Status: SHIPPED | OUTPATIENT
Start: 2021-01-01

## 2021-01-01 RX ORDER — ALBUMIN, HUMAN INJ 5% 5 %
SOLUTION INTRAVENOUS CONTINUOUS PRN
Status: DISCONTINUED | OUTPATIENT
Start: 2021-01-01 | End: 2021-01-01

## 2021-01-01 RX ORDER — METOPROLOL SUCCINATE 25 MG/1
25 TABLET, EXTENDED RELEASE ORAL DAILY
Status: DISCONTINUED | OUTPATIENT
Start: 2021-01-01 | End: 2021-01-01 | Stop reason: HOSPADM

## 2021-01-01 RX ORDER — BUPIVACAINE HYDROCHLORIDE 5 MG/ML
6 INJECTION, SOLUTION EPIDURAL; INTRACAUDAL
Status: COMPLETED | OUTPATIENT
Start: 2021-01-01 | End: 2021-01-01

## 2021-01-01 RX ORDER — LIDOCAINE HYDROCHLORIDE 10 MG/ML
INJECTION, SOLUTION EPIDURAL; INFILTRATION; INTRACAUDAL; PERINEURAL AS NEEDED
Status: DISCONTINUED | OUTPATIENT
Start: 2021-01-01 | End: 2021-01-01

## 2021-01-01 RX ORDER — FENTANYL CITRATE/PF 50 MCG/ML
50 SYRINGE (ML) INJECTION
Status: DISCONTINUED | OUTPATIENT
Start: 2021-01-01 | End: 2021-01-01 | Stop reason: HOSPADM

## 2021-01-01 RX ORDER — SODIUM CHLORIDE 1000 MG
1 TABLET, SOLUBLE MISCELLANEOUS
Status: DISCONTINUED | OUTPATIENT
Start: 2021-01-01 | End: 2021-01-01 | Stop reason: HOSPADM

## 2021-01-01 RX ORDER — POLYETHYLENE GLYCOL 3350 17 G/17G
17 POWDER, FOR SOLUTION ORAL DAILY
Refills: 0
Start: 2021-01-01

## 2021-01-01 RX ORDER — LACTULOSE 10 G/10G
10 SOLUTION ORAL 3 TIMES DAILY
COMMUNITY

## 2021-01-01 RX ORDER — OXYCODONE HYDROCHLORIDE 5 MG/1
2.5 TABLET ORAL EVERY 4 HOURS PRN
Refills: 0
Start: 2021-01-01

## 2021-01-01 RX ORDER — METOPROLOL SUCCINATE 25 MG/1
25 TABLET, EXTENDED RELEASE ORAL ONCE
Status: COMPLETED | OUTPATIENT
Start: 2021-01-01 | End: 2021-01-01

## 2021-01-01 RX ORDER — PROPOFOL 10 MG/ML
INJECTION, EMULSION INTRAVENOUS AS NEEDED
Status: DISCONTINUED | OUTPATIENT
Start: 2021-01-01 | End: 2021-01-01

## 2021-01-01 RX ORDER — DEXAMETHASONE SODIUM PHOSPHATE 10 MG/ML
INJECTION, SOLUTION INTRAMUSCULAR; INTRAVENOUS AS NEEDED
Status: DISCONTINUED | OUTPATIENT
Start: 2021-01-01 | End: 2021-01-01

## 2021-01-01 RX ORDER — ONDANSETRON 4 MG/1
4 TABLET, FILM COATED ORAL EVERY 8 HOURS PRN
COMMUNITY

## 2021-01-01 RX ORDER — ATORVASTATIN CALCIUM 40 MG/1
40 TABLET, FILM COATED ORAL EVERY EVENING
Status: DISCONTINUED | OUTPATIENT
Start: 2021-01-01 | End: 2021-01-01 | Stop reason: HOSPADM

## 2021-01-01 RX ORDER — PANTOPRAZOLE SODIUM 40 MG/1
40 TABLET, DELAYED RELEASE ORAL DAILY
COMMUNITY

## 2021-01-01 RX ORDER — TORSEMIDE 10 MG/1
10 TABLET ORAL DAILY
COMMUNITY

## 2021-01-01 RX ORDER — ROCURONIUM BROMIDE 10 MG/ML
INJECTION, SOLUTION INTRAVENOUS AS NEEDED
Status: DISCONTINUED | OUTPATIENT
Start: 2021-01-01 | End: 2021-01-01

## 2021-01-01 RX ORDER — ACETAMINOPHEN 325 MG/1
650 TABLET ORAL EVERY 6 HOURS PRN
Status: DISCONTINUED | OUTPATIENT
Start: 2021-01-01 | End: 2021-01-01 | Stop reason: HOSPADM

## 2021-01-01 RX ORDER — COSYNTROPIN 0.25 MG/ML
0.25 INJECTION, POWDER, FOR SOLUTION INTRAMUSCULAR; INTRAVENOUS ONCE
Status: COMPLETED | OUTPATIENT
Start: 2021-01-01 | End: 2021-01-01

## 2021-01-01 RX ORDER — OTC SKIN PROTECTANT DRUG PRODUCTS 0.04 G/G
OINTMENT TOPICAL
COMMUNITY

## 2021-01-01 RX ORDER — HYDROMORPHONE HCL/PF 1 MG/ML
1 SYRINGE (ML) INJECTION ONCE
Status: COMPLETED | OUTPATIENT
Start: 2021-01-01 | End: 2021-01-01

## 2021-01-01 RX ORDER — CEFTRIAXONE 1 G/50ML
1000 INJECTION, SOLUTION INTRAVENOUS EVERY 24 HOURS
Status: DISCONTINUED | OUTPATIENT
Start: 2021-01-01 | End: 2021-01-01 | Stop reason: HOSPADM

## 2021-01-01 RX ORDER — SODIUM CHLORIDE 9 MG/ML
50 INJECTION, SOLUTION INTRAVENOUS CONTINUOUS
Status: DISCONTINUED | OUTPATIENT
Start: 2021-01-01 | End: 2021-01-01

## 2021-01-01 RX ORDER — CEPHALEXIN 500 MG/1
500 CAPSULE ORAL EVERY 12 HOURS SCHEDULED
Qty: 6 CAPSULE | Refills: 0
Start: 2021-01-01 | End: 2021-01-01

## 2021-01-01 RX ORDER — ASPIRIN 81 MG/1
81 TABLET ORAL
Status: DISCONTINUED | OUTPATIENT
Start: 2021-01-01 | End: 2021-01-01 | Stop reason: HOSPADM

## 2021-01-01 RX ORDER — DONEPEZIL HYDROCHLORIDE 10 MG/1
10 TABLET, FILM COATED ORAL
Qty: 30 TABLET | Refills: 3 | Status: SHIPPED | OUTPATIENT
Start: 2021-01-01

## 2021-01-01 RX ORDER — ONDANSETRON 2 MG/ML
4 INJECTION INTRAMUSCULAR; INTRAVENOUS EVERY 6 HOURS PRN
Status: DISCONTINUED | OUTPATIENT
Start: 2021-01-01 | End: 2021-01-01 | Stop reason: HOSPADM

## 2021-01-01 RX ORDER — CASTOR OIL AND BALSAM, PERU 788; 87 MG/G; MG/G
OINTMENT TOPICAL
COMMUNITY

## 2021-01-01 RX ORDER — BISACODYL 10 MG
10 SUPPOSITORY, RECTAL RECTAL DAILY
COMMUNITY

## 2021-01-01 RX ORDER — MULTIVITAMIN/IRON/FOLIC ACID 18MG-0.4MG
TABLET ORAL
COMMUNITY

## 2021-01-01 RX ORDER — LORAZEPAM 2 MG/ML
INJECTION INTRAMUSCULAR
Status: DISPENSED
Start: 2021-01-01 | End: 2021-01-01

## 2021-01-01 RX ORDER — DOCUSATE SODIUM 100 MG/1
100 CAPSULE, LIQUID FILLED ORAL 2 TIMES DAILY
Status: DISCONTINUED | OUTPATIENT
Start: 2021-01-01 | End: 2021-01-01 | Stop reason: HOSPADM

## 2021-01-01 RX ORDER — OXYCODONE HYDROCHLORIDE 5 MG/1
5 TABLET ORAL EVERY 4 HOURS PRN
Status: DISCONTINUED | OUTPATIENT
Start: 2021-01-01 | End: 2021-01-01 | Stop reason: HOSPADM

## 2021-01-01 RX ORDER — LORAZEPAM 2 MG/ML
1 INJECTION INTRAMUSCULAR EVERY 6 HOURS PRN
Status: DISCONTINUED | OUTPATIENT
Start: 2021-01-01 | End: 2021-01-01 | Stop reason: HOSPADM

## 2021-01-01 RX ORDER — LACOSAMIDE 100 MG/1
100 TABLET ORAL EVERY 12 HOURS SCHEDULED
Qty: 60 TABLET | Refills: 2 | Status: SHIPPED | OUTPATIENT
Start: 2021-01-01 | End: 2021-01-01

## 2021-01-01 RX ORDER — CARBOXYMETHYLCELLULOSE SODIUM 5 MG/ML
1 SOLUTION/ DROPS OPHTHALMIC 3 TIMES DAILY PRN
COMMUNITY

## 2021-01-01 RX ORDER — CEFTRIAXONE 1 G/50ML
1000 INJECTION, SOLUTION INTRAVENOUS EVERY 24 HOURS
Status: DISCONTINUED | OUTPATIENT
Start: 2021-01-01 | End: 2021-01-01

## 2021-01-01 RX ORDER — TRIAMCINOLONE ACETONIDE 40 MG/ML
80 INJECTION, SUSPENSION INTRA-ARTICULAR; INTRAMUSCULAR
Status: COMPLETED | OUTPATIENT
Start: 2021-01-01 | End: 2021-01-01

## 2021-01-01 RX ORDER — FENOFIBRATE 48 MG/1
160 TABLET, COATED ORAL EVERY EVENING
Status: DISCONTINUED | OUTPATIENT
Start: 2021-01-01 | End: 2021-01-01

## 2021-01-01 RX ORDER — POTASSIUM CHLORIDE 600 MG/1
8 CAPSULE, EXTENDED RELEASE ORAL 2 TIMES DAILY
COMMUNITY

## 2021-01-01 RX ORDER — CEFTRIAXONE 1 G/50ML
1000 INJECTION, SOLUTION INTRAVENOUS ONCE
Status: COMPLETED | OUTPATIENT
Start: 2021-01-01 | End: 2021-01-01

## 2021-01-01 RX ORDER — FENTANYL CITRATE 50 UG/ML
INJECTION, SOLUTION INTRAMUSCULAR; INTRAVENOUS AS NEEDED
Status: DISCONTINUED | OUTPATIENT
Start: 2021-01-01 | End: 2021-01-01

## 2021-01-01 RX ORDER — SODIUM CHLORIDE 1000 MG
1 TABLET, SOLUBLE MISCELLANEOUS 2 TIMES DAILY WITH MEALS
Status: DISCONTINUED | OUTPATIENT
Start: 2021-01-01 | End: 2021-01-01

## 2021-01-01 RX ORDER — LACOSAMIDE 100 MG/1
100 TABLET ORAL EVERY 12 HOURS SCHEDULED
Qty: 20 TABLET | Refills: 0 | Status: SHIPPED | OUTPATIENT
Start: 2021-01-01 | End: 2021-01-01 | Stop reason: SDUPTHER

## 2021-01-01 RX ORDER — LOSARTAN POTASSIUM 50 MG/1
50 TABLET ORAL DAILY
COMMUNITY

## 2021-01-01 RX ORDER — AMLODIPINE BESYLATE 5 MG/1
5 TABLET ORAL EVERY MORNING
Status: DISCONTINUED | OUTPATIENT
Start: 2021-01-01 | End: 2021-01-01

## 2021-01-01 RX ORDER — DOCUSATE SODIUM 100 MG/1
100 CAPSULE, LIQUID FILLED ORAL 2 TIMES DAILY
Qty: 10 CAPSULE | Refills: 0 | Status: SHIPPED | OUTPATIENT
Start: 2021-01-01

## 2021-01-01 RX ORDER — CONJUGATED ESTROGENS 0.62 MG/G
CREAM VAGINAL
COMMUNITY
Start: 2021-01-01

## 2021-01-01 RX ORDER — LOSARTAN POTASSIUM 50 MG/1
100 TABLET ORAL DAILY
Status: DISCONTINUED | OUTPATIENT
Start: 2021-01-01 | End: 2021-01-01 | Stop reason: HOSPADM

## 2021-01-01 RX ORDER — HYDROMORPHONE HCL 110MG/55ML
PATIENT CONTROLLED ANALGESIA SYRINGE INTRAVENOUS AS NEEDED
Status: DISCONTINUED | OUTPATIENT
Start: 2021-01-01 | End: 2021-01-01

## 2021-01-01 RX ORDER — BUPIVACAINE HYDROCHLORIDE AND EPINEPHRINE 5; 5 MG/ML; UG/ML
INJECTION, SOLUTION PERINEURAL AS NEEDED
Status: DISCONTINUED | OUTPATIENT
Start: 2021-01-01 | End: 2021-01-01 | Stop reason: HOSPADM

## 2021-01-01 RX ORDER — MELATONIN
2000 DAILY
COMMUNITY

## 2021-01-01 RX ORDER — POTASSIUM CHLORIDE 750 MG/1
TABLET, EXTENDED RELEASE ORAL
COMMUNITY
Start: 2021-01-01

## 2021-01-01 RX ORDER — OXYCODONE HYDROCHLORIDE 5 MG/1
5 TABLET ORAL EVERY 4 HOURS PRN
Refills: 0
Start: 2021-01-01

## 2021-01-01 RX ORDER — OXYCODONE HYDROCHLORIDE 5 MG/1
2.5 TABLET ORAL EVERY 4 HOURS PRN
Status: DISCONTINUED | OUTPATIENT
Start: 2021-01-01 | End: 2021-01-01 | Stop reason: HOSPADM

## 2021-01-01 RX ORDER — CEFAZOLIN SODIUM 2 G/50ML
2000 SOLUTION INTRAVENOUS ONCE
Status: COMPLETED | OUTPATIENT
Start: 2021-01-01 | End: 2021-01-01

## 2021-01-01 RX ORDER — SODIUM CHLORIDE, SODIUM LACTATE, POTASSIUM CHLORIDE, CALCIUM CHLORIDE 600; 310; 30; 20 MG/100ML; MG/100ML; MG/100ML; MG/100ML
75 INJECTION, SOLUTION INTRAVENOUS CONTINUOUS
Status: DISCONTINUED | OUTPATIENT
Start: 2021-01-01 | End: 2021-01-01

## 2021-01-01 RX ORDER — ASPIRIN 325 MG
325 TABLET ORAL ONCE
Status: COMPLETED | OUTPATIENT
Start: 2021-01-01 | End: 2021-01-01

## 2021-01-01 RX ORDER — LEVOTHYROXINE SODIUM 0.05 MG/1
50 TABLET ORAL
Status: DISCONTINUED | OUTPATIENT
Start: 2021-01-01 | End: 2021-01-01 | Stop reason: HOSPADM

## 2021-01-01 RX ORDER — AMLODIPINE BESYLATE 5 MG/1
5 TABLET ORAL DAILY
Status: DISCONTINUED | OUTPATIENT
Start: 2021-01-01 | End: 2021-01-01 | Stop reason: HOSPADM

## 2021-01-01 RX ORDER — OLMESARTAN MEDOXOMIL 40 MG/1
40 TABLET ORAL DAILY
Refills: 0
Start: 2021-01-01

## 2021-01-01 RX ORDER — LACOSAMIDE 50 MG/1
100 TABLET ORAL EVERY 12 HOURS SCHEDULED
Status: DISCONTINUED | OUTPATIENT
Start: 2021-01-01 | End: 2021-01-01 | Stop reason: HOSPADM

## 2021-01-01 RX ORDER — METOPROLOL SUCCINATE 50 MG/1
50 TABLET, EXTENDED RELEASE ORAL DAILY
Status: DISCONTINUED | OUTPATIENT
Start: 2021-01-01 | End: 2021-01-01 | Stop reason: HOSPADM

## 2021-01-01 RX ORDER — ACETAMINOPHEN 325 MG/1
650 TABLET ORAL ONCE
Status: COMPLETED | OUTPATIENT
Start: 2021-01-01 | End: 2021-01-01

## 2021-01-01 RX ORDER — HYDROCHLOROTHIAZIDE 25 MG/1
25 TABLET ORAL DAILY
Status: DISCONTINUED | OUTPATIENT
Start: 2021-01-01 | End: 2021-01-01 | Stop reason: HOSPADM

## 2021-01-01 RX ORDER — MORPHINE SULFATE 4 MG/ML
4 INJECTION, SOLUTION INTRAMUSCULAR; INTRAVENOUS ONCE
Status: COMPLETED | OUTPATIENT
Start: 2021-01-01 | End: 2021-01-01

## 2021-01-01 RX ORDER — LOSARTAN POTASSIUM 50 MG/1
100 TABLET ORAL DAILY
Status: DISCONTINUED | OUTPATIENT
Start: 2021-01-01 | End: 2021-01-01

## 2021-01-01 RX ORDER — SODIUM CHLORIDE 1000 MG
1 TABLET, SOLUBLE MISCELLANEOUS
Refills: 0
Start: 2021-01-01

## 2021-01-01 RX ORDER — MORPHINE SULFATE 4 MG/ML
4 INJECTION, SOLUTION INTRAMUSCULAR; INTRAVENOUS EVERY 6 HOURS PRN
Status: DISCONTINUED | OUTPATIENT
Start: 2021-01-01 | End: 2021-01-01

## 2021-01-01 RX ORDER — ONDANSETRON 2 MG/ML
INJECTION INTRAMUSCULAR; INTRAVENOUS AS NEEDED
Status: DISCONTINUED | OUTPATIENT
Start: 2021-01-01 | End: 2021-01-01

## 2021-01-01 RX ORDER — ACETAMINOPHEN 325 MG/1
650 TABLET ORAL EVERY 6 HOURS PRN
Refills: 0
Start: 2021-01-01

## 2021-01-01 RX ORDER — ONDANSETRON 2 MG/ML
4 INJECTION INTRAMUSCULAR; INTRAVENOUS ONCE AS NEEDED
Status: DISCONTINUED | OUTPATIENT
Start: 2021-01-01 | End: 2021-01-01 | Stop reason: HOSPADM

## 2021-01-01 RX ORDER — ASCORBIC ACID 500 MG
500 TABLET ORAL DAILY
COMMUNITY

## 2021-01-01 RX ORDER — MAGNESIUM HYDROXIDE 1200 MG/15ML
LIQUID ORAL AS NEEDED
Status: DISCONTINUED | OUTPATIENT
Start: 2021-01-01 | End: 2021-01-01 | Stop reason: HOSPADM

## 2021-01-01 RX ADMIN — HEPARIN SODIUM 5000 UNITS: 5000 INJECTION INTRAVENOUS; SUBCUTANEOUS at 21:01

## 2021-01-01 RX ADMIN — HEPARIN SODIUM 5000 UNITS: 5000 INJECTION INTRAVENOUS; SUBCUTANEOUS at 05:21

## 2021-01-01 RX ADMIN — POLYETHYLENE GLYCOL 3350 17 G: 17 POWDER, FOR SOLUTION ORAL at 08:20

## 2021-01-01 RX ADMIN — SUGAMMADEX 160 MG: 100 INJECTION, SOLUTION INTRAVENOUS at 16:09

## 2021-01-01 RX ADMIN — SODIUM CHLORIDE 50 ML/HR: 0.9 INJECTION, SOLUTION INTRAVENOUS at 21:50

## 2021-01-01 RX ADMIN — PNEUMOCOCCAL 13-VALENT CONJUGATE VACCINE 0.5 ML: 2.2; 2.2; 2.2; 2.2; 2.2; 4.4; 2.2; 2.2; 2.2; 2.2; 2.2; 2.2; 2.2 INJECTION, SUSPENSION INTRAMUSCULAR at 15:30

## 2021-01-01 RX ADMIN — Medication 1 G: at 17:04

## 2021-01-01 RX ADMIN — Medication 1 G: at 09:16

## 2021-01-01 RX ADMIN — PHENYLEPHRINE HYDROCHLORIDE 100 MCG: 10 INJECTION INTRAVENOUS at 15:43

## 2021-01-01 RX ADMIN — FENOFIBRATE 168 MG: 48 TABLET, FILM COATED ORAL at 17:47

## 2021-01-01 RX ADMIN — HEPARIN SODIUM 5000 UNITS: 5000 INJECTION INTRAVENOUS; SUBCUTANEOUS at 14:45

## 2021-01-01 RX ADMIN — METFORMIN HYDROCHLORIDE 500 MG: 500 TABLET ORAL at 08:42

## 2021-01-01 RX ADMIN — ROCURONIUM BROMIDE 50 MG: 10 INJECTION, SOLUTION INTRAVENOUS at 14:10

## 2021-01-01 RX ADMIN — HEPARIN SODIUM 5000 UNITS: 5000 INJECTION INTRAVENOUS; SUBCUTANEOUS at 21:57

## 2021-01-01 RX ADMIN — INSULIN LISPRO 1 UNITS: 100 INJECTION, SOLUTION INTRAVENOUS; SUBCUTANEOUS at 17:06

## 2021-01-01 RX ADMIN — METOPROLOL SUCCINATE 25 MG: 25 TABLET, EXTENDED RELEASE ORAL at 08:52

## 2021-01-01 RX ADMIN — LACOSAMIDE 100 MG: 50 TABLET, FILM COATED ORAL at 09:45

## 2021-01-01 RX ADMIN — METFORMIN HYDROCHLORIDE 500 MG: 500 TABLET ORAL at 09:45

## 2021-01-01 RX ADMIN — HEPARIN SODIUM 5000 UNITS: 5000 INJECTION INTRAVENOUS; SUBCUTANEOUS at 05:37

## 2021-01-01 RX ADMIN — INSULIN LISPRO 1 UNITS: 100 INJECTION, SOLUTION INTRAVENOUS; SUBCUTANEOUS at 23:00

## 2021-01-01 RX ADMIN — ATORVASTATIN CALCIUM 40 MG: 40 TABLET, FILM COATED ORAL at 18:05

## 2021-01-01 RX ADMIN — Medication 1 TABLET: at 09:15

## 2021-01-01 RX ADMIN — LEVOTHYROXINE SODIUM 50 MCG: 50 TABLET ORAL at 06:27

## 2021-01-01 RX ADMIN — SODIUM CHLORIDE: 0.9 INJECTION, SOLUTION INTRAVENOUS at 14:04

## 2021-01-01 RX ADMIN — FENOFIBRATE 168 MG: 48 TABLET, FILM COATED ORAL at 17:05

## 2021-01-01 RX ADMIN — HYDROMORPHONE HYDROCHLORIDE 1 MG: 1 INJECTION, SOLUTION INTRAMUSCULAR; INTRAVENOUS; SUBCUTANEOUS at 12:34

## 2021-01-01 RX ADMIN — DEXAMETHASONE SODIUM PHOSPHATE 4 MG: 10 INJECTION, SOLUTION INTRAMUSCULAR; INTRAVENOUS at 14:43

## 2021-01-01 RX ADMIN — OXYCODONE HYDROCHLORIDE 5 MG: 5 TABLET ORAL at 18:53

## 2021-01-01 RX ADMIN — HEPARIN SODIUM 5000 UNITS: 5000 INJECTION INTRAVENOUS; SUBCUTANEOUS at 14:35

## 2021-01-01 RX ADMIN — OXYCODONE HYDROCHLORIDE 5 MG: 5 TABLET ORAL at 10:31

## 2021-01-01 RX ADMIN — MORPHINE SULFATE 4 MG: 4 INJECTION INTRAVENOUS at 19:43

## 2021-01-01 RX ADMIN — INSULIN LISPRO 1 UNITS: 100 INJECTION, SOLUTION INTRAVENOUS; SUBCUTANEOUS at 12:28

## 2021-01-01 RX ADMIN — ASPIRIN 81 MG: 81 TABLET, COATED ORAL at 08:41

## 2021-01-01 RX ADMIN — HEPARIN SODIUM 5000 UNITS: 5000 INJECTION INTRAVENOUS; SUBCUTANEOUS at 05:45

## 2021-01-01 RX ADMIN — METOPROLOL SUCCINATE 25 MG: 25 TABLET, EXTENDED RELEASE ORAL at 10:25

## 2021-01-01 RX ADMIN — LACOSAMIDE 100 MG: 50 TABLET, FILM COATED ORAL at 21:17

## 2021-01-01 RX ADMIN — INSULIN LISPRO 1 UNITS: 100 INJECTION, SOLUTION INTRAVENOUS; SUBCUTANEOUS at 12:19

## 2021-01-01 RX ADMIN — INSULIN LISPRO 1 UNITS: 100 INJECTION, SOLUTION INTRAVENOUS; SUBCUTANEOUS at 18:49

## 2021-01-01 RX ADMIN — METFORMIN HYDROCHLORIDE 500 MG: 500 TABLET ORAL at 18:05

## 2021-01-01 RX ADMIN — INSULIN LISPRO 1 UNITS: 100 INJECTION, SOLUTION INTRAVENOUS; SUBCUTANEOUS at 08:53

## 2021-01-01 RX ADMIN — LACOSAMIDE 100 MG: 50 TABLET, FILM COATED ORAL at 21:50

## 2021-01-01 RX ADMIN — HEPARIN SODIUM 5000 UNITS: 5000 INJECTION INTRAVENOUS; SUBCUTANEOUS at 21:16

## 2021-01-01 RX ADMIN — ACETAMINOPHEN 650 MG: 325 TABLET, FILM COATED ORAL at 17:45

## 2021-01-01 RX ADMIN — METOPROLOL SUCCINATE 25 MG: 25 TABLET, EXTENDED RELEASE ORAL at 09:45

## 2021-01-01 RX ADMIN — LEVOTHYROXINE SODIUM 50 MCG: 50 TABLET ORAL at 06:03

## 2021-01-01 RX ADMIN — HEPARIN SODIUM 5000 UNITS: 5000 INJECTION INTRAVENOUS; SUBCUTANEOUS at 13:06

## 2021-01-01 RX ADMIN — COSYNTROPIN 0.25 MG: 0.25 INJECTION, POWDER, LYOPHILIZED, FOR SOLUTION INTRAMUSCULAR; INTRAVENOUS at 11:46

## 2021-01-01 RX ADMIN — MORPHINE SULFATE 2 MG: 2 INJECTION, SOLUTION INTRAMUSCULAR; INTRAVENOUS at 22:11

## 2021-01-01 RX ADMIN — AMLODIPINE BESYLATE 5 MG: 5 TABLET ORAL at 09:28

## 2021-01-01 RX ADMIN — CEFTRIAXONE 1000 MG: 1 INJECTION, SOLUTION INTRAVENOUS at 20:20

## 2021-01-01 RX ADMIN — LOSARTAN POTASSIUM 100 MG: 50 TABLET, FILM COATED ORAL at 08:38

## 2021-01-01 RX ADMIN — MORPHINE SULFATE 4 MG: 4 INJECTION INTRAVENOUS at 08:38

## 2021-01-01 RX ADMIN — MORPHINE SULFATE 4 MG: 4 INJECTION INTRAVENOUS at 17:45

## 2021-01-01 RX ADMIN — ASPIRIN 81 MG: 81 TABLET, COATED ORAL at 09:45

## 2021-01-01 RX ADMIN — SODIUM CHLORIDE: 0.9 INJECTION, SOLUTION INTRAVENOUS at 14:25

## 2021-01-01 RX ADMIN — INSULIN LISPRO 2 UNITS: 100 INJECTION, SOLUTION INTRAVENOUS; SUBCUTANEOUS at 11:55

## 2021-01-01 RX ADMIN — HEPARIN SODIUM 5000 UNITS: 5000 INJECTION INTRAVENOUS; SUBCUTANEOUS at 22:20

## 2021-01-01 RX ADMIN — BUPIVACAINE HYDROCHLORIDE 6 ML: 5 INJECTION, SOLUTION EPIDURAL; INTRACAUDAL at 11:26

## 2021-01-01 RX ADMIN — LEVOTHYROXINE SODIUM 50 MCG: 50 TABLET ORAL at 06:32

## 2021-01-01 RX ADMIN — INSULIN LISPRO 1 UNITS: 100 INJECTION, SOLUTION INTRAVENOUS; SUBCUTANEOUS at 17:44

## 2021-01-01 RX ADMIN — METOPROLOL SUCCINATE 25 MG: 25 TABLET, EXTENDED RELEASE ORAL at 09:28

## 2021-01-01 RX ADMIN — PHENYLEPHRINE HYDROCHLORIDE 200 MCG: 10 INJECTION INTRAVENOUS at 14:13

## 2021-01-01 RX ADMIN — Medication 1 TABLET: at 09:45

## 2021-01-01 RX ADMIN — CEFTRIAXONE 1000 MG: 1 INJECTION, SOLUTION INTRAVENOUS at 23:42

## 2021-01-01 RX ADMIN — ASPIRIN 81 MG: 81 TABLET, COATED ORAL at 08:51

## 2021-01-01 RX ADMIN — LACOSAMIDE 100 MG: 50 TABLET, FILM COATED ORAL at 09:03

## 2021-01-01 RX ADMIN — AMLODIPINE BESYLATE 5 MG: 5 TABLET ORAL at 09:45

## 2021-01-01 RX ADMIN — TRIAMCINOLONE ACETONIDE 80 MG: 40 INJECTION, SUSPENSION INTRA-ARTICULAR; INTRAMUSCULAR at 11:26

## 2021-01-01 RX ADMIN — Medication 1 TABLET: at 08:20

## 2021-01-01 RX ADMIN — SODIUM CHLORIDE, SODIUM LACTATE, POTASSIUM CHLORIDE, AND CALCIUM CHLORIDE 75 ML/HR: .6; .31; .03; .02 INJECTION, SOLUTION INTRAVENOUS at 05:17

## 2021-01-01 RX ADMIN — METOPROLOL SUCCINATE 25 MG: 25 TABLET, EXTENDED RELEASE ORAL at 09:16

## 2021-01-01 RX ADMIN — METOPROLOL SUCCINATE 25 MG: 25 TABLET, EXTENDED RELEASE ORAL at 08:14

## 2021-01-01 RX ADMIN — HEPARIN SODIUM 5000 UNITS: 5000 INJECTION INTRAVENOUS; SUBCUTANEOUS at 06:32

## 2021-01-01 RX ADMIN — LEVOTHYROXINE SODIUM 50 MCG: 50 TABLET ORAL at 10:26

## 2021-01-01 RX ADMIN — HYDROMORPHONE HYDROCHLORIDE 0.5 MG: 2 INJECTION, SOLUTION INTRAMUSCULAR; INTRAVENOUS; SUBCUTANEOUS at 15:04

## 2021-01-01 RX ADMIN — ASPIRIN 81 MG: 81 TABLET, COATED ORAL at 10:26

## 2021-01-01 RX ADMIN — SODIUM CHLORIDE 250 ML: 0.9 INJECTION, SOLUTION INTRAVENOUS at 21:25

## 2021-01-01 RX ADMIN — SODIUM CHLORIDE: 0.9 INJECTION, SOLUTION INTRAVENOUS at 14:18

## 2021-01-01 RX ADMIN — CEFTRIAXONE 1000 MG: 1 INJECTION, SOLUTION INTRAVENOUS at 20:31

## 2021-01-01 RX ADMIN — HEPARIN SODIUM 5000 UNITS: 5000 INJECTION INTRAVENOUS; SUBCUTANEOUS at 06:03

## 2021-01-01 RX ADMIN — INSULIN LISPRO 1 UNITS: 100 INJECTION, SOLUTION INTRAVENOUS; SUBCUTANEOUS at 11:54

## 2021-01-01 RX ADMIN — FENOFIBRATE 168 MG: 48 TABLET, FILM COATED ORAL at 20:45

## 2021-01-01 RX ADMIN — SODIUM CHLORIDE 500 ML: 0.9 INJECTION, SOLUTION INTRAVENOUS at 12:29

## 2021-01-01 RX ADMIN — MORPHINE SULFATE 4 MG: 4 INJECTION INTRAVENOUS at 09:03

## 2021-01-01 RX ADMIN — PROPOFOL 80 MCG/KG/MIN: 10 INJECTION, EMULSION INTRAVENOUS at 14:56

## 2021-01-01 RX ADMIN — INSULIN LISPRO 1 UNITS: 100 INJECTION, SOLUTION INTRAVENOUS; SUBCUTANEOUS at 22:32

## 2021-01-01 RX ADMIN — INSULIN LISPRO 1 UNITS: 100 INJECTION, SOLUTION INTRAVENOUS; SUBCUTANEOUS at 12:34

## 2021-01-01 RX ADMIN — FENOFIBRATE 168 MG: 48 TABLET, FILM COATED ORAL at 18:32

## 2021-01-01 RX ADMIN — LOSARTAN POTASSIUM 100 MG: 50 TABLET, FILM COATED ORAL at 09:45

## 2021-01-01 RX ADMIN — ONDANSETRON 4 MG: 2 INJECTION INTRAMUSCULAR; INTRAVENOUS at 14:43

## 2021-01-01 RX ADMIN — CEFTRIAXONE 1000 MG: 1 INJECTION, SOLUTION INTRAVENOUS at 21:49

## 2021-01-01 RX ADMIN — LEVOTHYROXINE SODIUM 50 MCG: 50 TABLET ORAL at 05:55

## 2021-01-01 RX ADMIN — Medication 1 TABLET: at 08:14

## 2021-01-01 RX ADMIN — OXYCODONE HYDROCHLORIDE 5 MG: 5 TABLET ORAL at 08:20

## 2021-01-01 RX ADMIN — LACOSAMIDE 100 MG: 50 TABLET, FILM COATED ORAL at 09:28

## 2021-01-01 RX ADMIN — LACOSAMIDE 100 MG: 50 TABLET, FILM COATED ORAL at 22:20

## 2021-01-01 RX ADMIN — ASPIRIN 325 MG: 325 TABLET ORAL at 23:40

## 2021-01-01 RX ADMIN — METOPROLOL SUCCINATE 25 MG: 25 TABLET, EXTENDED RELEASE ORAL at 08:39

## 2021-01-01 RX ADMIN — LACOSAMIDE 100 MG: 50 TABLET, FILM COATED ORAL at 20:54

## 2021-01-01 RX ADMIN — LEVOTHYROXINE SODIUM 50 MCG: 50 TABLET ORAL at 05:21

## 2021-01-01 RX ADMIN — HEPARIN SODIUM 5000 UNITS: 5000 INJECTION INTRAVENOUS; SUBCUTANEOUS at 08:03

## 2021-01-01 RX ADMIN — PHENYLEPHRINE HYDROCHLORIDE 40 MCG/MIN: 10 INJECTION INTRAVENOUS at 15:05

## 2021-01-01 RX ADMIN — HEPARIN SODIUM 5000 UNITS: 5000 INJECTION INTRAVENOUS; SUBCUTANEOUS at 18:49

## 2021-01-01 RX ADMIN — CEFTRIAXONE 1000 MG: 1 INJECTION, SOLUTION INTRAVENOUS at 20:55

## 2021-01-01 RX ADMIN — FENTANYL CITRATE 25 MCG: 50 INJECTION, SOLUTION INTRAMUSCULAR; INTRAVENOUS at 16:23

## 2021-01-01 RX ADMIN — INSULIN LISPRO 1 UNITS: 100 INJECTION, SOLUTION INTRAVENOUS; SUBCUTANEOUS at 18:32

## 2021-01-01 RX ADMIN — ACETAMINOPHEN 650 MG: 325 TABLET, FILM COATED ORAL at 19:58

## 2021-01-01 RX ADMIN — HEPARIN SODIUM 5000 UNITS: 5000 INJECTION INTRAVENOUS; SUBCUTANEOUS at 14:36

## 2021-01-01 RX ADMIN — PHENYLEPHRINE HYDROCHLORIDE 100 MCG: 10 INJECTION INTRAVENOUS at 14:41

## 2021-01-01 RX ADMIN — Medication 1 G: at 17:45

## 2021-01-01 RX ADMIN — Medication 1 G: at 08:20

## 2021-01-01 RX ADMIN — MORPHINE SULFATE 2 MG: 2 INJECTION, SOLUTION INTRAMUSCULAR; INTRAVENOUS at 08:14

## 2021-01-01 RX ADMIN — METOPROLOL SUCCINATE 25 MG: 25 TABLET, EXTENDED RELEASE ORAL at 10:15

## 2021-01-01 RX ADMIN — PHENYLEPHRINE HYDROCHLORIDE 100 MCG: 10 INJECTION INTRAVENOUS at 14:10

## 2021-01-01 RX ADMIN — HEPARIN SODIUM 5000 UNITS: 5000 INJECTION INTRAVENOUS; SUBCUTANEOUS at 15:06

## 2021-01-01 RX ADMIN — FENTANYL CITRATE 25 MCG: 50 INJECTION, SOLUTION INTRAMUSCULAR; INTRAVENOUS at 16:21

## 2021-01-01 RX ADMIN — HEPARIN SODIUM 5000 UNITS: 5000 INJECTION INTRAVENOUS; SUBCUTANEOUS at 06:27

## 2021-01-01 RX ADMIN — LACOSAMIDE 100 MG: 50 TABLET, FILM COATED ORAL at 08:51

## 2021-01-01 RX ADMIN — INSULIN LISPRO 1 UNITS: 100 INJECTION, SOLUTION INTRAVENOUS; SUBCUTANEOUS at 11:46

## 2021-01-01 RX ADMIN — SODIUM CHLORIDE 200 MG: 9 INJECTION, SOLUTION INTRAVENOUS at 18:37

## 2021-01-01 RX ADMIN — Medication 1 G: at 12:34

## 2021-01-01 RX ADMIN — Medication 1 G: at 08:14

## 2021-01-01 RX ADMIN — METOPROLOL SUCCINATE 25 MG: 25 TABLET, EXTENDED RELEASE ORAL at 09:04

## 2021-01-01 RX ADMIN — DOCUSATE SODIUM 100 MG: 100 CAPSULE, LIQUID FILLED ORAL at 17:22

## 2021-01-01 RX ADMIN — Medication 1 G: at 11:46

## 2021-01-01 RX ADMIN — DOCUSATE SODIUM 100 MG: 100 CAPSULE, LIQUID FILLED ORAL at 17:44

## 2021-01-01 RX ADMIN — OXYCODONE HYDROCHLORIDE 5 MG: 5 TABLET ORAL at 05:21

## 2021-01-01 RX ADMIN — INSULIN LISPRO 1 UNITS: 100 INJECTION, SOLUTION INTRAVENOUS; SUBCUTANEOUS at 16:07

## 2021-01-01 RX ADMIN — METFORMIN HYDROCHLORIDE 500 MG: 500 TABLET ORAL at 17:33

## 2021-01-01 RX ADMIN — MORPHINE SULFATE 2 MG: 2 INJECTION, SOLUTION INTRAMUSCULAR; INTRAVENOUS at 19:46

## 2021-01-01 RX ADMIN — FENOFIBRATE 168 MG: 48 TABLET, FILM COATED ORAL at 20:55

## 2021-01-01 RX ADMIN — OXYCODONE HYDROCHLORIDE 5 MG: 5 TABLET ORAL at 17:47

## 2021-01-01 RX ADMIN — INSULIN LISPRO 1 UNITS: 100 INJECTION, SOLUTION INTRAVENOUS; SUBCUTANEOUS at 22:02

## 2021-01-01 RX ADMIN — DOCUSATE SODIUM 100 MG: 100 CAPSULE, LIQUID FILLED ORAL at 09:23

## 2021-01-01 RX ADMIN — ATORVASTATIN CALCIUM 40 MG: 40 TABLET, FILM COATED ORAL at 17:58

## 2021-01-01 RX ADMIN — HEPARIN SODIUM 5000 UNITS: 5000 INJECTION INTRAVENOUS; SUBCUTANEOUS at 23:00

## 2021-01-01 RX ADMIN — PHENYLEPHRINE HYDROCHLORIDE 100 MCG: 10 INJECTION INTRAVENOUS at 15:09

## 2021-01-01 RX ADMIN — ACETAMINOPHEN 650 MG: 325 TABLET, FILM COATED ORAL at 05:45

## 2021-01-01 RX ADMIN — LACOSAMIDE 100 MG: 50 TABLET, FILM COATED ORAL at 08:38

## 2021-01-01 RX ADMIN — Medication 1 G: at 17:22

## 2021-01-01 RX ADMIN — Medication 1 TABLET: at 09:04

## 2021-01-01 RX ADMIN — HEPARIN SODIUM 5000 UNITS: 5000 INJECTION INTRAVENOUS; SUBCUTANEOUS at 22:30

## 2021-01-01 RX ADMIN — HYDROCHLOROTHIAZIDE 25 MG: 25 TABLET ORAL at 09:45

## 2021-01-01 RX ADMIN — LEVOTHYROXINE SODIUM 50 MCG: 50 TABLET ORAL at 05:45

## 2021-01-01 RX ADMIN — LEVOTHYROXINE SODIUM 50 MCG: 50 TABLET ORAL at 05:43

## 2021-01-01 RX ADMIN — CEFTRIAXONE 1000 MG: 1 INJECTION, SOLUTION INTRAVENOUS at 21:50

## 2021-01-01 RX ADMIN — FENOFIBRATE 168 MG: 48 TABLET, FILM COATED ORAL at 18:49

## 2021-01-01 RX ADMIN — HEPARIN SODIUM 5000 UNITS: 5000 INJECTION INTRAVENOUS; SUBCUTANEOUS at 21:52

## 2021-01-01 RX ADMIN — OXYCODONE HYDROCHLORIDE 5 MG: 5 TABLET ORAL at 17:22

## 2021-01-01 RX ADMIN — LACOSAMIDE 100 MG: 50 TABLET, FILM COATED ORAL at 08:14

## 2021-01-01 RX ADMIN — LIDOCAINE HYDROCHLORIDE 50 MG: 10 INJECTION, SOLUTION EPIDURAL; INFILTRATION; INTRACAUDAL; PERINEURAL at 14:10

## 2021-01-01 RX ADMIN — PHENYLEPHRINE HYDROCHLORIDE 100 MCG: 10 INJECTION INTRAVENOUS at 14:20

## 2021-01-01 RX ADMIN — MORPHINE SULFATE 4 MG: 4 INJECTION INTRAVENOUS at 11:31

## 2021-01-01 RX ADMIN — HEPARIN SODIUM 5000 UNITS: 5000 INJECTION INTRAVENOUS; SUBCUTANEOUS at 15:17

## 2021-01-01 RX ADMIN — SODIUM BICARBONATE 75 ML/HR: 84 INJECTION, SOLUTION INTRAVENOUS at 11:54

## 2021-01-01 RX ADMIN — LACOSAMIDE 100 MG: 50 TABLET, FILM COATED ORAL at 08:20

## 2021-01-01 RX ADMIN — LACOSAMIDE 100 MG: 50 TABLET, FILM COATED ORAL at 09:17

## 2021-01-01 RX ADMIN — HEPARIN SODIUM 5000 UNITS: 5000 INJECTION INTRAVENOUS; SUBCUTANEOUS at 21:50

## 2021-01-01 RX ADMIN — INSULIN LISPRO 2 UNITS: 100 INJECTION, SOLUTION INTRAVENOUS; SUBCUTANEOUS at 21:15

## 2021-01-01 RX ADMIN — OXYCODONE HYDROCHLORIDE 5 MG: 5 TABLET ORAL at 09:54

## 2021-01-01 RX ADMIN — LACOSAMIDE 100 MG: 50 TABLET, FILM COATED ORAL at 21:56

## 2021-01-01 RX ADMIN — FENTANYL CITRATE 100 MCG: 50 INJECTION, SOLUTION INTRAMUSCULAR; INTRAVENOUS at 14:10

## 2021-01-01 RX ADMIN — ALBUMIN (HUMAN): 12.5 INJECTION, SOLUTION INTRAVENOUS at 14:45

## 2021-01-01 RX ADMIN — Medication 1 G: at 09:23

## 2021-01-01 RX ADMIN — PROPOFOL 150 MG: 10 INJECTION, EMULSION INTRAVENOUS at 14:10

## 2021-01-01 RX ADMIN — HEPARIN SODIUM 5000 UNITS: 5000 INJECTION INTRAVENOUS; SUBCUTANEOUS at 05:49

## 2021-01-01 RX ADMIN — METOPROLOL SUCCINATE 25 MG: 25 TABLET, EXTENDED RELEASE ORAL at 08:20

## 2021-01-01 RX ADMIN — DOCUSATE SODIUM 100 MG: 100 CAPSULE, LIQUID FILLED ORAL at 08:20

## 2021-01-01 RX ADMIN — INSULIN LISPRO 1 UNITS: 100 INJECTION, SOLUTION INTRAVENOUS; SUBCUTANEOUS at 21:52

## 2021-01-01 RX ADMIN — LEVOTHYROXINE SODIUM 50 MCG: 50 TABLET ORAL at 05:38

## 2021-01-01 RX ADMIN — AMLODIPINE BESYLATE 5 MG: 5 TABLET ORAL at 08:39

## 2021-01-01 RX ADMIN — MORPHINE SULFATE 2 MG: 2 INJECTION, SOLUTION INTRAMUSCULAR; INTRAVENOUS at 10:26

## 2021-01-01 RX ADMIN — FENOFIBRATE 168 MG: 48 TABLET, FILM COATED ORAL at 17:22

## 2021-01-01 RX ADMIN — INSULIN LISPRO 1 UNITS: 100 INJECTION, SOLUTION INTRAVENOUS; SUBCUTANEOUS at 17:02

## 2021-01-01 RX ADMIN — SODIUM CHLORIDE, SODIUM LACTATE, POTASSIUM CHLORIDE, AND CALCIUM CHLORIDE 75 ML/HR: .6; .31; .03; .02 INJECTION, SOLUTION INTRAVENOUS at 11:01

## 2021-01-01 RX ADMIN — LACOSAMIDE 100 MG: 50 TABLET, FILM COATED ORAL at 20:20

## 2021-01-01 RX ADMIN — ATORVASTATIN CALCIUM 40 MG: 40 TABLET, FILM COATED ORAL at 17:33

## 2021-01-01 RX ADMIN — INSULIN LISPRO 2 UNITS: 100 INJECTION, SOLUTION INTRAVENOUS; SUBCUTANEOUS at 22:19

## 2021-01-01 RX ADMIN — LACOSAMIDE 100 MG: 50 TABLET, FILM COATED ORAL at 20:47

## 2021-01-01 RX ADMIN — POLYETHYLENE GLYCOL 3350 17 G: 17 POWDER, FOR SOLUTION ORAL at 09:24

## 2021-01-01 RX ADMIN — Medication 1 TABLET: at 09:28

## 2021-01-01 RX ADMIN — CEFAZOLIN SODIUM 2000 MG: 2 SOLUTION INTRAVENOUS at 14:14

## 2021-01-01 RX ADMIN — MORPHINE SULFATE 2 MG: 2 INJECTION, SOLUTION INTRAMUSCULAR; INTRAVENOUS at 13:05

## 2021-01-20 NOTE — PROGRESS NOTES
Assessment/Plan:  1  Lymphedema of left lower extremity     2  Pain, joint, ankle and foot, left  XR tibia fibula 2 vw left    CANCELED: XR ankle 3+ vw left       Scribe Attestation    I,:  Oziel Carranza am acting as a scribe while in the presence of the attending physician :       I,:  Isaias Mckay MD personally performed the services described in this documentation    as scribed in my presence :         I do believe Chet Womack is demonstrating signs and symptoms consistent with her lymphedema  I do not believe she is demonstrating signs symptoms consistent with cellulitis as she is nontender to palpation  I do recommend she continue wearing her compression stockings daily for swelling control  Additionally, I did advise her to walk often with the use of a cane  She states she does have concerns with falling, however I do believe ambulating will help decrease her swelling  I do not recommend surgical intervention  The x-rays of the tibia demonstrate arthritic change into the knee that is fairly severe however the ankle demonstrates no obvious abnormality although she does have vascular calcification evident on the x-ray  She may follow up on an as-needed basis  She verbally stated she understood all information put forth today in the office  Subjective:   Johnnie London is a 79 y o  female who presents to the office today for follow-up evaluation of left lower extremity swelling  We last saw her on 10/16/2019 where we referred her Dr Mcdonough Son for his evaluation and treatment  He did diagnose her with lymphedema and advised her to attend lymphedema physical therapy, however she states she has not done so  She states she has been wearing compression stockings that has been helping with her swelling, however her swelling does return on occasion  She denies any true pain about her left ankle  She has appreciated mild redness about her ankle joint, though this does not cause her any pain    She denies any radicular symptoms  She denies any numbness and tingling  She has had a workup by vascular and Podiatry in the past   She states she has not been walking much due to the cold weather  Review of Systems   Constitutional: Positive for activity change  Negative for chills, fever and unexpected weight change  HENT: Negative for hearing loss, nosebleeds and sore throat  Eyes: Negative for pain, redness and visual disturbance  Respiratory: Negative for cough, shortness of breath and wheezing  Cardiovascular: Negative for chest pain, palpitations and leg swelling  Gastrointestinal: Negative for abdominal pain, nausea and vomiting  Endocrine: Negative for polydipsia and polyuria  Genitourinary: Negative for dysuria and hematuria  Musculoskeletal: Positive for joint swelling  See HPI   Skin: Negative for rash and wound  Neurological: Negative for dizziness, numbness and headaches  Psychiatric/Behavioral: Negative for decreased concentration and suicidal ideas  The patient is not nervous/anxious  Past Medical History:   Diagnosis Date    Anemia     Anxiety     Arthritis     Chronic UTI     better since urethral stretching    [facility] 9/2/2019    Current moderate episode of major depressive disorder without prior episode ([facility] ) 1/14/2020    Diabetes mellitus ([facility] )     type 2    GERD (gastroesophageal reflux disease)     diet controlled    High triglycerides     Hypertension     Irritable bowel syndrome     Other specified hypothyroidism 5/13/2020    Primary osteoarthritis of both knees 1/20/2016       Past Surgical History:   Procedure Laterality Date    APPENDECTOMY      age 14   [facility] GUM [facility]GERY      tumor removal benign x3    VA XCAPSL CTRC RMVL INSJ IO LENS PROSTH W/O ECP Left 5/15/2017    Procedure: EXTRACTION EXTRACAPSULAR CATARACT PHACO INTRAOCULAR LENS (IOL);   Surgeon: Tristan Posadas MD;  Location: [facility] MAIN OR;  Service: Ophthalmology  IN XCAPSL CTRC RMVL INSJ IO LENS PROSTH W/O ECP Right 2017    Procedure: EXTRACTION EXTRACAPSULAR CATARACT PHACO INTRAOCULAR LENS (IOL); Surgeon: Luisa Henriquez MD;  Location: [facility] MAIN OR;  Service: Ophthalmology    TONSILLECTOMY         Family History   Problem Relation Age of Onset    Early death Mother         CHF    Early death Father         MI    Cancer Sister         stomach lymphoma    No Known Problems Son        Social History     Occupational History    Not on file   Tobacco Use    Smoking status: Former Smoker     Packs/day: 1 00     Years: 10 00     Pack years: 10      Types: Cigarettes     Quit date:      Years since quittin 0    Smokeless tobacco: Never Used   Substance and Sexual Activity    Alcohol use:  Yes     Alcohol/week: 1 0 standard drinks     Types: 1 Standard drinks or equivalent per week     Comment: rarely    Drug use: No    Sexual activity: Not on file         Current Outpatient Medications:     amLODIPine (NORVASC) 5 mg tablet, Take 1 tablet (5 mg total) by mouth every morning, Disp: 90 tablet, Rfl: 1    aspirin (ECOTRIN LOW STRENGTH) 81 mg EC tablet, Take 81 mg by mouth daily with breakfast, Disp: , Rfl:     Blood Glucose Monitoring Suppl (ONE TOUCH ULTRA 2) w/Device KIT, by Does not apply route daily, Disp: 1 each, Rfl: 0    fenofibrate (TRIGLIDE) 160 MG tablet, Take 1 tablet (160 mg total) by mouth every evening, Disp: 90 tablet, Rfl: 1    glucose blood (OneTouch Ultra) test strip, Use as instructed, Disp: 50 each, Rfl: 6    Glycerin-Polysorbate 80 (REFRESH DRY EYE THERAPY OP), Apply to eye, Disp: , Rfl:     Lancets (onetouch ultrasoft) lancets, Use as instructed, Disp: 50 each, Rfl: 6    levothyroxine 50 mcg tablet, Take 1 tablet (50 mcg total) by mouth daily, Disp: 90 tablet, Rfl: 1    meloxicam (MOBIC) 15 mg tablet, , Disp: , Rfl:     metFORMIN (GLUCOPHAGE) 500 mg tablet, Take one in AM and two in PM (Patient taking differently: Take 500 mg by mouth 2 (two) times a day with meals ), Disp: 270 tablet, Rfl: 1    metoprolol succinate (TOPROL-XL) 25 mg 24 hr tablet, Take 25 mg by mouth daily , Disp: , Rfl:     Multiple Vitamins-Minerals (CENTRUM SILVER ADULT 50+ PO), Take by mouth daily with breakfast, Disp: , Rfl:     olmesartan-hydrochlorothiazide (BENICAR HCT) 40-25 MG per tablet, Take 1 tablet by mouth every morning, Disp: 90 tablet, Rfl: 1    Allergies   Allergen Reactions    Amoxicillin GI Intolerance    Lactose GI Intolerance    Sulfa Antibiotics GI Intolerance    Lidocaine Rash     Lidocaine patch       Objective: There were no vitals filed for this visit  Left Ankle Exam     Tenderness   The patient is experiencing no tenderness  Range of Motion   Dorsiflexion: 10   Plantar flexion: 20     Other   Erythema: absent  Scars: absent  Sensation: normal  Pulse: present    Comments:    Diffuse 2+ pitting edema of the lower shin extending into the ankle and dorsum of the foot  No calf tenderness  Observations   Left Ankle/Foot   Negative for adhesive scar  Physical Exam  Vitals signs reviewed  Constitutional:       Appearance: Normal appearance  She is well-developed  HENT:      Head: Normocephalic and atraumatic  Eyes:      General:         Right eye: No discharge  Left eye: No discharge  Extraocular Movements: Extraocular movements intact  Conjunctiva/sclera: Conjunctivae normal       Pupils: Pupils are equal, round, and reactive to light  Neck:      Musculoskeletal: Normal range of motion and neck supple  Cardiovascular:      Rate and Rhythm: Normal rate  Pulmonary:      Effort: Pulmonary effort is normal  No respiratory distress  Musculoskeletal:      Comments: As noted in HPI  Skin:     General: Skin is warm and dry  Neurological:      General: No focal deficit present  Mental Status: She is alert and oriented to person, place, and time     Psychiatric:         Mood and Affect: Mood normal          Behavior: Behavior normal          I have personally reviewed pertinent films in PACS and my interpretation is as follows:  X-rays of the left tibia/fibula obtained on 01/20/2021 demonstrate arterial calcification with no acute fracture or dislocation  There are arthritic changes to the knee

## 2021-02-02 PROBLEM — U07.1 COVID-19: Status: RESOLVED | Noted: 2020-01-01 | Resolved: 2021-01-01

## 2021-02-02 NOTE — ASSESSMENT & PLAN NOTE
Not checking sugar    Advise  Diabetes    Check your fingerstick Accu-Chek once a day  Please check your fingerstick Accu-Chek different time of the day either at 7:00 a m  or 11:00 a m  for for p m  4 9:00 p m  Steve Stinson Follow Diabetic diet for diabetes as advised  If you would sugar less than 80 or more than 300 to please call us on next visit is day  If the sugar is less than 80 follow-up hypoglycemia instructions as advised  Take your diabetic medicine as advised              Lab Results   Component Value Date    HGBA1C 7 0 (H) 10/15/2020

## 2021-02-02 NOTE — PROGRESS NOTES
Virtual Regular Visit      Assessment/Plan:chronic disease management    Problem List Items Addressed This Visit        Digestive    GERD (gastroesophageal reflux disease) - Primary     Sx well controlled            Endocrine    Diabetes mellitus ([address] )     Not checking sugar    Advise  Diabetes    Check your fingerstick Accu-Chek once a day  Please check your fingerstick Accu-Chek different time of the day either at 7:00 a m  or 11:00 a m  for for p m  4 9:00 p m  Unk Braxton Follow Diabetic diet for diabetes as advised  If you would sugar less than 80 or more than 300 to please call us on next visit is day  If the sugar is less than 80 follow-up hypoglycemia instructions as advised  Take your diabetic medicine as advised  Lab Results   Component Value Date    HGBA1C 7 0 (H) 10/15/2020               Cardiovascular and Mediastinum    Hypertension     Hypertension( High Blood Pressure ):    Continue Home BP check daily and bring log, if you are not checking consider checking daily    Take your Blood Pressure medicine as advised    Do not take your Blood pressure medicine if systolic Blood Pressure (top number) is less than 100 or heart rate less than 60  Notify you physician  Nervous and Auditory    Weakness of both legs     Unchanged    Advise home excerices    PT was not helping and pt stopped on her own              Other    Mixed hyperlipidemia     Cholesterol    Eat low cholesterol diet    Continue taking your cholesterol medicine as advised    Call if any side effects    Lipid Profile and LFT prior to next visit or as advised  ( You should Get periodically to monitor liver side effects)    KNOW YOUR DIABETIC GOAL( HBA1C AND SUGAR), BLOOD PRESSURE TARGET NUMBER AND CHOLESTEROL( LDL, HDL AND TRIGLYCERIDE)              Low back pain     Chronic but fair         Anxiety     better         MONSTER (generalized anxiety disorder)     Better  Pt's Anxiety  is well controlled    Pt is tolerating current psych medicine regimen and regimen is effective  Pt does not have any side effects of medicine  Refer to Med List for Psych Medicine  Will continue same regimen  Grief     better                    Reason for visit is   Chief Complaint   Patient presents with    Hypertension    Hyperlipidemia    Diabetes    CKD III    Anxiety    Depression    GERD    Virtual Regular Visit        Encounter provider Alexandra Powell MD    Provider located at [address] [address]      Recent Visits  No visits were found meeting these conditions  Showing recent visits within past 7 days and meeting all other requirements     Future Appointments  No visits were found meeting these conditions  Showing future appointments within next 150 days and meeting all other requirements        The patient was identified by name and date of birth  Kristel Shelton was informed that this is a telemedicine visit and that the visit is being conducted through [employer] and patient was informed that this is not a secure, HIPAA-compliant platform  She agrees to proceed     My office door was closed  No one else was in the room  She acknowledged consent and understanding of privacy and security of the video platform  The patient has agreed to participate and understands they can discontinue the visit at any time  Patient is aware this is a billable service  Subjective  Kristel Shelton is a 79 y o  female chronic disease management   This is a call in appointment  Palpitation is fair  Diabetes symptom-free   Patient denies any polydipsia polyuria hypoglycemic episode like symptoms  Hemoglobin A1c 6 9    Diabetes:  Diagnosed:  Current Medicine for Diabetes: Per Med List  Finger stick: Once a day at times  Glucose Log: not available  Hypoglycemia event and intervention: None  Diet: reviewed,  Exercise: None  Comorbidity: see HPI and Assessment/Plan  Last Eye Exam:per Dr Alli Greenwood retina specialist  Last Foot exam: per podiatrist          Hyperlipidemia: tolerating fenofibrate without any problem lipid profile reasonable  Tolerating fenofibric acid without side effect  Triglyceride is reasonable  ALT is normal     Cirrhosis stable  Pt did not go for gastroenterologist as advised  Peripheral edema reasonable  The left ankle pain fair  GI status reasonable  Memory impairment is mild, chronic but more has anxiety problem, Also going through grief and misses her  a lot  Pt also has some social isolation      Chronic low back pain and some proximal weakness in legs and difficulty getting up remains same, She has decreased strength in the legs  She walks without cane or walker, takes extended time getting out of chair    No radiculopathy ,or numbness or urinary retention or weakness in the legs  She gets flare up off and on   She was lifting heavy groceries yeesteday and got some left paralumbar pain but pain is gone    No problem with night sweats or hypoglycemia  Eating better, No nocturnal hypoglycemia  rec to check sugar once a day or any time if she feels like having low sugar  Varicose Vein left leg modest: rec stockoing, NO DVT    Grief: chronic, thinks about  all the time , not depressed, adjusting in holidays, goes to [facility] once a month   Eating ok and sleeping ok No sx of MDD    Weakness in legs : getting stronger, pt stopped Physical therapy, does not do at home    Sacral decub resolved  PHQ-9 Follow-up    Little interest or pleasure in doing things: 1 - several days  Feeling down, depressed, or hopeless: 0 - not at all  Trouble falling or staying asleep, or sleeping too much: 0 - not at all  Feeling tired or having little energy: 0 - not at all  Poor appetite or overeatin - not at all  Feeling bad about yourself - or that you are a failure or have let yourself or your family down: 0 - not at all  Trouble concentrating on things, such as reading the newspaper or watching   television: 0 - not at all  Moving or speaking so slowly that other people could have noticed  Or the   opposite - being so fidgety or restless that you have been moving around a   lot more than usual: 0 - not at all  Thoughts that you would be better off dead, or of hurting yourself in some   way: 0 - not at all  PHQ-2 Score: 1  PHQ-9 Score: 1     MONSTER-7 Flowsheet Screening      Most Recent Value   Over the last two weeks, how often have you been bothered by the following   problems? Feeling nervous, anxious, or on edge  0   Not being able to stop or control worrying  0   Worrying too much about different things  0   Trouble relaxing   0   Being so restless that it's hard to sit still  0   Becoming easily annoyed or irritable   0   Feeling afraid as if something awful might happen  0   How difficult have these problems made it for you to do your work, take   care of things at home, or get along with other people?    Not difficult at   all   MONSTER Score   0      Anxiety depression and grief are better          Admission on 10/27/2020, Discharged on 10/27/2020  WBC                       Value: 7 52(Thousand/uL)  Dt: 10/27/2020  RBC                       Value: 4 16(Million/uL)   Dt: 10/27/2020  Hemoglobin                Value: 11 5(g/dL)         Dt: 10/27/2020  Hematocrit                Value: 37 2(%)            Dt: 10/27/2020  MCV                       Value: 89(fL)             Dt: 10/27/2020  MCH                       Value: 27 6(pg)           Dt: 10/27/2020  MCHC                      Value: 30 9(g/dL)*        Dt: 10/27/2020  RDW                       Value: 13 7(%)            Dt: 10/27/2020  MPV                       Value: 10 4(fL)           Dt: 10/27/2020  Platelets                 Value: 270(Thousands/uL)  Dt: 10/27/2020  nRBC                      Value: 0(/100 WBCs)       Dt: 10/27/2020  Neutrophils Relative      Value: 66(%)              Dt: 10/27/2020  Immat GRANS %             Value: 1(%)               Dt: 10/27/2020  Lymphocytes Relative      Value: 19(%)              Dt: 10/27/2020  Monocytes Relative        Value: 10(%)              Dt: 10/27/2020  Eosinophils Relative      Value: 2(%)               Dt: 10/27/2020  Basophils Relative        Value: 2(%)*              Dt: 10/27/2020  Neutrophils Absolute      Value: 5 03(Thousands/µL) Dt: 10/27/2020  Immature Grans Absolute   Value: 0 05(Thousand/uL)  Dt: 10/27/2020  Lymphocytes Absolute      Value: 1 44(Thousands/µL) Dt: 10/27/2020  Monocytes Absolute        Value: 0 78(Thousand/µL)  Dt: 10/27/2020  Eosinophils Absolute      Value: 0 11(Thousand/µL)  Dt: 10/27/2020  Basophils Absolute        Value: 0 11(Thousands/µL)*Dt: 10/27/2020  Sodium                    Value: 136(mmol/L)        Dt: 10/27/2020  Potassium                 Value: 3 8(mmol/L)        Dt: 10/27/2020  Chloride                  Value: 101(mmol/L)        Dt: 10/27/2020  CO2                       Value: 25(mmol/L)         Dt: 10/27/2020  ANION GAP                 Value: 10(mmol/L)         Dt: 10/27/2020  BUN                       Value: 26(mg/dL)*         Dt: 10/27/2020  Creatinine                Value: 0 89(mg/dL)        Dt: 10/27/2020  Glucose                   Value: 143(mg/dL)*        Dt: 10/27/2020  Calcium                   Value: 8 5(mg/dL)         Dt: 10/27/2020  Corrected Calcium         Value: 9 0(mg/dL)         Dt: 10/27/2020  AST                       Value: 15(U/L)            Dt: 10/27/2020  ALT                       Value: 25(U/L)            Dt: 10/27/2020  Alkaline Phosphatase      Value: 92(U/L)            Dt: 10/27/2020  Total Protein             Value: 7 0(g/dL)          Dt: 10/27/2020  Albumin                   Value: 3 4(g/dL)*         Dt: 10/27/2020  Total Bilirubin           Value: 0 30(mg/dL)        Dt: 10/27/2020  eGFR                      Value: 61(ml/min/1 73sq m) Dt: 10/27/2020  Lipase                    Value: 149(u/L)           Dt: 10/27/2020  Color, UA                                           Dt: 10/27/2020                  Value: Light Yellow   Clarity, UA               Value: Cloudy             Dt: 10/27/2020  Specific Gravity, UA      Value: 1 020              Dt: 10/27/2020  pH, UA                    Value: 6 0                Dt: 10/27/2020  Leukocytes, UA            Value: Small*             Dt: 10/27/2020  Nitrite, UA               Value: Positive*          Dt: 10/27/2020  Protein, UA               Value: 30 (1+)(mg/dl)*    Dt: 10/27/2020  Glucose, UA               Value: Negative(mg/dl)    Dt: 10/27/2020  Ketones, UA               Value: Negative(mg/dl)    Dt: 10/27/2020  Urobilinogen, UA          Value: 0 2(E U /dl)       Dt: 10/27/2020  Bilirubin, UA             Value: Negative           Dt: 10/27/2020  Blood, UA                 Value: Negative           Dt: 10/27/2020  Troponin I                Value: <0 02(ng/mL)       Dt: 10/27/2020  RBC, UA                   Value: None Seen(/hpf)    Dt: 10/27/2020  WBC, UA                   Value: Innumerable(/hpf)* Dt: 10/27/2020  Epithelial Cells          Value: Occasional(/hpf)   Dt: 10/27/2020  Bacteria, UA              Value: Innumerable(/hpf)* Dt: 10/27/2020  MUCUS THREADS             Value: Occasional*        Dt: 10/27/2020  Urine Culture                                       Dt: 10/27/2020                  Value: Culture too young- will reincubate   Ventricular Rate          Value: 68(BPM)            Dt: 10/27/2020  Atrial Rate               Value: 68(BPM)            Dt: 10/27/2020  UT Interval               Value: 212(ms)            Dt: 10/27/2020  QRSD Interval             Value: 108(ms)            Dt: 10/27/2020  QT Interval               Value: 422(ms)            Dt: 10/27/2020  QTC Interval              Value: 448(ms)            Dt: 10/27/2020  P Axis                    Value: 69(degrees) Dt: 10/27/2020  QRS Axis                  Value: 89(degrees)        Dt: 10/27/2020  T Wave Axis               Value: 9(degrees)         Dt: 10/27/2020  ------------ - 2 weeks    10-: CXR NAD           Past Medical History:   Diagnosis Date    Anemia     Anxiety     Arthritis     Chronic UTI     better since urethral stretching    Cirrhosis of liver ([facility] [address] ) 9/2/2019    Current moderate episode of major depressive disorder without prior episode ([facility] [address] ) 1/14/2020    Diabetes mellitus ([facility] )     type 2    GERD (gastroesophageal reflux disease)     diet controlled    High triglycerides     Hypertension     Irritable bowel syndrome     Other specified hypothyroidism 5/13/2020    Primary osteoarthritis of both knees 1/20/2016       Past Surgical History:   Procedure Laterality Date    APPENDECTOMY      age 14   [employer] GUM SURGERY      tumor removal benign x3    GA XCAPSL CTRC RMVL INSJ IO LENS PROSTH W/O ECP Left 5/15/2017    Procedure: EXTRACTION EXTRACAPSULAR CATARACT PHACO INTRAOCULAR LENS (IOL); Surgeon: Viral Jaramillo MD;  [facility] MAIN OR;  Service: Ophthalmology    GA XCAPSL CTRC RMVL INSJ IO LENS PROSTH W/O ECP Right 6/26/2017    Procedure: EXTRACTION EXTRACAPSULAR CATARACT PHACO INTRAOCULAR LENS (IOL);   Surgeon: Viral Jaramillo MD;  Location: [facility] MAIN OR;  Service: Ophthalmology    TONSILLECTOMY         Current Outpatient Medications   Medication Sig Dispense Refill    amLODIPine (NORVASC) 5 mg tablet Take 1 tablet (5 mg total) by mouth every morning 90 tablet 1    aspirin (ECOTRIN LOW STRENGTH) 81 mg EC tablet Take 81 mg by mouth daily with breakfast      Blood Glucose Monitoring Suppl (ONE TOUCH ULTRA 2) w/Device KIT by Does not apply route daily 1 each 0    fenofibrate (TRIGLIDE) 160 MG tablet Take 1 tablet (160 mg total) by mouth every evening 90 tablet 1    glucose blood (OneTouch Ultra) test strip Use as instructed 50 each 6    Glycerin-Polysorbate 80 (REFRESH DRY EYE THERAPY OP) Apply to eye      Lancets (onetouch ultrasoft) lancets Use as instructed 50 each 6    levothyroxine 50 mcg tablet Take 1 tablet (50 mcg total) by mouth daily 90 tablet 1    meloxicam (MOBIC) 15 mg tablet       metFORMIN (GLUCOPHAGE) 500 mg tablet Take one in AM and two in PM (Patient taking differently: Take 500 mg by mouth 2 (two) times a day with meals ) 270 tablet 1    metoprolol succinate (TOPROL-XL) 25 mg 24 hr tablet Take 25 mg by mouth daily       Multiple Vitamins-Minerals (CENTRUM SILVER ADULT 50+ PO) Take by mouth daily with breakfast      olmesartan-hydrochlorothiazide (BENICAR HCT) 40-25 MG per tablet Take 1 tablet by mouth every morning 90 tablet 1     No current facility-administered medications for this visit  Allergies   Allergen Reactions    Amoxicillin GI Intolerance    Lactose GI Intolerance    Sulfa Antibiotics GI Intolerance    Lidocaine Rash     Lidocaine patch       Review of Systems   All other systems reviewed and are negative  Video Exam    Vitals:    02/02/21 1621   BP: 130/80   Pulse: 70   Weight: 78 kg (172 lb)   Height: 5' 9" (1 753 m)       Physical Exam  Constitutional:       Appearance: She is not ill-appearing or diaphoretic  Pulmonary:      Effort: No respiratory distress  Neurological:      General: No focal deficit present  Mental Status: She is alert  Psychiatric:         Mood and Affect: Mood normal          Behavior: Behavior normal          Thought Content: Thought content normal          Judgment: Judgment normal           I spent 15 minutes directly with the patient during this visit      VIRTUAL VISIT DISCLAIMER    Rosibel Grace acknowledges that she has consented to an online visit or consultation   She understands that the online visit is based solely on information provided by her, and that, in the absence of a face-to-face physical evaluation by the physician, the diagnosis she receives is both limited and provisional in terms of accuracy and completeness  This is not intended to replace a full medical face-to-face evaluation by the physician  Iveth Guido understands and accepts these terms

## 2021-02-02 NOTE — ASSESSMENT & PLAN NOTE
Better  Pt's Anxiety  is well controlled    Pt is tolerating current psych medicine regimen and regimen is effective  Pt does not have any side effects of medicine  Refer to Med List for Psych Medicine  Will continue same regimen

## 2021-02-02 NOTE — PATIENT INSTRUCTIONS
Follow with Consultants as per their and our suggestion    Follow up in 4  week(s) or as needed earlier    Follow all instructions as advised and discussed  Take your medications as prescribed  Call the office immediately if you experience any side effects  Ask questions if you do not understand  Keep your scheduled appointment as advised or come sooner if necessary or in doubt  Best time to call for non-urgent matter or questions on weekdays is between 9am and 12 noon  See physician for any new symptoms or worsening of current symptoms  Urgent or emergent situations call 911 and report to nearest emergency room  I spent  15 minutes taking care of this patient including clinical care, conseling, collaboration, chart, lab and consultaion review      Patient is to get labs 2 week(s) prior to next visit if advised

## 2021-02-03 PROBLEM — R47.9 SPEECH ABNORMALITY: Status: ACTIVE | Noted: 2021-01-01

## 2021-02-03 PROBLEM — R56.9 SEIZURE (HCC): Status: ACTIVE | Noted: 2021-01-01

## 2021-02-03 NOTE — ED NOTES
Another RN in room with patient  Pt unresponsive to questions being asked  Pt then noted to have grand mal type seizure  That RN summoned help, MD at bedside for patient eval  Pt suctioned   Non rebreather placed on pt at [address]  02/03/21 [address], RN  02/03/21 [id number]

## 2021-02-03 NOTE — H&P
[facility]  H&P- Johnnie London 1940, 79 y o  female MRN: [id number]  Unit/Bed#: ED 07 Encounter: [id number]  Primary Care Provider: Angel Marr MD   Date and time admitted to hospital: 2/2/2021  8:46 PM    Seizure [facility])  Assessment & Plan  Patient had a witnessed seizure in the ED after admission  Timing unclear as patient's nurse went to check on the patient and found her twitching, eyes rolled back, not responding  ED physician assessed patient at bedside and ordered a STAT repeat CT head which showed no changes from the original CT head earlier in the night  Post-ictal in the ED   - Patient did receive ASA 324mg and ceftriaxone, otherwise no new meds while in ED   - No prior history of seizure  - CT head showed microangiopathic change and age-indeterminate probably old lacunar infarct in right basal ganglion  - Seizure precautions  - PRN ativan  - Neuro checks  - Neuro consult appreciated       * Speech abnormality  Assessment & Plan  Patient presented to the ED with concern for confusion, speech difficulties that started at 6pm  Patient is noted to have expressive aphasia in the ED and is unable to answer some questions appropriately  Patient's son last noted her to be normal around 3pm when he spoke to her on the phone  She lives alone at home and her son is an hour away  She was seen by her PCP via telemedicine visit today with no problems noted during the encounter  Post-ictal state in the ED vs  Evolving neuro symptoms from presentation in ED, currently confused in the ED, not answering some questions    - CT head showed microagniopathic change and age-indeterminate probably old lacunar infarct in R basal ganglion  - Hold anti-HTN meds  - Stroke pathway, neuro checks  - MRI, carotid dopplers in AM, echo w/ bubble study  - Neuro evaluation appreciated  - Monitor on telemetry  - Neuro checks  - Received ASA 324mg  - Lipid panel, HgA1C pending    Memory impairment  Assessment & Plan  Patient's son states she has some mild confusion at baseline, sometimes difficulty with short term recall and mixing up dates  Continue supportive care  Currently not back to baseline, see above  Appreciate neurology recommendations    MONSTER (generalized anxiety disorder)  Assessment & Plan  Pt not currently on any home medications     Mixed hyperlipidemia  Assessment & Plan  Start statin with stroke pathway  Lipid panel pending     Diabetes mellitus [facility])  Assessment & Plan  Lab Results   Component Value Date    HGBA1C 7 0 (H) 10/15/2020       Recent Labs     02/02/21  2100 02/03/21  0317   POCGLU 144* 152*       Blood Sugar Average: Last 72 hrs:  (P) 148     Hold home dose of metformin  Add sliding scale insulin coverage t i d  With meals and q h s  Benign essential hypertension  Assessment & Plan  Patient takes amlodipine 5 mg QAM, Toprol XL 25 mg and Benicar 40-25 QAM  Hold above medications for permissive HTN       VTE Prophylaxis: Heparin  / sequential compression device   Code Status:  Level 1  POLST: There is no POLST form on file for this patient (pre-hospital)  Discussion with family:  Discussed with patient's son who is present in the ED    Anticipated Length of Stay:  Patient will be admitted on an Inpatient basis with an anticipated length of stay of  > 2 midnights  Justification for Hospital Stay:  Altered mental status, expressive aphasia    Total Time for Visit, including Counseling / Coordination of Care: 45 minutes  Greater than 50% of this total time spent on direct patient counseling and coordination of care  Chief Complaint:   Altered mental status    History of Present Illness:    Veronica Childers is a 79 y o  female who presents with PMH of HTN, HLD, GERD, DM2, cirrhosis, depression, anxiety  She presented to the ED today with chief complaint of altered mental status per the patient's son    Her son states that she does have a history of dementia and intermittently gets confused and has difficulty with short-term recall  He initially stated she was last known normal when she went up PE with them 1 week ago, but he spoke with her multiple times over the phone throughout the day and she seemed to be fine  This afternoon his wife spoke with her over the phone at approximately 6:00 p m  And she noted her speech to be off, stated she was having difficulty finding words  Her son drove to her house and noted her to be watching TV despite the TV being turned off in the room  She was oriented only to self and place initially in the ED  After she was admitted to the [facility] service but still in the ED, the patient had a witnessed seizure event  She does not have any history of seizures  Her nurse went into the room to check on her and found her twitching, eyes rolled back, and not responding  This episode lasted approximately 1-1 5 minutes before receiving any medication  She did receive  mg and ceftriaxone for possible UTI in the ED, otherwise no new medications  The son reports that she was treated with PO antibiotics about 2-3 weeks ago for a UTI but stated he hadn't heard anything more about further UTI-related complaints since then from his mom  Initially in the ED the patient was able to express to me that she did not have any headaches, lightheadedness, but does occasionally feel dizzy on and off  Her son didn't notice any facial droop or slurred speech, just difficulty getting words out  Review of Systems:    Review of Systems   Unable to perform ROS: Mental status change   Constitutional: Negative for chills and fatigue  HENT: Negative for congestion, sore throat and trouble swallowing  Eyes: Negative for visual disturbance  Respiratory: Negative for shortness of breath  Cardiovascular: Negative for chest pain and palpitations  Gastrointestinal: Negative for abdominal pain, constipation, diarrhea, nausea and vomiting     Genitourinary: Negative for dysuria, frequency and urgency  Musculoskeletal: Negative for gait problem  Neurological: Positive for seizures and speech difficulty  Negative for dizziness, facial asymmetry, weakness, light-headedness, numbness and headaches  Psychiatric/Behavioral: Positive for confusion  Past Medical and Surgical History:     Past Medical History:   Diagnosis Date    Anemia     Anxiety     Arthritis     Chronic UTI     better since urethral stretching    Cirrhosis of liver ([facility] ) 9/2/2019    Current moderate episode of major depressive disorder without prior episode ([address] ) 1/14/2020    Diabetes mellitus ([facility] )     type 2    GERD (gastroesophageal reflux disease)     diet controlled    High triglycerides     Hypertension     Irritable bowel syndrome     Other specified hypothyroidism 5/13/2020    Primary osteoarthritis of both knees 1/20/2016       Past Surgical History:   Procedure Laterality Date    APPENDECTOMY      age 14   [facility] GUM SURGERY      tumor removal benign x3    PA XCAPSL CTRC RMVL INSJ IO LENS PROSTH W/O ECP Left 5/15/2017    Procedure: EXTRACTION EXTRACAPSULAR CATARACT PHACO INTRAOCULAR LENS (IOL); Surgeon: Iman Juarez MD;  Location: [facility] MAIN OR;  Service: Ophthalmology    PA XCAPSL CTRC RMVL INSJ IO LENS PROSTH W/O ECP Right 6/26/2017    Procedure: EXTRACTION EXTRACAPSULAR CATARACT PHACO INTRAOCULAR LENS (IOL); Surgeon: Iman Juarez MD;  Location: [facility] MAIN OR;  Service: Ophthalmology    TONSILLECTOMY         Meds/Allergies:    Prior to Admission medications    Medication Sig Start Date End Date Taking?  Authorizing Provider   amLODIPine (NORVASC) 5 mg tablet Take 1 tablet (5 mg total) by mouth every morning 7/13/20   Lew Hu MD   aspirin (ECOTRIN LOW STRENGTH) 81 mg EC tablet Take 81 mg by mouth daily with breakfast    Historical Provider, MD   Blood Glucose Monitoring Suppl (ONE TOUCH ULTRA 2) w/Device KIT by Does not apply route daily 8/6/20   Lew Hu MD   fenofibrate (TRIGLIDE) 160 MG tablet Take 1 tablet (160 mg total) by mouth every evening 19   Madina Singleton MD   glucose blood (OneTouch Ultra) test strip Use as instructed 20   Madina Singleton MD   Glycerin-Polysorbate 80 (REFRESH DRY EYE THERAPY OP) Apply to eye    Historical Provider, MD   Lancets (onetouch ultrasoft) lancets Use as instructed 20   Madina Singleton MD   levothyroxine 50 mcg tablet Take 1 tablet (50 mcg total) by mouth daily 20   Madina Singleton MD   meloxicam (MOBIC) 15 mg tablet  21   Historical Provider, MD   metFORMIN (GLUCOPHAGE) 500 mg tablet Take one in AM and two in PM  Patient taking differently: Take 500 mg by mouth 2 (two) times a day with meals  19   Madina Singleton MD   metoprolol succinate (TOPROL-XL) 25 mg 24 hr tablet Take 25 mg by mouth daily  20   Historical Provider, MD   Multiple Vitamins-Minerals (CENTRUM SILVER ADULT 50+ PO) Take by mouth daily with breakfast    Historical Provider, MD   olmesartan-hydrochlorothiazide (BENICAR HCT) 40-25 MG per tablet Take 1 tablet by mouth every morning 20   Madina Singleton MD     Patient's son is going to obtain a list of her medications tonight when he returns to his mother's house to check her prescriptions  Allergies:    Allergies   Allergen Reactions    Amoxicillin GI Intolerance    Lactose GI Intolerance    Sulfa Antibiotics GI Intolerance    Lidocaine Rash     Lidocaine patch       Social History:    Social History     Substance and Sexual Activity   Alcohol Use Yes    Alcohol/week: 1 0 standard drinks    Types: 1 Standard drinks or equivalent per week    Comment: rarely     Social History     Tobacco Use   Smoking Status Former Smoker    Packs/day: 1     Years: 10     Pack years: 10 00    Types: Cigarettes    Quit date:     Years since quittin 0   Smokeless Tobacco Never Used     Social History     Substance and Sexual Activity   Drug Use No       Family History:    Family History   Problem Relation Age of Onset    Early death Mother         CHF    Early death Father         MI    Cancer Sister         stomach lymphoma    No Known Problems Son        Physical Exam:     Vitals:   Blood Pressure: (!) 175/75 (02/03/21 0630)  Pulse: 74 (02/03/21 0630)  Temperature: 98 7 °F (37 1 °C) (02/02/21 2108)  Temp Source: Oral (02/02/21 2108)  Respirations: 17 (02/03/21 0630)  SpO2: 94 % (02/03/21 0630)    Physical Exam  Vitals signs reviewed  Constitutional:       General: She is not in acute distress  Appearance: She is well-developed  She is not ill-appearing  Comments: Pleasant and cooperative, oriented to self only     HENT:      Head: Normocephalic and atraumatic  Cardiovascular:      Rate and Rhythm: Normal rate and regular rhythm  Heart sounds: Normal heart sounds  No murmur  Pulmonary:      Effort: Pulmonary effort is normal  No respiratory distress  Breath sounds: Normal breath sounds  No wheezing, rhonchi or rales  Abdominal:      General: Bowel sounds are normal  There is no distension  Palpations: Abdomen is soft  Tenderness: There is no abdominal tenderness  There is no guarding  Musculoskeletal:         General: No swelling or tenderness  Skin:     General: Skin is warm and dry  Findings: No erythema or rash  Neurological:      Mental Status: She is alert  Sensory: No sensory deficit  Motor: No weakness (no unilateral weakness, 4/5 strength in lower extremities)  Coordination: Coordination normal       Comments: Patient is unable to tell me her last name, states the year is 2020, and the month is 711  She is able to name objects such as pen, stethoscope, watch but unable to state why she is here   She is frequently has difficulty findings words and sometimes requires repeat directions throughout the physical exam to complete aspects of the neuro exam such as finger-to-nose and strength testing   Psychiatric:         Mood and Affect: Mood normal  Behavior: Behavior normal          Additional Data:     Lab Results: I have personally reviewed pertinent reports  Results from last 7 days   Lab Units 02/02/21  2122   WBC Thousand/uL 9 27   HEMOGLOBIN g/dL 12 9   HEMATOCRIT % 41 2   PLATELETS Thousands/uL 355   NEUTROS PCT % 65   LYMPHS PCT % 22   MONOS PCT % 9   EOS PCT % 2     Results from last 7 days   Lab Units 02/02/21 2122   SODIUM mmol/L 132*   POTASSIUM mmol/L 4 3   CHLORIDE mmol/L 97*   CO2 mmol/L 26   BUN mg/dL 18   CREATININE mg/dL 0 90   ANION GAP mmol/L 9   CALCIUM mg/dL 8 9   ALBUMIN g/dL 3 6   TOTAL BILIRUBIN mg/dL 0 40   ALK PHOS U/L 116   ALT U/L 35   AST U/L 22   GLUCOSE RANDOM mg/dL 165*         Results from last 7 days   Lab Units 02/03/21  0317 02/02/21  2100   POC GLUCOSE mg/dl 152* 144*               Imaging: I have personally reviewed pertinent reports  CT head without contrast   Final Result by Matilda Lazaro DO (02/03 1634)      No acute intracranial abnormality  Unchanged age-indeterminate right basal ganglia lacunar infarcts  Workstation performed: [id number]         XR chest 1 view portable   Final Result by Piper Garf MD (02/03 2467)      No acute cardiopulmonary disease  Workstation performed: [id number]         CT head without contrast   Final Result by Terrell Hanson DO (02/02 2151)   No acute intracranial abnormality  Age-indeterminate probably old lacunar infarcts in the right basal ganglion  Partial right mastoid effusion  Workstation performed: [id number]         MRI Inpatient Order    (Results Pending)   VAS carotid complete study (*Order only if CTA has not been completed*)    (Results Pending)       EKG, Pathology, and Other Studies Reviewed on Admission:   · EKG:  NSR, 75 BPM    Allscripts / Epic Records Reviewed: Yes     ** Please Note: This note has been constructed using a voice recognition system   **

## 2021-02-03 NOTE — ED NOTES
Pt more awake at this time  Patient able to turn towards RN when I called her name at this time        Lulu Barksdale, [facility]  02/03/21 4294

## 2021-02-03 NOTE — ED PROVIDER NOTES
History  Chief Complaint   Patient presents with    Altered Mental Status     pt presents to the ed with altered mental status starting today (according to family)  the person is alter to person and place but not time      HPI     14-year-old female presents to the emergency department for further evaluation  She presents with her family via private vehicle  She presents to the ER for evaluation of altered mental status  Patient's son accompanies patient to ER  Patient's son states that patient has intermittently poor memory  Last time completely normal last week and when they went out to eat  Noticed this afternoon that patient was increasingly confused  He went to her house, noted that she was watching TV however the TV was not on  Patient's son states that it appeared patient took her medications as prescribed today  No report or evidence of fall or trauma  Prior to Admission Medications   Prescriptions Last Dose Informant Patient Reported? Taking?    Blood Glucose Monitoring Suppl (ONE TOUCH ULTRA 2) w/Device KIT   No No   Sig: by Does not apply route daily   Glycerin-Polysorbate 80 (REFRESH DRY EYE THERAPY OP)  Self Yes No   Sig: Apply to eye   Lancets (onetouch ultrasoft) lancets   No No   Sig: Use as instructed   Multiple Vitamins-Minerals (CENTRUM SILVER ADULT 50+ PO)  Self Yes No   Sig: Take by mouth daily with breakfast   amLODIPine (NORVASC) 5 mg tablet   No No   Sig: Take 1 tablet (5 mg total) by mouth every morning   aspirin (ECOTRIN LOW STRENGTH) 81 mg EC tablet  Self Yes No   Sig: Take 81 mg by mouth daily with breakfast   fenofibrate (TRIGLIDE) 160 MG tablet   No No   Sig: Take 1 tablet (160 mg total) by mouth every evening   glucose blood (OneTouch Ultra) test strip   No No   Sig: Use as instructed   levothyroxine 50 mcg tablet   No No   Sig: Take 1 tablet (50 mcg total) by mouth daily   meloxicam (MOBIC) 15 mg tablet   Yes No   metFORMIN (GLUCOPHAGE) 500 mg tablet   No No   Sig: Take one in AM and two in PM   Patient taking differently: Take 500 mg by mouth 2 (two) times a day with meals    metoprolol succinate (TOPROL-XL) 25 mg 24 hr tablet  Self Yes No   Sig: Take 25 mg by mouth daily    olmesartan-hydrochlorothiazide (BENICAR HCT) 40-25 MG per tablet   No No   Sig: Take 1 tablet by mouth every morning      Facility-Administered Medications: None       Past Medical History:   Diagnosis Date    Anemia     Anxiety     Arthritis     Chronic UTI     better since urethral stretching    Cirrhosis of liver ([facility] ) 9/2/2019    Current moderate episode of major depressive disorder without prior episode ([facility] ) 1/14/2020    Diabetes mellitus (HCC)     type 2    GERD (gastroesophageal reflux disease)     diet controlled    High triglycerides     Hypertension     Irritable bowel syndrome     Other specified hypothyroidism 5/13/2020    Primary osteoarthritis of both knees 1/20/2016       Past Surgical History:   Procedure Laterality Date    APPENDECTOMY      age 14   [facility] GUM SURGERY      tumor removal benign x3    NJ XCAPSL CTRC RMVL INSJ IO LENS PROSTH W/O ECP Left 5/15/2017    Procedure: EXTRACTION EXTRACAPSULAR CATARACT PHACO INTRAOCULAR LENS (IOL); Surgeon: Joe Rivera MD;  Location: [facility] MAIN OR;  Service: Ophthalmology    NJ XCAPSL CTRC RMVL INSJ IO LENS PROSTH W/O ECP Right 6/26/2017    Procedure: EXTRACTION EXTRACAPSULAR CATARACT PHACO INTRAOCULAR LENS (IOL); Surgeon: Joe Rivera MD;  Location: [facility] OR;  Service: Ophthalmology    TONSILLECTOMY         Family History   Problem Relation Age of Onset    Early death Mother         CHF    Early death Father         MI    Cancer Sister         stomach lymphoma    No Known Problems Son      I have reviewed and agree with the history as documented      E-Cigarette/Vaping    E-Cigarette Use Never User      E-Cigarette/Vaping Substances     Social History     Tobacco Use    Smoking status: Former Smoker     Packs/day: 1 00 Years: 10 00     Pack years: 10 00     Types: Cigarettes     Quit date:      Years since quittin 0    Smokeless tobacco: Never Used   Substance Use Topics    Alcohol use: Yes     Alcohol/week: 1 0 standard drinks     Types: 1 Standard drinks or equivalent per week     Comment: rarely    Drug use: No       Review of Systems   Constitutional: Negative for fatigue and unexpected weight change  Respiratory: Negative for chest tightness and shortness of breath  Neurological: Positive for weakness  All other systems reviewed and are negative  Physical Exam  Physical Exam  Vitals signs and nursing note reviewed  Constitutional:       General: She is not in acute distress  Appearance: She is well-developed  HENT:      Head: Normocephalic and atraumatic  Eyes:      Pupils: Pupils are equal, round, and reactive to light  Neck:      Musculoskeletal: Normal range of motion and neck supple  Cardiovascular:      Rate and Rhythm: Normal rate and regular rhythm  Heart sounds: Normal heart sounds  No murmur  Pulmonary:      Effort: Pulmonary effort is normal  No respiratory distress  Breath sounds: Normal breath sounds  No wheezing or rales  Abdominal:      General: Bowel sounds are normal  There is no distension  Palpations: Abdomen is soft  Tenderness: There is no abdominal tenderness  There is no guarding or rebound  Musculoskeletal: Normal range of motion  General: No deformity  Lymphadenopathy:      Cervical: No cervical adenopathy  Skin:     Capillary Refill: Capillary refill takes less than 2 seconds  Findings: No erythema or rash  Neurological:      Mental Status: She is alert and oriented to person, place, and time  Cranial Nerves: No cranial nerve deficit  Motor: No abnormal muscle tone        Coordination: Coordination normal       Comments: Normal cranial nerve exam   Normal strength and sensation bilateral upper and lower extremities  Oriented to person, place, situation but not time  5/5 strength in bilateral upper and lower extremities  Normal coordination, normal finger-to-nose  Unable to name simple objects   Psychiatric:         Behavior: Behavior normal          Vital Signs  ED Triage Vitals   Temperature Pulse Respirations Blood Pressure SpO2   02/02/21 2108 02/02/21 2055 02/02/21 2055 02/02/21 2055 02/02/21 2055   98 7 °F (37 1 °C) 86 19 (!) 200/78 98 %      Temp Source Heart Rate Source Patient Position - Orthostatic VS BP Location FiO2 (%)   02/02/21 2108 02/02/21 2055 02/02/21 2108 02/02/21 2108 --   Oral Monitor Sitting Right arm       Pain Score       --                  Vitals:    02/02/21 2230 02/02/21 2245 02/02/21 2330 02/03/21 0000   BP: 150/74 157/74 (!) 166/104 168/86   Pulse: 72 70 70 74   Patient Position - Orthostatic VS:   Sitting Sitting         Visual Acuity      ED Medications  Medications   sodium chloride 0 9 % bolus 250 mL (0 mL Intravenous Stopped 2/2/21 2210)   cefTRIAXone (ROCEPHIN) IVPB (premix in dextrose) 1,000 mg 50 mL (0 mg Intravenous Stopped 2/3/21 0013)   aspirin tablet 325 mg (325 mg Oral Given 2/2/21 2340)       Diagnostic Studies  Results Reviewed     Procedure Component Value Units Date/Time    Urine Microscopic [[id number]]  (Abnormal) Collected: 02/02/21 2207    Lab Status: Final result Specimen: Urine, Straight Cath Updated: 02/02/21 2230     RBC, UA 1-2 /hpf      WBC, UA 20-30 /hpf      Epithelial Cells Occasional /hpf      Bacteria, UA Innumerable /hpf     Urine culture [[id number]] Collected: 02/02/21 2207    Lab Status:  In process Specimen: Urine, Straight Cath Updated: 02/02/21 2230    UA w Reflex to Microscopic w Reflex to Culture [[id number]]  (Abnormal) Collected: 02/02/21 2207    Lab Status: Final result Specimen: Urine, Straight Cath Updated: 02/02/21 2214     Color, UA Light Yellow     Clarity, UA Cloudy     Specific Gravity, UA 1 015     pH, UA 7 0     Leukocytes, UA Negative     Nitrite, UA Negative     Protein,  (2+) mg/dl      Glucose, UA Negative mg/dl      Ketones, UA Negative mg/dl      Urobilinogen, UA 0 2 E U /dl      Bilirubin, UA Negative     Blood, UA Negative    Ammonia [[id number]]  (Abnormal) Collected: 02/02/21 2122    Lab Status: Final result Specimen: Blood from Arm, Left Updated: 02/02/21 2201     Ammonia <10 umol/L     TSH, 3rd generation with Free T4 reflex [[id number]]  (Normal) Collected: 02/02/21 2122    Lab Status: Final result Specimen: Blood from Arm, Left Updated: 02/02/21 2200     TSH 3RD GENERATON 3 333 uIU/mL     Narrative:      Patients undergoing fluorescein dye angiography may retain small amounts of fluorescein in the body for 48-72 hours post procedure  Samples containing fluorescein can produce falsely depressed TSH values  If the patient had this procedure,a specimen should be resubmitted post fluorescein clearance  Magnesium [[id number]]  (Normal) Collected: 02/02/21 2122    Lab Status: Final result Specimen: Blood from Arm, Left Updated: 02/02/21 2200     Magnesium 1 6 mg/dL     Lipase [[id number]]  (Normal) Collected: 02/02/21 2122    Lab Status: Final result Specimen: Blood from Arm, Left Updated: 02/02/21 2200     Lipase 010 u/L     Salicylate level [[id number]]  (Abnormal) Collected: 02/02/21 2122    Lab Status: Final result Specimen: Blood from Arm, Left Updated: 07/96/93 4947     Salicylate Lvl <3 mg/dL     Acetaminophen level-If concentration is detectable, please discuss with medical  on call   [[id number]]  (Abnormal) Collected: 02/02/21 2122    Lab Status: Final result Specimen: Blood from Arm, Left Updated: 02/02/21 2200     Acetaminophen Level <2 ug/mL     Troponin I [[id number]]  (Normal) Collected: 02/02/21 2122    Lab Status: Final result Specimen: Blood from Arm, Left Updated: 02/02/21 2152     Troponin I <0 02 ng/mL     Comprehensive metabolic panel [[id number]]  (Abnormal) Collected: 02/02/21 2122    Lab Status: Final result Specimen: Blood from Arm, Left Updated: 02/02/21 2151     Sodium 132 mmol/L      Potassium 4 3 mmol/L      Chloride 97 mmol/L      CO2 26 mmol/L      ANION GAP 9 mmol/L      BUN 18 mg/dL      Creatinine 0 90 mg/dL      Glucose 165 mg/dL      Calcium 8 9 mg/dL      AST 22 U/L      ALT 35 U/L      Alkaline Phosphatase 116 U/L      Total Protein 7 0 g/dL      Albumin 3 6 g/dL      Total Bilirubin 0 40 mg/dL      eGFR 60 ml/min/1 73sq m     Narrative:      National Kid[employer] Disease Foundation guidelines for Chronic Kidney Disease (CKD):     Stage 1 with normal or high GFR (GFR > 90 mL/min/1 73 square meters)    Stage 2 Mild CKD (GFR = 60-89 mL/min/1 73 square meters)    Stage 3A Moderate CKD (GFR = 45-59 mL/min/1 73 square meters)    Stage 3B Moderate CKD (GFR = 30-44 mL/min/1 73 square meters)    Stage 4 Severe CKD (GFR = 15-29 mL/min/1 73 square meters)    Stage 5 End Stage CKD (GFR <15 mL/min/1 73 square meters)  Note: GFR calculation is accurate only with a steady state creatinine    Ethanol [[id number]]  (Normal) Collected: 02/02/21 2122    Lab Status: Final result Specimen: Blood from Arm, Left Updated: 02/02/21 2150     Ethanol Lvl <3 mg/dL     CBC and differential [[id number]]  (Abnormal) Collected: 02/02/21 2122    Lab Status: Final result Specimen: Blood from Arm, Left Updated: 02/02/21 2131     WBC 9 27 Thousand/uL      RBC 4 72 Million/uL      Hemoglobin 12 9 g/dL      Hematocrit 41 2 %      MCV 87 fL      MCH 27 3 pg      MCHC 31 3 g/dL      RDW 13 3 %      MPV 10 1 fL      Platelets 233 Thousands/uL      nRBC 0 /100 WBCs      Neutrophils Relative 65 %      Immat GRANS % 1 %      Lymphocytes Relative 22 %      Monocytes Relative 9 %      Eosinophils Relative 2 %      Basophils Relative 1 %      Neutrophils Absolute 6 08 Thousands/µL      Immature Grans Absolute 0 09 Thousand/uL      Lymphocytes Absolute 1 99 Thousands/µL      Monocytes Absolute 0 81 Thousand/µL      Eosinophils Absolute 0 19 Thousand/µL      Basophils Absolute 0 11 Thousands/µL     Fingerstick Glucose (POCT) [[id number]]  (Abnormal) Collected: 02/02/21 2100    Lab Status: Final result Updated: 02/02/21 2111     POC Glucose 144 mg/dl                  CT head without contrast   Final Result by Rosalia Bertrand DO (02/02 2151)   No acute intracranial abnormality  Age-indeterminate probably old lacunar infarcts in the right basal ganglion  Partial right mastoid effusion  Workstation performed: [id number]         XR chest 1 view portable    (Results Pending)              Procedures  ECG 12 Lead Documentation Only    Date/Time: 2/2/2021 9:18 PM  Performed by: Eric Wilson MD  Authorized by: Eric Wilson MD     Indications / Diagnosis:  Altered mental status  ECG reviewed by me, the ED Provider: yes    Patient location:  ED  Interpretation:     Interpretation: normal    Rate:     ECG rate:  75    ECG rate assessment: normal    Rhythm:     Rhythm: sinus rhythm    Ectopy:     Ectopy: none    QRS:     QRS axis:  Normal    QRS intervals:  Normal  Conduction:     Conduction: normal    ST segments:     ST segments:  Normal  T waves:     T waves: normal               ED Course                             SBIRT 22yo+      Most Recent Value   SBIRT (24 yo +)   In order to provide better care to our patients, we are screening all of our patients for alcohol and drug use  Would it be okay to ask you these screening questions? Unable to answer at this time Filed at: 02/02/2021 2132                    MDM  Number of Diagnoses or Management Options  Altered mental status: new and requires workup  Lacunar infarction [facility]): new and requires workup  UTI (urinary tract infection): new and requires workup  Diagnosis management comments: Altered mental status, no clear onset of symptoms  Nonfocal exam   Patient unable to name pen and thought TV was a computer    No clear time of onset or window, no stroke alert initiated  Urine microscopic 20-30 white blood cells, innumerable bacteria  History of UTIs  Will treat with Rocephin  CT with age indeterminate but probably chronic lacunar infarct  Patient given aspirin  Will need admission for further evaluation of altered mental status, chronic lacunar infarct  Admit to hospitalist service  Amount and/or Complexity of Data Reviewed  Clinical lab tests: ordered and reviewed  Tests in the radiology section of CPT®: ordered and reviewed  Tests in the medicine section of CPT®: ordered and reviewed  Obtain history from someone other than the patient: yes  Discuss the patient with other providers: yes  Independent visualization of images, tracings, or specimens: yes    Risk of Complications, Morbidity, and/or Mortality  Presenting problems: high  Diagnostic procedures: moderate    Patient Progress  Patient progress: stable      Disposition  Final diagnoses: Altered mental status   UTI (urinary tract infection)   Lacunar infarction [facility])     Time reflects when diagnosis was documented in both MDM as applicable and the Disposition within this note     Time User Action Codes Description Comment    2/3/2021 12:23 AM Sharlene Left Add [R41 82] Altered mental status     2/3/2021 12:23 AM Sharlene Left Add [N39 0] UTI (urinary tract infection)     2/3/2021 12:23 AM Sharlene Left Add [I63 81] Lacunar infarction [facility])       ED Disposition     ED Disposition Condition Date/Time Comment    Admit Stable Wed Feb 3, 2021 12:23 AM Case was discussed with [facility] and the patient's admission status was agreed to be Admission Status: inpatient status to the service of Dr Enzo Cheema   Follow-up Information    None         Patient's Medications   Discharge Prescriptions    No medications on file     No discharge procedures on file      PDMP Review     None          ED Provider  Electronically Signed by           Yamileth Simental MD  02/03/21 0177

## 2021-02-03 NOTE — ED NOTES
Pt retuned from CT with this RN  Dr Oneida Telles, and pt's son at bedside at this time        [facility] Martin Maya RN  02/03/21 0346       Vandana Jiang RN  02/03/21 9411

## 2021-02-03 NOTE — ASSESSMENT & PLAN NOTE
Patient takes amlodipine 5 mg QAM, Toprol XL 25 mg and Benicar 40-25 QAM  Hold above medications for permissive HTN

## 2021-02-03 NOTE — CONSULTS
[address]   Neurology Initial Consult    Aman Peterson is a 79 y o  female  [address] [facility] and [facility]-*          Information obtained from:   Chief Complaint   Patient presents with    Altered Mental Status     pt presents to the ed with altered mental status starting today (according to family)  the person is alter to person and place but not time          Assessment/Plan:    1  Altered mental status  2  Chronic right basal ganglion ischemic infarct  3  New onset seizure activity- in the ER and witnessed with prolonged postictal state  4  Expressive aphasia, possible receptive aphasia  5  HLD-    6  HTN   7  DM- A1c 7 2    - monitor on telemetry  - neuro assessments  - seizure precautions  - fall precaution  - aspiration precaution  - aspirin 81 mg daily  - Lipitor 40 mg daily  - blood pressure control per medical management  - MRI of the brain  - eeg- may need to consider AEDs for further seizure-like activity  Were prolonged postictal state  - patient echocardiogram completed- EF 55-60% with mildly to moderately dilated left atrium, no atrial septal defect or PFO seen  Right  Atrium normal in size  - labs A1c and lipid profile  -PT/ OT  - speech therapy  - possible need for skilled nursing facility pending outcome of PT/OT recommendations    Patient is an 48-year-old female who lives independently in her own home, was well at her baseline yesterday afternoon when she began having increased confusion  Patient's son was on the phone with her when he noted she was saying abnormal things  He lives in our way drove to her home at which point she was not having appropriate conversation  Son reports that she indicated she was watching TV when the TV was not even on  Patient was brought to the ER and was had continued to have issues with naming as well as hypertensive at 200/78  Patient had some abnormal findings on her UA microbiology with mild hyponatremia    She received IV fluids and IV Rocephin at that time as well as full-dose aspirin  While patient was in the ER she had witnessed onset of seizure with twitching and eyes rolling with nonresponsive behaviors  It is unclear how long the seizure lasted or if there was postictal confusion upon her arousal     Today at bedside, patient is awake and alert however unable to determine if she is oriented at this time  Patient has poor naming, repetition and possible comprehension  She is only able really to respond with yes no and I am okay  Will order MRI of the brain for further evaluation of any acute ischemic changes as well as EEG for monitoring and evaluation of possible seizure-like activity  Patient does have some physical changes with continued closure of right eye, question ongoing prolonged postictal phases  May need to consider use of AED for resolution  All testing except echocardiogram remains pending  Marilyn Perrin will need follow up in 2-3 months with general attending or advance practitioner  She will not require outpatient neurological testing  HPI:  Pari Santos is a 78yo  Female from home who was brought to the ER after family members noting an increase in her disorientation and confusion yesterday evening  Per patient's son she was doing well through the day she had an office appointment with her PCP in the afternoon and noted that throughout the afternoon she began having increased confusion  Patient's son does live about a however away from her and last time he was able to see her was approximately 1 week prior  In review of PCPs note as of 15 40, no indication of abnormality noted during that exam     Upon arrival to the ER, patient had reports of feeling weakness, she does have baseline weakness in her bilateral lower extremities although she walks independently without DME  ER MD noted patient having poor naming during his exam and her blood pressure 200/78    Patient has some abnormal findings on labs including sodium level 130 to positive WBCs in innumerable bacteria on her UA however was negative for leukocytes or nitrites  Patient was taken for [facility] noting no acute infarct however does indicate chronic right basal ganglion infarct  While patient in the ER she had an onset of seizure activity with twitching and eye rolling and was nonresponsive  Patient received IV fluid and IV Rocephin in the ER started on full-dose aspirin as well  Patient was admitted to telemetry for further workup and evaluation of acute findings  Today at bedside, patient is awake and alert however unable to indicate her orientation  Patient is able to answer yes no questions and answers of permitted to her name and place however is limited in knowing patient's orientation  Patient unable to express review of symptoms for assist  With some aspects of neurological exam as she is unable to comprehend directions for testing including coordination testing  On exam, patient able to open bilateral eyes however has significant closure of the right eye frequently and at rest   Patient has equal symmetry to her face EOMs are intact  Patient replies affirmative to equal sensation bilaterally and is able to assist with some parts of this exam   Patient does move all extremities, withdrawals bilaterally but not on command  Increased pain cessation assist with patient's withdrawal and movement  Deferred ambulatory and Romberg due to fall risk at this time in altered mentation  No evidence of seizure-like activity during this exam however patient may be in continued postictal state and having further seizure-like activity will order EEG as well as MRI of the brain for further evaluation  May consider the addition of AEDs for further periods of unresponsiveness or seizure activity      Past Medical History:   Diagnosis Date    Anemia     Anxiety     Arthritis     Chronic UTI     better since urethral stretching    [facility] 9/2/2019    Current moderate episode of major depressive disorder without prior episode ([address] ) 1/14/2020    Diabetes mellitus ([facility] )     type 2    GERD (gastroesophageal reflux disease)     diet controlled    High triglycerides     Hypertension     Irritable bowel syndrome     Other specified hypothyroidism 5/13/2020    Primary osteoarthritis of both knees 1/20/2016       Past Surgical History:   Procedure Laterality Date    APPENDECTOMY      age 14   [place] GUM SURGERY      tumor removal benign x3    MS XCAPSL CTRC RMVL INSJ IO LENS PROSTH W/O ECP Left 5/15/2017    Procedure: EXTRACTION EXTRACAPSULAR CATARACT PHACO INTRAOCULAR LENS (IOL); Surgeon: Tristan Posadas MD;  Location: [facility] MAIN OR;  Service: Ophthalmology    MS XCAPSL CTRC RMVL INSJ IO LENS PROSTH W/O ECP Right 6/26/2017    Procedure: EXTRACTION EXTRACAPSULAR CATARACT PHACO INTRAOCULAR LENS (IOL);   Surgeon: Tristan Posadas MD;  Location: [facility] MAIN OR;  Service: Ophthalmology    TONSILLECTOMY         Allergies   Allergen Reactions    Amoxicillin GI Intolerance    Lactose GI Intolerance    Sulfa Antibiotics GI Intolerance    Lidocaine Rash     Lidocaine patch         Current Facility-Administered Medications:     aspirin (ECOTRIN LOW STRENGTH) EC tablet 81 mg, 81 mg, Oral, Daily With Breakfast, Sunni Mathews PA-C, 81 mg at 02/03/21 1026    atorvastatin (LIPITOR) tablet 40 mg, 40 mg, Oral, QPM, Sunni Mathews PA-C    heparin (porcine) subcutaneous injection 5,000 Units, 5,000 Units, Subcutaneous, Q8H [facility] & long-term, 5,000 Units at 02/03/21 1517 **AND** [CANCELED] Platelet count, , , Once, Sunni Mathews PA-C    insulin lispro (HumaLOG) 100 units/mL subcutaneous injection 1-6 Units, 1-6 Units, Subcutaneous, TID AC **AND** Fingerstick Glucose (POCT), , , TID AC, Sunni Mathews PA-C    insulin lispro (HumaLOG) 100 units/mL subcutaneous injection 1-6 Units, 1-6 Units, Subcutaneous, HS, Sunni Mathews PA-C   levothyroxine tablet 50 mcg, 50 mcg, Oral, Early Morning, Arthur Knox PA-C, 50 mcg at 21 1026    LORazepam (ATIVAN) injection 1 mg, 1 mg, Intravenous, Q6H PRN, AMINATA Mark-HUMA    metoprolol succinate (TOPROL-XL) 24 hr tablet 25 mg, 25 mg, Oral, Daily, Arthur Knox, PA-C, 25 mg at 21 1025    Social History     Socioeconomic History    Marital status: /Civil Union     Spouse name: Not on file    Number of children: Not on file    Years of education: Not on file    Highest education level: Not on file   Occupational History    Not on file   Social Needs    Financial resource strain: Not on file    Food insecurity     Worry: Not on file     Inability: Not on file   [employer] needs     Medical: Not on file     Non-medical: Not on file   Tobacco Use    Smoking status: Former Smoker     Packs/day: 1 00     Years: 10 00     Pack years: 10 00     Types: Cigarettes     Quit date:      Years since quittin 0    Smokeless tobacco: Never Used   Substance and Sexual Activity    Alcohol use:  Yes     Alcohol/week: 1 0 standard drinks     Types: 1 Standard drinks or equivalent per week     Comment: rarely    Drug use: No    Sexual activity: Not on file   Lifestyle    Physical activity     Days per week: Not on file     Minutes per session: Not on file    Stress: Not on file   Relationships    Social connections     Talks on phone: Not on file     Gets together: Not on file     Attends [employer] service: Not on file     Active member of club or organization: Not on file     Attends meetings of clubs or organizations: Not on file     Relationship status: Not on file    Intimate partner violence     Fear of current or ex partner: Not on file     Emotionally abused: Not on file     Physically abused: Not on file     Forced sexual activity: Not on file   Other Topics Concern    Not on file   Social History Narrative    Not on file       Family History   Problem Relation Age of Onset    Early death Mother         CHF    Early death Father         MI    Cancer Sister         stomach lymphoma    No Known Problems Son          Review of systems:  Unable to assess patient's review of systems, repeats that she is okay and only able to answer yes no questions  Physical examination:  BP (!) 184/79 (BP Location: Right arm)   Pulse 80   Temp 98 9 °F (37 2 °C) (Oral)   Resp 18   SpO2 97%     GENERAL APPEARANCE:  The patient is alert, unable to fully assess orientation   HEENT:  Head is normocephalic  Pupils are equal and reactive  NECK:  Supple without lymphadenopathy  HEART:  Regular rate and rhythm  LUNGS:  No audible wheezing or stridor  ABDOMEN:  Soft, nontender, nondistended with good bowel sounds heard  EXTREMITIES:  Without cyanosis, clubbing or edema  Mental status: The patient is alert and attentive in exam     Speech is clear although limited to only yes, no and "Im OK"  Unable to name objects and appeared to have difficulty in comprehending command  Cranial nerves:  CN II: Visual fields are full to confrontation  Fundoscopic exam is normal with sharp discs and no vascular changes  Pupils are 3 mm and briskly reactive to light  CN III, IV, VI: At primary gaze, there is no eye deviation  CN V: Facial sensation is intact tin all 3 divisions bilaterally  Corneal responses are intact  CN VII: Face is symmetric with normal eye closure and smile  CN VIII: Hearing is normal to rubbing fingers  CN IX, X: Palate elevates symmetrically  Phonation is normal   CN XI: Head turning and shoulder shrug are intact  CN XII: Tongue is midline with normal movements and no atrophy  Motor: There is no pronator drift of out-stretched arms  Muscle bulk and tone are slightly weak in all extremities, pt motor exam is limited unable to fully follow command  Pt does have withdrawal and able to lift against gravity as needed      Muscle exam  Arm Right Left Leg Right Left Deltoid 4/5 4/5 Iliopsoas 4/5 4/5   Biceps 4/5 4/5 Quads 4/5 4/5   Triceps 4/5 4/5 Hamstrings 4/5 4/5   Wrist Extension 4/5 4/5 Ankle Dorsi Flexion 4/5 4/5   Wrist Flexion 4/5 4/5 Ankle Plantar Flexion 4/5 4/5        Reflexes    RJ BJ TJ KJ AJ Plantars Shelley's   Right 2+ 2+ 2+ 2+ 2+ Downgoing Not present   Left 2+ 2+ 2+ 2+ 2+ Downgoing Not present      Sensory:  Pt unable to adequately respond to sensory testing  Has withdrawal in all extremities  Coordination:  Unable to test for dysmetria, pt unable to comprehend finger to nose and heel to shin  There are no abnormal or extraneous movements  Romberg deferred d/t fall risk   Gait/Stance:  Deferred due fall risk    Lab Results   Component Value Date    WBC 9 88 02/03/2021    HGB 12 5 02/03/2021    HCT 40 3 02/03/2021    MCV 88 02/03/2021     02/03/2021     Lab Results   Component Value Date    HGBA1C 7 2 (H) 02/03/2021     Lab Results   Component Value Date    ALT 35 02/02/2021    AST 22 02/02/2021    ALKPHOS 116 02/02/2021     Lab Results   Component Value Date    CALCIUM 9 0 02/03/2021    K 4 0 02/03/2021    CO2 26 02/03/2021    CL 99 (L) 02/03/2021    BUN 17 02/03/2021    CREATININE 0 74 02/03/2021     Chol 213        Review of reports and notes reveal:  Independent Interpretation of images or specimens:  Xr Chest 1 View Portable  Result Date: 2/3/2021  No acute cardiopulmonary disease  Workstation performed: [id number]     Ct Head Without Contrast  Result Date: 2/3/2021  No acute intracranial abnormality  Unchanged age-indeterminate right basal ganglia lacunar infarcts  Workstation performed: [id number]     Ct Head Without Contrast  Result Date: 2/2/2021  No acute intracranial abnormality  Age-indeterminate probably old lacunar infarcts in the right basal ganglion  Partial right mastoid effusion  Workstation performed: [id number]           Thank you for this consult      Total time of encounter: 70 Minutes  More than 50% of time was spent in counseling and coordination of care of patient

## 2021-02-03 NOTE — ED NOTES
Pt resting in bed with lights dimmed  Son at bedside  No signs of distress at this time        Maya Andrews, [facility]  48/00/35 0709

## 2021-02-03 NOTE — ED NOTES
Pt soiled sheet with urine  Pt cleaned and new linens applied to bed  Pt resting comfortably at this time        [address] [facility]  02/03/21 1886

## 2021-02-03 NOTE — ASSESSMENT & PLAN NOTE
Patient's son states she has some mild confusion at baseline, sometimes difficulty with short term recall and mixing up dates  Continue supportive care  Currently not back to baseline, see above  Appreciate neurology recommendations

## 2021-02-03 NOTE — PHYSICAL THERAPY NOTE
Cancellation     02/03/21 1419   PT Last Visit   PT Visit Date 02/03/21   Note Type   Note type Evaluation   Cancel Reasons Other  (Pt is getting an ECHO in room; will follow)   [place] [address] License Number  Lisa Mcdaniel PT  [id number]

## 2021-02-03 NOTE — ASSESSMENT & PLAN NOTE
Patient presented to the ED with concern for confusion, speech difficulties that started at 6pm  Patient is noted to have expressive aphasia in the ED and is unable to answer some questions appropriately  Patient's son last noted her to be normal around 3pm when he spoke to her on the phone  She lives alone at home and her son is an hour away  She was seen by her PCP via telemedicine visit today with no problems noted during the encounter  Post-ictal state in the ED vs  Evolving neuro symptoms from presentation in ED, currently confused in the ED, not answering some questions    - CT head showed microagniopathic change and age-indeterminate probably old lacunar infarct in R basal ganglion  - Hold anti-HTN meds  - Stroke pathway, neuro checks  - MRI, carotid dopplers in AM, echo w/ bubble study  - Neuro evaluation appreciated  - Monitor on telemetry  - Neuro checks  - Received ASA 324mg  - Lipid panel, HgA1C pending

## 2021-02-03 NOTE — PHYSICAL THERAPY NOTE
PT EVALUATION       02/03/21 1618   PT Last Visit   PT Visit Date 02/03/21   Note Type   Note type Evaluation   Pain Assessment   Pain Assessment Tool Chamorro-Baker FACES   Chamorro-Baker FACES Pain Rating 2   Pain Location/Orientation Location: Knee;Orientation: Bilateral   Home Living   Type of Home House   Additional Comments pt unable to give info   Prior Function   Level of [place] Independent with ADLs and functional mobility   Lives With Alone   Comments per chart: son lives 1 5 hours away   Restrictions/Precautions   Weight Bearing Precautions Per Order No   Other Precautions Cognitive; Chair Alarm; Bed Alarm   General   Additional Pertinent History admitted with AMS, expressive aphasia   Family/Caregiver Present No   Cognition   Overall Cognitive Status Impaired   Arousal/Participation Alert   Orientation Level Oriented to person   Following Commands Follows one step commands inconsistently   Comments does not follow through with task, needs continued direction   RUE Assessment   RUE Assessment WFL   LUE Assessment   LUE Assessment WFL   RLE Assessment   RLE Assessment WFL  (grossly 3+/5)   LLE Assessment   LLE Assessment WFL  (grossly 3+/5)   Bed Mobility   Supine to Sit 3  Moderate assistance   Additional items Assist x 1; Increased time required;LE management;Verbal cues   Sit to Supine 3  Moderate assistance   Additional items Assist x 1; Increased time required;Verbal cues;LE management   Additional Comments slow to respond to commands, needs frequent direction   Transfers   Sit to Stand 2  Maximal assistance   Additional items Assist x 1;Verbal cues   Stand to Sit 2  Maximal assistance   Additional items Assist x 1;Verbal cues   Ambulation/Elevation   Gait Assistance Not tested   Balance   Static Sitting Fair -   Dynamic Sitting Poor +   Static Standing Poor +   Dynamic Standing Poor   Activity Tolerance   Activity Tolerance Patient limited by fatigue;Treatment limited secondary to medical complications (Comment)  (limited by decreased mentation)   Nurse Made Aware yes: [place]   Assessment   Prognosis Good   Problem List Decreased strength;Decreased endurance; Impaired balance;Decreased mobility; Decreased coordination;Decreased cognition; Impaired judgement;Decreased safety awareness;Pain   Assessment Patient seen for Physical Therapy evaluation  Patient admitted with Speech abnormality  Comorbidities affecting patient's physical performance include:Arthritis, DM2, UTI, HTN, OA bilat  Knees, depressive disorder  Personal factors affecting patient at time of initial evaluation include: lives in single story house, ambulating with assistive device, stairs to enter home, inability to ambulate household distances, inability to navigate community distances, inability to navigate level surfaces without external assistance, decreased cognition, limited home support, decreased initiation and engagement, depression, limited insight into impairments, inability to perform ADLS, inability to perform IADLS  and inability to live alone  Prior to admission, patient was independent with functional mobility with possible Assistive device, independent with ADLS, requiring assist for IADLS, living alone in possible single story home with possibly a few  steps to enter and ambulating household distance  Please find objective findings from Physical Therapy assessment regarding body systems outlined above with impairments and limitations including weakness, impaired balance, decreased endurance, impaired coordination, gait deviations, pain, decreased activity tolerance, decreased functional mobility tolerance, decreased safety awareness, impaired judgement, fall risk and decreased cognition  The Barthel Index was used as a functional outcome tool presenting with a score of 15 today indicating marked limitations of functional mobility and ADLS    Patient's clinical presentation is currently unstable/unpredictable as seen in patient's presentation of varying levels of cognitive performance, increased fall risk, new onset of impairment of functional mobility, decreased endurance and new onset of weakness  Pt would benefit from continued Physical Therapy treatment to address deficits as defined above and maximize level of functional mobility  As demonstrated by objective findings, the assigned level of complexity for this evaluation is high  The patient's [facility] Basic Mobility Inpatient Short Form Raw Score is 9, Standardized Score is    A standardized score less than 42 9 suggests the patient may benefit from discharge to post-acute rehabilitation services  Please also refer to the recommendation of the Physical Therapist for safe discharge planning  Goals   Patient Goals unable to state due to mentation   STG Expiration Date 02/10/21   Short Term Goal #1 Min A for bedmobs and transfers to short sit   Short Term Goal #2 Min A for transfers sit <> stand to appropriate AD ; Able to amb  with /without AD for 20 feet with fair balance   LTG Expiration Date 02/17/21   Long Term Goal #1 Indep with bedmobs and transfers to short sit; Min A for transfer bed <> chair  Long Term Goal #2 Supervision/Indep for transfers sit <> stand; and for amb  with / without AD for 60 feet with good balance  Plan   Treatment/Interventions ADL retraining;Functional transfer training;LE strengthening/ROM; Therapeutic exercise; Endurance training;Cognitive reorientation;Patient/family training;Equipment eval/education; Bed mobility;Gait training;Spoke to nursing   PT Frequency 5x/wk   Recommendation   PT Discharge Recommendation Post-Acute Rehabilitation Services   Additional Comments Pt is very confused and disoriented, unable to give prior functional status or living status  According to chart, pt lives alone     [facility] Basic Mobility Inpatient   Turning in Bed Without Bedrails 2   Lying on Back to Sitting on Edge of Flat Bed 2   Moving Bed to Chair 1   Standing Up From Chair 2   Walk in Room 1   Climb 3-5 Stairs 1   Basic Mobility Inpatient Raw Score 9   Turning Head Towards Sound 4   Follow Simple Instructions 2   Low Function Basic Mobility Raw Score 15   Low Function Basic Mobility Standardized Score 23 9   Modified Sosa Scale   Modified [place] Scale 4   Barthel Index   Feeding 5   Bathing 0   Grooming Score 0   Dressing Score 0   Bladder Score 0   Bowels Score 5   Toilet Use Score 0   Transfers (Bed/Chair) Score 5   Mobility (Level Surface) Score 0   Stairs Score 0   Barthel Index Score 15   Licensure   [place] License Number  Tosin Serna PT [id number]     FREQUENCY OF TREATMENT SHOULD BE DAILY  (5X/WK IS IN ERROR ABOVE)    PT TREATMENT  [id number]  10 min  S: "I can't "  When asked to take some steps  O: Pt sit <> stand at side of bed x 3 reps with Max A progressing to Mod A  On last try standing attempted to take steps along bed; able to take one side step then sat herself back onto the bed  Returned to bed with Mod/Max A  Pt positioned back supine in bed with all needs in reach  RN aware  Bed alarm activated  A: PROM to LEs causes pain in her knees at end range of flexion  Pt is very confused and is slow to respond or to follow commands  Requires frequent verbal and tactile cues to complete a task  Pt will benefit from continue PT to progress functional mobility  P: Cont  As per plan   Recommend: STR  CKF

## 2021-02-03 NOTE — ASSESSMENT & PLAN NOTE
Patient had a witnessed seizure in the ED after admission  Timing unclear as patient's nurse went to check on the patient and found her twitching, eyes rolled back, not responding  ED physician assessed patient at bedside and ordered a STAT repeat CT head which showed no changes from the original CT head earlier in the night   Post-ictal in the ED   - Patient did receive ASA 324mg and ceftriaxone, otherwise no new meds while in ED   - No prior history of seizure  - CT head showed microangiopathic change and age-indeterminate probably old lacunar infarct in right basal ganglion  - Seizure precautions  - PRN ativan  - Neuro checks  - Neuro consult appreciated

## 2021-02-03 NOTE — SPEECH THERAPY NOTE
Speech Language/Pathology  Speech Language/Pathology  Speech-Language Evaluation    Impression:  Pt  Is verbal however exhibits global aphasia: moderate- severe receptive and expressive  At times, reflexive communication is intact  See below for details  Recommendation:  Speech and language therapy while inpatient for aphasia  Therapy Prognosis: fair  Prognosis considerations: medical status, age  Frequency: 3-4 sessions    Goals:  1) Pt  Will increase ability to follow 1 step commands with verbal cues with 90% acc  2) Pt  Will increase naming objects or pictures with verbal cues with 90% acc  3) Pt  Will increase overall communication skills to fill basic needs with visual and verbal cues with 80% acc  Current Medical Status:  Pt is a 79 y o  female who presented to [facility] with PMH of HTN, HLD, GERD, DM2, cirrhosis, depression, anxiety   She presented to the ED today with chief complaint of altered mental status per the patient's son  Shreyas Daniel son states that she does have a history of dementia and intermittently gets confused and has difficulty with short-term recall  [facility] initially stated she was last known normal when she went up PE with them 1 week ago, but he spoke with her multiple times over the phone throughout the day and she seemed to be fine   This afternoon his wife spoke with her over the phone at approximately 6:00 p m  And she noted her speech to be off, stated she was having difficulty finding words  Shreyas Daniel son drove to her house and noted her to be watching TV despite the TV being turned off in the room  She was oriented only to self and place initially in the ED  After she was admitted to the [facility] service but still in the ED, the patient had a witnessed seizure event  She does not have any history of seizures  Her nurse went into the room to check on her and found her twitching, eyes rolled back, and not responding   This episode lasted approximately 1-1 5 minutes before receiving any medication  She did receive  mg and ceftriaxone for possible UTI in the ED, otherwise no new medications  The son reports that she was treated with PO antibiotics about 2-3 weeks ago for a UTI but stated he hadn't heard anything more about further UTI-related complaints since then from his mom  Initially in the ED the patient was able to express to me that she did not have any headaches, lightheadedness, but does occasionally feel dizzy on and off  Her son didn't notice any facial droop or slurred speech, just difficulty getting words out            Past Medical History:  See H&P for details      Special Studies:   2/3/21: CT head wo contrast: No acute intracranial abnormality      Unchanged age-indeterminate right basal ganglia lacunar infarcts      2/2/21: Chest Xray: No acute cardiopulmonary disease      MRI pending       Social/Educational/vocational Hx:   Pt lives with family  Pt  Was unable to state amount of assistance she has at home  Language Evaluation:    Auditory Comprehension:  Pt  Was unable to follow simple 1 step commands with visual and verbal cues  She was unable to point to objects on command or pictures on command  Verbal Expression:  Auto Sequences:   Counting to 21: could not do- even with visual cues  Word Repetition: could not repeat  Conversation: rote, reflexive answers only  Some appropriate answers to yes/no questions  Able to make basic needs known? NO    Written Expression: DNT    Motor Speech:  Dysarthria: none    Apraxia: could not assess because pt  Could not follow directions     Oral Motor Skills:could not assess because pt   Could not follow directions      Cognitive -linguistic skills:  Could not fully assess because of patient's severe aphasia    Nick Murrieta MS CCC-SLP  Speech Language Pathologist  Available via [facility] License # [id number]  [address] [place] [address] St # [id number]

## 2021-02-03 NOTE — ED NOTES
Pt restless in bed, pt will not respond to my questions at this time        [address] [facility]  02/03/21 3275

## 2021-02-04 PROBLEM — R26.2 AMBULATORY DYSFUNCTION: Status: ACTIVE | Noted: 2021-01-01

## 2021-02-04 NOTE — PHYSICAL THERAPY NOTE
PT TREATMENT     02/04/21 1415   Note Type   Note Type Treatment   Pain Assessment   Pain Assessment Tool Pain Assessment not indicated - pt denies pain   Restrictions/Precautions   Other Precautions Cognitive; Chair Alarm; Bed Alarm; Fall Risk;Seizure   General   Chart Reviewed Yes   Cognition   Arousal/Participation Cooperative   Attention Within functional limits   Orientation Level Oriented to person;Oriented to place; Disoriented to time;Disoriented to situation   Following Commands Follows one step commands without difficulty   Subjective   Subjective "I feel OK today"   Bed Mobility   Supine to Sit 3  Moderate assistance   Sit to Supine 3  Moderate assistance   Transfers   Sit to Stand 3  Moderate assistance   Stand to Sit 3  Moderate assistance   Stand pivot 3  Moderate assistance   Toilet transfer 3  Moderate assistance   Additional items Standard toilet  (grab bar)   Ambulation/Elevation   Gait pattern   (slow alden)   Gait Assistance 4  Minimal assist   Assistive Device Rolling walker   Distance 2x50 feet, x 15 feet   Balance   Static Sitting Fair   Dynamic Sitting Fair -   Static Standing Fair   Dynamic Standing Fair -   Ambulatory Fair -   Activity Tolerance   Activity Tolerance Patient tolerated treatment well   Assessment   Prognosis Good   Problem List Decreased strength;Decreased endurance; Impaired balance;Decreased mobility   Assessment Pt is much more alert today and able to physically participate more in PT  Pt still needs at least moderate assist to rise from a chair and needs min assist to walk  Recommend STR upon DC as pt was living alone prior to admission  The patient's AM-PAC Basic Mobility Inpatient Short Form Low Function Raw Score 20 , Standardized Score is 32 8  A standardized score less 42 9 suggests the patient may benefit from discharge to post-acute rehab services  Plan   Treatment/Interventions ADL retraining;Functional transfer training;LE strengthening/ROM; Therapeutic exercise; Endurance training;Gait training;Bed mobility; Equipment eval/education;Patient/family training;Cognitive reorientation   Progress Progressing toward goals   PT Frequency 5x/wk   Recommendation   PT Discharge Recommendation Post-Acute Rehabilitation Services   AM-PAC Basic Mobility Inpatient   Turning in Bed Without Bedrails 2   Lying on Back to Sitting on Edge of Flat Bed 2   Moving Bed to Chair 2   Standing Up From Chair 2   Walk in Room 3   Climb 3-5 Stairs 2   Basic Mobility Inpatient Raw Score 13   Basic Mobility Standardized Score 33 99   Turning Head Towards Sound 4   Follow Simple Instructions 3   Low Function Basic Mobility Raw Score 20   Low Function Basic Mobility Standardized Score 04 3   Licensure   [place] License Number  [place]  [id number]

## 2021-02-04 NOTE — CASE MANAGEMENT
CM s/w patient's son by phone to review recommendation for STR  Patient's son stating that he is agreeable to such, however requesting referrals in the [facility] area as that is where patient's son lives  CM explained that due to [place] visitation is not allowed at SNFs/STRs at this time  Patient's son verbalized understanding of this and still requested referrals to the [address] [place] [address]  CM reviewed bundle with patient's son by phone as patient is BUNDLE PATIENT: SEIZURES SNF LOS 15 -19  CM pended referrals to that zip [facility] as requested via [facility]

## 2021-02-04 NOTE — PLAN OF CARE
1) Pt  Will increase ability to follow 1 step commands with verbal cues with 90% acc  2) Pt  Will increase naming objects or pictures with verbal cues with 90% acc  3) Pt  Will increase overall communication skills to fill basic needs with visual and verbal cues with 80% acc  Pt  Will tolerate least restrictive diet with least restrictive liquids without s/s of aspiration over 3-4 sessions or as indicted by treating SLP  Pt  Will trial advanced materials with SLP for possible diet upgrade  SLP to determine if VBS with speech is indicated

## 2021-02-04 NOTE — TELEPHONE ENCOUNTER
----- Message from Tyrell Munson MD sent at 2/2/2021  4:27 PM EST -----  Regarding: retinopathy  02/02/21 4:28 PM    Hello, our patient Dat Israel has had Diabetic Eye Exam completed/performed  Please assist in updating the patient chart by making an External outreach to Dr Mane Frost, retin specialist facility located in [facility]  The date of service is dec 2020      Thank you,  Tyrell Munson MD  [facility] INTERNAL MED

## 2021-02-04 NOTE — PLAN OF CARE
Problem: Potential for Falls  Goal: Patient will remain free of falls  Description: INTERVENTIONS:  - Assess patient frequently for physical needs  -  Identify cognitive and physical deficits and behaviors that affect risk of falls    -  [place] fall precautions as indicated by assessment   - Educate patient/family on patient safety including physical limitations  - Instruct patient to call for assistance with activity based on assessment  - Modify environment to reduce risk of injury  - Consider OT/PT consult to assist with strengthening/mobility  Outcome: Progressing     Problem: NEUROSENSORY - ADULT  Goal: Achieves stable or improved neurological status  Description: INTERVENTIONS  - Monitor and report changes in neurological status  - Monitor vital signs such as temperature, blood pressure, glucose, and any other labs ordered   - Initiate measures to prevent increased intracranial pressure  - Monitor for seizure activity and implement precautions if appropriate      Outcome: Progressing  Goal: Remains free of injury related to seizures activity  Description: INTERVENTIONS  - Maintain airway, patient safety  and administer oxygen as ordered  - Monitor patient for seizure activity, document and report duration and description of seizure to physician/advanced practitioner  - If seizure occurs,  ensure patient safety during seizure  - Reorient patient post seizure  - Seizure pads on all 4 side rails  - Instruct patient/family to notify RN of any seizure activity including if an aura is experienced  - Instruct patient/family to call for assistance with activity based on nursing assessment  - Administer anti-seizure medications if ordered    Outcome: Progressing

## 2021-02-04 NOTE — OCCUPATIONAL THERAPY NOTE
Occupational Therapy Evaluation       02/04/21 1616   Note Type   Note type Evaluation   Restrictions/Precautions   Other Precautions Chair Alarm; Bed Alarm; Fall Risk   Pain Assessment   Pain Assessment Tool 0-10   Pain Score No Pain   Home Living   Type of Home House   Home Layout One level   Bathroom Shower/Tub Walk-in shower   Bathroom Toilet Standard  (Uses BSC over toilet)   Bathroom Equipment Grab bars in [facility]   (LIft chair)   Additional Comments Patient admitted with AMS, _seizure activity   Prior Function   Level of [place] Independent with ADLs and functional mobility   Lives With Alone   Receives Help From Family   ADL Assistance Independent   IADLs Needs assistance   ADL   Eating Assistance 5  Supervision/Setup   Grooming Assistance 5  Supervision/Setup   UB Bathing Assistance 3  Moderate Assistance   LB Bathing Assistance 3  Moderate Assistance   UB Dressing Assistance 3  Moderate Assistance   LB Dressing Assistance 3  Moderate [address]  3  Moderate Assistance   Bed Mobility   Additional Comments Not assessed, patient received seated in bedside chair   Transfers   Sit to Stand 3  Moderate assistance   Additional items Assist x 1  (From low bedside chair)   Stand to Sit 3  Moderate assistance   Additional items Assist x 1   Toilet transfer 4  Minimal assistance   Additional items Assist x 1  (BSC (height raised) , ambulatory transfer with RW)   Additional Comments STS from lower surfaces with mod assist, STS from higher surfaces (BSC) with min assist; patient reports having lift chair at home and using BSC over toilet to increase height   Functional Mobility   Functional Mobility 4  Minimal assistance   Additional Comments Patient ambulated short household distance in room with RW   Balance   Static Sitting Fair   Dynamic Sitting Fair   Static Standing Fair -   Dynamic Standing Fair -   Activity Tolerance   Activity Tolerance Patient limited by fatigue   MESHA Assessment   RUE Assessment WFL   LUE Assessment   LUE Assessment WFL   Hand Function   Gross Motor Coordination Functional   Fine Motor Coordination Functional   Cognition   Overall Cognitive Status WFL   Arousal/Participation Alert; Cooperative   Attention Within functional limits   Orientation Level Oriented X4   Following Commands Follows multistep commands with increased time or repetition   Assessment   Limitation Decreased ADL status; Decreased UE strength;Decreased Safe judgement during ADL;Decreased endurance;Decreased self-care trans;Decreased high-level ADLs   Prognosis Good   Assessment Patient evaluated by Occupational Therapy  Patient admitted with Speech abnormality  The patients occupational profile, medical and therapy history includes a extensive additional review of physical, cognitive, or psychosocial history related to current functional performance  Comorbidities affecting functional mobility and ADLS include: Arthritis, DM2, UTI, HTN, OA bilat  Knees, depressive disorder  Prior to admission, patient was independent with functional mobility without assistive device, independent with ADLS and requiring assist for IADLS  The evaluation identifies the following performance deficits: weakness, impaired balance, decreased endurance, increased fall risk, new onset of impairment of functional mobility, decreased ADLS, decreased IADLS, decreased activity tolerance, decreased safety awareness and decreased strength, that result in activity limitations and/or participation restrictions  This evaluation requires clinical decision making of high complexity, because the patient presents with comorbidites that affect occupational performance and required significant modification of tasks or assistance with consideration of multiple treatment options  The Barthel Index was used as a functional outcome tool presenting with a score of 35, indicating marked limitations of functional mobility and ADLS    The patient's raw score on the AM-PAC Daily Activity inpatient short form is 15, standardized score is 34 69, less than 39 4  Patients at this level are likely to benefit from DC to post-acute rehabilitation services  Please refer to the recommendation of the Occupational Therapist for safe DC planning  Patient will benefit from skilled Occupational Therapy services to address above deficits and facilitate a safe return to prior level of function  Goals   Patient Goals "I want to stand up on my own"   STG Time Frame   (1-7 days)   Short Term Goal  Goals established to promote patient goal of "standing up on my own":  Patient will increase standing tolerance to 5 minutes during ADL task to decrease assistance level and decrease fall risk; Patient will increase bed mobility to min assist in preparation for ADLS and transfers; Patient will increase functional mobility to and from bathroom with rolling walker with min assist to increase performance with ADLS and to use a toilet; Patient will tolerate 5 minutes of UE ROM/strengthening to increase general activity tolerance and performance in ADLS/IADLS; Patient will improve functional activity tolerance to 5 minutes of sustained functional tasks to increase participation in basic self-care and decrease assistance level;  Patient will be able to to verbalize understanding and perform energy conservation/proper body mechanics during ADLS and functional mobility at least 75% of the time with minimal cueing to decrease signs of fatigue and increase stamina to return to prior level of function; Patient will increase static/dynamic sitting balance to fair+ to improve the ability to sit at edge of bed or on a chair for ADLS;  Patient will increase static/dynamic standing balance to fair to improve postural stability and decrease fall risk during standing ADLS and transfers     LTG Time Frame   (8-14 days)   Long Term Goal Patient will increase standing tolerance to 10 minutes during ADL task to decrease assistance level and decrease fall risk; Patient will increase bed mobility to supervision in preparation for ADLS and transfers; Patient will increase functional mobility to and from bathroom with rolling walker with supervision to increase performance with ADLS and to use a toilet; Patient will tolerate 10 minutes of UE ROM/strengthening to increase general activity tolerance and performance in ADLS/IADLS; Patient will improve functional activity tolerance to 10 minutes of sustained functional tasks to increase participation in basic self-care and decrease assistance level;  Patient will be able to to verbalize understanding and perform energy conservation/proper body mechanics during ADLS and functional mobility at least 90% of the time with nocueing to decrease signs of fatigue and increase stamina to return to prior level of function; Patient will increase static/dynamic sitting balance to good to improve the ability to sit at edge of bed or on a chair for ADLS;  Patient will increase static/dynamic standing balance to fair+ to improve postural stability and decrease fall risk during standing ADLS and transfers  Functional Transfer Goals   Pt Will Perform All Functional Transfers   (STG min assist, LTG supervision)   ADL Goals   Pt Will Perform Eating   (LTG independent)   Pt Will Perform Grooming   (LTG independent)   Pt Will Perform Bathing   (STG min assist, LTG supervision)   Pt Will Perform UE Dressing   (STG min assist, LTG supervision)   Pt Will Perform LE Dressing   (STG min assist, LTG supervision)   Pt Will Perform Toileting   (STG min assist, LTG supervision)   Plan   Treatment Interventions ADL retraining;Functional transfer training;UE strengthening/ROM; Endurance training;Patient/family training;Equipment evaluation/education; Compensatory technique education;Continued evaluation; Energy conservation; Activityengagement   Goal Expiration Date 02/18/21   OT Frequency 3-5x/wk   Recommendation   OT Discharge Recommendation Post-Acute Rehabilitation Services   AM-PAC Daily Activity Inpatient   Lower Body Dressing 2   Bathing 2   Toileting 2   Upper Body Dressing 2   Grooming 3   Eating 4   Daily Activity Raw Score 15   Daily Activity Standardized Score (Calc for Raw Score >=11) 34 69   AM-PAC Applied Cognition Inpatient   Following a Speech/Presentation 4   Understanding Ordinary Conversation 4   Taking Medications 3   Remembering Where Things Are Placed or Put Away 4   Remembering List of 4-5 Errands 3   Taking Care of Complicated Tasks 3   Applied Cognition Raw Score 21   Applied Cognition Standardized Score 44 3   Barthel Index   Feeding 5   Bathing 0   Grooming Score 0   Dressing Score 5   Bladder Score 5   Bowels Score 10   Toilet Use Score 5   Transfers (Bed/Chair) Score 5   Mobility (Level Surface) Score 0   Stairs Score 0   Barthel Index Score 28   Licensure   [place] License Number  [id number]

## 2021-02-04 NOTE — PLAN OF CARE
Problem: Potential for Falls  Goal: Patient will remain free of falls  Description: INTERVENTIONS:  - Assess patient frequently for physical needs  -  Identify cognitive and physical deficits and behaviors that affect risk of falls    -  [place] fall precautions as indicated by assessment   - Educate patient/family on patient safety including physical limitations  - Instruct patient to call for assistance with activity based on assessment  - Modify environment to reduce risk of injury  - Consider OT/PT consult to assist with strengthening/mobility  Outcome: Progressing

## 2021-02-04 NOTE — PLAN OF CARE
Problem: Potential for Falls  Goal: Patient will remain free of falls  Description: INTERVENTIONS:  - Assess patient frequently for physical needs  -  Identify cognitive and physical deficits and behaviors that affect risk of falls    -  [place] fall precautions as indicated by assessment   - Educate patient/family on patient safety including physical limitations  - Instruct patient to call for assistance with activity based on assessment  - Modify environment to reduce risk of injury  - Consider OT/PT consult to assist with strengthening/mobility  Outcome: Progressing

## 2021-02-04 NOTE — SPEECH THERAPY NOTE
Speech Language/Pathology    Speech/Language Pathology Progress Note    Patient Name: Cher Prescott  [id number] Date: 2/4/2021     Problem List  Principal Problem:    Speech abnormality  Active Problems:    Benign essential hypertension    Diabetes mellitus ([facility] )    Mixed hyperlipidemia    MONSTER (generalized anxiety disorder)    Memory impairment    Seizure ([facility] )    Altered mental status         Subjective:  Pt  Much more alert and awake this AM  She has no recollection of the events that led her to the hospitalization  She is verbal and able to tell me her living arrangements and that she has a dog named Jessica  Pt  Had breakfast tray at her side  She was able to follow directions with no cues  Objective:  Pt  Was seen for dysphagia treatment as follow up to assess current diet tolerance and possible diet upgrade  Pt  Had pureed eggs and sausage with thin liquids  Also had a hard cookie for trial  No s/s of aspiration noted on breakfast  Pt  Did report that she gags often on foods at home  She is requesting a VBS because she is fearful of choking  Assessment:  Pt  Is more alert and awake  Able to answer questions and participate in conversations appropriately   No s/s of aspiration on breakfast and hard solid trial     Plan/Recommendations:  Upgrade to Dys 3 with thin liquids medications in puree  VBS due to patient concerns with gagging on foods and "scared to choke"  Speech to follow    Tristan Shook 89  Speech Language Pathologist  Available via [address] [facility] License # [id number]  [address] [facility] [address] # [id number]

## 2021-02-04 NOTE — PROGRESS NOTES
Neurology Consult Follow Up      Mickey Conner is a 79 y o  female  Carlos Bryant [birth year]-*    [id number]        Assessment/Recommendations:    1  Altered mental status  2  Chronic right basal ganglion ischemic infarct  3  New onset seizure activity- in the ER and witnessed with prolonged postictal state  4  Expressive aphasia, possible receptive aphasia  5  HLD-    6  HTN   7  DM- A1c 7 2     - monitor on telemetry  - neuro assessments  - seizure precautions  - fall precaution  - aspiration precaution  - IV Vimpat 200 mg x 1 dose then 100 mg twice a day  - aspirin 81 mg daily- he can continue her home regimen at discharge  - Lipitor 40 mg daily- recommend continued use after discharge  - blood pressure control per medical management  -  eeg-  Completed read pending    - patient echocardiogram completed- EF 55-60% with mildly to moderately dilated left atrium, no atrial septal defect or PFO seen  Right  Atrium normal in size  -PT/ OT  - speech therapy  - possible need for skilled nursing facility pending outcome of PT/OT recommendations     Patient is an 79-year-old female who lives independently in her own home, was well at her baseline yesterday afternoon when she began having increased confusion  Patient's son was on the phone with her when he noted she was saying abnormal things  He lives in our way drove to her home at which point she was not having appropriate conversation  Son reports that she indicated she was watching TV when the TV was not even on  Patient was brought to the ER and was had continued to have issues with naming as well as hypertensive at 200/78  Patient had some abnormal findings on her UA microbiology with mild hyponatremia  She received IV fluids and IV Rocephin at that time as well as full-dose aspirin  While patient was in the ER she had witnessed onset of seizure with twitching and eyes rolling with nonresponsive behaviors    It is unclear how long the seizure lasted or if there was postictal confusion upon her arousal    Yesterday at bedside, patient unable to respond appropriately, only able to say yes no and I am okay  Able to follow some commands however not others  Patient had ongoing closure of her right eye with right facial contracture, questioned continued or prolonged postictal state  Therefore patient started on Vimpat 200 mg, then started 100 mg twice a day  MRI of the brain completed today, no acute ischemic findings, advanced chronic microvascular ischemic changes noted as well as chronic lacunar infarct to right caudate head and thalamic capsular lacunar region     EEG completed today, read remains pending  This a m , patient is alert oriented x3  She is able to name objects have fluent conversation  Patient does have some issues with poor complex questions regarding purpose of some objects and some forgetfulness however cognitively she is improved since yesterday  Patient has equal strength bilaterally upper extremity stronger than lower extremity  Patient reports this is her baseline and she was recommended to use a cane or walker however she does not use this at home  Patient denies any history of seizure activity however describes esophageal spasming when she eats which she thought might have been seizures  Patient remains on Vimpat 100 mg b i d , continues her baby aspirin as taken at home and recommendations to continue Lipitor therapies as she has significant microvascular ischemic disease with LDL greater than 100       Blair Surya will need follow up in 2-3 months with general attending or advance practitioner  She will not require outpatient neurological testing      Chief Complaint:    Subjective:     Patient reports that she feels well today, has no recollection of events from yesterday  Denies any headache, dizziness, lightheadedness, nausea or vomiting       Patient has no reported history of seizure activity, reports that she  thought she was having seizures however as she described is more in line with esophageal spasms as this only occurred during eating  while patient was awake and alert  Past Medical History:   Diagnosis Date    Anemia     Anxiety     Arthritis     Chronic UTI     better since urethral stretching    Cirrhosis of liver ([facility] ) 2019    Current moderate episode of major depressive disorder without prior episode ([facility] ) 2020    Diabetes mellitus (HCC)     type 2    GERD (gastroesophageal reflux disease)     diet controlled    High triglycerides     Hypertension     Irritable bowel syndrome     Other specified hypothyroidism 2020    Primary osteoarthritis of both knees 2016     Social History     Socioeconomic History    Marital status: /Civil Union     Spouse name: Not on file    Number of children: Not on file    Years of education: Not on file    Highest education level: Not on file   Occupational History    Not on file   Social Needs    Financial resource strain: Not on file    Food insecurity     Worry: Not on file     Inability: Not on file   [employer] needs     Medical: Not on file     Non-medical: Not on file   Tobacco Use    Smoking status: Former Smoker     Packs/day: 1 00     Years: 10 00     Pack years: 10 00     Types: Cigarettes     Quit date:      Years since quittin 1    Smokeless tobacco: Never Used   Substance and Sexual Activity    Alcohol use:  Yes     Alcohol/week: 1 0 standard drinks     Types: 1 Standard drinks or equivalent per week     Comment: rarely    Drug use: No    Sexual activity: Not on file   Lifestyle    Physical activity     Days per week: Not on file     Minutes per session: Not on file    Stress: Not on file   Relationships    Social connections     Talks on phone: Not on file     Gets together: Not on file     Attends Zoroastrian service: Not on file     Active member of club or organization: Not on file     Attends meetings of clubs or organizations: Not on file     Relationship status: Not on file    Intimate partner violence     Fear of current or ex partner: Not on file     Emotionally abused: Not on file     Physically abused: Not on file     Forced sexual activity: Not on file   Other Topics Concern    Not on file   Social History Narrative    Not on file     Family History   Problem Relation Age of Onset    Early death Mother         CHF    Early death Father         MI    Cancer Sister         stomach lymphoma    No Known Problems Son        ROS:  Please see HPI for positive symptoms  No fever, no chills, no weight change  Ocular: No diplopia, no blurred vision, spot/zigzag lines   HEENT:  No sore throat, headache or congestion  No neck pain  COR:  No chest pain  No palpitations  Lungs:  no sob  GI:  no  nausea, no vomiting, no diarrhea, no constipation, no anorexia  :  No dysuria, frequency, or urgency  No hematuria  Musculoskeletal:  No joint pain or swelling   +weakness  Skin:  No rash or itching  Psychiatric:  no anxiety, no depression  Endocrine:  No polyuria or polydipsia  Objective:  /59   Pulse 75   Temp 97 6 °F (36 4 °C)   Resp 18   SpO2 93%     General: alert   Mental status: oriented x3  Attention: normal  Knowledge: fair, forgetfull  Unaware of yesterday events  Language and Speech: normal  Cranial nerves:   CN II: Visual fields are full to confrontation  Fundoscopic exam is normal with sharp discs and no vascular changes  Pupils are 3 mm and briskly reactive to light  CN III, IV, VI: At primary gaze, there is no eye deviation  CN V: Facial sensation is intact in all 3 divisions bilaterally  Corneal responses are intact  CN VII: Face is symmetrical, with normal eye closure and smile  CN VIII: Hearing is normal to rubbing fingers  CN IX, X: Palate elevates symmetrically   Phonation is normal   CN XI: Head turning and shoulder shrug are intact  CN XII: Tongue is midline with normal movements and no atrophy  Motor: There is no pronator drift of out-stretched arms  Muscle bulk and tone are globally weakened, decreased in the LE more than UE  Wilber Roulette Muscle exam  Arm Right Left Leg Right Left   Deltoid 4/5 4/5 Iliopsoas 3+/5 3+/5   Biceps 4/5 4/5 Quads 3+/5 3+/5   Triceps 4/5 4/5 Hamstrings 3+/5 3+/5   Wrist Extension 4/5 4/5 Ankle Dorsi Flexion 3+/5 3+/5   Wrist Flexion 4/5 4/5 Ankle Plantar Flexion 3+/5 3+/5       Sensory: normal to light touch, temperature, vibration  Proprioception intact  Gait: deferred for PT  Coordination: finger to nose and heel to toe with slight dysmetria in b/l LE     Reflexes    RJ BJ TJ KJ AJ Plantars Shelley's   Right 2+ 2+ 2+ 2+ 2+ Downgoing Not present   Left 2+ 2+ 2+ 2+ 2+ Downgoing Not present      Heart: Regular rate and rhythm  Lung: clear to auscultation   Abd: soft, non-tender, non-distended with positive bowel sounds in all quads  Skin: dry and intact    Labs:      Lab Results   Component Value Date    WBC 9 88 02/03/2021    HGB 12 5 02/03/2021    HCT 40 3 02/03/2021    MCV 88 02/03/2021     02/03/2021     Lab Results   Component Value Date    HGBA1C 7 2 (H) 02/03/2021     Lab Results   Component Value Date    ALT 35 02/02/2021    AST 22 02/02/2021    ALKPHOS 116 02/02/2021     Lab Results   Component Value Date    CALCIUM 9 0 02/03/2021    K 4 0 02/03/2021    CO2 26 02/03/2021    CL 99 (L) 02/03/2021    BUN 17 02/03/2021    CREATININE 0 74 02/03/2021     Chol 213      Review of reports and notes reveal:   Independent review of films/reports:  Xr Chest 1 View Portable  Result Date: 2/3/2021  No acute cardiopulmonary disease  Workstation performed: [id number]     Ct Head Without Contrast  Result Date: 2/3/2021  No acute intracranial abnormality  Unchanged age-indeterminate right basal ganglia lacunar infarcts     Workstation performed: [id number]     Ct Head Without Contrast  Result Date: 2/2/2021  No acute intracranial abnormality  Age-indeterminate probably old lacunar infarcts in the right basal ganglion  Partial right mastoid effusion  Workstation performed: [id number]     Mri Brain Wo Contrast  Result Date: 2/4/2021  No mass effect, acute intracranial hemorrhage or evidence of recent infarction  Advanced chronic microvascular ischemic change and chronic lacunar infarcts as described above  Workstation performed: [id number]     EEG complete and pending    Thank you for this consult      Total time of encounter:  30 min  More than 50% of the time was used in counseling and/or coordination of care  Extent of couseling and/or coordination of care

## 2021-02-04 NOTE — LETTER
Diabetic Eye Exam Form    Date Requested: 21  Patient: Cher Prescott  Patient : 1940   Referring Provider: Ygoi Hart MD    Dilated Retinal Exam, Optomap-Iris Exam, or Fundus Photography Done         Yes ([place] one above)         No     Date of Diabetic Eye Exam ______________________________  Left Eye      Exam did show retinopathy    Exam did not show retinopathy         Mild       Moderate       None       Proliferative       Severe     Right Eye     Exam did show retinopathy    Exam did not show retinopathy         Mild       Moderate       None       Proliferative       Severe     Comments __________________________________________________________    Practice Providing Exam ______________________________________________    Exam Performed By (print name) _______________________________________      Provider Signature ___________________________________________________      These reports are needed for  compliance    Please fax this completed form and a copy of the Diabetic Eye Exam report to our office located at [place] [address] as soon as possible to [phone] attention Dee: Phone [phone]    We thank you for your assistance in treating our mutual patient     (sent to [facility])

## 2021-02-04 NOTE — CASE MANAGEMENT
LOS - 1 day    SW met with pt to assess needs and discuss plans  Discussed goals of making sure pt's needs are met upon discharge and that Freedom of Choice is offered  Pt lives alone in her home  Per pt she was independent with ADLs and mobility PTA  Pt doesn't drive  Per pt she has a cane and walker at home that were her  's  No HHC/STR  No MH or D&A issues  Pt's PCP is Dr Angel Marr MD   Per pt she has prescription coverage and has no difficulty getting her medication as prescribed  Preferred pharmacy is [facility]  Pt does have POA/advanced directives  Per pt her son, Shona Velázquez, is her POA  Discussed discharge plans and needs with pt  STR placement is being recommended  Pt seemed to be having difficulty understanding why rehab was being offered and why returning home alone was not an option  SW will contact pt's son, Shona Velázquez, to discuss plans further  SW will continue to follow to monitor progress and assist with planning as needed

## 2021-02-05 NOTE — ASSESSMENT & PLAN NOTE
Patient presented to the ED with concern for confusion, speech difficulties that started at 6pm  Patient is noted to have expressive aphasia in the ED and is unable to answer some questions appropriately  Patient's son last noted her to be normal around 3pm when he spoke to her on the phone  She lives alone at home and her son is an hour away  She was seen by her PCP via telemedicine visit today with no problems noted during the encounter  Post-ictal state in the ED vs  Evolving neuro symptoms from presentation in ED, currently confused in the ED, not answering some questions  - CT head showed microagniopathic change and age-indeterminate probably old lacunar infarct in R basal ganglion  MRI negative for stroke  Continue patient on aspirin 81 mg daily along with statin  Most likely etiology for patient's speech abnormality was possibly secondary to postictal state  Patient mental status is today better than before  But still not back to baseline  Follow-up neurology recommendations  Can resume her antihypertensive medications slowly  Will start the patient on amlodipine for now  - MRI, carotid dopplers in AM, echo w/ bubble study- 2D echocardiogram showed EF of 55-60% no regional wall motion abnormalities  Patient's LDL was elevated, resume fenofibrate   - Lipid panel,   HgA1C elevated 7 2

## 2021-02-05 NOTE — TELEPHONE ENCOUNTER
Pt son called to inform that Karyna Galvez is being held at hospital for one more day of observation  She will be discharged to Rehab tomorrow   Pt 's son Jorge Schrader was able to speak to hospitalist

## 2021-02-05 NOTE — ASSESSMENT & PLAN NOTE
Patient's son states she has some mild confusion at baseline, sometimes difficulty with short term recall and mixing up dates  Continue supportive care  Currently not back to baseline, see above  Appreciate neurology recommendations  Patient has been having some sundowning episodes  On and off waxing and waning of symptoms  Will avoid antipsychotics as of now

## 2021-02-05 NOTE — ASSESSMENT & PLAN NOTE
Patient had a witnessed seizure in the ED after admission  Timing unclear as patient's nurse went to check on the patient and found her twitching, eyes rolled back, not responding  ED physician assessed patient at bedside and ordered a STAT repeat CT head which showed no changes from the original CT head earlier in the night  Post-ictal in the ED   - Patient did receive ASA 324mg and ceftriaxone, otherwise no new meds while in ED   - No prior history of seizure  - CT head showed microangiopathic change and age-indeterminate probably old lacunar infarct in right basal ganglion  - Seizure precautions  - PRN ativan  MRI -ve for Acute CVA, but showed some chronic small vessel ischemic changes  No mass in the brain noted  EEG on suggested some spikes in the temporal lobe region  Discussed with Neurology and recommended starting Vimpat 100 mg b i d  For the patient  Explained this plan of care to the patient's family

## 2021-02-05 NOTE — ASSESSMENT & PLAN NOTE
Secondary to postictal state, and combination of dementia with behavioral disturbances as well    Will discuss with Neurology

## 2021-02-05 NOTE — ASSESSMENT & PLAN NOTE
Lab Results   Component Value Date    HGBA1C 7 2 (H) 02/03/2021       Recent Labs     02/04/21  2044 02/05/21  0751 02/05/21  1208 02/05/21  1627   POCGLU 143* 120 176* 146*       Blood Sugar Average: Last 72 hrs:  (P) 557 [id number]     resume metformin, Cont  sliding scale insulin coverage t i d  With meals and q h s

## 2021-02-05 NOTE — CASE MANAGEMENT
LOS - 2 days     BUNDLE-Seizures    SW following to assist with DCP  STR placement is being recommended  SW spoke with pt's son earlier today and again this afternoon about facility choices  Pt's son requested placement in [facility] so pt can continue to follow up with Dr Shira Lemon  Pt has been accepted by [facility] and offered a bed at [facility]  Son was hoping to [place] and [facility] told him pt could transition to [place] when bed becomes available  Son has chosen to have pt transferred to [facility] when discharged  IMM reviewed with son  Notified Dr Eileen Barakat of plan and availability  Pt can be transferred to [facility] whenever ready  SW will follow to monitor progress and assist with transfer when ready

## 2021-02-05 NOTE — PROGRESS NOTES
Progress Note - Abraham Desouza 1940, 79 y o  female MRN: [id number]    Unit/Bed#: [address] [place] [address] Encounter: [id number]    Primary Care Provider: Rika Garcia MD   Date and time admitted to hospital: 2/2/2021  8:46 PM        * Seizure [facility])  Assessment & Plan  Patient had a witnessed seizure in the ED after admission  Timing unclear as patient's nurse went to check on the patient and found her twitching, eyes rolled back, not responding  ED physician assessed patient at bedside and ordered a STAT repeat CT head which showed no changes from the original CT head earlier in the night  Post-ictal in the ED   - Patient did receive ASA 324mg and ceftriaxone, otherwise no new meds while in ED   - No prior history of seizure  - CT head showed microangiopathic change and age-indeterminate probably old lacunar infarct in right basal ganglion  - Seizure precautions  - PRN ativan  MRI -ve for Acute CVA, but showed some chronic small vessel ischemic changes  No mass in the brain noted  EEG on suggested some spikes in the temporal lobe region  Discussed with Neurology and recommended starting Vimpat 100 mg b i d  For the patient  Explained this plan of care to the patient's family  Speech abnormality  Assessment & Plan  Patient presented to the ED with concern for confusion, speech difficulties that started at 6pm  Patient is noted to have expressive aphasia in the ED and is unable to answer some questions appropriately  Patient's son last noted her to be normal around 3pm when he spoke to her on the phone  She lives alone at home and her son is an hour away  She was seen by her PCP via telemedicine visit today with no problems noted during the encounter  Post-ictal state in the ED vs  Evolving neuro symptoms from presentation in ED, currently confused in the ED, not answering some questions  - CT head showed microagniopathic change and age-indeterminate probably old lacunar infarct in R basal ganglion  MRI negative for stroke  Continue patient on aspirin 81 mg daily along with statin  Most likely etiology for patient's speech abnormality was possibly secondary to postictal state  Patient mental status is today better than before  But still not back to baseline  Follow-up neurology recommendations  Can resume her antihypertensive medications slowly  Will start the patient on amlodipine for now  - MRI, carotid dopplers in AM, echo w/ bubble study- 2D echocardiogram showed EF of 55-60% no regional wall motion abnormalities  Patient's LDL was elevated, resume fenofibrate   - Lipid panel,   HgA1C elevated 7 2  Ambulatory dysfunction  Assessment & Plan  Family agreeable to have the patient go through short-term rehab for PT and OT  Altered mental status  Assessment & Plan  Secondary to postictal state, and combination of dementia with behavioral disturbances as well  Will discuss with Neurology    Memory impairment  Assessment & Plan  Patient's son states she has some mild confusion at baseline, sometimes difficulty with short term recall and mixing up dates  Continue supportive care  Currently not back to baseline, see above  Appreciate neurology recommendations  Patient has been having some sundowning episodes  On and off waxing and waning of symptoms  Will avoid antipsychotics as of now  Diabetes mellitus [facility])  Assessment & Plan  Lab Results   Component Value Date    HGBA1C 7 2 (H) 02/03/2021       Recent Labs     02/03/21  2133 02/04/21  0804 02/04/21  1149 02/04/21  1639   POCGLU 154* 153* 139 170*       Blood Sugar Average: Last 72 hrs:  (P) 489 [id number]     resume metformin, Cont  sliding scale insulin coverage t i d  With meals and q h s  Benign essential hypertension  Assessment & Plan  Continue patient on amlodipine, and Toprol  Hold Benicar    Subjective:   I have seen and Examined the patient at the bedside  No CP or Sob  No fevers or chills, No nausea or vomiting  Overnight events reviewed with the RN  No Other complains  Patient today is much more alert, having a conversation and able to give a history in subjective  Denies any tingling numbness or weakness any of extremities  Review of System:   Denies any CP or SOB  Denies any Cough or Cold  Denies any Fevers or chills  Denies any focal tingling numbness or weakness in any extremities  Denies any abdominal pain, Nausea or vomiting  Objective:   Temp (24hrs), Av 1 °F (36 7 °C), Min:97 6 °F (36 4 °C), Max:98 5 °F (36 9 °C)    Temp:  [97 6 °F (36 4 °C)-98 5 °F (36 9 °C)] 98 4 °F (36 9 °C)  HR:  [71-75] 71  Resp:  [16-18] 18  BP: (116-186)/(59-82) 147/68  SpO2:  [93 %-96 %] 95 %  There is no height or weight on file to calculate BMI  Input and Output Summary (last 24 hours):   No intake or output data in the 24 hours ending 21 1932  I/O        07 -  0700  07 -  0700    IV Piggyback      Total Intake      Urine      Total Output      Net            Unmeasured Urine Occurrence 2 x 1 x          Physical Exam:   General : Alert, Awake and oriented x 2, NAD  Neck : Supple  Eyes:  SABINO, EOMI  CVS : S1, S2, RRR    R/S : Clear to auscultate anteriorly  Abd: Soft, NT, ND  Bs+ve  Extremity: No pedal edema noted  Skin: No acute Rash noted  CNS: No acute FND       Additional Data:     Labs & Imaging Data Reviewed :    Results from last 7 days   Lab Units 21  0705   WBC Thousand/uL 9 88   HEMOGLOBIN g/dL 12 5   HEMATOCRIT % 40 3   PLATELETS Thousands/uL 295   NEUTROS PCT % 77*   LYMPHS PCT % 13*   MONOS PCT % 7   EOS PCT % 1     Results from last 7 days   Lab Units 21  0705 21  2122   POTASSIUM mmol/L 4 0 4 3   CHLORIDE mmol/L 99* 97*   CO2 mmol/L 26 26   BUN mg/dL 17 18   CREATININE mg/dL 0 74 0 90   CALCIUM mg/dL 9 0 8 9   ALK PHOS U/L  --  116   ALT U/L  --  35   AST U/L  --  22         Lab Results   Component Value Date    HGBA1C 7 2 (H) 2021 MRI brain wo contrast   Final Result by Michelle Rowe MD (02/04 1602)      No mass effect, acute intracranial hemorrhage or evidence of recent infarction  Advanced chronic microvascular ischemic change and chronic lacunar infarcts as described above  Workstation performed: [id number]         VAS carotid complete study (*Order only if CTA has not been completed*)   Final Result by Florence Santacruz MD (02/03 1226)      CT head without contrast   Final Result by Negin Massey DO (02/03 3672)      No acute intracranial abnormality  Unchanged age-indeterminate right basal ganglia lacunar infarcts  Workstation performed: [id number]         XR chest 1 view portable   Final Result by Laury Candelario MD (02/03 5794)      No acute cardiopulmonary disease  Workstation performed: [id number]         CT head without contrast   Final Result by Rasheed Frausto DO (02/02 2151)   No acute intracranial abnormality  Age-indeterminate probably old lacunar infarcts in the right basal ganglion  Partial right mastoid effusion                 Workstation performed: [id number]             Cultures:   Blood Culture: No results found for: BLOODCX  Urine Culture:   Lab Results   Component Value Date    URINECX >100,000 cfu/ml Klebsiella pneumoniae (A) 02/02/2021    URINECX 20,000-29,000 cfu/ml Aerococcus urinae (A) 02/02/2021    URINECX >100,000 cfu/ml Klebsiella pneumoniae (A) 10/27/2020    URINECX 80,000-89,000 cfu/ml Escherichia coli (A) 10/27/2020     Sputum Culture: No components found for: SPUTUMCX  Wound Culture: No results found for: WOUNDCULT    Last 24 Hours Medication List:   Current Facility-Administered Medications   Medication Dose Route Frequency Provider Last Rate    [START ON 2/5/2021] amLODIPine  5 mg Oral Daily Andrew Traore MD      aspirin  81 mg Oral Daily With Breakfast Brent Hitchcock PA-C      atorvastatin  40 mg Oral QPM Brent Hitchcock PA-C      cefTRIAXone  1,000 mg Intravenous Q24H Sheryle Minors, MD      fenofibrate  168 mg Oral QPM Sheryle Minors, MD      heparin (porcine)  5,000 Units Subcutaneous [facility] Robbin Patricio      insulin lispro  1-6 Units Subcutaneous TID [facility] Mario Cao PA-C      insulin lispro  1-6 Units Subcutaneous HS Mario Cao PA-C      lacosamide  100 mg Oral Q12H [address] Marina Felix MD      levothyroxine  50 mcg Oral Early Morning Mario Cao PA-C      LORazepam  1 mg Intravenous Q6H PRN Mario Cao PA-C      [START ON 2/5/2021] metFORMIN  500 mg Oral BID With Meals Sheryle Minors, MD      metoprolol succinate  25 mg Oral Daily TOREY Patricio ON 2/5/2021] multivitamin-minerals  1 tablet Oral Daily With Breakfast Sheryle Minors, MD         Patient is at moderate risk for morbidity and mortality due to above mentioned illness and comorbidities

## 2021-02-05 NOTE — PROGRESS NOTES
Progress Note - Bhavna Roy 1940, 79 y o  female MRN: [id number]    Unit/Bed#: [address] [place] [address] Encounter: [id number]    Primary Care Provider: Chuy Ely MD   Date and time admitted to hospital: 2/2/2021  8:46 PM        * Seizure [facility])  Assessment & Plan  Patient had a witnessed seizure in the ED after admission  Timing unclear as patient's nurse went to check on the patient and found her twitching, eyes rolled back, not responding  ED physician assessed patient at bedside and ordered a STAT repeat CT head which showed no changes from the original CT head earlier in the night  Post-ictal in the ED   - Patient did receive ASA 324mg and ceftriaxone, otherwise no new meds while in ED   - No prior history of seizure  - CT head showed microangiopathic change and age-indeterminate probably old lacunar infarct in right basal ganglion  - Seizure precautions  - PRN ativan  MRI -ve for Acute CVA, but showed some chronic small vessel ischemic changes  No mass in the brain noted  EEG on suggested some spikes in the temporal lobe region  Discussed with Neurology and recommended starting Vimpat 100 mg b i d  For the patient  Explained this plan of care to the patient's family  Speech abnormality  Assessment & Plan  Patient presented to the ED with concern for confusion, speech difficulties that started at 6pm  Patient is noted to have expressive aphasia in the ED and is unable to answer some questions appropriately  Patient's son last noted her to be normal around 3pm when he spoke to her on the phone  She lives alone at home and her son is an hour away  She was seen by her PCP via telemedicine visit today with no problems noted during the encounter  Post-ictal state in the ED vs  Evolving neuro symptoms from presentation in ED, currently confused in the ED, not answering some questions  - CT head showed microagniopathic change and age-indeterminate probably old lacunar infarct in R basal ganglion  MRI negative for stroke  Continue patient on aspirin 81 mg daily along with statin  Most likely etiology for patient's speech abnormality was possibly secondary to postictal state  Patient mental status is today better than before  But still not back to baseline  Follow-up neurology recommendations  Can resume her antihypertensive medications slowly  Will start the patient on amlodipine for now  - MRI, carotid dopplers in AM, echo w/ bubble study- 2D echocardiogram showed EF of 55-60% no regional wall motion abnormalities  Patient's LDL was elevated, resume fenofibrate   - Lipid panel,   HgA1C elevated 7 2  Memory impairment  Assessment & Plan  Patient's son states she has some mild confusion at baseline, sometimes difficulty with short term recall and mixing up dates  Continue supportive care  Currently not back to baseline, see above  Appreciate neurology recommendations  Patient has been having some sundowning episodes  On and off waxing and waning of symptoms  Will avoid antipsychotics as of now  MONSTER (generalized anxiety disorder)  Assessment & Plan  Pt not currently on any home medications     UTI (urinary tract infection)  Assessment & Plan  Urinalysis positive for Klebsiella, treat the patient's ceftriaxone  Mixed hyperlipidemia  Assessment & Plan  Start statin with stroke pathway  LDL-129  Diabetes mellitus [facility])  Assessment & Plan  Lab Results   Component Value Date    HGBA1C 7 2 (H) 02/03/2021       Recent Labs     02/04/21  2044 02/05/21  0751 02/05/21  1208 02/05/21  1627   POCGLU 143* 120 176* 146*       Blood Sugar Average: Last 72 hrs:  (P) 606 [id number]     resume metformin, Cont  sliding scale insulin coverage t i d  With meals and q h s  Benign essential hypertension  Assessment & Plan  Continue patient on amlodipine, and Toprol,   resume combination of Arb and hydrochlorothiazide  Subjective:   I have seen and Examined the patient at the bedside   No CP or Sob  No fevers or chills, No nausea or vomiting  Overnight events reviewed with the RN  No Other complains  Patient denies any other complaints or any urinary symptoms  However last night patient had some episodes of confusion and delirium  But patient did not be restless or agitated  Patient was otherwise okay  No episodes of Seizure-like activity  Review of System:   Denies any CP or SOB  Denies any Cough or Cold  Denies any Fevers or chills  Denies any focal tingling numbness or weakness in any extremities  Denies any abdominal pain, Nausea or vomiting  Objective:   Temp (24hrs), Av 7 °F (36 5 °C), Min:97 5 °F (36 4 °C), Max:98 °F (36 7 °C)    Temp:  [97 5 °F (36 4 °C)-98 °F (36 7 °C)] 97 8 °F (36 6 °C)  HR:  [62-78] 69  Resp:  [16-18] 16  BP: (141-172)/(63-80) 141/63  SpO2:  [92 %-96 %] 92 %  Body mass index is 25 4 kg/m²  Input and Output Summary (last 24 hours): Intake/Output Summary (Last 24 hours) at 2021 1802  Last data filed at 2021 4208  Gross per 24 hour   Intake --   Output 300 ml   Net -300 ml     I/O        0701 - /04 0700 / 0701 - /05 0700 02/05 0701 - /06 0700    IV Piggyback       Total Intake(mL/kg)       Urine (mL/kg/hr)  300 (0 2)     Total Output  300     Net  -300            Unmeasured Urine Occurrence 2 x 1 x         Physical Exam:   General : Alert, Awake and oriented x 2-3, NAD  Neck : Supple  Eyes:  SABINO, EOMI  CVS : S1, S2, RRR    R/S : Clear to auscultate anteriorly  Abd: Soft, NT, ND  Bs+ve  Extremity: Trace pedal edema noted  Skin: No acute Rash noted  CNS: No acute FND       Additional Data:     Labs & Imaging Data Reviewed :    Results from last 7 days   Lab Units 21  0705   WBC Thousand/uL 9 88   HEMOGLOBIN g/dL 12 5   HEMATOCRIT % 40 3   PLATELETS Thousands/uL 295   NEUTROS PCT % 77*   LYMPHS PCT % 13*   MONOS PCT % 7   EOS PCT % 1     Results from last 7 days   Lab Units 21  0705 21  3338 POTASSIUM mmol/L 4 0 4 3   CHLORIDE mmol/L 99* 97*   CO2 mmol/L 26 26   BUN mg/dL 17 18   CREATININE mg/dL 0 74 0 90   CALCIUM mg/dL 9 0 8 9   ALK PHOS U/L  --  116   ALT U/L  --  35   AST U/L  --  22         Lab Results   Component Value Date    HGBA1C 7 2 (H) 02/03/2021      MRI brain wo contrast   Final Result by Wendy Wiseman MD (02/04 1602)      No mass effect, acute intracranial hemorrhage or evidence of recent infarction  Advanced chronic microvascular ischemic change and chronic lacunar infarcts as described above  Workstation performed: [id number]         VAS carotid complete study (*Order only if CTA has not been completed*)   Final Result by Tripp Brian MD (02/03 1226)      CT head without contrast   Final Result by Cherelle Richards DO (02/03 9618)      No acute intracranial abnormality  Unchanged age-indeterminate right basal ganglia lacunar infarcts  Workstation performed: [id number]         XR chest 1 view portable   Final Result by Estefany Clark MD (02/03 0453)      No acute cardiopulmonary disease  Workstation performed: [id number]         CT head without contrast   Final Result by Geno Goodman DO (02/02 2151)   No acute intracranial abnormality  Age-indeterminate probably old lacunar infarcts in the right basal ganglion  Partial right mastoid effusion                 Workstation performed: [id number]             Cultures:   Blood Culture: No results found for: BLOODCX  Urine Culture:   Lab Results   Component Value Date    URINECX >100,000 cfu/ml Klebsiella pneumoniae (A) 02/02/2021    URINECX 20,000-29,000 cfu/ml Aerococcus urinae (A) 02/02/2021    URINECX >100,000 cfu/ml Klebsiella pneumoniae (A) 10/27/2020    URINECX 80,000-89,000 cfu/ml Escherichia coli (A) 10/27/2020     Sputum Culture: No components found for: SPUTUMCX  Wound Culture: No results found for: WOUNDCULT    Last 24 Hours Medication List:   Current Facility-Administered Medications   Medication Dose Route Frequency Provider Last Rate    amLODIPine  5 mg Oral Daily Andrew Rubin MD      aspirin  81 mg Oral Daily With Breakfast Suwannee Petite, TOREY      atorvastatin  40 mg Oral QPM Rene Petbhupinder, PA-C      cefTRIAXone  1,000 mg Intravenous Q24H Rebeca Drummond MD 1,000 mg (02/04/21 2055)    heparin (porcine)  5,000 Units Subcutaneous [facility] Rene Petite, PA-C      insulin lispro  1-6 Units Subcutaneous TID [facility] Rene Petite, PA-C      insulin lispro  1-6 Units Subcutaneous HS [place] Petite, PA-C      lacosamide  100 mg Oral Q12H [address] Pastor Adriano MD      levothyroxine  50 mcg Oral Early Morning Suwannee Petite, PA-HUMA      LORazepam  1 mg Intravenous Q6H PRN Rene Petite, PA-C      metFORMIN  500 mg Oral BID With Meals Rebeca Drummond MD      metoprolol succinate  25 mg Oral Daily Rene Petite, PA-HUMA      multivitamin-minerals  1 tablet Oral Daily With Breakfast Rebeca Drummond MD      olmesartan-HCTZ (BENICAR HCT 40/25) combo dose   Oral Daily Rebeca Drummond MD      pneumococcal 13-valent conjugate vaccine  0 5 mL Intramuscular Prior to discharge Rebeca Drummond MD         Patient is at moderate risk for morbidity and mortality due to above mentioned illness and comorbidities

## 2021-02-05 NOTE — SPEECH THERAPY NOTE
Speech Language/Pathology    Speech/Language Pathology Progress Note    Patient Name: Cem Moseley  [id number] Date: 2/5/2021     Problem List  Principal Problem:    Seizure [facility])  Active Problems:    Benign essential hypertension    Diabetes mellitus ([address] )    Mixed hyperlipidemia    MONSTER (generalized anxiety disorder)    Memory impairment    Speech abnormality    Altered mental status    Ambulatory dysfunction      Subjective:  Pt  Was awake and sitting out of bed in a chair  She had received puree for breakfast so ordered her a regular consistency tray for lunch  Short term recall is still impaired however unsure how far off baseline she is  Objective:  Pt  Was seen for dysphagia treatment to assess if appropriate for diet upgrade  Trialed with regular consistency- open faced turkey sandwich with gravy, salad, and ice cream  No s/s of aspiration on any materials  Assessment:  No s/s of aspiration on regular consistency food items  All aspects of swallowing are grossly WFL  Pt  Stated no globus sensation and states she has not had that feeling for "a long time"       Plan/Recommendations:  Patient is appropriate for regular consistency diet with thin liquids  Medications in puree for ease of swallow  Patient remains mildly confused but is able to meet her needs  Speech to discontinue   Reconsult if warranted    José Miguel Restrepo MS CCC-SLP  Speech Language Pathologist  Available via [facility] License # [id number]  [address] [place] [address] St # [phone]

## 2021-02-05 NOTE — TELEPHONE ENCOUNTER
Upon review of the In Basket request and the patient's chart, initial outreach has been made via telephone call, please see Contacts section for details       Thank you  Dolores Jones MA

## 2021-02-05 NOTE — ASSESSMENT & PLAN NOTE
Lab Results   Component Value Date    HGBA1C 7 2 (H) 02/03/2021       Recent Labs     02/03/21  2133 02/04/21  0804 02/04/21  1149 02/04/21  1639   POCGLU 154* 153* 139 170*       Blood Sugar Average: Last 72 hrs:  (P) 014 [id number]     resume metformin, Cont  sliding scale insulin coverage t i d  With meals and q h s

## 2021-02-06 NOTE — DISCHARGE SUMMARY
Discharge Summary - [facility] Internal Medicine    Patient Information: Johnnie London 79 y o  female MRN: [id number]  Unit/Bed#: [address] [place] [address] Encounter: [id number]    Discharging Physician / Practitioner: Asif Gonzalez MD  PCP: Angel Marr MD  Admission Date: 2/2/2021  Discharge Date: 02/06/21    Reason for Admission: Altered Mental Status (pt presents to the ed with altered mental status starting today (according to family)  the person is alter to person and place but not time )      Discharge Diagnoses:     Primary Discharge Diagnosis:     Principal Problem:    Seizure [facility])  Active Problems:    Speech abnormality    Benign essential hypertension    Diabetes mellitus ([facility] [address] )    Mixed hyperlipidemia    UTI (urinary tract infection)    MONSTER (generalized anxiety disorder)    Memory impairment    Altered mental status    Ambulatory dysfunction  Resolved Problems:    * No resolved hospital problems  *    Consultations During Hospital Stay:  IP CONSULT TO NEUROLOGY  IP CONSULT TO CASE MANAGEMENT    Procedures Performed:   EEG: This Routine EEG recorded during wakefulness, drowsiness, and sleep is abnormal due to presence of rare right temporal region epileptiform discharges  There is no evidence of electrographic seizure  Clinical correlation is recommended  Significant Findings:     · Refer to hospital course and above listed diagnosis related plan for details    Imaging while in hospital:  MRI Brain  CT Head  B/l Carotid dopplers       Incidental Findings:     Test Results Pending at Discharge (will require follow up):   · As per After Visit Summary     Outpatient Tests Requested:  Complications:  Refer to hospital course and above listed diagnosis related plan, if any    Hospital Course:     Johnnie London is a 79 y o  female patient with PMHx of  hypertension, hyperlipidemia type 2 diabetes, depression, anxiety who originally presented to the hospital on 2/2/2021 due to chief complaints of altered mental status  Her son states that she does have a history of dementia and intermittently gets confused and has difficulty with short-term recall  He initially stated she was last known normal when she went up PE with them 1 week ago, but he spoke with her multiple times over the phone throughout the day and she seemed to be fine  This afternoon his wife spoke with her over the phone at approximately 6:00 p m  And she noted her speech to be off, stated she was having difficulty finding words  Her son drove to her house and noted her to be watching TV despite the TV being turned off in the room  She was oriented only to self and place initially in the ED  Patient had urinalysis done which was suspected for UTI  However patient underwent a CT of the head which was negative for any acute intracranial abnormalities, patient was also given stroke protocol aspirin and statin  She underwent an MRI of the brain which is negative for any stroke as well  Evaluated by Neurology  There was suspicion for seizure-like activity  Patient was confused for the next day after admission  But 2 days later she was back to her usual and mental status improved dramatically and continue to improve  Patient does have some memory issues and hence has some episodes of sundowning while in hospital but they improved  Did not need to be placed on any antipsychotics  Patient underwent an EEG in the hospital which showed no active seizure activity but some spike waves which were suggestive that patient could be at risk of having seizures  Patient was evaluated by Neurology and was recommended to be started on Vimpat 100 mg b i d   Patient will be discharged on this new medication  She will also finish a 2-3 more day course of Keflex for her UTI, and her metformin dose will be increased to 1000 mg b i d  Otherwise no other changes in her home medications      Please see above list of diagnoses and related plan for additional information  Condition at Discharge: fair     Discharge Day Visit / Exam:     Subjective:  I have seen and examined the patient at bedside  Overnight events reviewed with the RN  Vitals: Blood Pressure: 152/87 (02/06/21 0820)  Pulse: 71 (02/06/21 0820)  Temperature: (!) 97 3 °F (36 3 °C) (02/06/21 0820)  Temp Source: Temporal (02/06/21 0820)  Respirations: 18 (02/06/21 0820)  Height: 5' 9" (175 3 cm) (02/05/21 0101)  Weight - Scale: 78 kg (172 lb) (02/05/21 0101)  SpO2: 97 % (02/06/21 0820)  Exam:   Physical Exam  General Exam: Alert and Oriented x 3, NAD  Eyes: SABINO  Neck: Supple  CVS: S1, S2 Jessamine, RRR    R/S: Clear to auscultate anteriorly  Abd: Soft, NT, ND, BS+ve  Extremities: No edema noted  Skin: No acute Rash noted  CNS: No acute FND  Moves all 4 extremities  Psych: Co-operative, Not agitated  Discharge instructions/Information to patient and family:(Discharge Medications and Follow up):   See after visit summary for information provided to patient and family  Provisions for Follow-Up Care:  See after visit summary for information related to follow-up care and any pertinent home health orders  Disposition: Short-term rehab at [facility] Readmission:  No     Discharge Statement:  I spent 35 minutes discharging the patient  This time was spent on the day of discharge  I had direct contact with the patient on the day of discharge  Greater than 50% of the total time was spent examining patient, answering all patient questions, arranging and discussing plan of care with patient as well as directly providing post-discharge instructions  Additional time then spent on discharge activities  Discharge Medications:  See after visit summary for reconciled discharge medications provided to patient and family  ** Please Note: "This note has been constructed using a voice recognition system  Therefore there may be syntax, spelling, and/or grammatical errors   Please call if you have any questions  "**

## 2021-02-06 NOTE — PLAN OF CARE
Problem: Potential for Falls  Goal: Patient will remain free of falls  Description: INTERVENTIONS:  - Assess patient frequently for physical needs  -  Identify cognitive and physical deficits and behaviors that affect risk of falls    -  [place] fall precautions as indicated by assessment   - Educate patient/family on patient safety including physical limitations  - Instruct patient to call for assistance with activity based on assessment  - Modify environment to reduce risk of injury  - Consider OT/PT consult to assist with strengthening/mobility  Outcome: Progressing     Problem: NEUROSENSORY - ADULT  Goal: Achieves stable or improved neurological status  Description: INTERVENTIONS  - Monitor and report changes in neurological status  - Monitor vital signs such as temperature, blood pressure, glucose, and any other labs ordered   - Initiate measures to prevent increased intracranial pressure  - Monitor for seizure activity and implement precautions if appropriate      Outcome: Progressing  Goal: Remains free of injury related to seizures activity  Description: INTERVENTIONS  - Maintain airway, patient safety  and administer oxygen as ordered  - Monitor patient for seizure activity, document and report duration and description of seizure to physician/advanced practitioner  - If seizure occurs,  ensure patient safety during seizure  - Reorient patient post seizure  - Seizure pads on all 4 side rails  - Instruct patient/family to notify RN of any seizure activity including if an aura is experienced  - Instruct patient/family to call for assistance with activity based on nursing assessment  - Administer anti-seizure medications if ordered    Outcome: Progressing

## 2021-02-06 NOTE — PLAN OF CARE
Problem: Potential for Falls  Goal: Patient will remain free of falls  Description: INTERVENTIONS:  - Assess patient frequently for physical needs  -  Identify cognitive and physical deficits and behaviors that affect risk of falls    -  [place] fall precautions as indicated by assessment   - Educate patient/family on patient safety including physical limitations  - Instruct patient to call for assistance with activity based on assessment  - Modify environment to reduce risk of injury  - Consider OT/PT consult to assist with strengthening/mobility  Outcome: Progressing     Problem: NEUROSENSORY - ADULT  Goal: Achieves stable or improved neurological status  Description: INTERVENTIONS  - Monitor and report changes in neurological status  - Monitor vital signs such as temperature, blood pressure, glucose, and any other labs ordered   - Initiate measures to prevent increased intracranial pressure  - Monitor for seizure activity and implement precautions if appropriate      Outcome: Progressing  Goal: Remains free of injury related to seizures activity  Description: INTERVENTIONS  - Maintain airway, patient safety  and administer oxygen as ordered  - Monitor patient for seizure activity, document and report duration and description of seizure to physician/advanced practitioner  - If seizure occurs,  ensure patient safety during seizure  - Reorient patient post seizure  - Seizure pads on all 4 side rails  - Instruct patient/family to notify RN of any seizure activity including if an aura is experienced  - Instruct patient/family to call for assistance with activity based on nursing assessment  - Administer anti-seizure medications if ordered    Outcome: Progressing

## 2021-02-06 NOTE — [place] UNIVERSAL TRANSFER FORM
[place] UNIV[place] TRANSFER FORM  (ALL ITEMS MUST BE COMPLETED)    1  TRANSFER FROM: [facility] [address] [facility]      TRANSFER TO: Gareth Antoine    2  DATE OF TRANSFER: 2/6/2021                        TIME OF TRANSFER: 1630    3  PATIENT NAME: ORQUIDEA Narvaez      YOB: 1940                             GENDER: female    4  LANGUAGE:   English    5  PHYSICIAN NAME:  Sidra Hamilton MD                   PHONE: [phone]    6  CODE STATUS: Level 1 - Full Code        Out of Hospital DNR Attached: No    7  :                                      :  Extended Emergency Contact Information  Primary Emergency Contact: Denys Bernabe   [facility]  Mobile Phone: [phone]  Relation: [address] Representative/Proxy:  No           Legal Guardian:  No             NAME OF:           HEALTH CARE REPRESENTATIVE/PROXY:                                         OR           LEGAL GUARDIAN, IF NOT :                                               PHONE:  (Day)           (Night)                        (Cell)    8  REASON FOR TRANSFER: (Must include brief medical history and recent changes in physical function or cognition ) short term rehab              V/S: /64 (BP Location: Left arm)   Pulse 71   Temp (!) 97 2 °F (36 2 °C) (Temporal)   Resp 18   Ht 5' 9" (1 753 m)   Wt 78 kg (172 lb)   SpO2 96%   BMI 25 40 kg/m²           PAIN: None    9  PRIMARY DIAGNOSIS: Seizure ([address] )      Secondary Diagnosis:         Pacemaker: No      Internal Defib: No          Mental Health Diagnosis (if Applicable):    10  RESTRAINTS: No     11  RESPIRATORY NEEDS: None    12  ISOLATION/PRECAUTION: None    13  ALLERGY: Amoxicillin, Lactose, Sulfa antibiotics, and Lidocaine    14  SENSORY:       Vision Good, Hearing Good  and Speech Clear    15  SKIN CONDITION: No Wounds    16  DIET: Regular    17  IV ACCESS: None    18   PERSONAL ITEMS SENT WITH PATIENT: Other clothing and cell phone    19  ATTACHED DOCUMENTS: MUST ATTACH CURRENT MEDICATION INFORMATION Face Sheet, MAR, Medication Reconciliation, TAR, POS, Diagnostic Studies, Labs, Operative Report, Respiratory Care, Advanced Directive, Code Status, Discharge Summary, PT Note, OT Note, ST Note and HX/PE    20  AT RISK ALERTS:Falls        HARM TO: N/A    21  WEIGHT BEARING STATUS:         Left Leg: Full        Right Leg: Full    22  MENTAL STATUS:Alert and Forgetful    23  FUNCTION:        Walk: With Help        Transfer: With Help        Toilet: With Help        Feed: Self    24  IMMUNIZATIONS/SCREENING:     Immunization History   Administered Date(s) Administered    Influenza, high dose seasonal 0 7 mL 10/15/2020    Influenza, injectable, quadrivalent, preservative free 0 5 mL 10/31/2019    Pneumococcal Conjugate 13-Valent 02/06/2021       25  BOWEL: Continent    26  BLADDER: Continent    27   SENDING FACILITY CONTACT: [address] [place] [address]                  Title: [facility]        Unit: 3N        Phone: [phone] [address] (if known):        Title:        Unit:         Phone:         FORM PREFILLED BY (if applicable)       Title:       Unit:        Phone:         FORM COMPLETED BY Alli Swann RN      Title: JAMES      Phone: [phone]

## 2021-02-06 NOTE — CASE MANAGEMENT
Discharge ordered  Patient will be going to [facility] for STR  Transport arranged with Janine PIERSON at 4:30 pm  Nurse, facility made aware

## 2021-02-07 NOTE — NURSING NOTE
Patient IV removed  No bleeding noted  Patient belogings gathered  Patients son brought a belongings bag for her discharge  Bag handed to transporter to take to STR  Patient aware of transfer, report called into [facility]  No questions at this time   Patient left the floor via stretcher with transport team

## 2021-02-08 NOTE — TELEPHONE ENCOUNTER
[facility] at [phone] and spoke to [facility], scheduled an in ofice visit for 4/12/2021 with Dr Kita Osborn  She stated pt is short term and will be discharged way before then  SLW/Altered Mental Status/Medicare/AARP  Mailed NP paperwork to pt's home  NOTE FROM CHART:  Assessment/Plan:  1  Altered mental status  Damaso Alexander will need follow up in 2-3 months with general attending or advance practitioner  She will not require outpatient neurological testing    1 Willian Chen

## 2021-02-09 NOTE — TELEPHONE ENCOUNTER
As a follow-up, a second attempt has been made for outreach via fax, please see Contacts section for details      Thank you  Douglas Davila MA

## 2021-02-10 NOTE — TELEPHONE ENCOUNTER
Upon review of the In Basket request we have found as a result of outreach that patient did not have the requested item(s) completed  Any additional questions or concerns should be emailed to the Practice Liaisons via [email or web address] email, please do not reply via In Basket      Thank you  Douglas Davila MA

## 2021-02-10 NOTE — TELEPHONE ENCOUNTER
No listing for Dr Gerri Hernández in [facility]- request sent to Dr Mercedes Munson of [facility]   Response received states 'Not a patient here'

## 2021-02-17 NOTE — PROGRESS NOTES
Chart review completed  Email sent to facility requesting update on patient  This care manager assistant will continue to monitor via chart review throughout bundle episode

## 2021-02-22 NOTE — TELEPHONE ENCOUNTER
Dizziness is a common side effect  It may get better with time  If it's too much then can switch her to another agent

## 2021-02-22 NOTE — TELEPHONE ENCOUNTER
Gerardo Muhammad from [facility] called and states that pt was started on vimpat 100 mg bid on 2/02/21  Pt was transferred to their facility on 2/6/21  Pt will be discharged mid this week  States that pt is reporting intermittent dizziness, started last week  The only med started was vimpat 100 mg  She is unsure if vimpat is the caused of pt's dizziness or not  Pt's bp has been stable  Most recent   bp 132/68  We have not seen this pt  Pt has HFU appt scheduled on 4/12/21  Advised Ju to contact pt's pcp as well  Pt was seen by Anusha Barger on 2/4/21 at the hospital  See notes    Asking for you recommendation?            [address]

## 2021-02-23 NOTE — TELEPHONE ENCOUNTER
[facility], spoke w/Ju and advised of the below  She verbalized understanding  States that dizziness is getting better  Pt is up moving around

## 2021-02-24 NOTE — TELEPHONE ENCOUNTER
saira made aware that we sent a 30 day supply with 1 refill which will get pt to her appt in April  They will still give pt a script for 30 day supply

## 2021-02-24 NOTE — TELEPHONE ENCOUNTER
Yola Quezada from [facility] called and states that pt takes toprol  Taking vimpat and toprol will cause lower HR and arrhythmias  Asking if ok to fill vimpat?               [phone]

## 2021-02-24 NOTE — PROGRESS NOTES
This CM Assisant received a email from [facility] with a update on the patient  The patient is discharging today 2/24/21 to Home  A email was sent to the facility requesting discharge instructions  When CM assistant has received the Discharge paperwork CM assistant will attach to this episode  I have removed myself off of the care team, added the CM to the care team who will follow the patient through the bundle episode, sent the care manager a inbasket notifying them of the bundle episode, updated the BPCI form, and updated the care coordination note

## 2021-02-24 NOTE — TELEPHONE ENCOUNTER
saira from [facility] called and states that pt will be dc'd from their facility today and will be given a 30 day supply of vimpat  follow up appt with our office is not until 4/12  They are not allowed to give additional days supply  Are you agreeable to refilling until pt is seen in office? Currently taking vimpat 100mg 1 tab every 12 hours  If so please send script to pharm on file      Please advise    She is requesting a call back either way  [phone] ext [phone]

## 2021-02-25 NOTE — PROGRESS NOTES
Bobby notification received for patient enrolled in Medicare Seizure Bundle; discharged home with [facility] of [address]  Chart review completed  [facility] requested and refreshed  Patient hospitalized (4 days) 2/2/2021 - 2/6/2021 at [facility] for Seizure  Patient presented with altered mental status  Patient discharged to [address] [place] [address],[address] for STR  Patient with past medical history of speech abnormality, HTN, Diabetes mellitus,  UTI, MONSTER (generalized anxiety disorder), major depressive disorder Memory impairment, Altered mental status, cirrhosis of liver, IBS, osteoarthritis of both knees and Ambulatory dysfunction  A1C of 7 2 drawn 2/3  See problem list for complete list of medical diagnosis  Review of IP SW note indicates patient lives alone and does not drive  Patient reportedly independent with ADLs prior to admission  Review of SNF discharge paperwork indicates that patient is continent of both bowel and bladder, independent in toileting, eating, and transferring  Patient does require assistance with ambulation and household tasks such as meal prep cleaning and manageing or paying her bills  Upcoming appointments:  3/2 10:20 AM with PCP   4/1 Cardiology  4/12 Neuro    Email to [facility] placed to V[facility] to confirm start of care  CM contacted patient to introduce self, explain the role of the OP CM and purpose of this phone call is to assess patient's general wellbeing or for any assistance needed with follow-up care  Explanation provided about patient's enrollment in a free medicare bundle program that allows for added telephonic nursing support for 90 days to help coordinate and manage further care, as needed    CM informed patient that outreach would be weekly / biweekly / monthly to ensure patient/caregiver is progressing and has understanding about their illness and medications, provide appointment reminders along with working closely with the VNA to prevent unplanned readmission to the hospital   Also explained that CM would conduct a one time telephonic assessment to collect medical and social history of patient  Patient agreed to future outreach of this CM and encouraged to contact CM as needed  Direct call back number of CM provided to patient  Patient reports she was told that her speech was funny, her son was called and he took her to the hospital   Patient does not have any recollection of event nor does she know if she was having any seizure like activity other than the "difficulty getting my words out " She states she was told that it was not a stroke and it was a seizure  Patient states she came home from the nursing center yesterday but before discharge had a little episode of dizziness for which a [place] [employer]aga was given to her  States that she never had dizziness before nor has she ever had to deal with any diabetes  Patient was told that she will need to start checking her blood sugar regularly and has not done so yet  Patient does have a blood glucose monitor at home  Education provided on hypo and hyperglycemia and when to call the doctor  Also discussed symptoms and interventions for each  Patient able to verbalize S&S and appropriate interactions for hypo / hyper glycemia  Patient did request assistance from CM for the followin) states that her vimpat medication was over $570, due to her deductible, in which she states she will not be able to afford that each month and 2) would like to see if her neurology appointment scheduled for  can be moved up sooner   CM agreed to assist      Patient reports her son lives over 40 minutes away in [place], however is staying with her until she can get more situated at home  She replies her son Tania Golden, works from home and will provide transportation to her appointments   She also states she was told that skilled nursing would come today and physical therapy tomorrow but she has not received an actual call from the agency  CM informed patient that the agencies typically called the night before or day of to give a window of time for arrival      CM contacted neuro office and spoke with staff member Gayla Felty who reports there was a cancellation for Monday 3/2 at [phone], however, patient is scheduled with PCP same day and time  CM spoke with PCP office staff member John Whitehead who changed appointment from [phone] AM to 320 PM 3/2  CM called neuro office back to change appointment from 4/12 to 3/2 at 1020 AM      CM called patient back to inform her of the new appointment both scheduled for 3/20  Patient acknowledged and wrote down new dates/times  CM offered to email and patient agreed and confirmed her email address  Patient expressed gratitude of CM's assistance and CM encouraged patient to discuss Vimpat medication at neuro appointment and if recommended to continue, CM will explore coupons for medication  Patient agreed  Email sent to patient with the following:    Lety Eugene 10:20 AM (Please arrive 10-15 minutes early to complete registration / check in)   o [facility] [address], [address]  P  [phone]     Dr Santos Rodarte  3:20 PM (Please arrive 10-15 minutes early to complete registration / check in)  o [facility] Internal Medicine  [facility], [address] [place] [phone]  [place], [place], [address]          This CM will continue to monitor through chart review, [address] and VNA

## 2021-02-25 NOTE — TELEPHONE ENCOUNTER
Called [facility], spoke w/ Annie Phillips and advised of the below and verbalized understanding

## 2021-02-26 NOTE — TELEPHONE ENCOUNTER
Please call the patient back - she is supposed to have her 2nd vaccine shot on Monday but she says she's had a seizure and doesn't know if it's safe  She said her seizure was not due to epilepsy  She did not tell me how long ago this happened

## 2021-02-26 NOTE — TELEPHONE ENCOUNTER
I called and spoke with the patient  I advised that it was safe for her to receive the second vaccination  There is no contraindication with seizures  She was simply concerned about the side effects from the second shot  Patient verbalized understanding with no further questions

## 2021-02-26 NOTE — PROGRESS NOTES
VM received from patient indicating she still  Has not received a call from the visiting nurse  CM spoke with Angelica Lowe at [facility] who reports that contact was made with patient's son for arrangements for [facility] tomorrow  CM contacted Angelica Lowe and informed her of above  CM encouraged her to talk with son  CM reminded patient of COVID vaccine on Monday 3/1 at 1145  Patient expressed concerns of getting the second dose stating:  "I am not so sure I want to as I am uncertain that it didn't cause me to have the seizure since they were only days apart "  CM encouraged patient to call PCP and discuss with PCP  CM also encouraged patient to research as best as she can online  Patient acknowledged receiving email from this CM with appointment information for 3/2  This CM will continue to monitor via chart review throughout bundle episode

## 2021-03-01 NOTE — PROGRESS NOTES
Email sent today to [facility] to confirm [facility] Saturday as previously scheduled  Awaiting reply  Email reply from [facility] indicating that patient was opened 2/28 by nurse Cheyanne Tavera  Agency reports that patient is scheduled pending visit for OT today, PT on Wednesday, and SN on Thursday  Spoke with patient who reports she got her second vaccine this monrning and has had explosive diarrhea twice this afternoon  States her last bout was nearly 2 hours ago  James Higginbotham admits to taking some pepto-bismal to help calm her stomach  Patient reports that A nurse came yesterday and ordered in home rehab for later this week  Patient reminded of appointments tomorrow and patient acknowledged

## 2021-03-02 NOTE — PROGRESS NOTES
Return NeuroOutpatient Note        Hima Boss  [id number]  81 y o   1940       AMS (HFU) and Seizures        History obtained from:  Patient     HPI/Subjective:  Hima Boss is an 80 yo F that had recently presented to [address] [place] [address] for ams  Patient was noted to be saying abnormal things  After arrival to ED, patient had witnessed seizure with twitching, eyes rolling and became unresponsive  Patient was repeating next day  She couldn't answer any simple questions  We had started her on Vimpat and next day she had returned to her baseline  Her EEG was abnormal with rare right temporal discharges  Initially patient had dizziness from vimpat and it's not much  She had diarrhea yesterday after covid vaccine so today's dizziness may be related to it  Her mri brain was negative for stroke  Son states that patient lost her  early this year  Patient has been feeling depressed and is less active  Now she lives at home independently  Son says she does worry a lot  When she spent time with her grand children, she felt completely fine         Past Medical History:   Diagnosis Date    AMS (altered mental status)     Anemia     Anxiety     Arthritis     Chronic UTI     better since urethral stretching    Cirrhosis of liver ([facility] [address] ) 9/2/2019    Current moderate episode of major depressive disorder without prior episode ([address] ) 1/14/2020    Diabetes mellitus (HCC)     type 2    GERD (gastroesophageal reflux disease)     diet controlled    High triglycerides     Hypertension     Irritable bowel syndrome     Other specified hypothyroidism 5/13/2020    Primary osteoarthritis of both knees 1/20/2016    Seizures ([facility] [address] )      Social History     Socioeconomic History    Marital status: /Civil Union     Spouse name: Not on file    Number of children: Not on file    Years of education: Not on file    Highest education level: Not on file   Occupational History    Not on file   Social Needs    Financial resource strain: Not on file    Food insecurity     Worry: Not on file     Inability: Not on file    Transportation needs     Medical: Not on file     Non-medical: Not on file   Tobacco Use    Smoking status: Former Smoker     Packs/day: 1 00     Years: 10      Pack years: 10 00     Types: Cigarettes     Quit date:      Years since quittin 1    Smokeless tobacco: Never Used   Substance and Sexual Activity    Alcohol use:  Yes     Alcohol/week: 1 0 standard drinks     Types: 1 Standard drinks or equivalent per week     Comment: rarely    Drug use: No    Sexual activity: Not on file   Lifestyle    Physical activity     Days per week: Not on file     Minutes per session: Not on file    Stress: Not on file   Relationships    Social connections     Talks on phone: Not on file     Gets together: Not on file     Attends Judaism service: Not on file     Active member of club or organization: Not on file     Attends meetings of clubs or organizations: Not on file     Relationship status: Not on file    Intimate partner violence     Fear of current or ex partner: Not on file     Emotionally abused: Not on file     Physically abused: Not on file     Forced sexual activity: Not on file   Other Topics Concern    Not on file   Social History Narrative    Not on file     Family History   Problem Relation Age of Onset    Early death Mother         CHF    Early death Father         MI    Cancer Sister         stomach lymphoma    No Known Problems Son      Allergies   Allergen Reactions    Amoxicillin GI Intolerance    Lactose GI Intolerance    Sulfa Antibiotics GI Intolerance    Lidocaine Rash     Lidocaine patch     Current Outpatient Medications on File Prior to Visit   Medication Sig Dispense Refill    amLODIPine (NORVASC) 5 mg tablet Take 1 tablet (5 mg total) by mouth every morning 90 tablet 1    fenofibrate (TRIGLIDE) 160 MG tablet Take 1 tablet (160 mg total) by mouth every evening 90 tablet 1    Glycerin-Polysorbate 80 (REFRESH DRY EYE THERAPY OP) Apply to eye      lacosamide (VIMPAT) 100 mg tablet Take 1 tablet (100 mg total) by mouth every 12 (twelve) hours 60 tablet 2    levothyroxine 50 mcg tablet Take 1 tablet (50 mcg total) by mouth daily 90 tablet 1    metFORMIN (GLUCOPHAGE) 1000 MG tablet Take one in AM and two in PM (Patient taking differently: Take one in AM and two in PM) 60 tablet 0    metoprolol succinate (TOPROL-XL) 25 mg 24 hr tablet Take 25 mg by mouth daily       Multiple Vitamins-Minerals (CENTRUM SILVER ADULT 50+ PO) Take by mouth daily with breakfast      olmesartan-hydrochlorothiazide (BENICAR HCT) 40-25 MG per tablet Take 1 tablet by mouth every morning 90 tablet 1    aspirin (ECOTRIN LOW STRENGTH) 81 mg EC tablet Take 81 mg by mouth daily with breakfast      Blood Glucose Monitoring Suppl (ONE TOUCH ULTRA 2) w/Device KIT by Does not apply route daily 1 each 0    glucose blood (OneTouch Ultra) test strip Use as instructed 50 each 6    insulin lispro (HumaLOG) 100 units/mL injection Inject 1-6 Units under the skin 3 (three) times a day before meals (Patient not taking: Reported on 2/25/2021)  0    insulin lispro (HumaLOG) 100 units/mL injection Inject 1-6 Units under the skin daily at bedtime (Patient not taking: Reported on 2/25/2021)  0    Lancets (onetouch ultrasoft) lancets Use as instructed (Patient not taking: Reported on 3/2/2021) 50 each 6     No current facility-administered medications on file prior to visit  Review of Systems   Refer to positive review of systems in HPI     Review of Systems    Constitutional- No fever  Eyes- No visual change  ENT- Hearing normal  CV- No chest pain  Resp- No Shortness of breath  GI- No diarrhea  - Bladder normal  MS- No Arthritis   Skin- No rash  Psych- No depression  Endo- No DM  Heme- No nodes    Vitals:    03/02/21 0955   BP: 128/70   BP Location: Left arm   Patient Position: Sitting   Cuff Size: Adult Pulse: 74   Temp: (!) 96 °F (35 6 °C)   Weight: 78 kg (172 lb)   Height: 5' 9" (1 753 m)       PHYSICAL EXAM:  Appearance: No Acute Distress  Ophthalmoscopic: Disc Flat, Normal fundus  Mental status:  Orientation: Awake, Alert, and Orientedx3  Memory: Registation 3/3 Recall 3/3  Attention: normal  Knowledge: good  Language: No aphasia  Speech: No dysarthria  Cranial Nerves:  2 No Visual Defect on Confrontation, Pupils round, equal, reactive to light  3,4,6 Extraocular Movements Intact, no nystagmus  5 Facial Sensation Intact  7 No facial asymmetry  8 Intact hearing  9,10 Palate symmetric, normal gag  11 Good shoulder shrug  12 Tongue Midline  Gait: Stable  Coordination: No ataxia with finger to nose testing, and heel to shin  Sensory: Intact, Symmetric to pinprick, light touch, vibration, and joint position  Muscle Tone: Normal              Muscle exam:  Arm Right Left Leg Right Left   Deltoid 5/5 5/5 Iliopsoas 5/5 5/5   Biceps 5/5 5/5 Quads 5/5 5/5   Triceps 5/5 5/5 Hamstrings 5/5 5/5   Wrist Extension 5/5 5/5 Ankle Dorsi Flexion 5/5 5/5   Wrist Flexion 5/5 5/5 Ankle Plantar Flexion 5/5 5/5   Interossei 5/5 5/5 Ankle Eversion 5/5 5/5   APB 5/5 5/5 Ankle Inversion 5/5 5/5       Reflexes   RJ BJ TJ KJ AJ Plantars Shelley's   Right 2+ 2+ 2+ 2+ 2+ Downgoing Not present   Left 2+ 2+ 2+ 2+ 2+ Downgoing Not present     Personal review of  Labs:                  Diagnoses and all orders for this visit:      1  EEG abnormal     2  Seizure [facility])       Patient has been seizure free since hospital admission  As long as she's compliant with medication, we can allow her to drive as she doesn't have anyone helping her for nearby short drives  Will check into cost of it  If co pay is still high, will change it to keppra  For now will resume vimpat 100mg bid  Asked her to get medical alert  Addressed all of their concerns                 Total time of encounter:  40 min  More than 50% of the time was used in counseling and/or coordination of care  Extent of counseling and/or coordination of care        MD [facility]  [address]  [address]  [phone]

## 2021-03-02 NOTE — ASSESSMENT & PLAN NOTE
Patient will be getting home physical therapy for gait dysfunction proximal weakness the came out of the hospital

## 2021-03-02 NOTE — ASSESSMENT & PLAN NOTE
Seizure-free  Abnormal EEG  Continue antiseizure medications Vimpat every 12 hour    Seen by neurologist   The all explanation given to the family likely secondary to dementia and microvascular disease and may be small stroke at 1 time

## 2021-03-02 NOTE — PROGRESS NOTES
[facility] Office Visit Note  21     Carlos Manuel Grimes 79 y o  female MRN: [id number]  : 1940    Assessment:     No problem-specific Assessment & Plan notes found for this encounter  There are no diagnoses linked to this encounter  Discussion Summary and Plan: Today's care plan and medications were reviewed with patient in detail and all their questions answered to their satisfaction  Recent new onset seizure control seen by neurologist we talked about driving we talked about leaving safely  Diabetes to avoid hypoglycemia decreased a metformin 500 mg twice a day hypertension well controlled  Dementia mild to moderate home but much brighter  No UTI  Cirrhosis fair  Anemia will monitor  DJD multiple joint a gets some gait dysfunction reasonable home a home but still present  Patient will be getting home physical therapy [facility]  Medication list were reviewed hypertension well controlled  Discussed with family  Time spent is greater than 45 minute  Hospital data is were reviewed  The all question answered discussed with son at bedside    Chief Complaint   Patient presents with    Transition of Care Management    Seizures    Hypertension    Hyperlipidemia    Diabetes    CKD III      Subjective: This is care transitioned  Patient is here with son  I had discussed with patient's son twice while patient was in hospital   Also of no discharge summary from nursing home were reviewed  The apparently patient was admitted with confusion actually on the evening of our last visit           [place] course were reviewed  Patient had prior previous history of mild progressive dementia and cognitive dysfunction with some short-term recall  Of on the afternoon of the hospital admission patient was perfectly fine till 6:00 p m  Zach Pipe She was noted to have a speech to be impaired and had difficulty finding words    Patient's son drove to her home patient was watching TV despite TV being turned off  Of patient was brought to the emergency room initially was suspected to have urinary tract tract infection CT scan of the brain was negative for any intracranial process  Patient was given stroke protocol initially  Subsequently MRI of the brain was negative for any stroke  Patient had seizure-like activity  The patient also had a postictal state  Patient was started on anti seizure medication  Patient had EEG which did reveal some spike wave suggestive of at risk for seizure  Patient was seen by neurologist   The patient was given short course of Keflex  She had remote history of a allergic to penicillin but she could tolerate Keflex without side effect  Metformin were recently increased to home 1000 mg twice a day  Patient had a bilateral carotid which were unremarkable  Other comorbidities manage were hypertension, diabetes, hyperlipidemia, generalized anxiety disorder, how history of cirrhosis, history of back pain,         The following portions of the patient's history were reviewed and updated as appropriate: allergies, current medications, past family history, past medical history, past social history, past surgical history and problem list     Review of Systems   All other systems reviewed and are negative          Historical Information   Patient Active Problem List   Diagnosis    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    Benign essential hypertension    Diabetes mellitus ([facility] )    Mixed hyperlipidemia    Primary osteoarthritis of both knees    Arthritis    Cerebral atherosclerosis    Low back pain    Chronic pain of left knee    Left ankle pain    Sacral back pain    UTI (urinary tract infection)    Irritable bowel syndrome    Hypertension    Cirrhosis of liver (HCC)    Edema    MR (mitral regurgitation)    Osteoarthritis of left knee    Trigger finger    GERD (gastroesophageal reflux disease)    Anxiety    MONSTER (generalized anxiety disorder)    Current moderate episode of major depressive disorder without prior episode ([facility] )    Other specified hypothyroidism    Pressure injury of sacral region, stage 2 (HCC)    Grief    Hypoglycemia    Weakness of both legs    Memory impairment    Urinary frequency    Acute left-sided thoracic back pain    Speech abnormality    Seizure (HCC)    Altered mental status    Ambulatory dysfunction     Past Medical History:   Diagnosis Date    AMS (altered mental status)     Anemia     Anxiety     Arthritis     Chronic UTI     better since urethral stretching    Cirrhosis of liver ([facility] ) 9/2/2019    Current moderate episode of major [facility] disorder [facility] 1/14/2020    Diabetes mellitus (HCC)     type 2    GERD (gastroesophageal reflux disease)     diet controlled    High triglycerides     Hypertension     Irritable bowel syndrome     Other specified hypothyroidism 5/13/2020    Primary osteoarthritis of both knees 1/20/2016    Seizures ([address] )      Past Surgical History:   Procedure Laterality Date    APPENDECTOMY      age 14   Codi Anirudh GUM SURGERY      tumor removal benign x3    WA XCAPSL CTRC RMVL INSJ IO LENS PROSTH W/O ECP Left 5/15/2017    Procedure: EXTRACTION EXTRACAPSULAR CATARACT PHACO INTRAOCULAR LENS (IOL); Surgeon: Grey Gross MD;  Location: [facility] MAIN OR;  Service: Ophthalmology    WA XCAPSL CTRC RMVL INSJ IO LENS PROSTH W/O ECP Right 6/26/2017    Procedure: EXTRACTION EXTRACAPSULAR CATARACT PHACO INTRAOCULAR LENS (IOL);   Surgeon: Grey Gross MD;  Location: [facility] MAIN OR;  Service: Ophthalmology    TONSILLECTOMY       Social History     Substance and Sexual Activity   Alcohol Use Yes    Alcohol/week: 1 0 standard drinks    Types: 1 Standard drinks or equivalent per week    Comment: rarely     Social History     Substance and Sexual Activity   Drug Use No     Social History     Tobacco Use   Smoking Status Former Smoker    Packs/day: 1 00    Years: 10 00    Pack years: 10 00    Types: Cigarettes    Quit date:     Years since quittin 1   Smokeless Tobacco Never Used     Family History   Problem Relation Age of Onset    Early death Mother         CHF    Early death Father         MI    Cancer Sister         stomach lymphoma    No Known Problems Son      Health Maintenance Due   Topic    Hepatitis A Vaccine (1 of 2 - Risk 2-dose series)    DM Eye Exam     COVID-19 Vaccine (1 of 2)    Hepatitis B Vaccine (1 of 3 - Risk 3-dose series)    DTaP,Tdap,and Td Vaccines (1 - Tdap)    BMI: Followup Plan       Meds/Allergies       Current Outpatient Medications:     amLODIPine (NORVASC) 5 mg tablet, Take 1 tablet (5 mg total) by mouth every morning, Disp: 90 tablet, Rfl: 1    aspirin (ECOTRIN LOW STRENGTH) 81 mg EC tablet, Take 81 mg by mouth daily with breakfast, Disp: , Rfl:     Blood Glucose Monitoring Suppl (ONE TOUCH ULTRA 2) w/Device KIT, by Does not apply route daily, Disp: 1 each, Rfl: 0    fenofibrate (TRIGLIDE) 160 MG tablet, Take 1 tablet (160 mg total) by mouth every evening, Disp: 90 tablet, Rfl: 1    glucose blood (OneTouch Ultra) test strip, Use as instructed, Disp: 50 each, Rfl: 6    Glycerin-Polysorbate 80 (REFRESH DRY EYE THERAPY OP), Apply to eye, Disp: , Rfl:     lacosamide (VIMPAT) 100 mg tablet, Take 1 tablet (100 mg total) by mouth every 12 (twelve) hours, Disp: 60 tablet, Rfl: 2    Lancets (onetouch ultrasoft) lancets, Use as instructed, Disp: 50 each, Rfl: 6    levothyroxine 50 mcg tablet, Take 1 tablet (50 mcg total) by mouth daily, Disp: 90 tablet, Rfl: 1    metFORMIN (GLUCOPHAGE) 1000 MG tablet, Take one in AM and two in PM (Patient taking differently: Take one in AM and two in PM), Disp: 60 tablet, Rfl: 0    metoprolol succinate (TOPROL-XL) 25 mg 24 hr tablet, Take 25 mg by mouth daily , Disp: , Rfl:     Multiple Vitamins-Minerals (CENTRUM SILVER ADULT 50+ PO), Take by mouth daily with breakfast, Disp: , Rfl:     olmesartan-hydrochlorothiazide (BENICAR HCT) 40-25 MG per tablet, Take 1 tablet by mouth every morning, Disp: 90 tablet, Rfl: 1      Objective:    Vitals:   Pulse 68   Temp (!) 97 3 °F (36 3 °C)   Ht 5' 9" (1 753 m)   Wt 78 kg (172 lb)   SpO2 99%   BMI 25 40 kg/m²   Body mass index is 25 4 kg/m²  Vitals:    03/02/21 1531   Weight: 78 kg (172 lb)       Physical Exam  Vitals signs and nursing note reviewed  Constitutional:       General: She is not in acute distress  Appearance: She is well-developed  She is not ill-appearing, toxic-appearing or diaphoretic  HENT:      Head: Normocephalic and atraumatic  Eyes:      Conjunctiva/sclera: Conjunctivae normal    Neck:      Thyroid: No thyromegaly  Vascular: No JVD  Cardiovascular:      Rate and Rhythm: Regular rhythm  Heart sounds: Murmur present  Systolic murmur present with a grade of 2/6  Pulmonary:      Effort: No respiratory distress  Breath sounds: Normal breath sounds  No wheezing or rales  Abdominal:      General: Bowel sounds are normal  There is no distension  Palpations: There is no mass  Tenderness: There is no abdominal tenderness  There is no right CVA tenderness, left CVA tenderness or rebound  Comments: CHRONICALLY DISTENDED, BIG BASELINE     Musculoskeletal:      Right shoulder: She exhibits decreased range of motion  She exhibits no tenderness, no swelling, no effusion and no deformity  Lumbar back: She exhibits decreased range of motion and deformity  She exhibits no tenderness, no edema and no spasm  Back:    Lymphadenopathy:      Cervical: No cervical adenopathy  Skin:     General: Skin is warm  Findings: No rash  Neurological:      General: No focal deficit present  Cranial Nerves: No cranial nerve deficit  Motor: No weakness or tremor        Coordination: Coordination normal       Gait: Gait abnormal       Comments: Some impaired gait as well as proximal weakness is common for her   Psychiatric:         Mood and Affect: Mood normal          Thought Content:  Thought content normal          Judgment: Judgment normal          Lab Review   Admission on 02/02/2021, Discharged on 02/06/2021   Component Date Value Ref Range Status    Color, UA 02/02/2021 Light Yellow   Final    Clarity, UA 02/02/2021 Cloudy   Final    Specific Gravity, UA 02/02/2021 1 015  1 000 - 1 030 Final    pH, UA 02/02/2021 7 0  5 0, 5 5, 6 0, 6 5, 7 0, 7 5, 8 0, 8 5, 9 0 Final    Leukocytes, UA 02/02/2021 Negative  Negative Final    Nitrite, UA 02/02/2021 Negative  Negative Final    Protein, UA 02/02/2021 100 (2+)* Negative mg/dl Final    Glucose, UA 02/02/2021 Negative  Negative mg/dl Final    Ketones, UA 02/02/2021 Negative  Negative mg/dl Final    Urobilinogen, UA 02/02/2021 0 2  0 2, 1 0 E U /dl E U /dl Final    Bilirubin, UA 02/02/2021 Negative  Negative Final    Blood, UA 02/02/2021 Negative  Negative Final    POC Glucose 02/02/2021 144* 65 - 140 mg/dl Final    WBC 02/02/2021 9 27  4 31 - 10 16 Thousand/uL Final    RBC 02/02/2021 4 72  3 81 - 5 12 Million/uL Final    Hemoglobin 02/02/2021 12 9  11 5 - 15 4 g/dL Final    Hematocrit 02/02/2021 41 2  34 8 - 46 1 % Final    MCV 02/02/2021 87  82 - 98 fL Final    MCH 02/02/2021 27 3  26 8 - 34 3 pg Final    MCHC 02/02/2021 31 3* 31 4 - 37 4 g/dL Final    RDW 02/02/2021 13 3  11 6 - 15 1 % Final    MPV 02/02/2021 10 1  8 9 - 12 7 fL Final    Platelets 50/39/3110 355  149 - 390 Thousands/uL Final    nRBC 02/02/2021 0  /100 WBCs Final    Neutrophils Relative 02/02/2021 65  43 - 75 % Final    Immat GRANS % 02/02/2021 1  0 - 2 % Final    Lymphocytes Relative 02/02/2021 22  14 - 44 % Final    Monocytes Relative 02/02/2021 9  4 - 12 % Final    Eosinophils Relative 02/02/2021 2  0 - 6 % Final    Basophils Relative 02/02/2021 1  0 - 1 % Final    Neutrophils Absolute 02/02/2021 6 08  1 85 - 7 62 Thousands/µL Final    Immature Grans Absolute 02/02/2021 0 09  0 00 - 0 20 Thousand/uL Final    Lymphocytes Absolute 02/02/2021 1 99  0 60 - 4 47 Thousands/µL Final    Monocytes Absolute 02/02/2021 0 81  0 17 - 1 22 Thousand/µL Final    Eosinophils Absolute 02/02/2021 0 19  0 00 - 0 61 Thousand/µL Final    Basophils Absolute 02/02/2021 0 11* 0 00 - 0 10 Thousands/µL Final    Sodium 02/02/2021 132* 136 - 145 mmol/L Final    Potassium 02/02/2021 4 3  3 5 - 5 3 mmol/L Final    Chloride 02/02/2021 97* 100 - 108 mmol/L Final    CO2 02/02/2021 26  21 - 32 mmol/L Final    ANION GAP 02/02/2021 9  4 - 13 mmol/L Final    BUN 02/02/2021 18  5 - 25 mg/dL Final    Creatinine 02/02/2021 0 90  0 60 - 1 30 mg/dL Final    Standardized to IDMS reference method    Glucose 02/02/2021 165* 65 - 140 mg/dL Final    If the patient is fasting, the ADA then defines impaired fasting glucose as > 100 mg/dL and diabetes as > or equal to 123 mg/dL  Specimen collection should occur prior to Sulfasalazine administration due to the potential for falsely depressed results  Specimen collection should occur prior to Sulfapyridine administration due to the potential for falsely elevated results   Calcium 02/02/2021 8 9  8 3 - 10 1 mg/dL Final    AST 02/02/2021 22  5 - 45 U/L Final    Specimen collection should occur prior to Sulfasalazine administration due to the potential for falsely depressed results   ALT 02/02/2021 35  12 - 78 U/L Final    Specimen collection should occur prior to Sulfasalazine administration due to the potential for falsely depressed results   Alkaline Phosphatase 02/02/2021 116  46 - 116 U/L Final    Total Protein 02/02/2021 7 0  6 4 - 8 2 g/dL Final    Albumin 02/02/2021 3 6  3 5 - 5 0 g/dL Final    Total Bilirubin 02/02/2021 0 40  0 20 - 1 00 mg/dL Final    Use of this assay is not recommended for patients undergoing treatment with eltrombopag due to the potential for falsely elevated results      eGFR 02/02/2021 60 ml/min/1 73sq m Final    TSH 3RD GENERATON 02/02/2021 3 333  0 358 - 3 740 uIU/mL Final    The recommended reference ranges for TSH during pregnancy are as follows:   First trimester 0 1 to 2 5 uIU/mL   Second trimester  0 2 to 3 0 uIU/mL   Third trimester 0 3 to 3 0 uIU/m    Note: Normal ranges may not apply to patients who are transgender, non-binary, or whose legal sex, sex at birth, and gender identity differ   Ammonia 02/02/2021 <10* 11 - 35 umol/L Final    Specimen collection should occur prior to Sulfapyridine administration due to the potential for falsely depressed results   Magnesium 02/02/2021 1 6  1 6 - 2 6 mg/dL Final    Lipase 02/02/2021 217  73 - 393 u/L Final    Troponin I 02/02/2021 <0 02  <=0 04 ng/mL Final    [employer] Chemistry analyzer 99% cutoff is > 0 04 ng/mL in network labs     o cTnI 99% cutoff is useful only when applied to patients in the clinical setting of myocardial ischemia   o cTnI 99% cutoff should be interpreted in the context of clinical history, ECG findings and possibly cardiac imaging to establish correct diagnosis  o cTnI 99% cutoff may be suggestive but clearly not indicative of a coronary event without the clinical setting of myocardial ischemia        Ethanol Lvl 02/02/2021 <3  0 - 3 mg/dL Final    Salicylate Lvl 93/39/8113 <3* 3 - 20 mg/dL Final    Acetaminophen Level 02/02/2021 <2* 10 - 20 ug/mL Final    RBC, UA 02/02/2021 1-2  None Seen, 0-1, 1-2, 2-4, 0-5 /hpf Final    WBC, UA 02/02/2021 20-30* None Seen, 0-1, 1-2, 0-5, 2-4 /hpf Final    Epithelial Cells 02/02/2021 Occasional  None Seen, Occasional /hpf Final    Bacteria, UA 02/02/2021 Innumerable* None Seen, Occasional /hpf Final    Urine Culture 02/02/2021 >100,000 cfu/ml Klebsiella pneumoniae*  Final    Urine Culture 02/02/2021 20,000-29,000 cfu/ml Aerococcus urinae*  Final    Comment: Aerococcus urinae has been described as typically being susceptible to most Penicillins, Cephalosporins, and nitrofurantoin  Activity is variable with Fluoroquinolones and poor with Sulfonamides  Susceptibility is not normally performed on this                            organism   POC Glucose 02/03/2021 152* 65 - 140 mg/dl Final    Cholesterol 02/03/2021 213* 50 - 200 mg/dL Final    Cholesterol:       Desirable         <200 mg/dl       Borderline         200-239 mg/dl       High              >239           Triglycerides 02/03/2021 240* <=150 mg/dL Final    Triglyceride:     Normal          <150 mg/dl     Borderline High 150-199 mg/dl     High            200-499 mg/dl        Very High       >499 mg/dl    Specimen collection should occur prior to N-Acetylcysteine or Metamizole administration due to the potential for falsely depressed results   HDL, Direct 02/03/2021 36* >=40 mg/dL Final    HDL Cholesterol:       Low     <41 mg/dL  Specimen collection should occur prior to Metamizole administration due to the potential for falsley depressed results   LDL Calculated 02/03/2021 129* 0 - 100 mg/dL Final    LDL Cholesterol:     Optimal           <100 mg/dl     Near Optimal      100-129 mg/dl     Above Optimal       Borderline High 130-159 mg/dl       High            160-189 mg/dl       Very High       >189 mg/dl         This screening LDL is a calculated result  It does not have the accuracy of the Direct Measured LDL in the monitoring of patients with hyperlipidemia and/or statin therapy  Direct Measure LDL (YJB373) must be ordered separately in these patients   Hemoglobin A1C 02/03/2021 7 2* Normal 3 8-5 6%; PreDiabetic 5 7-6 4%;  Diabetic >=6 5%; Glycemic control for adults with diabetes <7 0% % Final    EAG 02/03/2021 160  mg/dl Final    Sodium 02/03/2021 134* 136 - 145 mmol/L Final    Potassium 02/03/2021 4 0  3 5 - 5 3 mmol/L Final    Chloride 02/03/2021 99* 100 - 108 mmol/L Final    CO2 02/03/2021 26  21 - 32 mmol/L Final    ANION GAP 02/03/2021 9  4 - 13 mmol/L Final    BUN 02/03/2021 17  5 - 25 mg/dL Final    Creatinine 02/03/2021 0 74  0 60 - 1 30 mg/dL Final    Standardized to IDMS reference method    Glucose 02/03/2021 192* 65 - 140 mg/dL Final    If the patient is fasting, the ADA then defines impaired fasting glucose as > 100 mg/dL and diabetes as > or equal to 123 mg/dL  Specimen collection should occur prior to Sulfasalazine administration due to the potential for falsely depressed results  Specimen collection should occur prior to Sulfapyridine administration due to the potential for falsely elevated results      Calcium 02/03/2021 9 0  8 3 - 10 1 mg/dL Final    eGFR 02/03/2021 76  ml/min/1 73sq m Final    WBC 02/03/2021 9 88  4 31 - 10 16 Thousand/uL Final    RBC 02/03/2021 4 59  3 81 - 5 12 Million/uL Final    Hemoglobin 02/03/2021 12 5  11 5 - 15 4 g/dL Final    Hematocrit 02/03/2021 40 3  34 8 - 46 1 % Final    MCV 02/03/2021 88  82 - 98 fL Final    MCH 02/03/2021 27 2  26 8 - 34 3 pg Final    MCHC 02/03/2021 31 0* 31 4 - 37 4 g/dL Final    RDW 02/03/2021 13 2  11 6 - 15 1 % Final    MPV 02/03/2021 9 8  8 9 - 12 7 fL Final    Platelets 03/14/0712 295  149 - 390 Thousands/uL Final    nRBC 02/03/2021 0  /100 WBCs Final    Neutrophils Relative 02/03/2021 77* 43 - 75 % Final    Immat GRANS % 02/03/2021 1  0 - 2 % Final    Lymphocytes Relative 02/03/2021 13* 14 - 44 % Final    Monocytes Relative 02/03/2021 7  4 - 12 % Final    Eosinophils Relative 02/03/2021 1  0 - 6 % Final    Basophils Relative 02/03/2021 1  0 - 1 % Final    Neutrophils Absolute 02/03/2021 7 72* 1 85 - 7 62 Thousands/µL Final    Immature Grans Absolute 02/03/2021 0 08  0 00 - 0 20 Thousand/uL Final    Lymphocytes Absolute 02/03/2021 1 25  0 60 - 4 47 Thousands/µL Final    Monocytes Absolute 02/03/2021 0 67  0 17 - 1 22 Thousand/µL Final    Eosinophils Absolute 02/03/2021 0 07  0 00 - 0 61 Thousand/µL Final    Basophils Absolute 02/03/2021 0 09  0 00 - 0 10 Thousands/µL Final    POC Glucose 02/03/2021 184* 65 - 140 mg/dl Final    Ventricular Rate 02/02/2021 75  BPM Final    Atrial Rate 02/02/2021 75  BPM Final    MI Interval 02/02/2021 194  ms Final    QRSD Interval 02/02/2021 106  ms Final    QT Interval 02/02/2021 406  ms Final    QTC Interval 02/02/2021 453  ms Final    P Axis 02/02/2021 -8  degrees Final    QRS Axis 02/02/2021 75  degrees Final    T Wave [place] 02/02/2021 18  degrees Final    POC Glucose 02/03/2021 140  65 - 140 mg/dl Final    POC Glucose 02/03/2021 145* 65 - 140 mg/dl Final    POC Glucose 02/03/2021 154* 65 - 140 mg/dl Final    POC Glucose 02/04/2021 153* 65 - 140 mg/dl Final    POC Glucose 02/04/2021 139  65 - 140 mg/dl Final    POC Glucose 02/04/2021 170* 65 - 140 mg/dl Final    POC Glucose 02/04/2021 143* 65 - 140 mg/dl Final    POC Glucose 02/05/2021 120  65 - 140 mg/dl Final    POC Glucose 02/05/2021 176* 65 - 140 mg/dl Final    POC Glucose 02/05/2021 146* 65 - 140 mg/dl Final    POC Glucose 02/05/2021 194* 65 - 140 mg/dl Final    Sodium 02/06/2021 134* 136 - 145 mmol/L Final    Potassium 02/06/2021 4 1  3 5 - 5 3 mmol/L Final    Chloride 02/06/2021 99* 100 - 108 mmol/L Final    CO2 02/06/2021 24  21 - 32 mmol/L Final    ANION GAP 02/06/2021 11  4 - 13 mmol/L Final    BUN 02/06/2021 41* 5 - 25 mg/dL Final    Creatinine 02/06/2021 1 08  0 60 - 1 30 mg/dL Final    Standardized to IDMS reference method    Glucose 02/06/2021 151* 65 - 140 mg/dL Final    If the patient is fasting, the ADA then defines impaired fasting glucose as > 100 mg/dL and diabetes as > or equal to 123 mg/dL  Specimen collection should occur prior to Sulfasalazine administration due to the potential for falsely depressed results  Specimen collection should occur prior to Sulfapyridine administration due to the potential for falsely elevated results      Calcium 02/06/2021 8 8  8 3 - 10 1 mg/dL Final    eGFR 02/06/2021 48  ml/min/1 73sq m Final    WBC 02/06/2021 8 69  4 31 - 10 16 Thousand/uL Final    RBC 02/06/2021 4 55  3 81 - 5 12 Million/uL Final    Hemoglobin 02/06/2021 12 3  11 5 - 15 4 g/dL Final    Hematocrit 02/06/2021 40 1  34 8 - 46 1 % Final    MCV 02/06/2021 88  82 - 98 fL Final    MCH 02/06/2021 27 0  26 8 - 34 3 pg Final    MCHC 02/06/2021 30 7* 31 4 - 37 4 g/dL Final    RDW 02/06/2021 13 3  11 6 - 15 1 % Final    MPV 02/06/2021 10 5  8 9 - 12 7 fL Final    Platelets 72/69/4650 306  149 - 390 Thousands/uL Final    nRBC 02/06/2021 0  /100 WBCs Final    Neutrophils Relative 02/06/2021 65  43 - 75 % Final    Immat GRANS % 02/06/2021 1  0 - 2 % Final    Lymphocytes Relative 02/06/2021 21  14 - 44 % Final    Monocytes Relative 02/06/2021 9  4 - 12 % Final    Eosinophils Relative 02/06/2021 3  0 - 6 % Final    Basophils Relative 02/06/2021 1  0 - 1 % Final    Neutrophils Absolute 02/06/2021 5 59  1 85 - 7 62 Thousands/µL Final    Immature Grans Absolute 02/06/2021 0 10  0 00 - 0 20 Thousand/uL Final    Lymphocytes Absolute 02/06/2021 1 84  0 60 - 4 47 Thousands/µL Final    Monocytes Absolute 02/06/2021 0 80  0 17 - 1 22 Thousand/µL Final    Eosinophils Absolute 02/06/2021 0 25  0 00 - 0 61 Thousand/µL Final    Basophils Absolute 02/06/2021 0 11* 0 00 - 0 10 Thousands/µL Final    POC Glucose 02/06/2021 175* 65 - 140 mg/dl Final    POC Glucose 02/06/2021 208* 65 - 140 mg/dl Final    POC Glucose 02/06/2021 144* 65 - 140 mg/dl Final         There are no Patient Instructions on file for this visit  Dr Jennifer Bradshaw MD  [facility]       "This note has been constructed using a voice recognition system  Therefore there may be syntax, spelling, and/or grammatical errors   Please call if you have any questions  "

## 2021-03-02 NOTE — PATIENT INSTRUCTIONS
Recommend to give metformin 500 mg twice a day due to previous a possible history of hypoglycemia in the past as well as multiple comorbidities the goal is not to have a tight sugar control    Follow with Consultants as per their and our suggestion    Follow up in 2 months or as needed earlier    Follow all instructions as advised and discussed  Take your medications as prescribed  Call the office immediately if you experience any side effects  Ask questions if you do not understand  Keep your scheduled appointment as advised or come sooner if necessary or in doubt  Best time to call for non-urgent matter or questions on weekdays is between 9am and 12 noon  See physician for any new symptoms or worsening of current symptoms  Urgent or emergent situations call 911 and report to nearest emergency room      I spent  50+ minutes taking care of this patient including clinical care, conseling, collaboration, chart, lab and consultaion review as appropriate    Patient is to get labs 1 week(s) prior to next visit if advised

## 2021-03-02 NOTE — ASSESSMENT & PLAN NOTE
Lab Results   Component Value Date    HGBA1C 7 2 (H) 02/03/2021   Currently on home metformin 1000 mg twice a day she has a tendency for hypoglycemia  Will decrease to 500 mg twice a day patient is happy with that does which I agree him  I am not looking for tight control patient will continue to follow with eye doctor

## 2021-03-08 NOTE — TELEPHONE ENCOUNTER
I spoke to [facility] and told her that I was mailing a [employer] Patient Assistance Program application for Vimpat  She will have her son help her complete and mail it back to me directly so that I can fax directly to the company

## 2021-03-09 NOTE — PROGRESS NOTES
Chart review completed  Outside records through [facility] requested and refreshed  Patient presented 3/2 for both PCP and neuro appointments as scheduled  CM spoke with patient who offers no complaints  She reports that her son returned back to his home as she is doing much better  Patient denies needing assistance with transportation, finances or prescriptions  States she has a neighbor that offers assistance daily if needed  Telephonic assessment completed during this call    This CM will continue to monitor electronically via chart review, outreach and [facility]

## 2021-03-10 NOTE — LETTER
Diabetic Eye Exam Form    Date Requested: 21  Patient: Aman Peterson  Patient : 1940   Referring Provider: Cm Ely MD    Dilated Retinal Exam, Optomap-Iris Exam, or Fundus Photography Done         Yes ([place] one above)         No     Date of Diabetic Eye Exam ______________________________  Left Eye      Exam did show retinopathy    Exam did not show retinopathy         Mild       Moderate       None       Proliferative       Severe     Right Eye     Exam did show retinopathy    Exam did not show retinopathy         Mild       Moderate       None       Proliferative       Severe     Comments __________________________________________________________    Practice Providing Exam ______________________________________________    Exam Performed By (print name) _______________________________________      Provider Signature ___________________________________________________      These reports are needed for  compliance    Please fax this completed form and a copy of the Diabetic Eye Exam report to our office located at [place] [address] as soon as possible to [phone] [facility] [place]: Phone [phone]    We thank you for your assistance in treating our mutual patient

## 2021-03-10 NOTE — LETTER
Diabetic Eye Exam Form    Date Requested: 21  Patient: Matthew Mcginnis  Patient : 1940   Referring Provider: Russell Tang MD    Dilated Retinal Exam, Optomap-Iris Exam, or Fundus Photography Done         Yes ([place] one above)         No     Date of Diabetic Eye Exam ______________________________  Left Eye      Exam did show retinopathy    Exam did not show retinopathy         Mild       Moderate       None       Proliferative       Severe     Right Eye     Exam did show retinopathy    Exam did not show retinopathy         Mild       Moderate       None       Proliferative       Severe     Comments __________________________________________________________    Practice Providing Exam ______________________________________________    Exam Performed By (print name) _______________________________________      Provider Signature ___________________________________________________      These reports are needed for  compliance    Please fax this completed form and a copy of the Diabetic Eye Exam report to our office located at [place] [address] as soon as possible to [phone] [facility]: Phone [phone]    We thank you for your assistance in treating our mutual patient

## 2021-03-10 NOTE — TELEPHONE ENCOUNTER
----- Message from Marcus Stover MD sent at 3/2/2021  3:54 PM EST -----  Regarding: retinopathy  03/02/21 3:54 PM    Hello, our patient Hima Boss has had Diabetic Eye Exam completed/performed  Please assist in updating the patient chart by making an External outreach to Dr Michael Malave located in in [address] [place] [address]  The date of service is January 2021 or December 2020      Thank you,  Marcus Stover MD  [facility]

## 2021-03-13 NOTE — TELEPHONE ENCOUNTER
Upon review of the In Basket request and the patient's chart, initial outreach has been made via fax, please see Contacts section for details        [phone]    Thank you  Trinity Jiménez MA

## 2021-03-15 NOTE — TELEPHONE ENCOUNTER
Patient stated she never received the assistance program application that was supposed to be mailed out  She is requesting this be mailed out again  She stated it's been over 2 weeks (I'm not sure if that is accurate however, as the encounter is only one week ago)  Please follow up with patient  Thank  You

## 2021-03-16 NOTE — TELEPHONE ENCOUNTER
The patient returned my call, and I explained the mail system  She will let me know if she does not receive by early next week

## 2021-03-16 NOTE — TELEPHONE ENCOUNTER
I called the patient and left a voicemail that our mail goes to Ja first, then is sent out  I asked her to call me by Friday if she does not receive the application

## 2021-03-17 NOTE — PROGRESS NOTES
Assessment/Plan:  1  Primary osteoarthritis of both knees  Large joint arthrocentesis: R knee   2  Chronic pain of both knees  Large joint arthrocentesis: R knee   3  Bilateral knee effusions  Large joint arthrocentesis: R knee   4  Type 2 diabetes mellitus without complication, without long-term current use of insulin (HCC)  Large joint arthrocentesis: R knee   5  Memory impairment         Shy Richards is a pleasant 79-year-old presenting today for follow-up of her activity related bilateral knee pain with known severe underlying osteoarthritis valgus deformities  She also has bilateral knee effusions on exam today  We had a long discussion with her today about repeating cortisone injections  They are very unlikely to interfere with any of her recent seizure medications  Additionally, she would like to avoid surgery due to her age and comorbidities, and we are in agreement with this  Therefore, we will pursue staggered cortisone injections due to her underlying diabetes  Since her right knee is currently more symptomatic than her left, she consented to and underwent an aspiration and cortisone injection as detailed below, which she tolerated well without difficulty or complication  Post injection instructions were provided  We will plan to see her back in 2-3 weeks for re-evaluation an aspiration and cortisone injection of her left knee  She expressed understanding all of her questions were addressed    Large joint arthrocentesis: R knee  Universal Protocol:  Consent: Verbal consent obtained  Risks and benefits: risks, benefits and alternatives were discussed  Consent given by: patient  Time out: Immediately prior to procedure a "time out" was called to verify the correct patient, procedure, equipment, support staff and site/side marked as required    Timeout called at: 3/17/2021 11:25 AM   Site marked: the operative site was marked  Patient identity confirmed: verbally with patient    Supporting Documentation  Indications: pain   Procedure Details  Location: knee - R knee  Preparation: Patient was prepped and draped in the usual sterile fashion  Needle size: 18 G  Ultrasound guidance: no  Approach: lateral  Medications administered: 6 mL bupivacaine (PF) 0 5 %; 80 mg triamcinolone acetonide 40 mg/mL    Aspirate amount: 42 mL  Aspirate: yellow, serous and clear    Patient tolerance: patient tolerated the procedure well with no immediate complications  Dressing:  Sterile dressing applied        Subjective:  Bilateral knee follow-up    Patient ID: Zaid Nieves is a 79 y o  female  Elmore Servant is a pleasant 48-year-old female presenting today for follow-up of her activity related bilateral knee pain with known severe underlying osteoarthritis  Appointment last month had to be postponed due to her receiving the COVID vaccine, which was completed in early March  She reports that she still has activity related pain and swelling in her knees on a daily basis, currently the right is more symptomatic than the left  She is most symptomatic well standing from a seated position and reports achiness in her knees at night  She has done well with cortisone injections in the past, but is slightly apprehensive about receiving it since she had a first-time seizure in early February and has been placed on a new medication by Neurology  She did have an MRI of the brain which was negative for stroke and an EEG as well  She does have slight memory impairment and difficulty recalling historical details and events  Review of Systems   Constitutional: Positive for activity change  HENT: Negative  Eyes: Negative  Respiratory: Negative  Gastrointestinal: Negative  Endocrine: Negative  Genitourinary: Negative  Musculoskeletal: Positive for arthralgias, gait problem, joint swelling and myalgias  Skin: Negative  Allergic/Immunologic: Negative  Hematological: Negative      Psychiatric/Behavioral: Negative  Past Medical History:   Diagnosis Date    AMS (altered mental status)     Anemia     Anxiety     Arthritis     Chronic UTI     better since urethral stretching    [facility]    Current moderate episode of major depressive disorder without prior episode ([facility] ) 2020    Diabetes mellitus ([place] [address] )     type 2    GERD (gastroesophageal reflux disease)     diet controlled    High triglycerides     Hypertension     Irritable bowel syndrome     Other specified hypothyroidism 2020    Primary osteoarthritis of both knees 2016    Seizures ([place] [address] )        Past Surgical History:   Procedure Laterality Date    APPENDECTOMY      age 14   Valaria Reil GUM SURGERY      tumor removal benign x3    IA XCAPSL CTRC RMVL INSJ IO LENS PROSTH W/O ECP Left 5/15/2017    Procedure: EXTRACTION EXTRACAPSULAR CATARACT PHACO INTRAOCULAR LENS (IOL); Surgeon: Kaur Bah MD;  Location: [facility] MAIN OR;  Service: Ophthalmology    IA XCAPSL CTRC RMVL INSJ IO LENS PROSTH W/O ECP Right 2017    Procedure: EXTRACTION EXTRACAPSULAR CATARACT PHACO INTRAOCULAR LENS (IOL); Surgeon: Kaur Bah MD;  Location: [facility] MAIN OR;  Service: Ophthalmology    TONSILLECTOMY         Family History   Problem Relation Age of Onset    Early death Mother         CHF    Early death Father         MI    Cancer Sister         stomach lymphoma    No Known Problems Son        Social History     Occupational History    Not on file   Tobacco Use    Smoking status: Former Smoker     Packs/day: 1 00     Years: 10 00     Pack years: 10 00     Types: Cigarettes     Quit date:      Years since quittin 2    Smokeless tobacco: Never Used   Substance and Sexual Activity    Alcohol use:  Yes     Alcohol/week: 1 0 standard drinks     Types: 1 Standard drinks or equivalent per week     Comment: rarely    Drug use: No    Sexual activity: Not Currently         Current Outpatient Medications:     amLODIPine (NORVASC) 5 mg tablet, Take 1 tablet (5 mg total) by mouth every morning, Disp: 90 tablet, Rfl: 1    aspirin (ECOTRIN LOW STRENGTH) 81 mg EC tablet, Take 81 mg by mouth daily with breakfast, Disp: , Rfl:     Blood Glucose Monitoring Suppl (ONE TOUCH ULTRA 2) w/Device KIT, by Does not apply route daily, Disp: 1 each, Rfl: 0    fenofibrate (TRIGLIDE) 160 MG tablet, Take 1 tablet (160 mg total) by mouth every evening, Disp: 90 tablet, Rfl: 1    glucose blood (OneTouch Ultra) test strip, Use as instructed, Disp: 50 each, Rfl: 6    Glycerin-Polysorbate 80 (REFRESH DRY EYE THERAPY OP), Apply to eye, Disp: , Rfl:     lacosamide (VIMPAT) 100 mg tablet, Take 1 tablet (100 mg total) by mouth every 12 (twelve) hours, Disp: 60 tablet, Rfl: 2    Lancets (onetouch ultrasoft) lancets, Use as instructed, Disp: 50 each, Rfl: 6    levothyroxine 50 mcg tablet, Take 1 tablet (50 mcg total) by mouth daily, Disp: 90 tablet, Rfl: 1    metFORMIN (GLUCOPHAGE) 500 mg tablet, Take 1 tablet (500 mg total) by mouth 2 (two) times a day with meals, Disp: 180 tablet, Rfl: 1    metoprolol succinate (TOPROL-XL) 25 mg 24 hr tablet, Take 25 mg by mouth daily , Disp: , Rfl:     Multiple Vitamins-Minerals (CENTRUM SILVER ADULT 50+ PO), Take by mouth daily with breakfast, Disp: , Rfl:     olmesartan-hydrochlorothiazide (BENICAR HCT) 40-25 MG per tablet, Take 1 tablet by mouth every morning, Disp: 90 tablet, Rfl: 1    Allergies   Allergen Reactions    Amoxicillin GI Intolerance    Lactose GI Intolerance    Sulfa Antibiotics GI Intolerance    Lidocaine Rash     Lidocaine patch       Objective:  Vitals:    03/17/21 1103   BP: 162/74   Pulse: 66       Body mass index is 26 05 kg/m²  Right Knee Exam     Muscle Strength   The patient has normal right knee strength  Tenderness   The patient is experiencing tenderness in the lateral joint line, patella and medial joint line      Range of Motion   Extension:  0 normal   Flexion:  120 normal Tests   Tyrell:  Medial - negative Lateral - negative  Varus: negative Valgus: negative  Drawer:  Anterior - negative    Posterior - negative  Patellar apprehension: negative    Other   Erythema: absent  Scars: absent  Sensation: normal  Pulse: present  Swelling: none  Effusion: no effusion present    Comments:  Crepitance on AROM and PROM-diffuse  +PFG  No increased warmth of knee  Knee is stable at 0, 30, 90 degrees  7-10 degree valgus deformity that is passively correctable at neutral bilaterally  She does have soft tissue swelling consistent with her underlying disease appears to be chronic in nature around her bilateral knees  Left Knee Exam     Muscle Strength   The patient has normal left knee strength  Tenderness   The patient is experiencing tenderness in the lateral joint line, patella and medial joint line  Range of Motion   Extension:  0 normal   Flexion:  120 normal     Tests   Tyrell:  Medial - negative Lateral - negative  Varus: negative Valgus: negative  Drawer:  Anterior - negative     Posterior - negative  Patellar apprehension: negative    Other   Erythema: absent  Scars: absent  Sensation: normal  Pulse: present  Swelling: none  Effusion: no effusion present    Comments:  Crepitance on AROM and PROM-parapatellar  +PFG  No increased warmth of knee  Knee is stable at 0, 30, 90 degrees          Observations   Left Knee   Negative for effusion  Right Knee   Negative for effusion  Physical Exam  Vitals signs and nursing note reviewed  Constitutional:       Appearance: She is well-developed  Comments: Body mass index is 26 05 kg/m²  HENT:      Head: Normocephalic and atraumatic  Right Ear: External ear normal       Left Ear: External ear normal    Neck:      Musculoskeletal: Normal range of motion  Cardiovascular:      Rate and Rhythm: Normal rate  Pulmonary:      Effort: Pulmonary effort is normal    Abdominal:      Palpations: Abdomen is soft  Musculoskeletal:      Right knee: She exhibits no effusion  Left knee: She exhibits no effusion  Comments: See ortho exam   Skin:     General: Skin is warm and dry  Neurological:      Mental Status: She is alert and oriented to person, place, and time  Psychiatric:         Behavior: Behavior normal          Thought Content:  Thought content normal          Judgment: Judgment normal

## 2021-03-24 PROBLEM — S72.001A CLOSED RIGHT HIP FRACTURE (HCC): Status: ACTIVE | Noted: 2021-01-01

## 2021-03-24 PROBLEM — E87.1 HYPONATREMIA: Status: ACTIVE | Noted: 2021-01-01

## 2021-03-24 NOTE — PLAN OF CARE
Problem: Potential for Falls  Goal: Patient will remain free of falls  Description: INTERVENTIONS:  - Assess patient frequently for physical needs  -  Identify cognitive and physical deficits and behaviors that affect risk of falls    -  [place] fall precautions as indicated by assessment   - Educate patient/family on patient safety including physical limitations  - Instruct patient to call for assistance with activity based on assessment  - Modify environment to reduce risk of injury  - Consider OT/PT consult to assist with strengthening/mobility  Outcome: Progressing     Problem: Prexisting or High Potential for Compromised Skin Integrity  Goal: Skin integrity is maintained or improved  Description: INTERVENTIONS:  - Identify patients at risk for skin breakdown  - Assess and monitor skin integrity  - Assess and monitor nutrition and hydration status  - Monitor labs   - Assess for incontinence   - Turn and reposition patient  - Assist with mobility/ambulation  - Relieve pressure over bony prominences  - Avoid friction and shearing  - Provide appropriate hygiene as needed including keeping skin clean and dry  - Evaluate need for skin moisturizer/barrier cream  - Collaborate with interdisciplinary team   - Patient/family teaching  - Consider wound care consult   Outcome: Progressing     Problem: PAIN - ADULT  Goal: Verbalizes/displays adequate comfort level or baseline comfort level  Description: Interventions:  - Encourage patient to monitor pain and request assistance  - Assess pain using appropriate pain scale  - Administer analgesics based on type and severity of pain and evaluate response  - Implement non-pharmacological measures as appropriate and evaluate response  - Consider cultural and social influences on pain and pain management  - Notify physician/advanced practitioner if interventions unsuccessful or patient reports new pain  Outcome: Progressing

## 2021-03-24 NOTE — CONSULTS
Consultation - Orthopedics   Naseem Mascorro 79 y o  female MRN: [id number]  Unit/Bed#: [address] [place] [address] Encounter: [id number]      Assessment/Plan     Assessment:  Right hip pain -  Patient sustained an intertrochanteric fracture with subtrochanteric extension from her fall this morning  Discussed with her and her son that this will require surgical intervention in the form of a long cephalomedullary nail  Since she does weight bear and is active at baseline, they are not interested in non operative treatment, which we agree  We will plan for surgery tomorrow with Dr Luis Martinez  She will need clearance from the Internal Medicine team as well as Cardiology prior to the procedure  She may eat today, but should be NPO after midnight tonight  She should remain nonweightbearing with strict bedrest   Hold all chemical anticoagulation tomorrow  Orthopedics will continue to follow up    Plan:  -  Plan for surgical intervention in the form of a long cephalomedullary nail tomorrow with ELIAS Souza  -  Medical and cardiology clearance and recommendations appreciated  -  NPO after midnight  -  Hold PT/OT  -  Nonweightbearing and strict bedrest right lower extremity  -  Basic labs and type and screen preoperatively  -  Hold all chemical anticoagulation tomorrow, SCDs okay  -  Analgesia p r n   -  Orthopedics will continue to follow     History of Present Illness   Physician Requesting Consult: Say Black DO  Reason for Consult / Principal Problem: Fall  HPI: Naseem Mascorro is a 79y o  year old female who presents with  Right hip pain after fall this morning  She is known to our practice for her bilateral knee complaints and recently received a cortisone injection for her right knee  She reports that she was attempting to  a puppy pad off floor this morning when she lost her balance and fell forward    She was unable to ambulate due to pain in her right hip after her fall, and was brought to the emergency department where x-ray revealed a intertrochanteric fracture  She normally ambulates with an assistive device  She denies any antecedent hip pain  She denies any chest pain, shortness of breath, dizziness, lightheadedness, loss of consciousness, or paresthesias in the right lower extremity  Inpatient consult to Orthopedic Surgery  Consult performed by: Rad Andrews PA-C  Consult ordered by: Jaime Hdz DO          Review of Systems   Constitutional: Positive for activity change  HENT: Negative  Eyes: Negative  Respiratory: Negative  Cardiovascular: Negative  Gastrointestinal: Negative  Endocrine: Negative  Genitourinary: Negative  Musculoskeletal: Positive for arthralgias, gait problem, joint swelling and myalgias  Skin: Negative  Allergic/Immunologic: Negative  Hematological: Negative  Psychiatric/Behavioral: Positive for confusion  Historical Information   Past Medical History:   Diagnosis Date    AMS (altered mental status)     Anemia     Anxiety     Arthritis     Chronic UTI     better since urethral stretching    Cirrhosis of liver ([facility] ) 9/2/2019    Closed right hip fracture (HCC) 3/24/2021    Current moderate episode of major depressive disorder without prior episode ([address] ) 1/14/2020    Diabetes mellitus (HCC)     type 2    GERD (gastroesophageal reflux disease)     diet controlled    High triglycerides     Hypertension     Irritable bowel syndrome     Other specified hypothyroidism 5/13/2020    Primary osteoarthritis of both knees 1/20/2016    Seizures ([address] )      Past Surgical History:   Procedure Laterality Date    APPENDECTOMY      age 14   Jullie Liming GUM SURGERY      tumor removal benign x3    [place] XCAPSL CTRC RMVL INSJ IO LENS PROSTH W/O ECP Left 5/15/2017    Procedure: EXTRACTION EXTRACAPSULAR CATARACT PHACO INTRAOCULAR LENS (IOL);   Surgeon: Nehal Nguyen MD;  [facility] MAIN OR;  Service: Ophthalmology    [place] XCAPSL CTRC RMVL INSJ IO LENS PROSTH W/O ECP Right 2017    Procedure: EXTRACTION EXTRACAPSULAR CATARACT PHACO INTRAOCULAR LENS (IOL); Surgeon: Andrew Murillo MD;  Location: [facility] MAIN OR;  Service: Ophthalmology    TONSILLECTOMY       Social History   Social History     Substance and Sexual Activity   Alcohol Use Yes    Alcohol/week: 1 0 standard drinks    Types: 1 Standard drinks or equivalent per week    Comment: rarely     Social History     Substance and Sexual Activity   Drug Use No     E-Cigarette/Vaping    E-Cigarette Use Never User      E-Cigarette/Vaping Substances    Nicotine No     THC No     CBD No     Flavoring No     Other No     Unknown No      Social History     Tobacco Use   Smoking Status Former Smoker    Packs/day: 1 00    Years: 10     Pack years: 10     Types: Cigarettes    Quit date:     Years since quittin 2   Smokeless Tobacco Never Used     Family History:   Family History   Problem Relation Age of Onset   Dee Shadi Early death Mother         CHF    Early death Father         MI    Cancer Sister         stomach lymphoma    No Known Problems Son        Meds/Allergies   all current active meds have been reviewed, current meds:   Current Facility-Administered Medications   Medication Dose Route Frequency    insulin lispro (HumaLOG) 100 units/mL subcutaneous injection 1-5 Units  1-5 Units Subcutaneous TID AC    insulin lispro (HumaLOG) 100 units/mL subcutaneous injection 1-5 Units  1-5 Units Subcutaneous HS    and PTA meds:   Prior to Admission Medications   Prescriptions Last Dose Informant Patient Reported? Taking?    Blood Glucose Monitoring Suppl (ONE TOUCH ULTRA 2) w/Device KIT Unknown at Unknown time  No No   Sig: by Does not apply route daily   Glycerin-Polysorbate 80 (REFRESH DRY EYE THERAPY OP) Unknown at Unknown time Self Yes No   Sig: Apply to eye   Lancets (onetouch ultrasoft) lancets   No No   Sig: Use as instructed   Multiple Vitamins-Minerals (CENTRUM SILVER ADULT 50+ PO) 3/23/2021 at Unknown time Self Yes Yes   Sig: Take by mouth daily with breakfast   amLODIPine (NORVASC) 5 mg tablet 3/23/2021 at Unknown time  No Yes   Sig: Take 1 tablet (5 mg total) by mouth every morning   aspirin (ECOTRIN LOW STRENGTH) 81 mg EC tablet 3/23/2021 at Unknown time Self Yes Yes   Sig: Take 81 mg by mouth daily with breakfast   fenofibrate (TRIGLIDE) 160 MG tablet 3/23/2021 at Unknown time  No Yes   Sig: Take 1 tablet (160 mg total) by mouth every evening   glucose blood (OneTouch Ultra) test strip Unknown at Unknown time  No No   Sig: Use as instructed   lacosamide (VIMPAT) 100 mg tablet Unknown at Unknown time  No No   Sig: Take 1 tablet (100 mg total) by mouth every 12 (twelve) hours   levothyroxine 50 mcg tablet 3/23/2021 at Unknown time  No Yes   Sig: Take 1 tablet (50 mcg total) by mouth daily   metFORMIN (GLUCOPHAGE) 500 mg tablet 3/23/2021 at Unknown time  No Yes   Sig: Take 1 tablet (500 mg total) by mouth 2 (two) times a day with meals   metoprolol succinate (TOPROL-XL) 25 mg 24 hr tablet 3/23/2021 at Unknown time Self Yes Yes   Sig: Take 25 mg by mouth daily    olmesartan-hydrochlorothiazide (BENICAR HCT) 40-25 MG per tablet 3/23/2021 at Unknown time  No Yes   Sig: Take 1 tablet by mouth every morning      Facility-Administered Medications: None     Allergies   Allergen Reactions    Amoxicillin GI Intolerance    Lactose GI Intolerance    Sulfa Antibiotics GI Intolerance    Lidocaine Rash     Lidocaine patch       Objective   Vitals: Blood pressure 135/72, pulse 73, temperature (!) 101 3 °F (38 5 °C), resp  rate 18, SpO2 95 %, not currently breastfeeding  ,There is no height or weight on file to calculate BMI  No intake or output data in the 24 hours ending 03/24/21 1621  No intake/output data recorded      Invasive Devices     Peripheral Intravenous Line            Peripheral IV 03/24/21 Right Antecubital less than 1 day                Physical Exam  Vitals signs and nursing note reviewed  Constitutional:       Appearance: Normal appearance  HENT:      Head: Normocephalic and atraumatic  Right Ear: External ear normal       Left Ear: External ear normal       Nose: Nose normal       Mouth/Throat:      Mouth: Mucous membranes are moist    Eyes:      Extraocular Movements: Extraocular movements intact  Neck:      Musculoskeletal: Neck supple  Cardiovascular:      Rate and Rhythm: Normal rate  Pulses: Normal pulses  Pulmonary:      Effort: Pulmonary effort is normal       Breath sounds: Normal breath sounds  Abdominal:      Palpations: Abdomen is soft  Musculoskeletal:         General: Deformity present  Comments: See ortho exam   Skin:     General: Skin is warm and dry  Neurological:      General: No focal deficit present  Mental Status: She is alert and oriented to person, place, and time  Mental status is at baseline  Psychiatric:         Mood and Affect: Mood normal          Behavior: Behavior normal          Thought Content: Thought content normal          Judgment: Judgment normal        Right Hip Exam     Other   Erythema: absent  Scars: absent  Sensation: normal  Pulse: present    Comments:  Right lower extremity shortened and externally rotated  No abrasion, laceration, ulceration, erythema, ecchymosis, or other break in skin    Range of motion and strength deferred   thigh and calf soft and nontender   normal sensation in the L2 through S1 nerve distributions            Lab Results:   I have personally reviewed pertinent lab results    CBC:   Lab Results   Component Value Date    WBC 10 57 (H) 03/24/2021    HGB 12 1 03/24/2021    HCT 38 0 03/24/2021    MCV 88 03/24/2021     03/24/2021    MCH 27 9 03/24/2021    MCHC 31 8 03/24/2021    RDW 14 1 03/24/2021    MPV 10 1 03/24/2021    NRBC 0 03/24/2021     CMP:   Lab Results   Component Value Date    SODIUM 126 (L) 03/24/2021    CL 93 (L) 03/24/2021    CO2 24 03/24/2021    BUN 30 (H) 03/24/2021 CREATININE 1 14 03/24/2021    CALCIUM 8 8 03/24/2021    AST 14 03/24/2021    ALT 27 03/24/2021    ALKPHOS 88 03/24/2021    EGFR 45 03/24/2021     PT/INR:   Lab Results   Component Value Date    INR 0 97 03/24/2021     ESR: No results found for: ESR  CRP: No results found for: CRP     Imaging Studies: I have personally reviewed pertinent films in PACS   Xr Chest 1 View Portable    Result Date: 3/24/2021  Narrative: CHEST INDICATION:   pre op  COMPARISON:  2/2/2021 EXAM PERFORMED/VIEWS:  XR CHEST PORTABLE Single view FINDINGS: Cardiomediastinal silhouette appears unremarkable  The lungs are clear  No pneumothorax or pleural effusion  Osseous structures appear within normal limits for patient age  Impression: No acute cardiopulmonary disease  Findings are stable Workstation performed: [id number]     Xr Hip/pelv 2-3 Vws Right If Performed    Result Date: 3/24/2021  Narrative: RIGHT HIP INDICATION:   pain  COMPARISON:  None VIEWS:  XR HIP/PELV 2-3 VWS RIGHT W PELVIS IF PERFORMED Images: 3 FINDINGS: Acute comminuted intertrochanteric/subtrochanteric fracture right hip with mild displacement Mild degenerative arthritis both hips No lytic or blastic osseous lesion  Scattered vascular calcifications Left central pelvic calcification, likely degenerating fibroid Mild multilevel degenerative spondylosis     Impression: Acute comminuted  fracture right hip Workstation performed: [id number]      Dr Flavio Mulligan and I reviewed the available images of her right hip which demonstrate an acute comminuted displaced intertrochanteric hip fracture with subtrochanteric extension      EKG, Pathology, and Other Studies: I have personally reviewed pertinent films in PACS     VTE Prophylaxis: Sequential compression device Merceda [facility]y)     Code Status: Prior  Advance Directive and Living Will:      Power of :    POLST:      Grey Sarkar PA-C

## 2021-03-24 NOTE — PROGRESS NOTES
ADT notification received for patient being triaged at [facility] Emergency Department  Patient presented s/p mechanical fall at home, injuring right hip and right ankle  This CM will continue to monitor via chart review throughout hospitalization

## 2021-03-24 NOTE — H&P
History and Physical - [facility] Internal Medicine    Patient Information: Candy Schmid 79 y o  female MRN: [id number]  Unit/Bed#: [address] Encounter: [id number]  Admitting Physician: Elizabeth Marte DO  PCP: Bridgette Wright MD  Date of Admission:  03/24/21        Hospital Problem List:     Principal Problem:    Closed right hip fracture [facility])  Active Problems:    Hyponatremia    UTI (urinary tract infection)    Benign essential hypertension    Diabetes mellitus ([facility] )    Mixed hyperlipidemia    Other specified hypothyroidism    Seizure ([facility] )      Assessment/Plan:    * Closed right hip fracture [facility])  Assessment & Plan  Patient presented with a fall at home and fell on her right side  X-ray showed comminuted right hip fracture  Case was discussed with Orthopedics and plan is for surgical intervention tomorrow  Heparin for DVT prophylaxis for now which will be held after midnight tonight  Pain control with IV morphine and Tylenol p r n  Hyponatremia  Assessment & Plan  Sodium 126 in the ER  Received 500 cc normal saline bolus  Recheck lab work now and again in a m  Gentle IVF if sodium level is not worsening or correcting rapidly    UTI (urinary tract infection)  Assessment & Plan  Patient and son reports that she was recently diagnosed with UTI and was started on some antibiotic by Dr Santos Ernandez  Patient states that she has taken about 3 days of it  Patient febrile on the floors  Place patient on IV Rocephin  Chest x-ray negative for pneumonia  Check UA    Seizure [facility])  Assessment & Plan  Continue Vimpat    Other specified hypothyroidism  Assessment & Plan  Continue levothyroxine    Mixed hyperlipidemia  Assessment & Plan  Continue Tricor    Diabetes mellitus [facility])  Assessment & Plan  Lab Results   Component Value Date    HGBA1C 7 2 (H) 02/03/2021       Recent Labs     03/24/21  1648   POCGLU 123     Hold metformin    Place patient on Accu-Cheks with sliding scale insulin    Benign essential hypertension  Assessment & Plan  Continue Toprol-XL, Norvasc  Hold hydrochlorothiazide component and olmesartan substituted with losartan here          VTE Prophylaxis: Heparin  / sequential compression device   Code Status: Level 1 - Full Code    Anticipated Length of Stay:  Patient will be admitted on an Inpatient basis with an anticipated length of stay of atleast 2 midnights  Justification for Hospital Stay:  Right hip fracture, hyponatremia    Total Time for Visit, including Counseling / Coordination of Care: 45 minutes  Greater than 50% of this total time spent on direct patient counseling and coordination of care  Chief Complaint:     Fall (patient was picking a pee cup and accidentally fell and hurt her right side specifically the leg and ankle with pain radiating to her right groin)    of Present Illness:    Leah Bhatt is a 79 y o  female who presents with fall at home  Patient states that she was bending to pick something up off the floor and fell onto her right side  She pressed her Life Alert button and was brought in by EMS as she was not able to get herself off the floor  Patient denies any lightheadedness, dizziness, chest pain prior to, during or after the fall  Denies any head trauma or loss of consciousness    Review of Systems:    Review of Systems   Constitutional: Negative for appetite change, chills and fever  HENT: Negative for congestion and trouble swallowing  Eyes: Negative for photophobia and visual disturbance  Respiratory: Negative for chest tightness and shortness of breath  Cardiovascular: Negative for chest pain and leg swelling  Gastrointestinal: Negative for abdominal pain, blood in stool, diarrhea, nausea and vomiting  Genitourinary: Negative for dysuria, frequency and hematuria  Musculoskeletal:        (+) right hip pain   Skin: Negative for wound  Neurological: Negative for dizziness, syncope, speech difficulty and light-headedness  Hematological: Does not bruise/bleed easily  Psychiatric/Behavioral: Negative for agitation  Past Medical and Surgical History:     Past Medical History:   Diagnosis Date    AMS (altered mental status)     Anemia     Anxiety     Arthritis     Chronic UTI     better since urethral stretching    [facility] ([facility] ) 9/2/2019    Closed right hip fracture (HCC) 3/24/2021    Current moderate episode of major depressive disorder without prior episode ([facility] ) 1/14/2020    Diabetes mellitus (HCC)     type 2    GERD (gastroesophageal reflux disease)     diet controlled    High triglycerides     Hypertension     Irritable bowel syndrome     Other specified hypothyroidism 5/13/2020    Primary osteoarthritis of both knees 1/20/2016    Seizures ([facility] )        Past Surgical History:   Procedure Laterality Date    APPENDECTOMY      age 14   Rainer Cato GUM SURGERY      tumor removal benign x3    [place] XCAPSL CTRC RMVL INSJ IO LENS PROSTH W/O ECP Left 5/15/2017    Procedure: EXTRACTION EXTRACAPSULAR CATARACT PHACO INTRAOCULAR LENS (IOL); Surgeon: Mindi Emery MD;  Location: [facility] MAIN OR;  Service: Ophthalmology    [place] XCAPSL CTRC RMVL INSJ IO LENS PROSTH W/O ECP Right 6/26/2017    Procedure: EXTRACTION EXTRACAPSULAR CATARACT PHACO INTRAOCULAR LENS (IOL); Surgeon: Mindi Emery MD;  Location: [facility] MAIN OR;  Service: Ophthalmology    TONSILLECTOMY         Meds/Allergies:    PTA meds:   Prior to Admission Medications   Prescriptions Last Dose Informant Patient Reported? Taking?    Blood Glucose Monitoring Suppl (ONE TOUCH ULTRA 2) w/Device KIT Unknown at Unknown time  No No   Sig: by Does not apply route daily   Glycerin-Polysorbate 80 (REFRESH DRY EYE THERAPY OP) 3/23/2021 at Unknown time Self Yes Yes   Sig: Apply to eye   Lancets (onetouch ultrasoft) lancets   No No   Sig: Use as instructed   Multiple Vitamins-Minerals (CENTRUM SILVER ADULT 50+ PO) 3/23/2021 at Unknown time Self Yes Yes   Sig: Take by mouth daily with breakfast   amLODIPine (NORVASC) 5 mg tablet 3/23/2021 at Unknown time  No Yes   Sig: Take 1 tablet (5 mg total) by mouth every morning   aspirin (ECOTRIN LOW STRENGTH) 81 mg EC tablet 3/23/2021 at Unknown time Self Yes Yes   Sig: Take 81 mg by mouth daily with breakfast   fenofibrate (TRIGLIDE) 160 MG tablet 3/23/2021 at Unknown time  No Yes   Sig: Take 1 tablet (160 mg total) by mouth every evening   glucose blood (OneTouch Ultra) test strip Unknown at Unknown time  No No   Sig: Use as instructed   lacosamide (VIMPAT) 100 mg tablet 3/23/2021 at Unknown time  No Yes   Sig: Take 1 tablet (100 mg total) by mouth every 12 (twelve) hours   levothyroxine 50 mcg tablet 3/23/2021 at Unknown time  No Yes   Sig: Take 1 tablet (50 mcg total) by mouth daily   metFORMIN (GLUCOPHAGE) 500 mg tablet 3/23/2021 at Unknown time  No Yes   Sig: Take 1 tablet (500 mg total) by mouth 2 (two) times a day with meals   metoprolol succinate (TOPROL-XL) 25 mg 24 hr tablet 3/23/2021 at Unknown time Self Yes Yes   Sig: Take 25 mg by mouth daily    olmesartan-hydrochlorothiazide (BENICAR HCT) 40-25 MG per tablet 3/23/2021 at Unknown time  No Yes   Sig: Take 1 tablet by mouth every morning      Facility-Administered Medications: None       Allergies: Allergies   Allergen Reactions    Amoxicillin GI Intolerance    Lactose GI Intolerance    Sulfa Antibiotics GI Intolerance    Lidocaine Rash     Lidocaine patch     History:     Marital Status:       Substance Use History:   Social History     Substance and Sexual Activity   Alcohol Use Not Currently    Alcohol/week: 1 0 standard drinks    Types: 1 Standard drinks or equivalent per week    Comment: rarely     Social History     Tobacco Use   Smoking Status Former Smoker    Packs/day: 1 00    Years: 10     Pack years: 10 00    Types: Cigarettes    Quit date:     Years since quittin 2   Smokeless Tobacco Never Used     Social History     Substance and Sexual Activity   Drug Use No       Family History:    Family History   Problem Relation Age of Onset    Early death Mother         CHF    Early death Father         MI    Cancer Sister         stomach lymphoma    No Known Problems Son        Physical Exam:     Vitals:   Blood Pressure: 135/72 (03/24/21 1620)  Pulse: 69 (03/24/21 1620)  Temperature: (!) 101 3 °F (38 5 °C) (03/24/21 1620)  Temp Source: Oral (03/24/21 1620)  Respirations: 18 (03/24/21 1620)  Height: 5' 9" (175 3 cm) (03/24/21 1620)  Weight - Scale: 79 kg (174 lb 2 6 oz) (03/24/21 1620)  SpO2: 92 % (03/24/21 1620)    Physical Exam  Constitutional:       General: She is not in acute distress  Appearance: She is not diaphoretic  HENT:      Head: Normocephalic and atraumatic  Eyes:      General:         Right eye: No discharge  Left eye: No discharge  Cardiovascular:      Rate and Rhythm: Normal rate and regular rhythm  Pulmonary:      Effort: Pulmonary effort is normal  No respiratory distress  Breath sounds: Normal breath sounds  No wheezing or rales  Abdominal:      General: Bowel sounds are normal  There is no distension  Palpations: Abdomen is soft  Tenderness: There is no abdominal tenderness  Musculoskeletal:      Comments: Right lower extremity shortened and externally rotated  Mild tenderness noted along the right inguinal region   Neurological:      Mental Status: She is alert and oriented to person, place, and time  Lab Results: I have personally reviewed pertinent reports        Results from last 7 days   Lab Units 03/24/21  1132   WBC Thousand/uL 10 57*   HEMOGLOBIN g/dL 12 1   HEMATOCRIT % 38 0   PLATELETS Thousands/uL 284   NEUTROS PCT % 82*   LYMPHS PCT % 8*   MONOS PCT % 6   EOS PCT % 1     Results from last 7 days   Lab Units 03/24/21  1132   POTASSIUM mmol/L 4 6   CHLORIDE mmol/L 93*   CO2 mmol/L 24   BUN mg/dL 30*   CREATININE mg/dL 1 14   CALCIUM mg/dL 8 8   ALK PHOS U/L 88   ALT U/L 27   AST U/L 14     Results from last 7 days   Lab Units 03/24/21  1132   INR  0 97       Imaging: I have personally reviewed pertinent reports  Xr Chest 1 View Portable    Result Date: 3/24/2021  Narrative: CHEST INDICATION:   pre op  COMPARISON:  2/2/2021 EXAM PERFORMED/VIEWS:  XR CHEST PORTABLE Single view FINDINGS: Cardiomediastinal silhouette appears unremarkable  The lungs are clear  No pneumothorax or pleural effusion  Osseous structures appear within normal limits for patient age  Impression: No acute cardiopulmonary disease  Findings are stable Workstation performed: [id number]     Xr Hip/pelv 2-3 Vws Right If Performed    Result Date: 3/24/2021  Narrative: RIGHT HIP INDICATION:   pain  COMPARISON:  None VIEWS:  XR HIP/PELV 2-3 VWS RIGHT W PELVIS IF PERFORMED Images: 3 FINDINGS: Acute comminuted intertrochanteric/subtrochanteric fracture right hip with mild displacement Mild degenerative arthritis both hips No lytic or blastic osseous lesion  Scattered vascular calcifications Left central pelvic calcification, likely degenerating fibroid Mild multilevel degenerative spondylosis     Impression: Acute comminuted  fracture right hip Workstation performed: [id number]       XR hip/pelv 2-3 vws right if performed   Final Result      Acute comminuted  fracture right hip            Workstation performed: [id number]         XR chest 1 view portable   Final Result   No acute cardiopulmonary disease  Findings are stable               Workstation performed: [id number]             EKG, Pathology, and Other Studies Reviewed on Admission:   · EKG showed sinus rhythm with no acute ST elevation    Allscripts/EPIC Records Reviewed: Yes     ** Please Note: "This note has been constructed using a voice recognition system  Therefore there may be syntax, spelling, and/or grammatical errors   Please call if you have any questions  "**

## 2021-03-24 NOTE — ED NOTES
Received report from EMT, patient fell and hurt her right groin area and twisted right ankle  On  antibiotics for 3 days for UTI       Dino Centeno RN  03/24/21 9743

## 2021-03-24 NOTE — H&P (VIEW-ONLY)
Consultation - Orthopedics   Nathalie Pulido 79 y o  female MRN: [id number]  Unit/Bed#: [address] [place] [address] Encounter: [id number]      Assessment/Plan     Assessment:  Right hip pain -  Patient sustained an intertrochanteric fracture with subtrochanteric extension from her fall this morning  Discussed with her and her son that this will require surgical intervention in the form of a long cephalomedullary nail  Since she does weight bear and is active at baseline, they are not interested in non operative treatment, which we agree  We will plan for surgery tomorrow with Dr Ayala Salvage  She will need clearance from the Internal Medicine team as well as Cardiology prior to the procedure  She may eat today, but should be NPO after midnight tonight  She should remain nonweightbearing with strict bedrest   Hold all chemical anticoagulation tomorrow  Orthopedics will continue to follow up    Plan:  -  Plan for surgical intervention in the form of a long cephalomedullary nail tomorrow with ELIAS Souza  -  Medical and cardiology clearance and recommendations appreciated  -  NPO after midnight  -  Hold PT/OT  -  Nonweightbearing and strict bedrest right lower extremity  -  Basic labs and type and screen preoperatively  -  Hold all chemical anticoagulation tomorrow, SCDs okay  -  Analgesia p r n   -  Orthopedics will continue to follow     History of Present Illness   Physician Requesting Consult: Haja Degroot DO  Reason for Consult / Principal Problem: Fall  HPI: Nathalie Pulido is a 79y o  year old female who presents with  Right hip pain after fall this morning  She is known to our practice for her bilateral knee complaints and recently received a cortisone injection for her right knee  She reports that she was attempting to  a puppy pad off floor this morning when she lost her balance and fell forward    She was unable to ambulate due to pain in her right hip after her fall, and was brought to the emergency department where x-ray revealed a intertrochanteric fracture  She normally ambulates with an assistive device  She denies any antecedent hip pain  She denies any chest pain, shortness of breath, dizziness, lightheadedness, loss of consciousness, or paresthesias in the right lower extremity  Inpatient consult to Orthopedic Surgery  Consult performed by: Clarke Tam PA-C  Consult ordered by: Holger Guerin DO          Review of Systems   Constitutional: Positive for activity change  HENT: Negative  Eyes: Negative  Respiratory: Negative  Cardiovascular: Negative  Gastrointestinal: Negative  Endocrine: Negative  Genitourinary: Negative  Musculoskeletal: Positive for arthralgias, gait problem, joint swelling and myalgias  Skin: Negative  Allergic/Immunologic: Negative  Hematological: Negative  Psychiatric/Behavioral: Positive for confusion  Historical Information   Past Medical History:   Diagnosis Date    AMS (altered mental status)     Anemia     Anxiety     Arthritis     Chronic UTI     better since urethral stretching    Cirrhosis [facility] [address] [facility] [facility] ) 9/2/2019    Closed right hip fracture (HCC) 3/24/2021    Current moderate episode of major depressive disorder without prior episode ([facility] [address] ) 1/14/2020    Diabetes mellitus (HCC)     type 2    GERD (gastroesophageal reflux disease)     diet controlled    High triglycerides     Hypertension     Irritable bowel syndrome     Other specified hypothyroidism 5/13/2020    Primary osteoarthritis of both knees 1/20/2016    Seizures ([facility] [address] )      Past Surgical History:   Procedure Laterality Date    APPENDECTOMY      age 14   [facility] GUM SURGERY      tumor removal benign x3    PA XCAPSL CTRC RMVL INSJ IO LENS PROSTH W/O ECP Left 5/15/2017    Procedure: EXTRACTION EXTRACAPSULAR CATARACT PHACO INTRAOCULAR LENS (IOL);   Surgeon: Augusta Powell MD;  Location: [facility] MAIN OR;  Service: Ophthalmology    PA XCAPSL CTRC RMVL INSJ IO LENS PROSTH W/O ECP Right 2017    Procedure: EXTRACTION EXTRACAPSULAR CATARACT PHACO INTRAOCULAR LENS (IOL); Surgeon: Eduarda Ortega MD;  Location: [facility] MAIN OR;  Service: Ophthalmology    TONSILLECTOMY       Social History   Social History     Substance and Sexual Activity   Alcohol Use Yes    Alcohol/week: 1 0 standard drinks    Types: 1 Standard drinks or equivalent per week    Comment: rarely     Social History     Substance and Sexual Activity   Drug Use No     E-Cigarette/Vaping    E-Cigarette Use Never User      E-Cigarette/Vaping Substances    Nicotine No     THC No     CBD No     Flavoring No     Other No     Unknown No      Social History     Tobacco Use   Smoking Status Former Smoker    Packs/day: 1 00    Years: 10     Pack years: 10     Types: Cigarettes    Quit date:     Years since quittin 2   Smokeless Tobacco Never Used     Family History:   Family History   Problem Relation Age of Onset   [facility] Early death Mother         CHF    Early death Father         MI    Cancer Sister         stomach lymphoma    No Known Problems Son        Meds/Allergies   all current active meds have been reviewed, current meds:   Current Facility-Administered Medications   Medication Dose Route Frequency    insulin lispro (HumaLOG) 100 units/mL subcutaneous injection 1-5 Units  1-5 Units Subcutaneous TID AC    insulin lispro (HumaLOG) 100 units/mL subcutaneous injection 1-5 Units  1-5 Units Subcutaneous HS    and PTA meds:   Prior to Admission Medications   Prescriptions Last Dose Informant Patient Reported? Taking?    Blood Glucose Monitoring Suppl (ONE TOUCH ULTRA 2) w/Device KIT Unknown at Unknown time  No No   Sig: by Does not apply route daily   Glycerin-Polysorbate 80 (REFRESH DRY EYE THERAPY OP) Unknown at Unknown time Self Yes No   Sig: Apply to eye   Lancets (onetouch ultrasoft) lancets   No No   Sig: Use as instructed   Multiple Vitamins-Minerals (CENTRUM SILVER ADULT 50+ PO) 3/23/2021 at Unknown time Self Yes Yes   Sig: Take by mouth daily with breakfast   amLODIPine (NORVASC) 5 mg tablet 3/23/2021 at Unknown time  No Yes   Sig: Take 1 tablet (5 mg total) by mouth every morning   aspirin (ECOTRIN LOW STRENGTH) 81 mg EC tablet 3/23/2021 at Unknown time Self Yes Yes   Sig: Take 81 mg by mouth daily with breakfast   fenofibrate (TRIGLIDE) 160 MG tablet 3/23/2021 at Unknown time  No Yes   Sig: Take 1 tablet (160 mg total) by mouth every evening   glucose blood (OneTouch Ultra) test strip Unknown at Unknown time  No No   Sig: Use as instructed   lacosamide (VIMPAT) 100 mg tablet Unknown at Unknown time  No No   Sig: Take 1 tablet (100 mg total) by mouth every 12 (twelve) hours   levothyroxine 50 mcg tablet 3/23/2021 at Unknown time  No Yes   Sig: Take 1 tablet (50 mcg total) by mouth daily   metFORMIN (GLUCOPHAGE) 500 mg tablet 3/23/2021 at Unknown time  No Yes   Sig: Take 1 tablet (500 mg total) by mouth 2 (two) times a day with meals   metoprolol succinate (TOPROL-XL) 25 mg 24 hr tablet 3/23/2021 at Unknown time Self Yes Yes   Sig: Take 25 mg by mouth daily    olmesartan-hydrochlorothiazide (BENICAR HCT) 40-25 MG per tablet 3/23/2021 at Unknown time  No Yes   Sig: Take 1 tablet by mouth every morning      Facility-Administered Medications: None     Allergies   Allergen Reactions    Amoxicillin GI Intolerance    Lactose GI Intolerance    Sulfa Antibiotics GI Intolerance    Lidocaine Rash     Lidocaine patch       Objective   Vitals: Blood pressure 135/72, pulse 73, temperature (!) 101 3 °F (38 5 °C), resp  rate 18, SpO2 95 %, not currently breastfeeding  ,There is no height or weight on file to calculate BMI  No intake or output data in the 24 hours ending 03/24/21 1621  No intake/output data recorded      Invasive Devices     Peripheral Intravenous Line            Peripheral IV 03/24/21 Right Antecubital less than 1 day                Physical Exam  Vitals signs and nursing note reviewed  Constitutional:       Appearance: Normal appearance  HENT:      Head: Normocephalic and atraumatic  Right Ear: External ear normal       Left Ear: External ear normal       Nose: Nose normal       Mouth/Throat:      Mouth: Mucous membranes are moist    Eyes:      Extraocular Movements: Extraocular movements intact  Neck:      Musculoskeletal: Neck supple  Cardiovascular:      Rate and Rhythm: Normal rate  Pulses: Normal pulses  Pulmonary:      Effort: Pulmonary effort is normal       Breath sounds: Normal breath sounds  Abdominal:      Palpations: Abdomen is soft  Musculoskeletal:         General: Deformity present  Comments: See ortho exam   Skin:     General: Skin is warm and dry  Neurological:      General: No focal deficit present  Mental Status: She is alert and oriented to person, place, and time  Mental status is at baseline  Psychiatric:         Mood and Affect: Mood normal          Behavior: Behavior normal          Thought Content: Thought content normal          Judgment: Judgment normal        Right Hip Exam     Other   Erythema: absent  Scars: absent  Sensation: normal  Pulse: present    Comments:  Right lower extremity shortened and externally rotated  No abrasion, laceration, ulceration, erythema, ecchymosis, or other break in skin    Range of motion and strength deferred   thigh and calf soft and nontender   normal sensation in the L2 through S1 nerve distributions            Lab Results:   I have personally reviewed pertinent lab results    CBC:   Lab Results   Component Value Date    WBC 10 57 (H) 03/24/2021    HGB 12 1 03/24/2021    HCT 38 0 03/24/2021    MCV 88 03/24/2021     03/24/2021    MCH 27 9 03/24/2021    MCHC 31 8 03/24/2021    RDW 14 1 03/24/2021    MPV 10 1 03/24/2021    NRBC 0 03/24/2021     CMP:   Lab Results   Component Value Date    SODIUM 126 (L) 03/24/2021    CL 93 (L) 03/24/2021    CO2 24 03/24/2021    BUN 30 (H) 03/24/2021 CREATININE 1 14 03/24/2021    CALCIUM 8 8 03/24/2021    AST 14 03/24/2021    ALT 27 03/24/2021    ALKPHOS 88 03/24/2021    EGFR 45 03/24/2021     PT/INR:   Lab Results   Component Value Date    INR 0 97 03/24/2021     ESR: No results found for: ESR  CRP: No results found for: CRP     Imaging Studies: I have personally reviewed pertinent films in PACS   Xr Chest 1 View Portable    Result Date: 3/24/2021  Narrative: CHEST INDICATION:   pre op  COMPARISON:  2/2/2021 EXAM PERFORMED/VIEWS:  XR CHEST PORTABLE Single view FINDINGS: Cardiomediastinal silhouette appears unremarkable  The lungs are clear  No pneumothorax or pleural effusion  Osseous structures appear within normal limits for patient age  Impression: No acute cardiopulmonary disease  Findings are stable Workstation performed: [id number]     Xr Hip/pelv 2-3 Vws Right If Performed    Result Date: 3/24/2021  Narrative: RIGHT HIP INDICATION:   pain  COMPARISON:  None VIEWS:  XR HIP/PELV 2-3 VWS RIGHT W PELVIS IF PERFORMED Images: 3 FINDINGS: Acute comminuted intertrochanteric/subtrochanteric fracture right hip with mild displacement Mild degenerative arthritis both hips No lytic or blastic osseous lesion  Scattered vascular calcifications Left central pelvic calcification, likely degenerating fibroid Mild multilevel degenerative spondylosis     Impression: Acute comminuted  fracture right hip Workstation performed: [id number]      Dr Carlos Alberto Alonso and I reviewed the available images of her right hip which demonstrate an acute comminuted displaced intertrochanteric hip fracture with subtrochanteric extension      EKG, Pathology, and Other Studies: I have personally reviewed pertinent films in PACS     VTE Prophylaxis: Sequential compression device [place])     Code Status: Prior  Advance Directive and Living Will:      Power of :    POLST:      Marito Serrato PA-C

## 2021-03-24 NOTE — ED PROVIDER NOTES
History  Chief Complaint   Patient presents with    Fall     patient was picking a pee cup and accidentally fell and hurt her right side specifically the leg and ankle with pain radiating to her right groin     78 y/o female presents after a fall today  She was bending to  something and then fell onto her right side  Could not get up from that position  Brought in by the ambulance  Not on blood thinners  No numbness tingling, did not hit her head, no LOC no other injuries or complaints  No medical complaints at this time  History provided by:  Patient   used: No        Prior to Admission Medications   Prescriptions Last Dose Informant Patient Reported? Taking?    Blood Glucose Monitoring Suppl (ONE TOUCH ULTRA 2) w/Device KIT Unknown at Unknown time  No No   Sig: by Does not apply route daily   Glycerin-Polysorbate 80 (REFRESH DRY EYE THERAPY OP) Unknown at Unknown time Self Yes No   Sig: Apply to eye   Lancets (onetouch ultrasoft) lancets   No No   Sig: Use as instructed   Multiple Vitamins-Minerals (CENTRUM SILVER ADULT 50+ PO) 3/23/2021 at Unknown time Self Yes Yes   Sig: Take by mouth daily with breakfast   amLODIPine (NORVASC) 5 mg tablet 3/23/2021 at Unknown time  No Yes   Sig: Take 1 tablet (5 mg total) by mouth every morning   aspirin (ECOTRIN LOW STRENGTH) 81 mg EC tablet 3/23/2021 at Unknown time Self Yes Yes   Sig: Take 81 mg by mouth daily with breakfast   fenofibrate (TRIGLIDE) 160 MG tablet 3/23/2021 at Unknown time  No Yes   Sig: Take 1 tablet (160 mg total) by mouth every evening   glucose blood (OneTouch Ultra) test strip Unknown at Unknown time  No No   Sig: Use as instructed   lacosamide (VIMPAT) 100 mg tablet Unknown at Unknown time  No No   Sig: Take 1 tablet (100 mg total) by mouth every 12 (twelve) hours   levothyroxine 50 mcg tablet 3/23/2021 at Unknown time  No Yes   Sig: Take 1 tablet (50 mcg total) by mouth daily   metFORMIN (GLUCOPHAGE) 500 mg tablet 3/23/2021 at Unknown time  No Yes   Sig: Take 1 tablet (500 mg total) by mouth 2 (two) times a day with meals   metoprolol succinate (TOPROL-XL) 25 mg 24 hr tablet 3/23/2021 at Unknown time Self Yes Yes   Sig: Take 25 mg by mouth daily    olmesartan-hydrochlorothiazide (BENICAR HCT) 40-25 MG per tablet 3/23/2021 at Unknown time  No Yes   Sig: Take 1 tablet by mouth every morning      Facility-Administered Medications: None       Past Medical History:   Diagnosis Date    AMS (altered mental status)     Anemia     Anxiety     Arthritis     Chronic UTI     better since urethral stretching    Cirrhosis of liver ([facility] [address] ) 9/2/2019    Current moderate episode of [facility] disorder [facility] 1/14/2020    Diabetes mellitus ([place] [address] )     type 2    GERD (gastroesophageal reflux disease)     diet controlled    High triglycerides     Hypertension     Irritable bowel syndrome     Other specified hypothyroidism 5/13/2020    Primary osteoarthritis of both knees 1/20/2016    Seizures ([facility] )        Past Surgical History:   Procedure Laterality Date    APPENDECTOMY      age 14   [facility] GUM SURGERY      tumor removal benign x3    ME XCAPSL CTRC RMVL INSJ IO LENS PROSTH W/O ECP Left 5/15/2017    Procedure: EXTRACTION EXTRACAPSULAR CATARACT PHACO INTRAOCULAR LENS (IOL); Surgeon: Gilbert Morelos MD;  Location: [facility] MAIN OR;  Service: Ophthalmology    ME XCAPSL CTRC RMVL INSJ IO LENS PROSTH W/O ECP Right 6/26/2017    Procedure: EXTRACTION EXTRACAPSULAR CATARACT PHACO INTRAOCULAR LENS (IOL); Surgeon: Gilbert Morelos MD;  Location: [facility] MAIN OR;  Service: Ophthalmology    TONSILLECTOMY         Family History   Problem Relation Age of Onset    Early death Mother         CHF    Early death Father         MI    Cancer Sister         stomach lymphoma    No Known Problems Son      I have reviewed and agree with the history as documented      E-Cigarette/Vaping    E-Cigarette Use Never User E-Cigarette/Vaping Substances    Nicotine No     THC No     CBD No     Flavoring No     Other No     Unknown No      Social History     Tobacco Use    Smoking status: Former Smoker     Packs/day: 1 00     Years: 10 00     Pack years: 10 00     Types: Cigarettes     Quit date:      Years since quittin 2    Smokeless tobacco: Never Used   Substance Use Topics    Alcohol use: Yes     Alcohol/week: 1 0 standard drinks     Types: 1 Standard drinks or equivalent per week     Comment: rarely    Drug use: No       Review of Systems   Constitutional: Negative  HENT: Negative  Eyes: Negative  Respiratory: Negative  Cardiovascular: Negative  Gastrointestinal: Negative  Endocrine: Negative  Genitourinary: Negative  Musculoskeletal: Positive for arthralgias, joint swelling and myalgias  Skin: Negative  Allergic/Immunologic: Negative  Neurological: Negative  Hematological: Negative  Psychiatric/Behavioral: Negative  All other systems reviewed and are negative  Physical Exam  Physical Exam  Vitals signs and nursing note reviewed  Constitutional:       Appearance: She is well-developed  HENT:      Head: Normocephalic and atraumatic  Eyes:      Pupils: Pupils are equal, round, and reactive to light  Neck:      Musculoskeletal: Normal range of motion and neck supple  Cardiovascular:      Rate and Rhythm: Normal rate and regular rhythm  Pulmonary:      Effort: Pulmonary effort is normal       Breath sounds: Normal breath sounds  Abdominal:      General: Bowel sounds are normal       Palpations: Abdomen is soft  Musculoskeletal: Normal range of motion  Comments: Right hip: tender to palpation proximal femur, pelvis stable  No hematoma, positive femoral pulses, no open wounds  Right lower extremity is externally rotated and shortened  Skin:     General: Skin is warm and dry     Neurological:      Mental Status: She is alert and oriented to person, place, and time           Vital Signs  ED Triage Vitals [03/24/21 1113]   Temperature Pulse Respirations Blood Pressure SpO2   98 5 °F (36 9 °C) 55 20 162/71 94 %      Temp Source Heart Rate Source Patient Position - Orthostatic VS BP Location FiO2 (%)   Tympanic Monitor Lying Left arm --      Pain Score       Worst Possible Pain           Vitals:    03/24/21 1113 03/24/21 1238 03/24/21 1242   BP: 162/71  148/67   Pulse: 55 63 60   Patient Position - Orthostatic VS: Lying Lying Sitting         Visual Acuity      ED Medications  Medications   sodium chloride 0 9 % bolus 500 mL (500 mL Intravenous New Bag 3/24/21 1229)   morphine (PF) 4 mg/mL injection 4 mg (4 mg Intravenous Given 3/24/21 1131)   HYDROmorphone (DILAUDID) injection 1 mg (1 mg Intravenous Given 3/24/21 1234)       Diagnostic Studies  Results Reviewed     Procedure Component Value Units Date/Time    COVID19, Influenza A/B, RSV PCR, [facility] [[id number]] Collected: 03/24/21 1246    Lab Status: No result Specimen: Nares from Nasopharyngeal Swab     Comprehensive metabolic panel [[id number]]  (Abnormal) Collected: 03/24/21 1132    Lab Status: Final result Specimen: Blood from Arm, Right Updated: 03/24/21 1157     Sodium 126 mmol/L      Potassium 4 6 mmol/L      Chloride 93 mmol/L      CO2 24 mmol/L      ANION GAP 9 mmol/L      BUN 30 mg/dL      Creatinine 1 14 mg/dL      Glucose 160 mg/dL      Calcium 8 8 mg/dL      Corrected Calcium 9 3 mg/dL      AST 14 U/L      ALT 27 U/L      Alkaline Phosphatase 88 U/L      Total Protein 7 0 g/dL      Albumin 3 4 g/dL      Total Bilirubin 0 50 mg/dL      eGFR 45 ml/min/1 73sq m     Narrative:      [place] guidelines for Chronic Kidney Disease (CKD):     Stage 1 with normal or high GFR (GFR > 90 mL/min/1 73 square meters)    Stage 2 Mild CKD (GFR = 60-89 mL/min/1 73 square meters)    Stage 3A Moderate CKD (GFR = 45-59 mL/min/1 73 square meters)    Stage 3B Moderate CKD (GFR = 30-44 mL/min/1 73 square meters)    Stage 4 Severe CKD (GFR = 15-29 mL/min/1 73 square meters)    Stage 5 End Stage CKD (GFR <15 mL/min/1 73 square meters)  Note: GFR calculation is accurate only with a steady state creatinine    Protime-INR [[id number]]  (Normal) Collected: 03/24/21 1132    Lab Status: Final result Specimen: Blood from Arm, Right Updated: 03/24/21 1153     Protime 12 8 seconds      INR 0 97    APTT [[id number]]  (Normal) Collected: 03/24/21 1132    Lab Status: Final result Specimen: Blood from Arm, Right Updated: 03/24/21 1153     PTT 27 seconds     CBC and differential [[id number]]  (Abnormal) Collected: 03/24/21 1132    Lab Status: Final result Specimen: Blood from Arm, Right Updated: 03/24/21 1137     WBC 10 57 Thousand/uL      RBC 4 34 Million/uL      Hemoglobin 12 1 g/dL      Hematocrit 38 0 %      MCV 88 fL      MCH 27 9 pg      MCHC 31 8 g/dL      RDW 14 1 %      MPV 10 1 fL      Platelets 538 Thousands/uL      nRBC 0 /100 WBCs      Neutrophils Relative 82 %      Immat GRANS % 2 %      Lymphocytes Relative 8 %      Monocytes Relative 6 %      Eosinophils Relative 1 %      Basophils Relative 1 %      Neutrophils Absolute 8 70 Thousands/µL      Immature Grans Absolute 0 21 Thousand/uL      Lymphocytes Absolute 0 82 Thousands/µL      Monocytes Absolute 0 64 Thousand/µL      Eosinophils Absolute 0 15 Thousand/µL      Basophils Absolute 0 05 Thousands/µL                  XR hip/pelv 2-3 vws right if performed    (Results Pending)   XR chest 1 view portable    (Results Pending)              Procedures  ECG 12 Lead Documentation Only  Performed by: Issac Bro DO  Authorized by: Issac Bor DO     ECG reviewed by me, the ED Provider: yes    Patient location:  ED  Previous ECG:     Previous ECG:  Unavailable    Comparison to cardiac monitor: Yes    Interpretation:     Interpretation: non-specific    Rate:     ECG rate assessment: normal    Rhythm:     Rhythm: sinus rhythm    Ectopy:     Ectopy: none    QRS: QRS axis:  Normal  Conduction:     Conduction: normal    ST segments:     ST segments:  Non-specific  T waves:     T waves: non-specific               ED Course                                           MDM  Number of Diagnoses or Management Options  Hip fracture [facility]):      Amount and/or Complexity of Data Reviewed  Clinical lab tests: ordered and reviewed  Tests in the radiology section of CPT®: reviewed and ordered  Tests in the medicine section of CPT®: ordered and reviewed    Patient Progress  Patient progress: stable      Disposition  Final diagnoses:   Hip fracture ([address] )     Time reflects when diagnosis was documented in both MDM as applicable and the Disposition within this note     Time User Action Codes Description Comment    3/24/2021 12:33 PM Brett Vivar Add [S72 009A] Hip fracture [facility])       ED Disposition     ED Disposition Condition Date/Time Comment    Admit Stable Wed Mar 24, 2021 12:33 PM          Follow-up Information    None         Patient's Medications   Discharge Prescriptions    No medications on file     No discharge procedures on file      PDMP Review     None          ED Provider  Electronically Signed by           Jaime Hdz DO  03/24/21 5022

## 2021-03-25 NOTE — INTERVAL H&P NOTE
H&P reviewed  After examining the patient I find no changes in the patients condition since the H&P had been written      Vitals:    03/25/21 0834   BP: 118/59   Pulse: 90   Resp:    Temp:    SpO2: 94%

## 2021-03-25 NOTE — PLAN OF CARE
Problem: Potential for Falls  Goal: Patient will remain free of falls  Description: INTERVENTIONS:  - Assess patient frequently for physical needs  -  Identify cognitive and physical deficits and behaviors that affect risk of falls  -  [place] fall precautions as indicated by assessment   - Educate patient/family on patient safety including physical limitations  - Instruct patient to call for assistance with activity based on assessment  - Modify environment to reduce risk of injury  - Consider OT/PT consult to assist with strengthening/mobility  3/25/2021 1050 by [facility]  JAMES Mason  Outcome: Progressing  3/25/2021 1050 by [facility]  JAMES Mason  Outcome: Progressing     Problem: Prexisting or High Potential for Compromised Skin Integrity  Goal: Skin integrity is maintained or improved  Description: INTERVENTIONS:  - Identify patients at risk for skin breakdown  - Assess and monitor skin integrity  - Assess and monitor nutrition and hydration status  - Monitor labs   - Assess for incontinence   - Turn and reposition patient  - Assist with mobility/ambulation  - Relieve pressure over bony prominences  - Avoid friction and shearing  - Provide appropriate hygiene as needed including keeping skin clean and dry  - Evaluate need for skin moisturizer/barrier cream  - Collaborate with interdisciplinary team   - Patient/family teaching  - Consider wound care consult   3/25/2021 1050 by [facility]  JAMES Mason  Outcome: Progressing  3/25/2021 1050 by [facility]   JAMES Mason  Outcome: Progressing     Problem: PAIN - ADULT  Goal: Verbalizes/displays adequate comfort level or baseline comfort level  Description: Interventions:  - Encourage patient to monitor pain and request assistance  - Assess pain using appropriate pain scale  - Administer analgesics based on type and severity of pain and evaluate response  - Implement non-pharmacological measures as appropriate and evaluate response  - Consider cultural and social influences on pain and pain management  - Notify physician/advanced practitioner if interventions unsuccessful or patient reports new pain  3/25/2021 1050 by [facility]  Isabella RN  Outcome: Progressing  3/25/2021 1050 by [facility]   Isabella, RN  Outcome: Progressing

## 2021-03-25 NOTE — PROGRESS NOTES
Patient remains hospitalized at time of this chart review and has since been admitted due Right hip fracture requiring surgical intervention  Expected to go to OR today  BPCI form and care coordination note updated  This CM will continue to monitor via chart review throughout hospitalization

## 2021-03-25 NOTE — ANESTHESIA PREPROCEDURE EVALUATION
Procedure:  INSERTION NAIL IM FEMUR ANTEGRADE (TROCHANTERIC) - long (Right Leg Upper)    Relevant Problems   CARDIO   (+) Benign essential hypertension   (+) Hypertension   (+) MR (mitral regurgitation)   (+) Mixed hyperlipidemia      ENDO   (+) Other specified hypothyroidism      GI/HEPATIC   (+) Cirrhosis of liver (HCC)   (+) GERD (gastroesophageal reflux disease)      MUSCULOSKELETAL   (+) Acute left-sided thoracic back pain   (+) Arthritis   (+) Low back pain   (+) Osteoarthritis of left knee   (+) Primary osteoarthritis of both knees   (+) Sacral back pain      NEURO/PSYCH   (+) Anxiety   (+) Current moderate episode of major depressive disorder without prior episode (HCC)   (+) MONSTER (generalized anxiety disorder)   (+) Seizure (HCC)        Physical Exam    Airway    Mallampati score: II  TM Distance: >3 FB  Neck ROM: full     Dental   No notable dental hx     Cardiovascular  Rhythm: irregular, Rate: normal,     Pulmonary  Breath sounds clear to auscultation, Decreased breath sounds,     Other Findings        Anesthesia Plan  ASA Score- 3     Anesthesia Type- general with ASA Monitors  Additional Monitors:   Airway Plan: LMA  Plan Factors-Exercise tolerance (METS): <4 METS  Chart reviewed  EKG reviewed  Existing labs reviewed  Patient summary reviewed  Patient is not a current smoker  Induction- intravenous  Postoperative Plan- Plan for postoperative opioid use  Informed Consent- Anesthetic plan and risks discussed with patient  I personally reviewed this patient with the CRNA  Discussed and agreed on the Anesthesia Plan with the CRNA  Bharathi Schwarz

## 2021-03-25 NOTE — PERIOPERATIVE NURSING NOTE
EKG completed as per Dr Rich Leon orders  Cardiology, Dr Michael Henning evaluated EKG    No new orders

## 2021-03-25 NOTE — ANESTHESIA POSTPROCEDURE EVALUATION
Post-Op Assessment Note    CV Status:  Stable  Pain Score: 0    Pain management: adequate     Mental Status:  Alert and awake   Hydration Status:  Euvolemic and stable   PONV Controlled:  Controlled   Airway Patency:  Patent      Post Op Vitals Reviewed: Yes      Staff: Anesthesiologist, CRNA   Comments: Report given to recovering RN, VSS, Pt resting comfortably        No complications documented      /61 (03/25/21 1625)    Temp 97 7 °F (36 5 °C) (03/25/21 1625)    Pulse 79 (03/25/21 1625)   Resp 16 (03/25/21 1625)    SpO2 99 % (03/25/21 1625)

## 2021-03-25 NOTE — PROGRESS NOTES
[facility] Progress Note  Patient: Nathaniel Morelos 79 y o  female   MRN: [id number]  PCP: Devi Saunders MD  Unit/Bed#: [address] [place] [address] Encounter: [id number]  Date Of Visit: 03/25/21    Problem List:    Principal Problem:    Closed right hip fracture ([facility] [address] )  Active Problems:    Hyponatremia    UTI (urinary tract infection)    Benign essential hypertension    Diabetes mellitus ([facility] )    Mixed hyperlipidemia    Other specified hypothyroidism    Seizure ([facility] )      Assessment & Plan:    * Closed right hip fracture [facility])  Assessment & Plan  Patient presented with a fall at home and fell on her right side  X-ray showed comminuted right hip fracture  Case was discussed with Orthopedics and plan is for surgical intervention today  Patient seen by Cardiology and is low risk for perioperative complications  Heparin for DVT prophylaxis held for OR  Pain control with IV morphine and Tylenol p r n  Hyponatremia  Assessment & Plan  Sodium 126 in the ER  Received 500 cc normal saline bolus  lab work now from today pending  Repeat again in a [facility]    UTI (urinary tract infection)  Assessment & Plan  Patient and son reports that she was recently diagnosed with UTI and was started on some antibiotic by Dr João Pascal  Patient states that she has taken about 3 days of it  Patient febrile on the floors  Continue patient on IV Rocephin  Chest x-ray negative for pneumonia  UA pending    Seizure [facility])  Assessment & Plan  Continue Vimpat  Other specified hypothyroidism  Assessment & Plan  Continue levothyroxine  Mixed hyperlipidemia  Assessment & Plan  Continue Tricor  Diabetes mellitus [facility])  Assessment & Plan  Lab Results   Component Value Date    HGBA1C 7 2 (H) 02/03/2021       Recent Labs     03/24/21  1648 03/24/21  2109 03/25/21  0723   POCGLU 123 158* 133     Hold metformin    Patient on Accu-Cheks with sliding scale insulin    Benign essential hypertension  Assessment & Plan  Continue Toprol-XL, Norvasc  Holding hydrochlorothiazide component and olmesartan substituted with losartan here        VTE Pharmacologic Prophylaxis:   Pharmacologic: Pharmacologic VTE Prophylaxis contraindicated due to for OR today  Mechanical VTE Prophylaxis in Place: Yes    Patient Centered Rounds: I have performed bedside rounds with nursing staff today  Discussions with Specialists or Other Care Team Provider: yes- cardio    Education and Discussions with Family / Patient: yes - Melanee Main    Time Spent for Care: 30 minutes  More than 50% of total time spent on counseling and coordination of care as described above  Current Length of Stay: 1 day(s)    Current Patient Status: Inpatient   Certification Statement: The patient will continue to require additional inpatient hospital stay due to Right hip fracture, hyponatremia    Discharge Plan:  Pending    Code Status: Level 1 - Full Code      Subjective:   Reports pain along her right thigh which is manageable as long as she does not move    Objective:     Vitals:   Temp (24hrs), Av °F (37 2 °C), Min:98 2 °F (36 8 °C), Max:101 3 °F (38 5 °C)    Temp:  [98 2 °F (36 8 °C)-101 3 °F (38 5 °C)] 99 1 °F (37 3 °C)  HR:  [] 90  Resp:  [16-20] 20  BP: (107-162)/(47-72) 118/59  SpO2:  [92 %-96 %] 94 %  Body mass index is 25 72 kg/m²  Input and Output Summary (last 24 hours): Intake/Output Summary (Last 24 hours) at 3/25/2021 0853  Last data filed at 3/25/2021 0645  Gross per 24 hour   Intake 1000 ml   Output 400 ml   Net 600 ml       Physical Exam:     Physical Exam  Constitutional:       General: She is not in acute distress  Appearance: She is not diaphoretic  HENT:      Head: Normocephalic and atraumatic  Eyes:      General: No scleral icterus  Cardiovascular:      Rate and Rhythm: Normal rate and regular rhythm  Pulmonary:      Effort: Pulmonary effort is normal  No respiratory distress  Breath sounds: Normal breath sounds   No wheezing or rales    Abdominal:      General: Bowel sounds are normal  There is no distension  Palpations: Abdomen is soft  Tenderness: There is no abdominal tenderness  Musculoskeletal:      Comments: Right lower extremity externally rotated and shortened  Tenderness noted along the thigh region   Neurological:      Mental Status: She is alert and oriented to person, place, and time  Additional Data:     Labs:    Results from last 7 days   Lab Units 03/25/21  0606   WBC Thousand/uL 10 49*   HEMOGLOBIN g/dL 10 6*   HEMATOCRIT % 33 9*   PLATELETS Thousands/uL 248   NEUTROS PCT % 80*   LYMPHS PCT % 8*   MONOS PCT % 9   EOS PCT % 1     Results from last 7 days   Lab Units 03/24/21  1813 03/24/21  1132   POTASSIUM mmol/L 5 0 4 6   CHLORIDE mmol/L 95* 93*   CO2 mmol/L 22 24   BUN mg/dL 34* 30*   CREATININE mg/dL 1 12 1 14   CALCIUM mg/dL 8 9 8 8   ALK PHOS U/L  --  88   ALT U/L  --  27   AST U/L  --  14     Results from last 7 days   Lab Units 03/24/21  1132   INR  0 97       * I Have Reviewed All Lab Data Listed Above  * Additional Pertinent Lab Tests Reviewed: [facility] Admission Reviewed      Imaging:  Imaging Reports Reviewed Today Include: CXR, right hip xray  Imaging Personally Reviewed by Myself Includes:  N/A    Recent Cultures (last 7 days):     Results from last 7 days   Lab Units 03/24/21  1813 03/24/21  1805   BLOOD CULTURE  Received in Microbiology Lab  Culture in Progress  Received in Microbiology Lab  Culture in Progress         Last 24 Hours Medication List:   Current Facility-Administered Medications   Medication Dose Route Frequency Provider Last Rate    acetaminophen  650 mg Oral Q6H PRN Adelia Revankar, DO      amLODIPine  5 mg Oral QAM Adelia Revankar, DO      cefTRIAXone  1,000 mg Intravenous Q24H Adelia Revankar, DO 1,000 mg (03/24/21 2149)    fenofibrate  168 mg Oral QPM Adelia Revankar, DO      heparin (porcine)  5,000 Units Subcutaneous Q8H Axel Shepard, DO      insulin lispro  1-5 Units Subcutaneous TID AC Adelia Revankar, DO      insulin lispro  1-5 Units Subcutaneous HS Adelia Revankar, DO      lacosamide  100 mg Oral Q12H [facility] Adelia Revankar, DO      levothyroxine  50 mcg Oral Early Morning Adelia Revankar, DO      losartan  100 mg Oral Daily Adelia Revankar, DO      metoprolol succinate  25 mg Oral Daily Adelia Revankar, DO      morphine injection  4 mg Intravenous Q6H PRN Adelia Revankar, DO      morphine injection  2 mg Intravenous Q4H PRN Adelia Revankar, DO      morphine injection  2 mg Intravenous Q4H PRN Piliell [place], CRNP      multivitamin-minerals  1 tablet Oral Daily With Breakfast Adelia Revankar, DO      ondansetron  4 mg Intravenous Q6H PRN Adelia Revankar, DO      sodium chloride  50 mL/hr Intravenous Continuous Adelia Revankar, DO 50 mL/hr (03/24/21 2150)          Today, Patient Was Seen By: Diane Baez DO    ** Please Note: "This note has been constructed using a voice recognition system  Therefore there may be syntax, spelling, and/or grammatical errors   Please call if you have any questions  "**

## 2021-03-25 NOTE — OP NOTE
OPERATIVE REPORT  PATIENT NAME: Nelia Kay    :  1940  MRN: [id number]  Pt Location: [facility] OR ROOM 03    SURGERY DATE: 3/25/2021    Surgeon(s) and Role:     * Socorro Richards MD - Primary     * Yayo Gee PA-C - Assisting necessary for the procedure for assistance with reduction and placement of the alexandru techniques  Madhavi FINN  And [facility] 2nd assistant    Preop Diagnosis:  Hip fracture ([facility] [address] ) Bob Fishman right subtrochanteric    Post-Op Diagnosis Codes:     * Hip fracture ([facility] ) [S72 009A] right subtrochanteric    Procedure(s) (LRB):  INSERTION NAIL IM FEMUR ANTEGRADE (TROCHANTERIC) - long (Right) utilizing [employer]ynthe[employer] long titanium cannulated trochanteric femoral nail size 380 mm x 11 mm x 130° with a fenestrated helical blade size 369 mm and 5 0 mm titanium locking screws size 44 and 42 mm     Specimen(s):  * No specimens in log *    Estimated Blood Loss:   100 mL    Drains:  External Urinary Catheter (Active)   Collection Container Canister and suction tubing (For Female) 21 1201   Suction Pressure (mmHg) 100 mmHg 21 1201   Interventions Removed and skin assessed; Pericare performed;Device changed 21 1201   Output (mL) 250 mL 21 1201   Number of days: 1       Anesthesia Type:   General    Operative Indications:  Hip fracture ([facility] ) Charles Sr is an 79-year-old female who had unfortunately suffered a mechanical fall yesterday and sustained a subtrochanteric significantly displaced right hip fracture  She understood the risks and benefits of a right hip long trochanteric femoral nail and wished to go ahead  The risks are inclusive of but not limited to infection, stiffness, failure of the operation provide anticipated results, blood clots, medical problems perioperatively, death, failure to walk appropriately postoperatively, failure of the fracture to unite appropriately, leg length discrepancy, rotational deformity, and need for further surgery      Operative Findings:  Right hip with a significantly comminuted subtrochanteric femur fracture  We were able to reduce this with traction and we were able to pass the guidewire and passed the alexandru utilizing traction and manipulation techniques  We did have good fixation of the helical blade selected into a in the center of the femoral head on both AP and lateral views  We did also have good centering of the device on the lateral view distally and were able to get to the distal interlocking screws  We felt we had appropriate reduction and fixation  Complications:   None    Procedure and Technique:  Christin Moss was taken to the operating room had general anesthesia induced on the hospital bed  We then transferred her to the [facility] table where all parts well padded and the right lower extremity was placed into traction and left lower extremities placed into the well leg sesay  We then took x-rays and felt that we had an appropriate reduction to perform the intramedullary nail the a more minimally invasive techniques to decrease blood loss  We then prepped and draped right lower extremity in usual sterile fashion  Surgical time-out was taken  We then utilized fluoroscopic guidance the 10 minutes incision proximal and to the right hip up with a 15 blade through skin  We then made a of the incision through fascia  We did pass a guidewire proximally into the greater trochanter and then used the starter Reamer  We then passed the guidewire making certain on AP and lateral views that we were in the femoral canal   We then reamed sequentially up to 12 5 to fit an 11 5 nail  We measured for the nail and felt that a 380 mm length nail was appropriate  We then placed the nail in standard fashion  We did make a small incision along the lateral thigh for the aiming device to place the helical blade  We felt that we were appropriately seated into the proximal femur and placed a guide pin into the femoral head    We checked its positioning on AP and lateral views and after replacing it we felt that we were very nicely centered into the femoral head on both views  We did measure for this and felt that it was appropriate for a 601 mm helical blade  We did drill the outer cortex and reamed and then placed the blade  We were pleased on AP and lateral views and did engage the set screw proximally  We took x-rays proximally at the hip after removing the implant device  We then went distally and created perfect circles  We made a small incision along the distal lateral thigh just proximal to the knee with a 15 blade and then utilized the 15 blade for deep fascia  We did place 2 distal interlocks after getting perfect circles and verified the positioning with x-ray  We were pleased with our overall positioning on AP and lateral views for the entire prosthesis from proximal to distal   We then irrigated all wounds thoroughly and closed with 2-0 Vicryl and 4-0 Vicryl and skin glue  Dry, sterile dressings were applied  She tolerated the procedure well and transferred to recovery room stable condition  She did have some cardiac issues intraoperatively and Cardiology is going to see her in the recovery room  We will have for carefully monitored in the intensive care unit postoperatively  She can weightbear as tolerated  She will be on anticoagulation postoperatively as well     I was present for the entire procedure and A qualified resident physician was not available    Patient Disposition:  PACU     SIGNATURE: Shabana Palmer MD  DATE: March 25, 2021  TIME: 4:10 PM

## 2021-03-25 NOTE — ASSESSMENT & PLAN NOTE
Patient presented with a fall at home and fell on her right side  X-ray showed comminuted right hip fracture  Status post IM nail insertion on 3/25  Patient became hypotensive during surgery requiring pressors  Patient seen by Cardiology and considered low risk for perioperative complications  Heparin for DVT prophylaxis while here  Switched to Lovenox on discharge and patient to continue for 6 weeks postop  Follow-up with ortho after discharge  Continue oxycodone p r n   Along with Tylenol p r n

## 2021-03-25 NOTE — ASSESSMENT & PLAN NOTE
Sodium 126 in the ER  Received 500 cc normal saline bolus  lab work 131 from today  Appreciate Nephrology input   Likely due to hydrochlorothiazide use along with postoperative SIADH  Received 0 45 normal saline with 75 mEq of sodium bicarb x1 L on 03/26  Continue fluid restriction on discharge  Continue salt tabs 1 g t i d  With meals    Okay to restart Benicar but no hydrochlorothiazide on discharge  Cortisol low at 3 7 --> 6 1  stim test WNL  Repeat lab work after discharge and follow up with Nephrology  urine sodium 75, osm 578, serum osm 284

## 2021-03-25 NOTE — ASSESSMENT & PLAN NOTE
Lab Results   Component Value Date    HGBA1C 7 2 (H) 02/03/2021       Recent Labs     03/28/21  2045 03/29/21  0724 03/29/21  1121 03/29/21  1553   POCGLU 237* 121 189* 210*     Held metformin here but okay to restart on discharge

## 2021-03-25 NOTE — ASSESSMENT & PLAN NOTE
Continue Toprol-XL   Norvasc and losartan was discontinued while here due to hypotension but BPs now running high  Restart Norvasc and Benicar  Continue to hold hydrochlorothiazide on discharge

## 2021-03-25 NOTE — QUICK NOTE
Quick note 03/25/2021 1635    Received call from anesthesia, while patient in OR she became hypotensive requiring pressors  At that time was noted to have ST segment depression in the inferior lateral leads  Troponins were drawn and 1st troponin 0 02  Patient seen in PACU, she denies any chest pain, pressure or palpitations  Heart rate 72 and blood pressure 116/72  Assessment and plan   1   Hypotension during surgery    -  will hold patient's losartan and Norvasc and re-evaluate blood pressure and volume status in the morning    -  12 lead EKG performed in the PACU shows nonspecific ST and T-wave changes    -  will recheck EKG in the a m     -  trend troponins q 3 hours x3    -  place patient on telemetry postoperatively    [facility]  Cardiology

## 2021-03-25 NOTE — CONSULTS
Consultation - Cardiology   Melissa Blanco 79 y o  female MRN: [id number]  Unit/Bed#: 2 [place] [address] Encounter: [id number]    Assessment/Plan     Assessment:  1  Pre-surgical screening  2  Mechanical fall with fractured right hip  3  Hypertension  4  Dyslipidemia      Plan:  Patient has been admitted to the hospitalist service        Patient is at low risk for perioperative complications for proposed surgery, she does not have any history of heart failure, arrhythmia or angina  She does not have any history of syncope  May proceed with scheduled surgery without further cardiac testing and we will follow  History of Present Illness   Physician Requesting Consult: Angelic Hayes DO  Reason for Consult / Principal Problem:  Pre-surgical screening  Mechanical fall with fractured right hip      HPI: Melissa Blanco is a 79y o  year old female who presented to the emergency room after experiencing a mechanical fall at home  She states she was bending over to  her dogs weewee pad when she lost her balance falling to her right  She denies any lightheadedness dizziness or loss of consciousness prior to fall  She was unable to get up off the floor, but fortunately she had her Life Alert on and push the button for help  X-ray in the emergency room demonstrated acute comminuted right hip fracture  Patient is scheduled for medullary nailing later today  Patient recently had an echocardiogram in February 2021 which demonstrated ejection fraction of 60%  Left atrium was noted to be mild-to-moderately dilated, she had mild-to-moderate MR, trace TR and her aortic valve appear to be trileaflet and functioning well  Patient was not noted to have an ASD or PFO on bubble study  Labs were unremarkable except for a low sodium of 126  Twelve lead EKG is unremarkable  Patient states she had been fairly active in till the [place] [employer] pandemic started    She lives in a senior/55 and older community and due to the pandemic all activities were discontinued  Patient also notes she has been grieving as her  passed away last May  Inpatient consult to Cardiology  Consult performed by: ROSE Carmona  Consult ordered by: Andrew Osorio DO          Review of Systems   Constitutional: Positive for activity change  Negative for appetite change, fatigue and fever  Notes it has decreased activity since COVID pandemic and also her  passed away in May   HENT: Negative  Negative for congestion, ear discharge, hearing loss, postnasal drip, sinus pain, sore throat and tinnitus  Eyes: Negative  Negative for photophobia and visual disturbance  Respiratory: Negative  Negative for chest tightness, shortness of breath and wheezing  Cardiovascular: Negative  Negative for chest pain, palpitations and leg swelling  Gastrointestinal: Negative  Negative for abdominal distention, constipation, diarrhea, nausea and vomiting  Endocrine: Negative  Negative for polydipsia, polyphagia and polyuria  Genitourinary: Negative  Negative for difficulty urinating  Musculoskeletal: Negative  Skin: Negative  Neurological: Negative  Negative for dizziness, syncope, speech difficulty, weakness and light-headedness  Hematological: Negative  Psychiatric/Behavioral: Negative          Historical Information   Past Medical History:   Diagnosis Date    AMS (altered mental status)     Anemia     Anxiety     Arthritis     Chronic UTI     better since urethral stretching    [facility] ) 9/2/2019    Closed right hip fracture (HCC) 3/24/2021    Current moderate episode of major depressive disorder without prior episode ([facility] ) 1/14/2020    Diabetes mellitus (HCC)     type 2    GERD (gastroesophageal reflux disease)     diet controlled    High triglycerides     Hypertension     Irritable bowel syndrome     Other specified hypothyroidism 5/13/2020    Primary osteoarthritis of both knees 2016    Seizures ([address] )      Past Surgical History:   Procedure Laterality Date    APPENDECTOMY      age 15    GUM SURGERY      tumor removal benign x3    WA XCAPSL CTRC RMVL INSJ IO LENS PROSTH W/O ECP Left 5/15/2017    Procedure: EXTRACTION EXTRACAPSULAR CATARACT PHACO INTRAOCULAR LENS (IOL); Surgeon: Steph Hanley MD;  Location: [facility] MAIN OR;  Service: Ophthalmology    WA XCAPSL CTRC RMVL INSJ IO LENS PROSTH W/O ECP Right 2017    Procedure: EXTRACTION EXTRACAPSULAR CATARACT PHACO INTRAOCULAR LENS (IOL);   Surgeon: Steph Hanley MD;  Location: [facility] MAIN OR;  Service: Ophthalmology    TONSILLECTOMY       Social History     Substance and Sexual Activity   Alcohol Use Not Currently    Alcohol/week: 1 0 standard drinks    Types: 1 Standard drinks or equivalent per week    Comment: rarely     Social History     Substance and Sexual Activity   Drug Use No     E-Cigarette/Vaping    E-Cigarette Use Never User      E-Cigarette/Vaping Substances    Nicotine No     THC No     CBD No     Flavoring No     Other No     Unknown No      Social History     Tobacco Use   Smoking Status Former Smoker    Packs/day: 1     Years: 10 00    Pack years: 10 00    Types: Cigarettes    Quit date:     Years since quittin 2   Smokeless Tobacco Never Used     Family History:   Family History   Problem Relation Age of Onset   [employer] Early death Mother         CHF   [employer] Early death Father         MI    Cancer Sister         stomach lymphoma    No Known Problems Son        Meds/Allergies   all current active meds have been reviewed, current meds:   Current Facility-Administered Medications   Medication Dose Route Frequency    acetaminophen (TYLENOL) tablet 650 mg  650 mg Oral Q6H PRN    amLODIPine (NORVASC) tablet 5 mg  5 mg Oral QAM    cefTRIAXone (ROCEPHIN) IVPB (premix in dextrose) 1,000 mg 50 mL  1,000 mg Intravenous Q24H    fenofibrate (TRICOR) tablet 168 mg  168 mg Oral QPM    heparin (porcine) subcutaneous injection 5,000 Units  5,000 Units Subcutaneous Q8H [address]    insulin lispro (HumaLOG) 100 units/mL subcutaneous injection 1-5 Units  1-5 Units Subcutaneous TID AC    insulin lispro (HumaLOG) 100 units/mL subcutaneous injection 1-5 Units  1-5 Units Subcutaneous HS    lacosamide (VIMPAT) tablet 100 mg  100 mg Oral Q12H [address]    levothyroxine tablet 50 mcg  50 mcg Oral Early Morning    losartan (COZAAR) tablet 100 mg  100 mg Oral Daily    metoprolol succinate (TOPROL-XL) 24 hr tablet 25 mg  25 mg Oral Daily    morphine (PF) 4 mg/mL injection 4 mg  4 mg Intravenous Q6H PRN    morphine injection 2 mg  2 mg Intravenous Q4H PRN    morphine injection 2 mg  2 mg Intravenous Q4H PRN    multivitamin-minerals (CENTRUM) tablet 1 tablet  1 tablet Oral Daily With Breakfast    ondansetron (ZOFRAN) injection 4 mg  4 mg Intravenous Q6H PRN    sodium chloride 0 9 % infusion  50 mL/hr Intravenous Continuous    and PTA meds:   Prior to Admission Medications   Prescriptions Last Dose Informant Patient Reported? Taking?    Blood Glucose Monitoring Suppl (ONE TOUCH ULTRA 2) w/Device KIT Unknown at Unknown time  No No   Sig: by Does not apply route daily   Glycerin-Polysorbate 80 (REFRESH DRY EYE THERAPY OP) 3/23/2021 at Unknown time Self Yes Yes   Sig: Apply to eye   Lancets (onetouch ultrasoft) lancets   No No   Sig: Use as instructed   Multiple Vitamins-Minerals (CENTRUM SILVER ADULT 50+ PO) 3/23/2021 at Unknown time Self Yes Yes   Sig: Take by mouth daily with breakfast   amLODIPine (NORVASC) 5 mg tablet 3/23/2021 at Unknown time  No Yes   Sig: Take 1 tablet (5 mg total) by mouth every morning   aspirin (ECOTRIN LOW STRENGTH) 81 mg EC tablet 3/23/2021 at Unknown time Self Yes Yes   Sig: Take 81 mg by mouth daily with breakfast   fenofibrate (TRIGLIDE) 160 MG tablet 3/23/2021 at Unknown time  No Yes   Sig: Take 1 tablet (160 mg total) by mouth every evening   glucose blood (OneTouch Ultra) test strip Unknown at Unknown time  No No   Sig: Use as instructed   lacosamide (VIMPAT) 100 mg tablet 3/23/2021 at Unknown time  No Yes   Sig: Take 1 tablet (100 mg total) by mouth every 12 (twelve) hours   levothyroxine 50 mcg tablet 3/23/2021 at Unknown time  No Yes   Sig: Take 1 tablet (50 mcg total) by mouth daily   metFORMIN (GLUCOPHAGE) 500 mg tablet 3/23/2021 at Unknown time  No Yes   Sig: Take 1 tablet (500 mg total) by mouth 2 (two) times a day with meals   metoprolol succinate (TOPROL-XL) 25 mg 24 hr tablet 3/23/2021 at Unknown time Self Yes Yes   Sig: Take 25 mg by mouth daily    olmesartan-hydrochlorothiazide (BENICAR HCT) 40-25 MG per tablet 3/23/2021 at Unknown time  No Yes   Sig: Take 1 tablet by mouth every morning      Facility-Administered Medications: None     Allergies   Allergen Reactions    Amoxicillin GI Intolerance    Lactose GI Intolerance    Sulfa Antibiotics GI Intolerance    Lidocaine Rash     Lidocaine patch       Objective   Vitals: Blood pressure (!) 113/47, pulse 104, temperature 99 1 °F (37 3 °C), resp  rate 20, height 5' 9" (1 753 m), weight 79 kg (174 lb 2 6 oz), SpO2 93 %, not currently breastfeeding  Orthostatic Blood Pressures      Most Recent Value   Blood Pressure  (!) 113/47 filed at 03/25/2021 0706   Patient Position - Orthostatic VS  Lying filed at 03/25/2021 8523            Intake/Output Summary (Last 24 hours) at 3/25/2021 0804  Last data filed at 3/25/2021 0645  Gross per 24 hour   Intake 1000 ml   Output 400 ml   Net 600 ml       Invasive Devices     Peripheral Intravenous Line            Peripheral IV 03/24/21 Right Antecubital less than 1 day          Drain            External Urinary Catheter less than 1 day                Physical Exam  Vitals signs and nursing note reviewed  Constitutional:       General: She is not in acute distress  Appearance: Normal appearance  She is normal weight  HENT:      Head: Normocephalic and atraumatic        Right Ear: External ear normal  There is no impacted cerumen  Left Ear: External ear normal  There is no impacted cerumen  Nose: Nose normal       Mouth/Throat:      Pharynx: No oropharyngeal exudate  Eyes:      General: No scleral icterus  Right eye: No discharge  Left eye: No discharge  Conjunctiva/sclera: Conjunctivae normal       Pupils: Pupils are equal, round, and reactive to light  Neck:      Musculoskeletal: Normal range of motion and neck supple  Cardiovascular:      Rate and Rhythm: Normal rate and regular rhythm  Pulses: Normal pulses  Heart sounds: Normal heart sounds  No murmur  Pulmonary:      Effort: Pulmonary effort is normal  No respiratory distress  Breath sounds: Normal breath sounds  No wheezing or rales  Abdominal:      General: Bowel sounds are normal       Palpations: Abdomen is soft  Musculoskeletal:         General: Deformity present  Right lower leg: No edema  Left lower leg: No edema  Comments: Right hip externally rotated and shortened   Skin:     General: Skin is warm and dry  Capillary Refill: Capillary refill takes less than 2 seconds  Neurological:      General: No focal deficit present  Mental Status: She is alert and oriented to person, place, and time  Mental status is at baseline  Psychiatric:         Mood and Affect: Mood normal          Behavior: Behavior normal          Thought Content: Thought content normal          Judgment: Judgment normal          Lab Results:   I have personally reviewed pertinent lab results      CBC with diff:   Results from last 7 days   Lab Units 03/25/21  0606   WBC Thousand/uL 10 49*   RBC Million/uL 3 85   HEMOGLOBIN g/dL 10 6*   HEMATOCRIT % 33 9*   MCV fL 88   MCH pg 27 5   MCHC g/dL 31 3*   RDW % 14 4   MPV fL 10 4   PLATELETS Thousands/uL 248     CMP:   Results from last 7 days   Lab Units 03/24/21  1813 03/24/21  1132   SODIUM mmol/L 127* 126*   POTASSIUM mmol/L 5 0 4 6   CHLORIDE mmol/L 95* 93*   CO2 mmol/L 22 24   BUN mg/dL 34* 30*   CREATININE mg/dL 1 12 1 14   CALCIUM mg/dL 8 9 8 8   AST U/L  --  14   ALT U/L  --  27   ALK PHOS U/L  --  88   EGFR ml/min/1 73sq m 46 45     Troponin:   0   Lab Value Date/Time    TROPONINI <0 02 02/02/2021 2122    TROPONINI <0 02 10/27/2020 1640    TROPONINI <0 02 08/30/2018 1745    TROPONINI <0 02 03/10/2018 1551    TROPONINI <0 02 03/10/2018 1256     BNP:   Results from last 7 days   Lab Units 03/24/21  1813   POTASSIUM mmol/L 5 0   CHLORIDE mmol/L 95*   CO2 mmol/L 22   BUN mg/dL 34*   CREATININE mg/dL 1 12   CALCIUM mg/dL 8 9   EGFR ml/min/1 73sq m 46     Coags:   Results from last 7 days   Lab Units 03/24/21  1132   PTT seconds 27   INR  0 97     Imaging: I have personally reviewed pertinent reports      EKG:  Sinus rhythm no acute changes, unchanged from previous EKGs  VTE Prophylaxis: Sequential compression device [facility])     Code Status: Level 1 - Full Code  Advance Directive and Living Will:      Power of :    POLST:      Francoise Chahal, [address]  Cardiology

## 2021-03-25 NOTE — ASSESSMENT & PLAN NOTE
Patient and son reported that she was recently diagnosed with UTI and was started on some antibiotic by Dr Mccallum [employer]  Patient stated that she has taken about 3 days of it  Patient febrile on the floors  Fever resolved  Discontinued IV Rocephin she has completed 3 day course of it here  Chest x-ray negative for pneumonia  UA negative here

## 2021-03-26 PROBLEM — D64.9 ANEMIA: Status: ACTIVE | Noted: 2021-01-01

## 2021-03-26 NOTE — CASE MANAGEMENT
LOS: 2 GMLOS: 2 9  Bundle: Seizure  Readmission: Yes  Unplanned Readmission Score: 22    Pt is a 30 day readmission  She was last admitted to [facility] 2/2-2/6 for tx of a seizure  Pt was discharged to [facility] for STR  Pt eventually was discharged home w/ VNA of NNJ  Pt presented back to [facility] ED on 3/24 after a fall  Pt sustained a R hip fracture and underwent ORIF  Therapy evaluated patient and is recommending STR  CM Met with patient and her son Dwayne Albrecht to introduce CM, review role, complete initial assessment and discuss DCP  Lives where: [place], [place]  Lives with: Alone  Home Type & Entry:  DME: RW, SPC, Lift Recliner Chair  ADLs: Independent w/o AD (occasional use of SPC outdoors); Family assists with household mgmt as needed  VNA history: Recent use of VNA of NNJ  STR history: Recent admission at [address] [place] [address] for approx 2-3 weeks  Pharmacy Preference & Coverage: [facility]  Mental [facility]/Drug/ETOH history:  Dialysis history: N/A  POA/AD/LW: denies  Income/Employment: correction  Transportation: Family  PCP: Dr Peter Gain: Family    DCP: STR-family reviewing SNF list     Freedom of choice reviewed with patient and her son  Son requesting pt be placed in a facility close to his home  SW sorted SNF list by zip code ([phone]) and provided to pt/son to review  CM reviewed discharge planning process including the following: identifying caregivers at home, preference for d/c planning needs,   availability of [facility] to [facility] program, availability of treatment team to discuss questions or concerns patient and/or family may have regarding diagnosis, plan of care, old or new medications and discharge planning   CM will continue to follow for care coordination and update assessment as appropriate  IMM#2 discussed with patient and son  Both gave verbal understanding  Copy provided and original placed in chart for scanning      Son confirmed he would follow-up with SW with their top choices ASAFABRIZIO MCDONALD will continue to follow for discharge plan

## 2021-03-26 NOTE — PROGRESS NOTES
[facility] Internal Medicine Progress Note  Patient: Danial Puente 79 y o  female   MRN: [id number]  PCP: Cass Lawrence MD  Unit/Bed#: 2 [place] [address] Encounter: [id number]  Date Of Visit: 03/26/21    Problem List:    Principal Problem:    Closed right hip fracture ([facility] )  Active Problems:    Hyponatremia    Anemia    UTI (urinary tract infection)    Benign essential hypertension    Diabetes mellitus ([facility] )    Mixed hyperlipidemia    Other specified hypothyroidism    Seizure [facility])      Assessment & Plan:    * Closed right hip fracture [facility])  Assessment & Plan  Patient presented with a fall at home and fell on her right side  X-ray showed comminuted right hip fracture  Status post IM nail insertion on 3/25  Patient became hypotensive during surgery requiring pressors  Patient seen by Cardiology and is low risk for perioperative complications  Heparin for DVT prophylaxis   Pain control with IV morphine and Tylenol p r n    Anemia  Assessment & Plan  Hemoglobin 7 5 today, partly dilutional in nature  1 unit PRBC transfusion  Repeat lab work later again in a   And transfuse as needed    Hyponatremia  Assessment & Plan  Sodium 126 in the ER  Received 500 cc normal saline bolus  lab work 128 from today   Repeat again in a m  Discontinued IVF  Check urine sodium, osm, serum osm    UTI (urinary tract infection)  Assessment & Plan  Patient and son reports that she was recently diagnosed with UTI and was started on some antibiotic by Dr Maurice Moreno  Patient states that she has taken about 3 days of it  Patient febrile on the floors  Continue patient on IV Rocephin  Chest x-ray negative for pneumonia  UA negative here       Seizure [facility])  Assessment & Plan  Continue Vimpat    Other specified hypothyroidism  Assessment & Plan  Continue levothyroxine    Mixed hyperlipidemia  Assessment & Plan  Continue Tricor    Diabetes mellitus [facility])  Assessment & Plan  Lab Results   Component Value Date    HGBA1C 7 2 (H) 02/03/2021 Recent Labs     21  1100 21  1708 21  2152 21  0727   POCGLU 119 196* 159* 139     Hold metformin  Patient on Accu-Cheks with sliding scale insulin    Benign essential hypertension  Assessment & Plan  Continue Toprol-XL  Norvasc and losartan was discontinued yesterday due to hypotension  VTE Pharmacologic Prophylaxis:   Pharmacologic: Pharmacologic VTE Prophylaxis contraindicated due to for OR today  Mechanical VTE Prophylaxis in Place: Yes    Patient Centered Rounds: I have performed bedside rounds with nursing staff today  Discussions with Specialists or Other Care Team Provider: yes- cardio    Education and Discussions with Family / Patient: left VM for Maryann Hernandez    Time Spent for Care: 25 min  More than 50% of total time spent on counseling and coordination of care as described above  Current Length of Stay: 2 day(s)    Current Patient Status: Inpatient   Certification Statement: The patient will continue to require additional inpatient hospital stay due to Right hip fracture, hyponatremia    Discharge Plan:  Pending    Code Status: Level 1 - Full Code      Subjective:     Reports 10/10 pain with movement but none otherwise    Objective:     Vitals:   Temp (24hrs), Av °F (36 7 °C), Min:97 7 °F (36 5 °C), Max:98 6 °F (37 °C)    Temp:  [97 7 °F (36 5 °C)-98 6 °F (37 °C)] 97 8 °F (36 6 °C)  HR:  [63-81] 66  Resp:  [16-18] 16  BP: (106-132)/(5-61) 128/59  SpO2:  [91 %-100 %] 93 %  Body mass index is 25 72 kg/m²  Input and Output Summary (last 24 hours): Intake/Output Summary (Last 24 hours) at 3/26/2021 0972  Last data filed at 3/26/2021 0517  Gross per 24 hour   Intake 2310 ml   Output 650 ml   Net 1660 ml       Physical Exam:     Physical Exam  Constitutional:       General: She is not in acute distress  Appearance: She is not diaphoretic  HENT:      Head: Normocephalic and atraumatic  Eyes:      General:         Right eye: No discharge  Left eye: No discharge  Cardiovascular:      Rate and Rhythm: Normal rate and regular rhythm  Pulmonary:      Effort: Pulmonary effort is normal  No respiratory distress  Breath sounds: Normal breath sounds  No wheezing or rales  Abdominal:      General: Bowel sounds are normal  There is no distension  Palpations: Abdomen is soft  Tenderness: There is no abdominal tenderness  Musculoskeletal:      Right lower leg: No edema  Left lower leg: No edema  Comments: Right thigh dressing is clean dry and intact with mild tenderness noted by the right thigh   Neurological:      Mental Status: She is alert and oriented to person, place, and time  Additional Data:     Labs:    Results from last 7 days   Lab Units 03/26/21  0740   WBC Thousand/uL 9 84   HEMOGLOBIN g/dL 7 5*   HEMATOCRIT % 23 9*   PLATELETS Thousands/uL 213   NEUTROS PCT % 82*   LYMPHS PCT % 8*   MONOS PCT % 9   EOS PCT % 0     Results from last 7 days   Lab Units 03/26/21  0502 03/25/21  1430   POTASSIUM mmol/L 5 3 4 2   CHLORIDE mmol/L 99* 102   CO2 mmol/L 19* 20*   BUN mg/dL 39* 31*   CREATININE mg/dL 1 03 0 94   CALCIUM mg/dL 8 1* 7 0*   ALK PHOS U/L  --  58   ALT U/L  --  21   AST U/L  --  14     Results from last 7 days   Lab Units 03/24/21  1132   INR  0 97       * I Have Reviewed All Lab Data Listed Above  * Additional Pertinent Lab Tests Reviewed: [facility] Admission Reviewed      Imaging:  Imaging Reports Reviewed Today Include: CXR, right hip xray  Imaging Personally Reviewed by Myself Includes:  N/A    Recent Cultures (last 7 days):     Results from last 7 days   Lab Units 03/24/21  1813 03/24/21  1805   BLOOD CULTURE  No Growth at 24 hrs  No Growth at 24 hrs         Last 24 Hours Medication List:   Current Facility-Administered Medications   Medication Dose Route Frequency Provider Last Rate    acetaminophen  650 mg Oral Q6H PRN Shanna Gunn PA-C      cefTRIAXone  1,000 mg Intravenous Q24H Andreas AMINATA Claire-C 1,000 mg (03/25/21 2150)    fenofibrate  168 mg Oral QPM Andreas AMINATA Claire-C      heparin (porcine)  5,000 Units Subcutaneous [facility] Andreas AMINATA Claire-C      insulin lispro  1-5 Units Subcutaneous TID AC Andreas AMINATA Claire-C      insulin lispro  1-5 Units Subcutaneous HS Andreas YOLI ClaireC      lacosamide  100 mg Oral Q12H [address] Andreas AMINATA Claire-C      levothyroxine  50 mcg Oral Early Morning Andreas AMINATA Claire-C      metoprolol succinate  25 mg Oral Daily Andreas AMINATA Claire-C      morphine injection  4 mg Intravenous Q6H PRN Andreas AMINATA Claire-C      morphine injection  2 mg Intravenous Q4H PRN Andreas AMINATA Claire-C      morphine injection  2 mg Intravenous Q4H PRN Andreas AMINATA Claire-C      multivitamin-minerals  1 tablet Oral Daily With Breakfast Andreas TOREY Claire      ondansetron  4 mg Intravenous Q6H PRN Andreas TOREY Claire            Today, Patient Was Seen By: Tito Parham DO    ** Please Note: "This note has been constructed using a voice recognition system  Therefore there may be syntax, spelling, and/or grammatical errors   Please call if you have any questions  "**

## 2021-03-26 NOTE — TELEPHONE ENCOUNTER
Upon review of the In Basket request we were able to locate, review, and update the patient chart as requested for Diabetic Eye Exam     Any additional questions or concerns should be emailed to the Practice Liaisons via [email or web address] email, please do not reply via In Basket      Thank you  Chemo Garvey MA

## 2021-03-26 NOTE — TELEPHONE ENCOUNTER
As a follow-up, a second attempt has been made for outreach via telephone call  The outcome of the telephone call was a fax request form to be sent to Office, please see Contacts section for details      Thank you  Giulia Hines MA

## 2021-03-26 NOTE — PHYSICAL THERAPY NOTE
PT EVALUATION       03/26/21 1030   PT Last Visit   PT Visit Date 03/26/21   Note Type   Note type Evaluation   Pain Assessment   Pain Assessment Tool Pain Assessment not indicated - pt denies pain   Pain Score Worst Possible Pain   Pain Location/Orientation Orientation: Right;Location: Leg   Hospital Pain Intervention(s) Medication by RN   Home Living   Type of [address]  One level  (1 SHILPA, no rail)   Home Equipment Cane  (RW;recliner lift chair)   Prior Function   Level of [place] Independent with ADLs and functional mobility   Lives With Alone   Receives Help From Family   ADL Assistance Independent   Falls in the last 6 months 1 to 4   Comments Pt amb w/out AD PTA;occasional use of cane outside  Pt also reports just finishing a course of home PT but states the PT said she was good and didn't need home PT anymore  Restrictions/Precautions   RLE Weight Bearing Per Order WBAT   Other Precautions Pain; Fall Risk;Bed Alarm; Chair Alarm   General   Additional Pertinent History Patient with a fall sustaining a right hip fracture  She is status post IM nail on 03/25/2021   Family/Caregiver Present No   Cognition   Overall Cognitive Status WFL   Arousal/Participation Cooperative   Orientation Level Oriented X4   Following Commands Follows multistep commands without difficulty   RLE Assessment   RLE Assessment WFL  (hip and knee 2-/5, ankle 3/5)   LLE Assessment   LLE Assessment WFL  (3+ to 4-/5)   Bed Mobility   Supine to Sit 2  Maximal assistance   Additional items Assist x 1;Verbal cues   Transfers   Sit to Stand   (mod/max)   Additional items Assist x 2;Verbal cues  (with the bed raised)   Stand to Sit 3  Moderate assistance   Additional items Assist x 2;Verbal cues   Ambulation/Elevation   Gait pattern Decreased R stance  (unable to take steps)   Gait Assistance   (mod/max)   Additional items Assist x 2;Verbal cues; Tactile cues   Assistive Device Rolling walker   Distance Pt stood x 1 minute with mod/max A + 2;rest;pt stood a 2nd time with mod/max A + 2 - one person able to break away and move bed away from pt and bring chair up to pt;pt sat OOB in chair with all needs in reach, (+) chair alarm   Balance   Static Sitting Fair   Static Standing Poor +   Activity Tolerance   Activity Tolerance Patient limited by fatigue;Patient limited by pain;Treatment limited secondary to medical complications (Comment)  (weakness)   Assessment   Problem List Decreased strength;Decreased range of motion;Decreased endurance; Impaired balance;Decreased mobility; Decreased coordination;Decreased safety awareness;Pain   Assessment Patient seen for Physical Therapy evaluation  Patient admitted with Closed right hip fracture ([facility] [address] )  Comorbidities affecting patient's physical performance include:  DM, HP, arthritis, low back pain, chronic pain of left knee, edema, MONSTER, ambulatory dysfunction, seizure, anemia, hyponatremia  Personal factors affecting patient at time of initial evaluation include: lives in one story house, ambulating with assistive device, stairs to enter home, inability to ambulate household distances, inability to navigate community distances, inability to navigate level surfaces without external assistance, inability to perform dynamic tasks in community, limited home support, positive fall history, anxiety and depression  Prior to admission, patient was independent with functional mobility with cane, independent with functional mobility without assistive device, independent with ADLS, living alone in a one level home with 1 steps to enter, ambulating household distance and ambulating community distances    Please find objective findings from Physical Therapy assessment regarding body systems outlined above with impairments and limitations including weakness, decreased ROM, impaired balance, decreased endurance, impaired coordination, gait deviations, pain, decreased activity tolerance, decreased functional mobility tolerance, decreased safety awareness and fall risk  The Barthel Index was used as a functional outcome tool presenting with a score of 40 today indicating marked limitations of functional mobility and ADLS  Patient's clinical presentation is currently unstable/unpredictable as seen in patient's presentation of vital sign response, changing level of pain, increased fall risk, new onset of impairment of functional mobility, decreased endurance and new onset of weakness  Pt would benefit from continued Physical Therapy treatment to address deficits as defined above and maximize level of functional mobility  As demonstrated by objective findings, the assigned level of complexity for this evaluation is high  The patient's -[facility] Basic Mobility Inpatient Short Form Raw Score is 6, Standardized Score is 22 01  A standardized score less than 42 9 suggests the patient may benefit from discharge to post-acute rehabilitation services  Please also refer to the recommendation of the Physical Therapist for safe discharge planning  Goals   Patient Goals "less pain"   STG Expiration Date 04/02/21   Short Term Goal #1 Bed mobility with moderate assist; transfers with moderate assistance   Short Term Goal #2 Patient will ambulate with a roller walker and moderate assist functional household distances WBAT RLE   LTG Expiration Date 04/09/21   Long Term Goal #1 Bed mobility with minimal/supervision; transfers with minimal assist/supervision; up and down 1 step with supervision so patient can enter and exit her home   Long Term Goal #2 Patient will ambulate with a roller walker functional household distances - S/I; balance with a roller walker fair plus/good for safe gait and mobility and to decrease fall risk; strength right lower extremity 3+ to 4-/5   Plan   Treatment/Interventions ADL retraining;Functional transfer training;LE strengthening/ROM; Elevations; Therapeutic exercise; Endurance training;Patient/family training;Equipment eval/education; Bed mobility;Gait training; Compensatory technique education   PT Frequency Once a day   Recommendation   PT Discharge Recommendation Post-Acute Rehabilitation Services   AM-PAC Basic Mobility Inpatient   Turning in Bed Without Bedrails 1   Lying on Back to Sitting on Edge of Flat Bed 1   Moving Bed to Chair 1   Standing Up From Chair 1   Walk in Room 1   Climb 3-5 Stairs 1   Basic Mobility Inpatient Raw Score 6   Turning Head Towards Sound 4   Follow Simple Instructions 4   Low Function Basic Mobility Raw Score 14   Low Function Basic Mobility Standardized Score 22 01   Barthel Index   Feeding 10   Bathing 0   Grooming Score 0   Dressing Score 5   Bladder Score 5   Bowels Score 10   Toilet Use Score 5   Transfers (Bed/Chair) Score 5   Mobility (Level Surface) Score 0   Stairs Score 0   Barthel Index Score 36   Licensure   [place] License Number  Terra Higgins [id number]       Time In:1030  Time Out:1045  Total Time: 15 mins      S:  Patient reports 10/10 pain right lower extremity  O:  AAROM right LE heel slides and hip abduction, quad sets and ankle pumps  All exercises times 10 reps  A:  Patient will benefit from continued skilled physical therapy services status post right hip fracture and IM nailing to increase her active range of motion, strength, gait with a roller walker, and functional mobility  P:  Continue per PT POC  DCP-post acute rehab services      Terra Higgins   [id number]

## 2021-03-26 NOTE — PLAN OF CARE
Problem: Potential for Falls  Goal: Patient will remain free of falls  Description: INTERVENTIONS:  - Assess patient frequently for physical needs  -  Identify cognitive and physical deficits and behaviors that affect risk of falls    -  [place] fall precautions as indicated by assessment   - Educate patient/family on patient safety including physical limitations  - Instruct patient to call for assistance with activity based on assessment  - Modify environment to reduce risk of injury  - Consider OT/PT consult to assist with strengthening/mobility  Outcome: Progressing     Problem: Prexisting or High Potential for Compromised Skin Integrity  Goal: Skin integrity is maintained or improved  Description: INTERVENTIONS:  - Identify patients at risk for skin breakdown  - Assess and monitor skin integrity  - Assess and monitor nutrition and hydration status  - Monitor labs   - Assess for incontinence   - Turn and reposition patient  - Assist with mobility/ambulation  - Relieve pressure over bony prominences  - Avoid friction and shearing  - Provide appropriate hygiene as needed including keeping skin clean and dry  - Evaluate need for skin moisturizer/barrier cream  - Collaborate with interdisciplinary team   - Patient/family teaching  - Consider wound care consult   Outcome: Progressing     Problem: PAIN - ADULT  Goal: Verbalizes/displays adequate comfort level or baseline comfort level  Description: Interventions:  - Encourage patient to monitor pain and request assistance  - Assess pain using appropriate pain scale  - Administer analgesics based on type and severity of pain and evaluate response  - Implement non-pharmacological measures as appropriate and evaluate response  - Consider cultural and social influences on pain and pain management  - Notify physician/advanced practitioner if interventions unsuccessful or patient reports new pain  Outcome: Progressing

## 2021-03-26 NOTE — ASSESSMENT & PLAN NOTE
Hemoglobin 7 5 on 03/26, partly dilutional in nature and postoperative anemia  1 unit PRBC transfusion on 03/26 after which hemoglobin has improved   Repeat lab work after discharge

## 2021-03-26 NOTE — PROGRESS NOTES
Progress Note - Orthopedics   Tiffany Or 79 y o  female MRN: [id number]  Unit/Bed#: [address] [address] Encounter: [id number]        Subjective: Status post ORIF right subtrochanteric femur fracture yesterday with long IM nail  Patient notes soreness about the right thigh, but good sensation of the right lower extremity  There were some cardiac concerns by anesthesia intra-op  Due to ECG changes, however troponins have been within normal limits  No acute overnight events  Vitals: Blood pressure 128/59, pulse 66, temperature 97 8 °F (36 6 °C), resp  rate 16, height 5' 9" (1 753 m), weight 79 kg (174 lb 2 6 oz), SpO2 93 %, not currently breastfeeding  ,Body mass index is 25 72 kg/m²  Intake/Output Summary (Last 24 hours) at 3/26/2021 1203  Last data filed at 3/26/2021 0517  Gross per 24 hour   Intake 2310 ml   Output 400 ml   Net 1910 ml       Invasive Devices     Peripheral Intravenous Line            Peripheral IV 03/24/21 Right Antecubital 2 days    Peripheral IV 03/25/21 Left Antecubital less than 1 day    Peripheral IV 03/25/21 Left Arm less than 1 day          Drain            External Urinary Catheter less than 1 day                     Ortho Exam: Dressing CDI  Right lower extremity  Mild swelling hip  Compartments soft  No calf tenderness  Moves toes/ankle freely  1+ DP pulse  Sensation intact lateral femoral cutaneous, saphenous, sural, deep/superficial peroneal nerve distributions  Lab, Imaging and other studies: I have personally reviewed pertinent lab results    CBC:   Lab Results   Component Value Date    WBC 9 84 03/26/2021    HGB 7 5 (L) 03/26/2021    HCT 23 9 (L) 03/26/2021    MCV 90 03/26/2021     03/26/2021    MCH 28 3 03/26/2021    MCHC 31 4 03/26/2021    RDW 14 4 03/26/2021    MPV 10 4 03/26/2021    NRBC 0 03/26/2021     CMP:   Lab Results   Component Value Date    CL 99 (L) 03/26/2021    CO2 19 (L) 03/26/2021    BUN 39 (H) 03/26/2021    CREATININE 1 03 03/26/2021    CALCIUM 8 1 (L) 03/26/2021    AST 14 03/25/2021    ALT 21 03/25/2021    ALKPHOS 58 03/25/2021    EGFR 51 03/26/2021     PT/INR:   Lab Results   Component Value Date    INR 0 97 03/24/2021       Assessment:  Status post ORIF right femur fracture  Plan:  PT/OT  WBAT  Leave dressing in place  Heparin for DVT prophylaxis  Medicine did or PRBC for low hgb  Minimal intra-operative blood loss  Cardiology will continue to monitor patient  Weight bearing: WBAT with assistance      VTE Pharmacologic Prophylaxis: Enoxaparin (Lovenox)  VTE Mechanical Prophylaxis: sequential compression device

## 2021-03-26 NOTE — OCCUPATIONAL THERAPY NOTE
OT EVALUATION       03/26/21 1105   Note Type   Note type Evaluation   Restrictions/Precautions   RLE Weight Bearing Per Order WBAT   Other Precautions Chair Alarm; Bed Alarm; Fall Risk;Pain   Pain Assessment   Pain Assessment Tool Pain Assessment not indicated - pt denies pain  (seated in chair at rest )   Home Living   Type of [address]  One level  (1 SHILPA )   Bathroom Shower/Tub Walk-in shower   Bathroom Toilet Raised   Bathroom Equipment Commode;Grab bars in shower;Built-in shower seat   Home Equipment Walker;Cane  (recliner lift chair )   Prior Function   Level of [place] Independent with ADLs and functional mobility   Lives With Alone   Receives Help From Family   ADL Assistance Independent   IADLs Independent   Falls in the last 6 months 1 to 4   Comments pt was having home PT, uses cane occassionally outside    ADL   Eating Assistance 5  Supervision/Setup   Grooming Assistance 5  Supervision/Setup   UB Bathing Assistance 4  Minimal Assistance   LB Bathing Assistance 2  Maximal Assistance   UB Dressing Assistance 4  Minimal Assistance   LB Dressing Assistance 2  Maximal Assistance   Toileting Assistance  2  Maximal Assistance   Transfers   Sit to Stand   (mod/max assist of 2 )   Additional items Assist x 2;Verbal cues   Balance   Static Sitting Fair   Dynamic Sitting Fair -   Static Standing Poor +   Activity Tolerance   Activity Tolerance Patient limited by fatigue;Patient limited by pain   RUE Assessment   RUE Assessment WFL   LUE Assessment   LUE Assessment WFL   Cognition   Overall Cognitive Status WFL   Arousal/Participation Cooperative   Attention Within functional limits   Orientation Level Oriented X4   Following Commands Follows multistep commands with increased time or repetition   Assessment   Limitation Decreased ADL status; Decreased UE strength;Decreased Safe judgement during ADL;Decreased endurance;Decreased self-care trans;Decreased high-level ADLs  (decreased balance and mobility )   Prognosis Good   Assessment Patient evaluated by Occupational Therapy  Patient admitted with Closed right hip fracture ([facility] [address] )  The patients occupational profile, medical and therapy history includes a extensive additional review of physical, cognitive, or psychosocial history related to current functional performance  Comorbidities affecting functional mobility and ADLS include: anemia, anxiety, arthritis, diabetes, hypertension and seizures  Prior to admission, patient was independent with functional mobility with cane, independent with ADLS and independent with IADLS  The evaluation identifies the following performance deficits: weakness, impaired balance, decreased endurance, increased fall risk, new onset of impairment of functional mobility, decreased ADLS, decreased IADLS, pain, decreased activity tolerance, decreased safety awareness, impaired judgement and decreased strength, that result in activity limitations and/or participation restrictions  This evaluation requires clinical decision making of high complexity, because the patient presents with comorbidites that affect occupational performance and required significant modification of tasks or assistance with consideration of multiple treatment options  The Barthel Index was used as a functional outcome tool presenting with a score of 40, indicating marked limitations of functional mobility and ADLS  The patient's raw score on the -PAC Daily Activity inpatient short form is 13, standardized score is 32 03, less than 39 4  Patients at this level are likely to benefit from DC to post-acute rehabilitation services  Please refer to the recommendation of the Occupational Therapist for safe DC planning  Patient will benefit from skilled Occupational Therapy services to address above deficits and facilitate a safe return to prior level of function     Goals   Patient Goals less pain and be independent    STG Time Frame   (1-7 days)   Short Term Goal  Goals established to promote patient goal of less pain and be independent:  Patient will increase standing tolerance to 1 minutes during ADL task to decrease assistance level and decrease fall risk; Patient will increase bed mobility to mod assist in preparation for ADLS and transfers; Patient will increase functional mobility to and from bathroom with rolling walker with mod assist to increase performance with ADLS and to use a toilet; Patient will tolerate 10 minutes of UE ROM/strengthening to increase general activity tolerance and performance in ADLS/IADLS; Patient will improve functional activity tolerance to 10 minutes of sustained functional tasks to increase participation in basic self-care and decrease assistance level;  Patient will be able to to verbalize understanding and perform energy conservation/proper body mechanics during ADLS and functional mobility at least 75% of the time with minimal cueing to decrease signs of fatigue and increase stamina to return to prior level of function; Patient will increase dynamic sitting balance to fair to improve the ability to sit at edge of bed or on a chair for ADLS;  Patient will increase dynamic standing balance to poor+ to improve postural stability and decrease fall risk during standing ADLS and transfers  LTG Time Frame   (8-14 days)   Long Term Goal Goals established to promote patient goal of less pain and be independent:  Patient will increase standing tolerance to 3 minutes during ADL task to decrease assistance level and decrease fall risk; Patient will increase bed mobility to min assist in preparation for ADLS and transfers;  Patient will increase functional mobility to and from bathroom with rolling walker with min assist to increase performance with ADLS and to use a toilet; Patient will tolerate 20 minutes of UE ROM/strengthening to increase general activity tolerance and performance in ADLS/IADLS; Patient will improve functional activity tolerance to 20 minutes of sustained functional tasks to increase participation in basic self-care and decrease assistance level;  Patient will be able to to verbalize understanding and perform energy conservation/proper body mechanics during ADLS and functional mobility at least 90% of the time without cueing to decrease signs of fatigue and increase stamina to return to prior level of function; Patient will increase dynamic sitting balance to fair+ to improve the ability to sit at edge of bed or on a chair for ADLS;  Patient will increase dynamic standing balance to fair- to improve postural stability and decrease fall risk during standing ADLS and transfers  Functional Transfer Goals   Pt Will Perform All Functional Transfers   (STG mod assist LTG Min assist )   ADL Goals   Pt Will Perform Eating   (STG Independent )   Pt Will Perform Grooming   (STG Independent )   Pt Will Perform Bathing   (STG mod assist LTG min assist )   Pt Will Perform UE Dressing   (STG supervision LTG independent )   Pt Will Perform LE Dressing   (STG mod assist LTG min assist )   Pt Will Perform Toileting   (STG mod assist LTG min assist )   Plan   Treatment Interventions ADL retraining;Functional transfer training;UE strengthening/ROM; Endurance training;Patient/family training;Equipment evaluation/education; Activityengagement; Compensatory technique education   Goal Expiration Date 04/09/21   OT Frequency 5x/wk   Recommendation   OT Discharge Recommendation Post-Acute Rehabilitation Services   AM-PAC Daily Activity Inpatient   Lower Body Dressing 1   Bathing 1   Toileting 1   Upper Body Dressing 3   Grooming 3   Eating 4   Daily Activity Raw Score 13   Daily Activity Standardized Score (Calc for Raw Score >=11) 32 03   AM-PAC Applied Cognition Inpatient   Following a Speech/Presentation 4   Understanding Ordinary Conversation 4   Taking Medications 4   Remembering Where Things Are Placed or Put Away 4   Remembering List of 4-5 Errands 4   Taking Care of Complicated Tasks 4   Applied Cognition Raw Score 24   Applied Cognition Standardized Score 62 21   Barthel Index   Feeding 10   Bathing 0   Grooming Score 0   Dressing Score 5   Bladder Score 5   Bowels Score 10   Toilet Use Score 5   Transfers (Bed/Chair) Score 5   Mobility (Level Surface) Score 0   Stairs Score 0   Barthel Index Score 40   Licensure   [place] License Number  Santosh Freire [address] 86 [id number] [id number]

## 2021-03-26 NOTE — PROGRESS NOTES
Progress Note - Cardiology   [facility]     Benjamin Ochoa 79 y o  female MRN: [id number]  : 1940  Unit/Bed#: [address] [place] [address] Encounter: [id number]    Assessment and Plan:   1  Mechanical fall with fractured right hip:  Patient underwent insertion of IM femoral nail yesterday with orthopedics  Patient doing fairly well postoperatively  Care per Ortho and PT/OT  2  Hypertension:  Patient hypotensive in the OR yesterday  Losartan and Norvasc held postoperatively  Will continue to monitor and adjust medications as she needs it  3  Postoperative anemia:  Hemoglobin 12 on admission, today it is 7 5  Discussed with primary team to consider transfusion of 1 unit packed red blood cells  4  Dyslipidemia:  Patient currently on fenofibrate    5  Depression:  Patient recently lost her  10 months ago and still becomes weak be when she discusses it  Discussed with primary team consider addition of antidepressant  Subjective / Objective:       Vitals: Blood pressure 128/59, pulse 66, temperature 97 8 °F (36 6 °C), resp  rate 16, height 5' 9" (1 753 m), weight 79 kg (174 lb 2 6 oz), SpO2 93 %, not currently breastfeeding  Vitals:    21 1620   Weight: 79 kg (174 lb 2 6 oz)     Body mass index is 25 72 kg/m²  BP Readings from Last 3 Encounters:   21 128/59   21 162/74   21 140/64     Orthostatic Blood Pressures      Most Recent Value   Blood Pressure  128/59 filed at 2021 0743   Patient Position - Orthostatic VS  Lying filed at 2021 0336        I/O        0701 -  0700  07 -  0700  07 -  0700    P  O   60     I V  (mL/kg) 1000 (12 7) 2000 (25 3)     IV Piggyback  250     Total Intake(mL/kg) 1000 (12 7) 2310 (29 2)     Urine (mL/kg/hr) 400 550 (0 3)     Emesis/NG output  0     Stool  0     Blood  100     Total Output 400 650     Net +600 +1660            Unmeasured Stool Occurrence  0 x         Invasive Devices Peripheral Intravenous Line            Peripheral IV 03/24/21 Right Antecubital 1 day    Peripheral IV 03/25/21 Left Antecubital less than 1 day    Peripheral IV 03/25/21 Left Arm less than 1 day          Drain            External Urinary Catheter less than 1 day                  Intake/Output Summary (Last 24 hours) at 3/26/2021 1027  Last data filed at 3/26/2021 0517  Gross per 24 hour   Intake 2310 ml   Output 650 ml   Net 1660 ml         Physical Exam:   Physical Exam  Vitals signs and nursing note reviewed  Constitutional:       General: She is not in acute distress  Appearance: Normal appearance  She is well-developed  She is obese  HENT:      Head: Normocephalic  Right Ear: External ear normal       Left Ear: External ear normal       Nose: Nose normal    Eyes:      General:         Right eye: No discharge  Left eye: No discharge  Pupils: Pupils are equal, round, and reactive to light  Neck:      Musculoskeletal: Normal range of motion and neck supple  Thyroid: No thyromegaly  Cardiovascular:      Rate and Rhythm: Normal rate and regular rhythm  Pulses: Normal pulses  Heart sounds: Normal heart sounds  No murmur  Pulmonary:      Effort: Pulmonary effort is normal  No respiratory distress  Breath sounds: Normal breath sounds  No wheezing, rhonchi or rales  Abdominal:      General: Bowel sounds are normal       Palpations: Abdomen is soft  Musculoskeletal:      Right lower leg: No edema  Left lower leg: No edema  Skin:     General: Skin is warm and dry  Capillary Refill: Capillary refill takes less than 2 seconds  Neurological:      General: No focal deficit present  Mental Status: She is alert  Mental status is at baseline  Psychiatric:         Attention and Perception: Attention normal          Mood and Affect: Mood is anxious  Affect is tearful  Speech: Speech normal          Behavior: Behavior is cooperative  Thought Content:  Thought content normal          Cognition and Memory: Cognition normal          Judgment: Judgment normal                    Medications/ Allergies:     Current Facility-Administered Medications   Medication Dose Route Frequency Provider Last Rate    acetaminophen  650 mg Oral Q6H PRN Carmelita Malagon PA-C      cefTRIAXone  1,000 mg Intravenous Q24H Carmelita Malagon PA-C 1,000 mg (03/25/21 2150)    fenofibrate  168 mg Oral QPM Carmelita Malagon PA-C      heparin (porcine)  5,000 Units Subcutaneous [facility] Carmelita Malagon PA-C      insulin lispro  1-5 Units Subcutaneous TID AC AMINATA Fong-HUMA      insulin lispro  1-5 Units Subcutaneous HS Carmelita Malagon PA-C      lacosamide  100 mg Oral Q12H [address] Carmelita Malagon PA-C      levothyroxine  50 mcg Oral Early Morning Carmelita Malagon PA-C      metoprolol succinate  25 mg Oral Daily Carmelita Malagon PA-C      morphine injection  4 mg Intravenous Q6H PRN Carmelita Malagon PA-C      morphine injection  2 mg Intravenous Q4H PRN AMINATA Fong-HUMA      morphine injection  2 mg Intravenous Q4H PRN AMINATA Fong-HUMA      multivitamin-minerals  1 tablet Oral Daily With Breakfast Carmelita Malagon PA-C      ondansetron  4 mg Intravenous Q6H PRN Carmelita Malagon PA-C       acetaminophen, 650 mg, Q6H PRN  morphine injection, 4 mg, Q6H PRN  morphine injection, 2 mg, Q4H PRN  morphine injection, 2 mg, Q4H PRN  ondansetron, 4 mg, Q6H PRN      Allergies   Allergen Reactions    Amoxicillin GI Intolerance    Lactose GI Intolerance    Sulfa Antibiotics GI Intolerance    Lidocaine Rash     Lidocaine patch       VTE Pharmacologic Prophylaxis:   Sequential compression device (Venodyne)     Labs:   Troponins:  Results from last 7 days   Lab Units 03/25/21  2302 03/25/21  1958 03/25/21  1732   TROPONIN I ng/mL <0 02 <0 02 <0 02     CBC with diff:  Results from last 7 days   Lab Units 03/26/21  0740 03/25/21  0606 03/24/21  1132   WBC Thousand/uL 9 84 10 49* 10 57*   HEMOGLOBIN g/dL 7 5* 10 6* 12 1   HEMATOCRIT % 23 9* 33 9* 38 0   MCV fL 90 88 88   PLATELETS Thousands/uL 213 248 284   MCH pg 28 3 27 5 27 9   MCHC g/dL 31 4 31 3* 31 8   RDW % 14 4 14 4 14 1   MPV fL 10 4 10 4 10 1   NRBC AUTO /100 WBCs 0 0 0     CMP:  Results from last 7 days   Lab Units 03/26/21  0502 03/25/21  1430 03/25/21  0605 03/24/21  1813 03/24/21  1132   SODIUM mmol/L 128* 131* 128* 127* 126*   POTASSIUM mmol/L 5 3 4 2 5 2 5 0 4 6   CHLORIDE mmol/L 99* 102 96* 95* 93*   CO2 mmol/L 19* 20* 20* 22 24   ANION GAP mmol/L 10 9 12 10 9   BUN mg/dL 39* 31* 38* 34* 30*   CREATININE mg/dL 1 03 0 94 1 38* 1 12 1 14   CALCIUM mg/dL 8 1* 7 0* 8 5 8 9 8 8   AST U/L  --  14  --   --  14   ALT U/L  --  21  --   --  27   ALK PHOS U/L  --  58  --   --  88   TOTAL PROTEIN g/dL  --  5 2*  --   --  7 0   ALBUMIN g/dL  --  2 6*  --   --  3 4*   TOTAL BILIRUBIN mg/dL  --  0 30  --   --  0 50   EGFR ml/min/1 73sq m 51 57 36 46 45     Magnesium:  Results from last 7 days   Lab Units 03/25/21  0605   MAGNESIUM mg/dL 2 0     Coags:  Results from last 7 days   Lab Units 03/24/21  1132   PTT seconds 27   INR  0 97     Imaging & Testing   I have personally reviewed pertinent reports  Xr Chest 1 View Portable    Result Date: 3/24/2021  Narrative: CHEST INDICATION:   pre op  COMPARISON:  2/2/2021 EXAM PERFORMED/VIEWS:  XR CHEST PORTABLE Single view FINDINGS: Cardiomediastinal silhouette appears unremarkable  The lungs are clear  No pneumothorax or pleural effusion  Osseous structures appear within normal limits for patient age  Impression: No acute cardiopulmonary disease  Findings are stable Workstation performed: [id number]     Xr Hip/pelv 2-3 Vws Right If Performed    Result Date: 3/26/2021  Narrative: C-ARM - right hip INDICATION: ORIF right hip in OR  Procedure guidance   COMPARISON:  Right hip radiographs 3/24/2021 TECHNIQUE: FLUOROSCOPY TIME:   157 9 seconds 6 FLUOROSCOPIC IMAGES FINDINGS: Fluoroscopic guidance provided for procedure guidance  ORIF of proximal right femoral subtrochanteric fracture  Osseous and soft tissue detail limited by technique  Impression: Fluoroscopic guidance provided for procedure guidance  Please refer to the separate procedure notes for additional details  Workstation performed: [id number]     Xr Hip/pelv 2-3 Vws Right If Performed    Result Date: 3/24/2021  Narrative: RIGHT HIP INDICATION:   pain  COMPARISON:  None VIEWS:  XR HIP/PELV 2-3 VWS RIGHT W PELVIS IF PERFORMED Images: 3 FINDINGS: Acute comminuted intertrochanteric/subtrochanteric fracture right hip with mild displacement Mild degenerative arthritis both hips No lytic or blastic osseous lesion  Scattered vascular calcifications Left central pelvic calcification, likely degenerating fibroid Mild multilevel degenerative spondylosis     Impression: Acute comminuted  fracture right hip Workstation performed: [id number]        EKG / Monitor: Personally reviewed  Sinus rhythm    Cardiac testing:   Results for orders placed during the hospital encounter of 21   Echo complete with contrast if indicated    Narrative [address]  [address] [facility] [address]  [phone]    Echocardiogram  2D, M-mode, Doppler, and Color Doppler    Study date:  2021    Patient: Randell Moya  MR number: [id number]  Account number: [de-identified]  : 1940  Age: 79 years  Gender: Female  Status: Inpatient  Location: Bedside  Height: 69 in  Weight: 172 lb  BP: 184/ 79 mmHg    Indications: TIA    Diagnoses: G45 9 - Transient cerebral ischemic attack, unspecified    Sonographer:  FRED Polanco  Primary Physician:   Stephenie Jhaveri MD  Referring Physician:  Krishan Suazo PA-C  Group:  [facility]  Interpreting Physician:  Ar Orozco MD    SUMMARY    LEFT VENTRICLE:  Systolic function was normal  Ejection fraction was estimated in the range of 55 % to 60 % to be 60 %  There were no regional wall motion abnormalities  Wall thickness was mildly increased  There was mild concentric hypertrophy  Doppler parameters were consistent with abnormal left ventricular relaxation (grade 1 diastolic dysfunction)  LEFT ATRIUM:  The atrium was mildly to moderately dilated  ATRIAL SEPTUM:  No atrial septal defect or large patent PFO seen by color contrast by bubble study    MITRAL VALVE:  There was mild to moderate annular calcification  There was mild to moderate regurgitation  TRICUSPID VALVE:  There was trace regurgitation  The tricuspid jet envelope definition was inadequate for estimation of RV systolic pressure  HISTORY: PRIOR HISTORY: High Triglycerides,Seizure,Edema,Former smoker,MR,GERD,HTN,HLD,DM,MONSTER    PROCEDURE: The procedure was performed at the bedside  This was a routine study  The study included complete 2D imaging, M-mode, complete spectral Doppler, and color Doppler  The heart rate was 71 bpm, at the start of the study  Intravenous contrast (agitated saline) was administered  Echocardiographic views were limited due to poor patient compliance and lung interference  Image quality was adequate  LEFT VENTRICLE: Size was normal  Systolic function was normal  Ejection fraction was estimated in the range of 55 % to 60 % to be 60 %  There were no regional wall motion abnormalities  Wall thickness was mildly increased  There was mild  concentric hypertrophy  DOPPLER: Doppler parameters were consistent with abnormal left ventricular relaxation (grade 1 diastolic dysfunction)  RIGHT VENTRICLE: The size was normal  Systolic function was normal     LEFT ATRIUM: The atrium was mildly to moderately dilated  ATRIAL SEPTUM: No atrial septal defect or large patent PFO seen by color contrast by bubble study    RIGHT ATRIUM: Size was normal     MITRAL VALVE: There was mild to moderate annular calcification  There was normal leaflet separation   DOPPLER: The transmitral velocity was within the normal range  There was no evidence for stenosis  There was mild to moderate  regurgitation  AORTIC VALVE: The valve was trileaflet  Leaflets exhibited mildly to moderately increased thickness, mild to moderate calcification, and normal cuspal separation  Calcification thickening mostly of non coronary cusp  DOPPLER: Transaortic  velocity was within the normal range  There was no evidence for stenosis  There was no regurgitation  TRICUSPID VALVE: DOPPLER: There was trace regurgitation  The tricuspid jet envelope definition was inadequate for estimation of RV systolic pressure  PULMONIC VALVE: DOPPLER: There was trace regurgitation  PERICARDIUM: There was no thickening or calcification  There was no pericardial effusion  AORTA: The root exhibited normal size  SYSTEMIC VEINS: IVC: The inferior vena cava was not well visualized  SYSTEM MEASUREMENT TABLES    2D  Ao Diam: 3 61 cm  LA Area: 27 55 cm2  LA Diam: 3 9 cm  LVOT Diam: 2 1 cm  RA Area: 15 84 cm2  RVIDd: 3 28 cm    PW  MV E/A Ratio: 0 89    [employer] Accredited Echocardi[facility]    Prepared and electronically signed by    Nato Montana MD  Signed 03-Feb-2021 16:53:06           Norm Whitman        "This note has been constructed using a voice recognition system  Therefore there may be syntax, spelling, and/or grammatical errors   Please call if you have any questions  "

## 2021-03-27 NOTE — PROGRESS NOTES
[address] [facility] NOTE   Ai Becerra 79 y o  female MRN: [id number]  Unit/Bed#: [address] Encounter: [id number]  Reason for Consult: Hyponatremia    ASSESSMENT and PLAN:  1  Hyponatremia:  · Admission sodium of 127 on 3/24/21 in the setting of a fall  · Sodium range prior to this admission was between 132 to 136 since 2018 while on Olmesartan HCT at home  · Etiology suspected to be HCTZ use complicated by postoperative SIADH  · Work up: SOsm 284 (while Na was 131), UOsm 578, Jack 75  · Continue fluid restriction 1500 cc     · Sodium up to 130 today with 1 L of isotonic fluid yesterday  · Currently appears euvolemic  · Will give salt tabs 1 gm BID for now and monitor  2  Low bicarbonate level: Resolved     3  Hypertension: BP controlled  4  Anemia: Hgb improved  5  R hip fracture s/p IM nailing 3/25/21  6  Fall    DISPOSITION:  · Continue fluid restriction 1500 cc per 24 hours  · Start salt tablets 1 g BID  SUBJECTIVE / 24H INTERVAL HISTORY:  Deneis any chest pain or shortness of breath    OBJECTIVE:  Current Weight: Weight - Scale: 79 kg (174 lb 2 6 oz)  Vitals:    03/26/21 2318 03/26/21 2320 03/27/21 0411 03/27/21 0726   BP: 154/55 154/55  129/50   Pulse: 64 64 60 68   Resp: 16 16 16 16   Temp: (!) 97 °F (36 1 °C) (!) 97 °F (36 1 °C) 98 °F (36 7 °C) 97 5 °F (36 4 °C)   TempSrc:  Oral     SpO2: 97%  97% 99%   Weight:       Height:           Intake/Output Summary (Last 24 hours) at 3/27/2021 0751  Last data filed at 3/27/2021 0501  Gross per 24 hour   Intake 1447 92 ml   Output 800 ml   Net 647 92 ml     General: conscious, cooperative, no distress  Skin: dry  Eyes: pink conjunctivae  ENT: dry mucous membranes  Chest/Lungs: equal chest expansion, clear breath sounds  CVS: distinct heart sounds, normal rate, regular rhythm, no rub  Abdomen: soft, non tender, non distended, normal bowel sounds  Extremities: no edema  : no davis catheter  Neuro: awake, alert     Psych: appropriate affect    Medications:    Current Facility-Administered Medications:     acetaminophen (TYLENOL) tablet 650 mg, 650 mg, Oral, Q6H PRN, Stephen Bush PA-C, 650 mg at 03/26/21 0545    cefTRIAXone (ROCEPHIN) IVPB (premix in dextrose) 1,000 mg 50 mL, 1,000 mg, Intravenous, Q24H, Stephen Bush PA-C, Last Rate: 100 mL/hr at 03/26/21 2020, 1,000 mg at 03/26/21 2020    fenofibrate (TRICOR) tablet 168 mg, 168 mg, Oral, QPM, Stephen Bush PA-C, 168 mg at 03/26/21 1849    heparin (porcine) subcutaneous injection 5,000 Units, 5,000 Units, Subcutaneous, Q8H [address], 5,000 Units at 03/27/21 0549 **AND** [CANCELED] Platelet count, , , Once, Adelia Christensenar, DO    insulin lispro (HumaLOG) 100 units/mL subcutaneous injection 1-5 Units, 1-5 Units, Subcutaneous, TID AC, 1 Units at 03/26/21 1849 **AND** Fingerstick Glucose (POCT), , , TID AC, Stephen Bush PA-C    insulin lispro (HumaLOG) 100 units/mL subcutaneous injection 1-5 Units, 1-5 Units, Subcutaneous, HS, Stephen Bush PA-C, 1 Units at 03/26/21 2300    lacosamide (VIMPAT) tablet 100 mg, 100 mg, Oral, Q12H [address], Stephen Bush PA-C, 100 mg at 03/26/21 2020    levothyroxine tablet 50 mcg, 50 mcg, Oral, Early Morning, Stephen Bush PA-C, 50 mcg at 03/27/21 0555    metoprolol succinate (TOPROL-XL) 24 hr tablet 25 mg, 25 mg, Oral, Daily, Stephen Bush PA-C, 25 mg at 03/26/21 0904    morphine (PF) 4 mg/mL injection 4 mg, 4 mg, Intravenous, Q6H PRN, Stephen Bush PA-C, 4 mg at 03/26/21 1943    morphine injection 2 mg, 2 mg, Intravenous, Q4H PRN, Stephen Bush PA-C    morphine injection 2 mg, 2 mg, Intravenous, Q4H PRN, Stephen Bush PA-C, 2 mg at 03/26/21 1026    multivitamin-minerals (CENTRUM) tablet 1 tablet, 1 tablet, Oral, Daily With Breakfast, Stephen Bush PA-C, 1 tablet at 03/26/21 0904    ondansetron (ZOFRAN) injection 4 mg, 4 mg, Intravenous, Q6H PRN, Stephen Bush PA-C    Laboratory Results:  Results from last 7 days   Lab Units 03/27/21  0541 03/26/21  2144 03/26/21  0740 03/26/21  0502 03/25/21  1430 03/25/21  0606 03/25/21  0605 03/24/21  1813 03/24/21  1132   WBC Thousand/uL 7 23  --  9 84  --   --  10 49*  --   --  10 57*   HEMOGLOBIN g/dL 8 2*  --  7 5*  --   --  10 6*  --   --  12 1   HEMATOCRIT % 26 4*  --  23 9*  --   --  33 9*  --   --  38 0   PLATELETS Thousands/uL 214  --  213  --   --  248  --   --  284   POTASSIUM mmol/L 5 1  --   --  5 3 4 2  --  5 2 5 0 4 6   CHLORIDE mmol/L 100  --   --  99* 102  --  96* 95* 93*   CO2 mmol/L 22  --   --  19* 20*  --  20* 22 24   BUN mg/dL 45*  --   --  39* 31*  --  38* 34* 30*   CREATININE mg/dL 1 10  --   --  1 03 0 94  --  1 38* 1 12 1 14   CALCIUM mg/dL 8 4  --   --  8 1* 7 0*  --  8 5 8 9 8 8   MAGNESIUM mg/dL  --  2 2  --   --   --   --  2 0  --   --

## 2021-03-27 NOTE — PROGRESS NOTES
Progress Note - Cardiology   [facility]     Danial Puente 79 y o  female MRN: [id number]  : 1940  Unit/Bed#: 2 [place] [address] Encounter: [id number]    Assessment and Recommendations/Plan:      ASSESSMENT:   Status post surgery for right hip fracture following a mechanical fall  Postop troponins are normal and she is hemodynamically stable without any cardiac symptoms      History of hypertension, hyperlipidemia, and diabetes mellitus     Intermittent, rate-related right bundle branch block      Postop anemia  Hemoglobin is 8 2 from prior 12      RECOMMENDATIONS:  Continue cardiac medications including amlodipine, Tricor,       Management of anemia as per primary service      Patient is stable from cardiac standpoint            Subjective / Objective:     Review of Systems    patient is somewhat confused after having received morphine for pain  Denies any chest pain or dyspnea    Vitals: Blood pressure 129/50, pulse 68, temperature 97 5 °F (36 4 °C), resp  rate 16, height 5' 9" (1 753 m), weight 79 kg (174 lb 2 6 oz), SpO2 99 %, not currently breastfeeding  Vitals:    21 1620   Weight: 79 kg (174 lb 2 6 oz)     Body mass index is 25 72 kg/m²  BP Readings from Last 3 Encounters:   21 129/50   21 162/74   21 140/64     Orthostatic Blood Pressures      Most Recent Value   Blood Pressure  129/50 filed at 2021 0726   Patient Position - Orthostatic VS  Lying filed at 2021 0336        I/O        07 -  0700  07 -  0700  07 -  0700    P  O  60 60     I V  (mL/kg) 2000 (25 3) 1071 3 (13 6)     Blood  316 7     IV Piggyback 250      Total Intake(mL/kg) 2310 (29 2) 1447 9 (18 3)     Urine (mL/kg/hr) 550 (0 3) 800 (0 4)     Emesis/NG output 0      Stool 0      Blood 100      Total Output 650 800     Net +1660 +647 9            Unmeasured Stool Occurrence 0 x          Invasive Devices     Peripheral Intravenous Line Peripheral IV 03/24/21 Right Antecubital 2 days    Peripheral IV 03/25/21 Left Antecubital 1 day    Peripheral IV 03/25/21 Left Arm 1 day          Drain            External Urinary Catheter less than 1 day                  Intake/Output Summary (Last 24 hours) at 3/27/2021 0955  Last data filed at 3/27/2021 0501  Gross per 24 hour   Intake 1447 92 ml   Output 800 ml   Net 647 92 ml         Physical Exam:   Physical Exam    Neurologic:  Alert & oriented x 3,  no focal deficits noted   Constitutional:  Well developed, well nourished,  With no acute distress  Eyes:  PERRL, conjunctiva normal   HENT:  Atraumatic, external ears normal, nose normal,   NECK: Normal range of motion, no tenderness, neck is supple , No JVP  Respiratory:  Bilateral air entry, mostly clear to auscultation  Cardiovascular: Regular S1-S2 with a I/VI ejection systolic murmur  No pericardial rub or gallop audible  GI:  Soft, nondistended, audible bowel sounds, nontender, no hepatosplenomegaly appreciated  Musculoskeletal:  No tenderness, no deformities  Extremities:  No appreciable pitting edema   Distal pulses are present  Psychiatric:  Speech and behavior appropriate             Medications/ Allergies:     Current Facility-Administered Medications   Medication Dose Route Frequency Provider Last Rate    acetaminophen  650 mg Oral Q6H PRN Sheldon Klein PA-C      fenofibrate  168 mg Oral QPM Sheldon Klein PA-C      heparin (porcine)  5,000 Units Subcutaneous [facility] Sheldon Klein PA-C      insulin lispro  1-5 Units Subcutaneous TID AC Sheldon Klein PA-C      insulin lispro  1-5 Units Subcutaneous HS Sheldon Klein PA-C      lacosamide  100 mg Oral Q12H [address] Sheldon Klein PA-C      levothyroxine  50 mcg Oral Early Morning Sheldon Klein PA-C      metoprolol succinate  25 mg Oral Daily Sheldon Klein PA-C      morphine injection  2 mg Intravenous Q4H PRN Sheldon Klein PA-C      multivitamin-minerals  1 tablet Oral Daily With Breakfast Julian Patiño PA-C      ondansetron  4 mg Intravenous Q6H PRN Julian Patiño PA-C      oxyCODONE  2 5 mg Oral Q4H PRN Adelia Revankar, DO      oxyCODONE  5 mg Oral Q4H PRN Adelia Revankar, DO      sodium chloride  1 g Oral BID With Meals Zina Lopez MD       acetaminophen, 650 mg, Q6H PRN  morphine injection, 2 mg, Q4H PRN  ondansetron, 4 mg, Q6H PRN  oxyCODONE, 2 5 mg, Q4H PRN  oxyCODONE, 5 mg, Q4H PRN      Allergies   Allergen Reactions    Amoxicillin GI Intolerance    Lactose GI Intolerance    Sulfa Antibiotics GI Intolerance    Lidocaine Rash     Lidocaine patch       VTE Pharmacologic Prophylaxis:    subcu heparin    Labs:   Troponins:  Results from last 7 days   Lab Units 03/27/21  0541 03/27/21  0106 03/26/21  2144   TROPONIN I ng/mL <0 02 0 02 <0 02       CBC with diff:  Results from last 7 days   Lab Units 03/27/21  0541 03/26/21  0740 03/25/21  0606 03/24/21  1132   WBC Thousand/uL 7 23 9 84 10 49* 10 57*   HEMOGLOBIN g/dL 8 2* 7 5* 10 6* 12 1   HEMATOCRIT % 26 4* 23 9* 33 9* 38 0   MCV fL 88 90 88 88   PLATELETS Thousands/uL 214 213 248 284   MCH pg 27 3 28 3 27 5 27 9   MCHC g/dL 31 1* 31 4 31 3* 31 8   RDW % 14 9 14 4 14 4 14 1   MPV fL 10 0 10 4 10 4 10 1   NRBC AUTO /100 WBCs 0 0 0 0       CMP:  Results from last 7 days   Lab Units 03/27/21  0541 03/26/21  0502 03/25/21  1430 03/25/21  0605 03/24/21  1813 03/24/21  1132   SODIUM mmol/L 130* 128* 131* 128* 127* 126*   POTASSIUM mmol/L 5 1 5 3 4 2 5 2 5 0 4 6   CHLORIDE mmol/L 100 99* 102 96* 95* 93*   CO2 mmol/L 22 19* 20* 20* 22 24   ANION GAP mmol/L 8 10 9 12 10 9   BUN mg/dL 45* 39* 31* 38* 34* 30*   CREATININE mg/dL 1 10 1 03 0 94 1 38* 1 12 1 14   CALCIUM mg/dL 8 4 8 1* 7 0* 8 5 8 9 8 8   AST U/L  --   --  14  --   --  14   ALT U/L  --   --  21  --   --  27   ALK PHOS U/L  --   --  58  --   --  88   TOTAL PROTEIN g/dL  --   --  5 2*  --   --  7 0   ALBUMIN g/dL  --   --  2 6*  --   --  3 4*   TOTAL BILIRUBIN mg/dL  -- --  0 30  --   --  0 50   EGFR ml/min/1 73sq m 47 51 57 36 46 45       Magnesium:  Results from last 7 days   Lab Units 03/26/21  2144 03/25/21  0605   MAGNESIUM mg/dL 2 2 2 0     Coags:  Results from last 7 days   Lab Units 03/24/21  1132   PTT seconds 27   INR  0 97     TSH:    No components found for: TSH3  Lipid Profile:    Hgb A1c:    NT-proBNP: No results for input(s): NTBNP in the last 72 hours  Imaging & Testing   I have personally reviewed pertinent reports  Xr Chest 1 View Portable    Result Date: 3/24/2021  Narrative: CHEST INDICATION:   pre op  COMPARISON:  2/2/2021 EXAM PERFORMED/VIEWS:  XR CHEST PORTABLE Single view FINDINGS: Cardiomediastinal silhouette appears unremarkable  The lungs are clear  No pneumothorax or pleural effusion  Osseous structures appear within normal limits for patient age  Impression: No acute cardiopulmonary disease  Findings are stable Workstation performed: [id number]     Xr Hip/pelv 2-3 Vws Right If Performed    Result Date: 3/26/2021  Narrative: C-ARM - right hip INDICATION: ORIF right hip in OR  Procedure guidance  COMPARISON:  Right hip radiographs 3/24/2021 TECHNIQUE: FLUOROSCOPY TIME:   157 9 seconds 6 FLUOROSCOPIC IMAGES FINDINGS: Fluoroscopic guidance provided for procedure guidance  ORIF of proximal right femoral subtrochanteric fracture  Osseous and soft tissue detail limited by technique  Impression: Fluoroscopic guidance provided for procedure guidance  Please refer to the separate procedure notes for additional details  Workstation performed: [id number]     Xr Hip/pelv 2-3 Vws Right If Performed    Result Date: 3/24/2021  Narrative: RIGHT HIP INDICATION:   pain  COMPARISON:  None VIEWS:  XR HIP/PELV 2-3 VWS RIGHT W PELVIS IF PERFORMED Images: 3 FINDINGS: Acute comminuted intertrochanteric/subtrochanteric fracture right hip with mild displacement Mild degenerative arthritis both hips No lytic or blastic osseous lesion    Scattered vascular calcifications Left central pelvic calcification, likely degenerating fibroid Mild multilevel degenerative spondylosis     Impression: Acute comminuted  fracture right hip Workstation performed: [id number]        EKG / Monitor: Personally reviewed  sinus rhythm with intermittent  RBBB, rate related    Cardiac testing:   Results for orders placed during the hospital encounter of 21   Echo complete with contrast if indicated    Narrative [address]  [address] [facility] [phone]    Echocardiogram  2D, M-mode, Doppler, and Color Doppler    Study date:  2021    Patient: Ramón Rock  MR number: [id number]  Account number: [de-identified]  : 1940  Age: 79 years  Gender: Female  Status: Inpatient  Location: Bedside  Height: 69 in  Weight: 172 lb  BP: 184/ 79 mmHg    Indications: TIA    Diagnoses: G45 9 - Transient cerebral ischemic attack, unspecified    Sonographer:  FRED Gloria  Primary Physician: Kiana Abernathy MD  Referring Physician:  Edda Chou PA-C  Group:  [facility]  Interpreting Physician:  Edgardo Toro MD    SUMMARY    LEFT VENTRICLE:  Systolic function was normal  Ejection fraction was estimated in the range of 55 % to 60 % to be 60 %  There were no regional wall motion abnormalities  Wall thickness was mildly increased  There was mild concentric hypertrophy  Doppler parameters were consistent with abnormal left ventricular relaxation (grade 1 diastolic dysfunction)  LEFT ATRIUM:  The atrium was mildly to moderately dilated  ATRIAL SEPTUM:  No atrial septal defect or large patent PFO seen by color contrast by bubble study    MITRAL VALVE:  There was mild to moderate annular calcification  There was mild to moderate regurgitation  TRICUSPID VALVE:  There was trace regurgitation  The tricuspid jet envelope definition was inadequate for estimation of RV systolic pressure      HISTORY: PRIOR HISTORY: High Triglycerides,Seizure,Edema,Former smoker,MR,GERD,HTN,HLD,DM,MONSTER    PROCEDURE: The procedure was performed at the bedside  This was a routine study  The study included complete 2D imaging, M-mode, complete spectral Doppler, and color Doppler  The heart rate was 71 bpm, at the start of the study  Intravenous contrast (agitated saline) was administered  Echocardiographic views were limited due to poor patient compliance and lung interference  Image quality was adequate  LEFT VENTRICLE: Size was normal  Systolic function was normal  Ejection fraction was estimated in the range of 55 % to 60 % to be 60 %  There were no regional wall motion abnormalities  Wall thickness was mildly increased  There was mild  concentric hypertrophy  DOPPLER: Doppler parameters were consistent with abnormal left ventricular relaxation (grade 1 diastolic dysfunction)  RIGHT VENTRICLE: The size was normal  Systolic function was normal     LEFT ATRIUM: The atrium was mildly to moderately dilated  ATRIAL SEPTUM: No atrial septal defect or large patent PFO seen by color contrast by bubble study    RIGHT ATRIUM: Size was normal     MITRAL VALVE: There was mild to moderate annular calcification  There was normal leaflet separation  DOPPLER: The transmitral velocity was within the normal range  There was no evidence for stenosis  There was mild to moderate  regurgitation  AORTIC VALVE: The valve was trileaflet  Leaflets exhibited mildly to moderately increased thickness, mild to moderate calcification, and normal cuspal separation  Calcification thickening mostly of non coronary cusp  DOPPLER: Transaortic  velocity was within the normal range  There was no evidence for stenosis  There was no regurgitation  TRICUSPID VALVE: DOPPLER: There was trace regurgitation  The tricuspid jet envelope definition was inadequate for estimation of RV systolic pressure      PULMONIC VALVE: DOPPLER: There was trace regurgitation  PERICARDIUM: There was no thickening or calcification  There was no pericardial effusion  AORTA: The root exhibited normal size  SYSTEMIC VEINS: IVC: The inferior vena cava was not well visualized  SYSTEM MEASUREMENT TABLES    2D  Ao Diam: 3 61 cm  LA Area: 27 55 cm2  LA Diam: 3 9 cm  LVOT Diam: 2 1 cm  RA Area: 15 84 cm2  RVIDd: 3 28 cm    PW  MV E/A Ratio: 0 89    [facility] Accredited [facility]    Prepared and electronically signed by    Surinder Borges MD  Signed 03-Feb-2021 16:53:06       No results found for this or any previous visit  No results found for this or any previous visit  No results found for this or any previous visit  Dr Yayo Ochoa MD, [facility]      "This note has been constructed using a voice recognition system  Therefore there may be syntax, spelling, and/or grammatical errors   Please call if you have any questions  "

## 2021-03-27 NOTE — PLAN OF CARE
Problem: Potential for Falls  Goal: Patient will remain free of falls  Description: INTERVENTIONS:  - Assess patient frequently for physical needs  -  Identify cognitive and physical deficits and behaviors that affect risk of falls    -  Villanueva fall precautions as indicated by assessment   - Educate patient/family on patient safety including physical limitations  - Instruct patient to call for assistance with activity based on assessment  - Modify environment to reduce risk of injury  - Consider OT/PT consult to assist with strengthening/mobility  Outcome: Progressing     Problem: Prexisting or High Potential for Compromised Skin Integrity  Goal: Skin integrity is maintained or improved  Description: INTERVENTIONS:  - Identify patients at risk for skin breakdown  - Assess and monitor skin integrity  - Assess and monitor nutrition and hydration status  - Monitor labs   - Assess for incontinence   - Turn and reposition patient  - Assist with mobility/ambulation  - Relieve pressure over bony prominences  - Avoid friction and shearing  - Provide appropriate hygiene as needed including keeping skin clean and dry  - Evaluate need for skin moisturizer/barrier cream  - Collaborate with interdisciplinary team   - Patient/family teaching  - Consider wound care consult   Outcome: Progressing     Problem: PAIN - ADULT  Goal: Verbalizes/displays adequate comfort level or baseline comfort level  Description: Interventions:  - Encourage patient to monitor pain and request assistance  - Assess pain using appropriate pain scale  - Administer analgesics based on type and severity of pain and evaluate response  - Implement non-pharmacological measures as appropriate and evaluate response  - Consider cultural and social influences on pain and pain management  - Notify physician/advanced practitioner if interventions unsuccessful or patient reports new pain  Outcome: Progressing

## 2021-03-27 NOTE — PHYSICAL THERAPY NOTE
PT TREATMENT     03/27/21 1015   Note Type   Note Type Treatment   Pain Assessment   Pain Assessment Tool 0-10   Pain Score Worst Possible Pain   Pain Location/Orientation   (R thigh, RN aware and not able to give more pain meds yet)   Restrictions/Precautions   RLE Weight Bearing Per Order WBAT   Other Precautions Chair Alarm; Bed Alarm;Pain; Fall Risk   General   Chart Reviewed Yes   Cognition   Overall Cognitive Status WFL   Following Commands Follows one step commands with increased time or repetition   Bed Mobility   Supine to Sit 2  Maximal assistance   Additional items LE management; Increased time required   Transfers   Sit to Stand 2  Maximal assistance   Additional items Assist x 2; Increased time required;Verbal cues   Stand to Sit 2  Maximal assistance   Additional items Assist x 2;Verbal cues; Increased time required   Stand pivot 2  Maximal assistance   Additional items Assist x 2; Increased time required;Verbal cues  (with walker)   Toilet transfer 2  Maximal assistance   Additional items Assist x 2;Commode; Increased time required;Verbal cues  (with walker)   Ambulation/Elevation   Gait pattern   (shuffling)   Gait Assistance 2  Maximal assist   Additional items Assist x 2   Assistive Device Rolling walker   Distance 2 feet with chair follow   Balance   Static Sitting Fair   Dynamic Sitting Fair -   Static Standing Fair -   Dynamic Standing Poor   Ambulatory Poor   Activity Tolerance   Activity Tolerance Patient limited by fatigue;Patient limited by pain   Exercises   Neuro re-ed x 10 each R LE:  ankle pumps, quad set, AAROM hip IR/ER, AAROM hip abd/add, AAROM LAQ   Assessment   Prognosis Good   Problem List Decreased strength;Decreased endurance; Impaired balance;Decreased mobility;Pain   Assessment Pt demonstrates improving tolerance to functional activity and abiltiy to ambulate 2 days x/p R femur ORIF  Pt requries lots of encouragement and support as pt is generally negative about her situation    Pt was able to take a few steps to the commode and then to the chair today, an improvement from last session  The patient's AM-PAC Basic Mobility Inpatient Short Form Low Function Raw Score 16 , Standardized Score is 25 72  A standardized score less 42 9 suggests the patient may benefit from discharge to post-acute rehab services  Plan   Treatment/Interventions ADL retraining;Functional transfer training;LE strengthening/ROM; Therapeutic exercise; Endurance training;Gait training;Bed mobility; Equipment eval/education;Patient/family training   Progress Progressing toward goals   PT Frequency Once a day   Recommendation   PT Discharge Recommendation [id number] Basic Mobility Inpatient   Turning in Bed Without Bedrails 2   Lying on Back to Sitting on Edge of Flat Bed 2   Moving Bed to Chair 1   Standing Up From Chair 1   Walk in Room 1   Climb 3-5 Stairs 1   Basic Mobility Inpatient Raw Score 8   Turning Head Towards Sound 4   Follow Simple Instructions 4   Low Function Basic Mobility Raw Score 16   Low Function Basic Mobility Standardized Score 35 73   Licensure   [place] License Number  Daylin Slim PT [id number]

## 2021-03-27 NOTE — PROGRESS NOTES
Progress Note - Orthopedics   Media Almaguer 79 y o  female MRN: [id number]  Unit/Bed#: 2 [address] Encounter: [id number]        Subjective: POD #2 s/p ORIF right femur fracture with long IM nail  Patient notes ongoing pain about the right hip with attempts at movements  Rest and pain mediation improve this  She notes good sensation of the right lower extremity  Denies CP, SOB, or dizziness  Transfused PRBC yesterday with hgb 8 2 today  Vitals: Blood pressure 129/50, pulse 68, temperature 97 5 °F (36 4 °C), resp  rate 16, height 5' 9" (1 753 m), weight 79 kg (174 lb 2 6 oz), SpO2 99 %, not currently breastfeeding  ,Body mass index is 25 72 kg/m²  Intake/Output Summary (Last 24 hours) at 3/27/2021 0951  Last data filed at 3/27/2021 0501  Gross per 24 hour   Intake 1447 92 ml   Output 800 ml   Net 647 92 ml       Invasive Devices     Peripheral Intravenous Line            Peripheral IV 03/24/21 Right Antecubital 2 days    Peripheral IV 03/25/21 Left Antecubital 1 day    Peripheral IV 03/25/21 Left Arm 1 day          Drain            External Urinary Catheter less than 1 day                Ortho Exam: Right lower extremity: Dressing CDI  Mild swelling hip  Compartments soft  No calf tenderness  Moves toes/ankle freely    Sensation intact lateral femoral cutaneous, saphenous, sural, deep/superficial peroneal nerve distributions  Lab, Imaging and other studies: I have personally reviewed pertinent lab results    CBC:   Lab Results   Component Value Date    WBC 7 23 03/27/2021    HGB 8 2 (L) 03/27/2021    HCT 26 4 (L) 03/27/2021    MCV 88 03/27/2021     03/27/2021    MCH 27 3 03/27/2021    MCHC 31 1 (L) 03/27/2021    RDW 14 9 03/27/2021    MPV 10 0 03/27/2021    NRBC 0 03/27/2021     CMP:   Lab Results   Component Value Date     03/27/2021    CO2 22 03/27/2021    BUN 45 (H) 03/27/2021    CREATININE 1 10 03/27/2021    CALCIUM 8 4 03/27/2021    AST 14 03/25/2021    ALT 21 03/25/2021    ALKPHOS 58 03/25/2021    EGFR 47 03/27/2021     PT/INR:   Lab Results   Component Value Date    INR 0 97 03/24/2021       Assessment:  Status post ORIF right femur fx  Plan:  PT/OT, WBAT, Heparin for DVT prophylaxis  Leave dressing in place  Will continue to monitor hgb  Likely will need short term rehab on discharge  Weight bearing: WBAT with assistance

## 2021-03-27 NOTE — NURSING NOTE
Telemetry unit reporting VTACH at 70's and high PVCs  Patient resting comfortably; no reports of chest pain or shortness of breath  ECG performed and showing LBBB  Hospitalist aware; at bedside for assessment  Blood transfusion in progress  Troponin and magnesium check ordered

## 2021-03-27 NOTE — PROGRESS NOTES
[facility] Internal Medicine Progress Note  Patient: Rosa Castro 79 y o  female   MRN: [id number]  PCP: Casi Méndez MD  Unit/Bed#: [address] [place] [address] Encounter: [id number]  Date Of Visit: 03/27/21    Problem List:    Principal Problem:    Closed right hip fracture ([facility] )  Active Problems:    Hyponatremia    Anemia    UTI (urinary tract infection)    Benign essential hypertension    Diabetes mellitus ([place] [address] )    Mixed hyperlipidemia    Other specified hypothyroidism    Seizure [facility])      Assessment & Plan:    * Closed right hip fracture [facility])  Assessment & Plan  Patient presented with a fall at home and fell on her right side  X-ray showed comminuted right hip fracture  Status post IM nail insertion on 3/25  Patient became hypotensive during surgery requiring pressors  Patient seen by Cardiology and consider low risk for perioperative complications  Heparin for DVT prophylaxis   Start oxycodone p r n  Along with IV morphine for breakthrough pain and Tylenol p r n    Anemia  Assessment & Plan  Hemoglobin 7 5 on 03/26, partly dilutional in nature and postoperative anemia  1 unit PRBC transfusion on 03/26 after which hemoglobin has improved  Repeat lab work later again in a m  And transfuse as needed    Hyponatremia  Assessment & Plan  Sodium 126 in the ER  Received 500 cc normal saline bolus  lab work 130 from today  Appreciate Nephrology input   Likely due to hydrochlorothiazide use along with postoperative SIADH  Received 0 45 normal saline with 75 mEq of sodium bicarb x1 L on 03/26  Continue fluid restriction  Started on salt tabs  Repeat again in a m   urine sodium 75, osm 578, serum osm 284    UTI (urinary tract infection)  Assessment & Plan  Patient and son reports that she was recently diagnosed with UTI and was started on some antibiotic by Dr Asif Ferraro  Patient states that she has taken about 3 days of it  Patient febrile on the floors    Fever resolved  Discontinue IV Rocephin she has completed 3 day course of it here  Chest x-ray negative for pneumonia  UA negative here  Seizure [facility])  Assessment & Plan  Continue Vimpat  Other specified hypothyroidism  Assessment & Plan  Continue levothyroxine  Mixed hyperlipidemia  Assessment & Plan  Continue Tricor  Diabetes mellitus [facility])  Assessment & Plan  Lab Results   Component Value Date    HGBA1C 7 2 (H) 2021       Recent Labs     21  1141 21  1628 21  2101 21  0705   POCGLU 198* 176* 223* 127     Holding metformin  Patient on Accu-Cheks with sliding scale insulin    Benign essential hypertension  Assessment & Plan  Continue Toprol-XL  Norvasc and losartan was discontinued yesterday due to hypotension  Continue to hold hydrochlorothiazide as well        VTE Pharmacologic Prophylaxis:   Pharmacologic: Pharmacologic VTE Prophylaxis contraindicated due to for OR today  Mechanical VTE Prophylaxis in Place: Yes    Patient Centered Rounds: I have performed bedside rounds with nursing staff today  Discussions with Specialists or Other Care Team Provider: yes- cardio, ortho, renal    Education and Discussions with Family / Patient: Renato Álvarez    Time Spent for Care: 30 min  More than 50% of total time spent on counseling and coordination of care as described above  Current Length of Stay: 3 day(s)    Current Patient Status: Inpatient   Certification Statement: The patient will continue to require additional inpatient hospital stay due to Right hip fracture, hyponatremia    Discharge Plan:  Pending    Code Status: Level 1 - Full Code      Subjective:     Patient noted to be somewhat confused after getting morphine    Reports pain along her right leg with movements    Objective:     Vitals:   Temp (24hrs), Av 7 °F (36 5 °C), Min:97 °F (36 1 °C), Max:98 1 °F (36 7 °C)    Temp:  [97 °F (36 1 °C)-98 1 °F (36 7 °C)] 97 5 °F (36 4 °C)  HR:  [60-78] 68  Resp:  [16-18] 16  BP: (118-154)/(38-57) 129/50  SpO2:  [90 %-99 %] 99 %  Body mass index is 25 72 kg/m²  Input and Output Summary (last 24 hours): Intake/Output Summary (Last 24 hours) at 3/27/2021 0918  Last data filed at 3/27/2021 0501  Gross per 24 hour   Intake 1447 92 ml   Output 800 ml   Net 647 92 ml       Physical Exam:     Physical Exam  Constitutional:       General: She is not in acute distress  Appearance: She is not diaphoretic  HENT:      Head: Normocephalic and atraumatic  Eyes:      General: No scleral icterus  Cardiovascular:      Rate and Rhythm: Normal rate and regular rhythm  Pulmonary:      Effort: Pulmonary effort is normal  No respiratory distress  Breath sounds: Normal breath sounds  No wheezing or rales  Abdominal:      General: Bowel sounds are normal  There is no distension  Palpations: Abdomen is soft  Tenderness: There is no abdominal tenderness  Musculoskeletal:      Right lower leg: No edema  Left lower leg: No edema  Comments: Right hip dressing which is clean, dry and intact  Mild Tenderness noted along the right hip region   Neurological:      Mental Status: She is alert and oriented to person, place, and time  Additional Data:     Labs:    Results from last 7 days   Lab Units 03/27/21  0541   WBC Thousand/uL 7 23   HEMOGLOBIN g/dL 8 2*   HEMATOCRIT % 26 4*   PLATELETS Thousands/uL 214   NEUTROS PCT % 68   LYMPHS PCT % 17   MONOS PCT % 12   EOS PCT % 2     Results from last 7 days   Lab Units 03/27/21  0541  03/25/21  1430   POTASSIUM mmol/L 5 1   < > 4 2   CHLORIDE mmol/L 100   < > 102   CO2 mmol/L 22   < > 20*   BUN mg/dL 45*   < > 31*   CREATININE mg/dL 1 10   < > 0 94   CALCIUM mg/dL 8 4   < > 7 0*   ALK PHOS U/L  --   --  58   ALT U/L  --   --  21   AST U/L  --   --  14    < > = values in this interval not displayed  Results from last 7 days   Lab Units 03/24/21  1132   INR  0 97       * I Have Reviewed All Lab Data Listed Above  * Additional Pertinent Lab Tests Reviewed:  All Labs For Current Hospital Admission Reviewed      Imaging:  Imaging Reports Reviewed Today Include: CXR, right hip xray  Imaging Personally Reviewed by Myself Includes:  N/A    Recent Cultures (last 7 days):     Results from last 7 days   Lab Units 03/25/21  2208 03/24/21  1813 03/24/21  1805   BLOOD CULTURE   --  No Growth at 48 hrs  No Growth at 48 hrs  URINE CULTURE  No Growth <1000 cfu/mL  --   --        Last 24 Hours Medication List:   Current Facility-Administered Medications   Medication Dose Route Frequency Provider Last Rate    acetaminophen  650 mg Oral Q6H PRN Brook Baca PA-C      fenofibrate  168 mg Oral QPM Brook Baca PA-C      heparin (porcine)  5,000 Units Subcutaneous [facility] Brook Baca PA-C      insulin lispro  1-5 Units Subcutaneous TID AC Brook Baca PA-C      insulin lispro  1-5 Units Subcutaneous HS Brook Baca PA-C      lacosamide  100 mg Oral Q12H [facility] Brook Baca PA-C      levothyroxine  50 mcg Oral Early Morning Brook Baca PA-C      metoprolol succinate  25 mg Oral Daily Brook Baca PA-C      morphine injection  2 mg Intravenous Q4H PRN Brook Baca PA-C      multivitamin-minerals  1 tablet Oral Daily With Breakfast Brook Baca PA-C      ondansetron  4 mg Intravenous Q6H PRN Brook Baca PA-C      oxyCODONE  2 5 mg Oral Q4H PRN Adelia Revankar, DO      oxyCODONE  5 mg Oral Q4H PRN Adelia Revankar, DO      sodium chloride  1 g Oral BID With Meals Deanna Morgan MD            Today, Patient Was Seen By: Ny Mondragon DO    ** Please Note: "This note has been constructed using a voice recognition system  Therefore there may be syntax, spelling, and/or grammatical errors   Please call if you have any questions  "**

## 2021-03-27 NOTE — OCCUPATIONAL THERAPY NOTE
OT TREATMENT       03/27/21 1033   OT Last Visit   OT Visit Date 03/27/21   Note Type   Note Type Treatment   Restrictions/Precautions   Weight Bearing Precautions Per Order Yes   RLE Weight Bearing Per Order WBAT   Pain Assessment   Pain Assessment Tool 0-10   Pain Score Worst Possible Pain   Pain Location/Orientation Orientation: Right;Location: Hip;Location: Leg   ADL   Where Assessed Chair   Eating Assistance 5  Supervision/Setup   Grooming Assistance 5  Supervision/Setup   Grooming Deficit Teeth care;Wash/dry face; Wash/dry hands; Increased time to complete;Setup   LB Dressing Assistance 1  Total Assistance   LB Dressing Deficit Don/doff R sock; Don/doff L sock; Don/doff R shoe;Don/doff L shoe  (slippers)   Toileting Assistance  2  Maximal Assistance   Toileting Deficit Setup;Steadying;Verbal cueing;Supervison/safety; Increased time to complete; Bedside commode;Clothing management up;Clothing management down;Perineal hygiene   Functional Standing Tolerance   Time 2 minutes   Activity static stance for toilet hygiene assist   Comments assist of 2, rolling walker, WBAT R LE   Bed Mobility   Rolling L 3  Moderate assistance   Supine to Sit 2  Maximal assistance   Transfers   Sit to Stand 2  Maximal assistance   Additional items Assist x 2   Stand to Sit 2  Maximal assistance   Additional items Assist x 2   Toilet transfer 2  Maximal assistance   Additional items Assist x 2;Commode   Toilet Transfers   Toilet Transfer From [place] Type To and from   Toilet Transfer to Standard bedside commode   Toilet Transfer Technique Stand pivot   Toilet Transfers Maximal assistance  (assist of 2)   Toilet Transfers Comments pt not confident in ability to transfer; support offered   Cognition   Arousal/Participation Alert; Cooperative   Attention Attends with cues to redirect   Orientation Level Oriented to person;Oriented to place;Oriented to situation   Following Commands Follows one step commands without difficulty   Activity Tolerance   Activity Tolerance Patient limited by pain; Patient limited by fatigue   Assessment   Assessment Pt seen at bedside for skilled treatment  Pt requires assist of 2 for bed mobililty, transfers, and functional mobility  Pt limited by pain and discouraged by her status; support given  Pt requires set up for grooming and total assist for LE dressing  Pt also MAX assist with toileting  Pt will continue to benefit from skilled tx, will follow, recommend D/C to Post Acute Rehabilitation Services   Plan   Treatment Interventions Activityengagement; Energy conservation; Compensatory technique education;Equipment evaluation/education;Patient/family training; Endurance training;UE strengthening/ROM; Functional transfer training;ADL retraining   Goal Expiration Date 04/09/21   OT Frequency 5x/wk   Recommendation   OT Discharge Recommendation Post-Acute Rehabilitation Services   Equipment Recommended Bedside commode; Hip Kit ($);Reacher ($);Sock aid ($);Raised toilet seat ($);Shower transfer bench ($$)   AM-PAC Daily Activity Inpatient   Lower Body Dressing 1   Bathing 2   Toileting 2   Upper Body Dressing 3   Grooming 3   Eating 3   Daily Activity Raw Score 14   Daily Activity Standardized Score (Calc for Raw Score >=11) 33 39   AM-PAC Applied Cognition Inpatient   Following a Speech/Presentation 3   Understanding Ordinary Conversation 4   Taking Medications 4   Remembering Where Things Are Placed or Put Away 3   Remembering List of 4-5 Errands 4   Taking Care of Complicated Tasks 4   Applied Cognition Raw Score 22   Applied Cognition Standardized Score 76 63   Licensure   [place] License Number  Jack Corral 429 # [id number]       The patient's raw score on the AM-PAC Daily Activity inpatient short form is 14, standardized score is 33 39, less than 39 4  Patients at this level are likely to benefit from DC to post-acute rehabilitation services   Please refer to the recommendation of the Occupational Therapist for safe DC planning

## 2021-03-28 PROBLEM — N39.0 UTI (URINARY TRACT INFECTION): Status: RESOLVED | Noted: 2019-08-16 | Resolved: 2021-01-01

## 2021-03-28 NOTE — PROGRESS NOTES
Progress Note - Cardiology   [facility]     Trinidad Walsh 79 y o  female MRN: [id number]  : 1940  Unit/Bed#: 2 South  Encounter: [id number]    Assessment and Recommendations/Plan:   ASSESSMENT:   Status post surgery for right hip fracture following a mechanical fall   Postop troponins are normal and she is hemodynamically stable without any cardiac symptoms   History of hypertension, hyperlipidemia, and diabetes mellitus   Intermittent, rate-related right bundle branch block   Postop anemia   Hemoglobin is 8 0 from prior 12      RECOMMENDATIONS:  Continue cardiac medications including amlodipine, Tricor,   Management of anemia as per primary service     Patient is stable from cardiac standpoint          Subjective / Objective:     Review of Systems    patient is awake and alert and offers no new or acute symptoms  Patient offers no cardiac symptoms of chest pain, dyspnea, palpitations or syncope  Vitals: Blood pressure 149/60, pulse 73, temperature 98 1 °F (36 7 °C), temperature source Oral, resp  rate 20, height 5' 9" (1 753 m), weight 79 kg (174 lb 2 6 oz), SpO2 95 %, not currently breastfeeding  Vitals:    21 1620   Weight: 79 kg (174 lb 2 6 oz)     Body mass index is 25 72 kg/m²  BP Readings from Last 3 Encounters:   21 149/60   21 162/74   21 140/64     Orthostatic Blood Pressures      Most Recent Value   Blood Pressure  149/60 filed at 2021 0737   Patient Position - Orthostatic VS  Lying filed at 2021 0737        I/O        07 -  0700  07 -  0700  07 -  0700    P  O  60      I V  (mL/kg) 1071 3 (13 6)      Blood 316 7      IV Piggyback       Total Intake(mL/kg) 1447 9 (18 3)      Urine (mL/kg/hr) 800 (0 4) 1160 (0 6)     Emesis/NG output       Stool       Blood       Total Output 800 1160     Net +647 9 -1160                Invasive Devices     Peripheral Intravenous Line            Peripheral IV 03/24/21 Right Antecubital 3 days    Peripheral IV 03/25/21 Left Antecubital 2 days    Peripheral IV 03/25/21 Left Arm 2 days          Drain            External Urinary Catheter 1 day                  Intake/Output Summary (Last 24 hours) at 3/28/2021 0944  Last data filed at 3/28/2021 0501  Gross per 24 hour   Intake --   Output 1160 ml   Net -1160 ml         Physical Exam:   Physical Exam    Neurologic:  Alert & oriented x 3,  no focal deficits noted   Constitutional:  Well developed, well nourished,  With no acute distress  Eyes:  PERRL, conjunctiva normal   HENT:  Atraumatic, external ears normal, nose normal,   NECK: Normal range of motion, no tenderness, neck is supple , No JVP  Respiratory:  Bilateral air entry, mostly clear to auscultation  Cardiovascular: Regular S1-S2 with a I/VI ejection systolic murmur  No pericardial rub or gallop audible  GI:  Soft, nondistended, audible bowel sounds, nontender, no hepatosplenomegaly appreciated  Musculoskeletal:   Right hip and lower extremity tenderness  Extremities:  No appreciable pitting edema   Distal pulses are present  Psychiatric:  Speech and behavior appropriate             Medications/ Allergies:     Current Facility-Administered Medications   Medication Dose Route Frequency Provider Last Rate    acetaminophen  650 mg Oral Q6H PRN Pancho Zapata PA-C      docusate sodium  100 mg Oral BID Adelia Revankar, DO      fenofibrate  168 mg Oral QPM Pancho Zapata PA-C      heparin (porcine)  5,000 Units Subcutaneous [facility] Pancho Zapata PA-C      insulin lispro  1-5 Units Subcutaneous TID AC Pancho Zapata PA-C      insulin lispro  1-5 Units Subcutaneous HS Pancho Zapata PA-C      lacosamide  100 mg Oral Q12H [facility] Pancho Zapata PA-C      levothyroxine  50 mcg Oral Early Morning Pancho Zapata PA-C      metoprolol succinate  25 mg Oral Daily Pancho Zapata PA-C      morphine injection  2 mg Intravenous Q4H PRN Pancho Zapata PA-C      multivitamin-minerals  1 tablet Oral Daily With Breakfast Jorge Landa PA-C      ondansetron  4 mg Intravenous Q6H PRN Jorge Landa PA-C      oxyCODONE  2 5 mg Oral Q4H PRN Adelia Revankar, DO      oxyCODONE  5 mg Oral Q4H PRN Adelia Revankar, DO      polyethylene glycol  17 g Oral Daily Adelia Revankar, DO      sodium chloride  1 g Oral TID With Meals Mishel Jolley MD       acetaminophen, 650 mg, Q6H PRN  morphine injection, 2 mg, Q4H PRN  ondansetron, 4 mg, Q6H PRN  oxyCODONE, 2 5 mg, Q4H PRN  oxyCODONE, 5 mg, Q4H PRN      Allergies   Allergen Reactions    Amoxicillin GI Intolerance    Lactose GI Intolerance    Sulfa Antibiotics GI Intolerance    Lidocaine Rash     Lidocaine patch       VTE Pharmacologic Prophylaxis:   Heparin    Labs:   Troponins:  Results from last 7 days   Lab Units 03/27/21  0541 03/27/21  0106 03/26/21  2144   TROPONIN I ng/mL <0 02 0 02 <0 02       CBC with diff:  Results from last 7 days   Lab Units 03/28/21  0517 03/27/21  0541 03/26/21  0740 03/25/21  0606 03/24/21  1132   WBC Thousand/uL  --  7 23 9 84 10 49* 10 57*   HEMOGLOBIN g/dL 8 0* 8 2* 7 5* 10 6* 12 1   HEMATOCRIT % 25 8* 26 4* 23 9* 33 9* 38 0   MCV fL  --  88 90 88 88   PLATELETS Thousands/uL  --  214 213 248 284   MCH pg  --  27 3 28 3 27 5 27 9   MCHC g/dL  --  31 1* 31 4 31 3* 31 8   RDW %  --  14 9 14 4 14 4 14 1   MPV fL  --  10 0 10 4 10 4 10 1   NRBC AUTO /100 WBCs  --  0 0 0 0       CMP:  Results from last 7 days   Lab Units 03/28/21  0517 03/27/21  0541 03/26/21  0502 03/25/21  1430 03/25/21  0605 03/24/21  1813 03/24/21  1132   SODIUM mmol/L 130* 130* 128* 131* 128* 127* 126*   POTASSIUM mmol/L 5 0 5 1 5 3 4 2 5 2 5 0 4 6   CHLORIDE mmol/L 100 100 99* 102 96* 95* 93*   CO2 mmol/L 22 22 19* 20* 20* 22 24   ANION GAP mmol/L 8 8 10 9 12 10 9   BUN mg/dL 36* 45* 39* 31* 38* 34* 30*   CREATININE mg/dL 0 86 1 10 1 03 0 94 1 38* 1 12 1 14   CALCIUM mg/dL 8 6 8 4 8 1* 7 0* 8 5 8 9 8 8   AST U/L  --   --   --  14 --   --  14   ALT U/L  --   --   --  21  --   --  27   ALK PHOS U/L  --   --   --  58  --   --  88   TOTAL PROTEIN g/dL  --   --   --  5 2*  --   --  7 0   ALBUMIN g/dL  --   --   --  2 6*  --   --  3 4*   TOTAL BILIRUBIN mg/dL  --   --   --  0 30  --   --  0 50   EGFR ml/min/1 73sq m 64 47 51 57 36 46 45       Magnesium:  Results from last 7 days   Lab Units 03/26/21  2144 03/25/21  0605   MAGNESIUM mg/dL 2 2 2 0     Coags:  Results from last 7 days   Lab Units 03/24/21  1132   PTT seconds 27   INR  0 97     TSH:    No components found for: TSH3  Lipid Profile:    Hgb A1c:    NT-proBNP: No results for input(s): NTBNP in the last 72 hours  Imaging & Testing   I have personally reviewed pertinent reports  Xr Chest 1 View Portable    Result Date: 3/24/2021  Narrative: CHEST INDICATION:   pre op  COMPARISON:  2/2/2021 EXAM PERFORMED/VIEWS:  XR CHEST PORTABLE Single view FINDINGS: Cardiomediastinal silhouette appears unremarkable  The lungs are clear  No pneumothorax or pleural effusion  Osseous structures appear within normal limits for patient age  Impression: No acute cardiopulmonary disease  Findings are stable Workstation performed: [id number]     Xr Hip/pelv 2-3 Vws Right If Performed    Result Date: 3/26/2021  Narrative: C-ARM - right hip INDICATION: ORIF right hip in OR  Procedure guidance  COMPARISON:  Right hip radiographs 3/24/2021 TECHNIQUE: FLUOROSCOPY TIME:   157 9 seconds 6 FLUOROSCOPIC IMAGES FINDINGS: Fluoroscopic guidance provided for procedure guidance  ORIF of proximal right femoral subtrochanteric fracture  Osseous and soft tissue detail limited by technique  Impression: Fluoroscopic guidance provided for procedure guidance  Please refer to the separate procedure notes for additional details  Workstation performed: [id number]     Xr Hip/pelv 2-3 Vws Right If Performed    Result Date: 3/24/2021  Narrative: RIGHT HIP INDICATION:   pain   COMPARISON:  None VIEWS:  XR HIP/PELV 2-3 VWS RIGHT W PELVIS IF PERFORMED Images: 3 FINDINGS: Acute comminuted intertrochanteric/subtrochanteric fracture right hip with mild displacement Mild degenerative arthritis both hips No lytic or blastic osseous lesion  Scattered vascular calcifications Left central pelvic calcification, likely degenerating fibroid Mild multilevel degenerative spondylosis     Impression: Acute comminuted  fracture right hip Workstation performed: [id number]            Cardiac testing:   Results for orders placed during the hospital encounter of 21   Echo complete with contrast if indicated    Narrative Jerome   [id number] [facility], [place] [address]  [phone]    Echocardiogram  2D, M-mode, Doppler, and Color Doppler    Study date:  2021    Patient: Caitlin Christensen  MR number: [id number]  Account number: [de-identified]  : 1940  Age: 79 years  Gender: Female  Status: Inpatient  Location: Bedside  Height: 69 in  Weight: 172 lb  BP: 184/ 79 mmHg    Indications: TIA    Diagnoses: G45 9 - Transient cerebral ischemic attack, unspecified    Sonographer:  FRED Willams  Primary Physician: Aydin Chew MD  Referring Physician:  Jeffrey Heard PA-C  Group:  [facility] [address] [facility]  Interpreting Physician:  Vivian Field MD    SUMMARY    LEFT VENTRICLE:  Systolic function was normal  Ejection fraction was estimated in the range of 55 % to 60 % to be 60 %  There were no regional wall motion abnormalities  Wall thickness was mildly increased  There was mild concentric hypertrophy  Doppler parameters were consistent with abnormal left ventricular relaxation (grade 1 diastolic dysfunction)  LEFT ATRIUM:  The atrium was mildly to moderately dilated  ATRIAL SEPTUM:  No atrial septal defect or large patent PFO seen by color contrast by bubble study    MITRAL VALVE:  There was mild to moderate annular calcification  There was mild to moderate regurgitation      TRICUSPID VALVE:  There was trace regurgitation  The tricuspid jet envelope definition was inadequate for estimation of RV systolic pressure  HISTORY: PRIOR HISTORY: High Triglycerides,Seizure,Edema,Former smoker,MR,GERD,HTN,HLD,DM,MONSTER    PROCEDURE: The procedure was performed at the bedside  This was a routine study  The study included complete 2D imaging, M-mode, complete spectral Doppler, and color Doppler  The heart rate was 71 bpm, at the start of the study  Intravenous contrast (agitated saline) was administered  Echocardiographic views were limited due to poor patient compliance and lung interference  Image quality was adequate  LEFT VENTRICLE: Size was normal  Systolic function was normal  Ejection fraction was estimated in the range of 55 % to 60 % to be 60 %  There were no regional wall motion abnormalities  Wall thickness was mildly increased  There was mild  concentric hypertrophy  DOPPLER: Doppler parameters were consistent with abnormal left ventricular relaxation (grade 1 diastolic dysfunction)  RIGHT VENTRICLE: The size was normal  Systolic function was normal     LEFT ATRIUM: The atrium was mildly to moderately dilated  ATRIAL SEPTUM: No atrial septal defect or large patent PFO seen by color contrast by bubble study    RIGHT ATRIUM: Size was normal     MITRAL VALVE: There was mild to moderate annular calcification  There was normal leaflet separation  DOPPLER: The transmitral velocity was within the normal range  There was no evidence for stenosis  There was mild to moderate  regurgitation  AORTIC VALVE: The valve was trileaflet  Leaflets exhibited mildly to moderately increased thickness, mild to moderate calcification, and normal cuspal separation  Calcification thickening mostly of non coronary cusp  DOPPLER: Transaortic  velocity was within the normal range  There was no evidence for stenosis  There was no regurgitation  TRICUSPID VALVE: DOPPLER: There was trace regurgitation   The tricuspid jet envelope definition was inadequate for estimation of RV systolic pressure  PULMONIC VALVE: DOPPLER: There was trace regurgitation  PERICARDIUM: There was no thickening or calcification  There was no pericardial effusion  AORTA: The root exhibited normal size  SYSTEMIC VEINS: IVC: The inferior vena cava was not well visualized  SYSTEM MEASUREMENT TABLES    2D  Ao Diam: 3 61 cm  LA Area: 27 55 cm2  LA Diam: 3 9 cm  LVOT Diam: 2 1 cm  RA Area: 15 84 cm2  RVIDd: 3 28 cm    PW  MV E/A Ratio: 0 89    [employer] Accredited Echocardiography Laboratory    Prepared and electronically signed by    Miguel Aiken MD  Signed 03-Feb-2021 16:53:06             Dr Logan Ruth MD, [facility]      "This note has been constructed using a voice recognition system  Therefore there may be syntax, spelling, and/or grammatical errors   Please call if you have any questions  "

## 2021-03-28 NOTE — PHYSICAL THERAPY NOTE
PT TREATMENT     03/28/21 1115   Note Type   Note Type Treatment   Pain Assessment   Pain Assessment Tool 0-10   Pain Score 7   Pain Location/Orientation   (R hip/pt received pain meds prior to PT)   Restrictions/Precautions   RLE Weight Bearing Per Order WBAT   General   Chart Reviewed Yes   Cognition   Overall Cognitive Status WFL   Subjective   Subjective 'I'm never going to get better"   Bed Mobility   Supine to Sit 2  Maximal assistance   Transfers   Sit to Stand 2  Maximal assistance   Additional items Assist x 2;Verbal cues; Increased time required   Stand to Sit 2  Maximal assistance   Additional items Assist x 2;Verbal cues; Increased time required   Stand pivot 2  Maximal assistance   Additional items Assist x 2;Verbal cues; Increased time required  (walker)   Ambulation/Elevation   Gait Assistance 1  Dependent   Balance   Static Sitting Fair   Dynamic Sitting Fair -   Static Standing Poor   Dynamic Standing Poor   Activity Tolerance   Activity Tolerance Patient limited by fatigue   Exercises   Neuro re-ed x 10 each R LE:  ankle pumps, quad set, AAROM hip IR/ER, AAROM heel slides, AAROM hip abd/add, AAROM LAQ  (Ice to R hip after PT)   Assessment   Prognosis Good   Problem List Decreased strength;Decreased endurance; Impaired balance;Decreased mobility; Decreased range of motion;Pain   Assessment Pt continues to requires assist of 2 for transfers and has marked difficulty unweighting LEs in order to advance them to transfer and walk  Pt is also very negative and needs frequent reminders that function s/p hip fx improves slowly and generally requires STR  Support and encouragement given  The patient's AM-PAC Basic Mobility Inpatient Short Form Low Function Raw Score 16 , Standardized Score is 25 72  A standardized score less 42 9 suggests the patient may benefit from discharge to post-acute rehab services       Plan   Treatment/Interventions ADL retraining;Functional transfer training;LE strengthening/ROM; Elevations; Therapeutic exercise; Endurance training;Gait training;Bed mobility; Equipment eval/education;Patient/family training   Progress Progressing toward goals   PT Frequency Once a day   Recommendation   PT Discharge Recommendation [facility] Basic Mobility Inpatient   Turning in Bed Without Bedrails 2   Lying on Back to Sitting on Edge of Flat Bed 2   Moving Bed to Chair 1   Standing Up From Chair 1   Walk in Room 1   Climb 3-5 Stairs 1   Basic Mobility Inpatient Raw Score 8   Turning Head Towards Sound 4   Follow Simple Instructions 4   Low Function Basic Mobility Raw Score 16   Low Function Basic Mobility Standardized Score 28 46   Licensure   [place] License Number  Dejan Gerard PT [id number]

## 2021-03-28 NOTE — PROGRESS NOTES
[address] [facility] NOTE   Danial Puente 79 y o  female MRN: [id number]  Unit/Bed#: [address] Encounter: [id number]  Reason for Consult: Hyponatremia    ASSESSMENT and PLAN:  1  Hyponatremia:  · Admission sodium of 127 on 3/24/21 in the setting of a fall  · Sodium range prior to this admission was between 132 to 136 since 2018 while on Olmesartan HCT at home  · Etiology suspected to be HCTZ use complicated by postoperative SIADH  · Work up: SOsm 284 (while Na was 131), UOsm 578, Jack 75, cortisol 3 7, TSH 3 33    · Continue fluid restriction 1500 cc  · Sodium same today at 130 with salt tabs 1 gm BID  · Increase salt tabs to 1 gm TID  3  Hypertension:   · BP controlled  4  Low cortisol:   · Recheck cortisol at 9 am today  · If still low, then may need alex stim  5  Anemia:   · Hgb stable  · Received PRBC this admission  6  R hip fracture s/p IM nailing 3/25/21    7  Fall    DISPOSITION:  · Increase salt tabs to 1 gm TID  · Check Cortisol at 9 am    · Recheck TSH  · Continue fluid restriction 1500 cc per 24 hours  SUBJECTIVE / 24H INTERVAL HISTORY:  Eating okay  No CP or SOB  OBJECTIVE:  Current Weight: Weight - Scale: 79 kg (174 lb 2 6 oz)  Vitals:    03/27/21 1952 03/27/21 2047 03/27/21 2348 03/28/21 0351   BP: 147/52 143/52 135/64 136/59   BP Location:  Right arm Right arm Left arm   Pulse: 71 72 71 74   Resp: 20 20 19 20   Temp:  (!) 97 °F (36 1 °C) 97 6 °F (36 4 °C) 97 8 °F (36 6 °C)   TempSrc:  Oral Oral Oral   SpO2: 94% 92% 91% 93%   Weight:       Height:           Intake/Output Summary (Last 24 hours) at 3/28/2021 2303  Last data filed at 3/28/2021 0501  Gross per 24 hour   Intake --   Output 1160 ml   Net -1160 ml     General: conscious, cooperative, no distress  Skin: dry  Eyes: pink conjunctivae  ENT: moist mucous membranes  Chest/Lungs: equal chest expansion, clear breath sounds    CVS: distinct heart sounds, normal rate, regular rhythm, no rub  Abdomen: soft, non tender, non distended, normal bowel sounds  Extremities: no edema  : no davis catheter  Neuro: awake, alert     Psych: appropriate affect    Medications:    Current Facility-Administered Medications:     acetaminophen (TYLENOL) tablet 650 mg, 650 mg, Oral, Q6H PRN, Gricel Jesse, PA-C, 650 mg at 03/26/21 0545    fenofibrate (TRICOR) tablet 168 mg, 168 mg, Oral, QPM, Gricel Cast, PA-C, 168 mg at 03/27/21 1705    heparin (porcine) subcutaneous injection 5,000 Units, 5,000 Units, Subcutaneous, Q8H [address], 5,000 Units at 03/28/21 0521 **AND** [CANCELED] Platelet count, , , Once, Adelia Tubbs, DO    insulin lispro (HumaLOG) 100 units/mL subcutaneous injection 1-5 Units, 1-5 Units, Subcutaneous, TID AC, 1 Units at 03/27/21 1706 **AND** Fingerstick Glucose (POCT), , , TID AC, Gricel Cast, PA-C    insulin lispro (HumaLOG) 100 units/mL subcutaneous injection 1-5 Units, 1-5 Units, Subcutaneous, HS, Gricel Cast, PA-C, 1 Units at 03/26/21 2300    lacosamide (VIMPAT) tablet 100 mg, 100 mg, Oral, Q12H [address], Gricel Cast, PA-C, 100 mg at 03/27/21 2220    levothyroxine tablet 50 mcg, 50 mcg, Oral, Early Morning, Gricel Cast, PA-C, 50 mcg at 03/28/21 5547    metoprolol succinate (TOPROL-XL) 24 hr tablet 25 mg, 25 mg, Oral, Daily, Gricel Cast, PA-C, 25 mg at 03/27/21 5141    morphine injection 2 mg, 2 mg, Intravenous, Q4H PRN, Gricel Cast, PA-C, 2 mg at 03/27/21 1305    multivitamin-minerals (CENTRUM) tablet 1 tablet, 1 tablet, Oral, Daily With Breakfast, Gricel Molina, PA-C, 1 tablet at 03/27/21 0814    ondansetron (ZOFRAN) injection 4 mg, 4 mg, Intravenous, Q6H PRN, Gricel Molina PA-C    oxyCODONE (ROXICODONE) IR tablet 2 5 mg, 2 5 mg, Oral, Q4H PRN, Adelia Tubbs DO    oxyCODONE (ROXICODONE) IR tablet 5 mg, 5 mg, Oral, Q4H PRN, Adelia Tubbs DO, 5 mg at 03/28/21 6910    sodium chloride tablet 1 g, 1 g, Oral, BID With Meals, Margarito Oneill MD, 1 g at 03/27/21 1705    Laboratory Results:  Results from last 7 days   Lab Units 03/28/21  0517 03/27/21  0541 03/26/21  2144 03/26/21  0740 03/26/21  0502 03/25/21  1430 03/25/21  0606 03/25/21  0605 03/24/21  1813 03/24/21  1132   WBC Thousand/uL  --  7 23  --  9 84  --   --  10 49*  --   --  10 57*   HEMOGLOBIN g/dL 8 0* 8 2*  --  7 5*  --   --  10 6*  --   --  12 1   HEMATOCRIT % 25 8* 26 4*  --  23 9*  --   --  33 9*  --   --  38 0   PLATELETS Thousands/uL  --  214  --  213  --   --  248  --   --  284   POTASSIUM mmol/L 5 0 5 1  --   --  5 3 4 2  --  5 2 5 0 4 6   CHLORIDE mmol/L 100 100  --   --  99* 102  --  96* 95* 93*   CO2 mmol/L 22 22  --   --  19* 20*  --  20* 22 24   BUN mg/dL 36* 45*  --   --  39* 31*  --  38* 34* 30*   CREATININE mg/dL 0 86 1 10  --   --  1 03 0 94  --  1 38* 1 12 1 14   CALCIUM mg/dL 8 6 8 4  --   --  8 1* 7 0*  --  8 5 8 9 8 8   MAGNESIUM mg/dL  --   --  2 2  --   --   --   --  2 0  --   --

## 2021-03-28 NOTE — PROGRESS NOTES
[facility] Internal [facility] Progress Note  Patient: Leah Bhatt 79 y o  female   MRN: [id number]  PCP: Frida Gusman MD  Unit/Bed#: [address] [place] [address] Encounter: [id number]  Date Of Visit: 03/28/21    Problem List:    Principal Problem:    Closed right hip fracture ([facility] )  Active Problems:    Hyponatremia    Anemia    Benign essential hypertension    Diabetes mellitus ([facility] )    Mixed hyperlipidemia    Other specified hypothyroidism    Seizure [facility])      Assessment & Plan:    * Closed right hip fracture [facility])  Assessment & Plan  Patient presented with a fall at home and fell on her right side  X-ray showed comminuted right hip fracture  Status post IM nail insertion on 3/25  Patient became hypotensive during surgery requiring pressors  Patient seen by Cardiology and considered low risk for perioperative complications  Heparin for DVT prophylaxis   Continue oxycodone p r n  Along with IV morphine for breakthrough pain and Tylenol p r n    Anemia  Assessment & Plan  Hemoglobin 7 5 on 03/26, partly dilutional in nature and postoperative anemia  1 unit PRBC transfusion on 03/26 after which hemoglobin has improved   Repeat lab work later again in a   And transfuse as needed    Hyponatremia  Assessment & Plan  Sodium 126 in the ER  Received 500 cc normal saline bolus  lab work 130 from today  Appreciate Nephrology input   Likely due to hydrochlorothiazide use along with postoperative SIADH  Received 0 45 normal saline with 75 mEq of sodium bicarb x1 L on 03/26  Continue fluid restriction  Started on salt tabs, increased to 1 g t i d  With meals  Cortisol low at 3 7, repeat later today and if continues to remain low, will need stim test  Repeat again in a m   urine sodium 75, osm 578, serum osm 284    UTI (urinary tract infection)-resolved as of 3/28/2021  Assessment & Plan  Patient and son reported that she was recently diagnosed with UTI and was started on some antibiotic by Dr Demario Latif    Patient stated that she has taken about 3 days of it  Patient febrile on the floors  Fever resolved  Discontinued IV Rocephin she has completed 3 day course of it here  Chest x-ray negative for pneumonia  UA negative here  Seizure [facility])  Assessment & Plan  Continue Vimpat    Other specified hypothyroidism  Assessment & Plan  Continue levothyroxine    Mixed hyperlipidemia  Assessment & Plan  Continue Tricor    Diabetes mellitus [facility])  Assessment & Plan  Lab Results   Component Value Date    HGBA1C 7 2 (H) 2021       Recent Labs     21  1141 21  1628 21  2101 21  0705   POCGLU 198* 176* 223* 127     Holding metformin  Patient on Accu-Cheks with sliding scale insulin    Benign essential hypertension  Assessment & Plan  Continue Toprol-XL  Norvasc and losartan was discontinued yesterday due to hypotension  Continue to hold hydrochlorothiazide as well        VTE Pharmacologic Prophylaxis:   Pharmacologic: Heparin  Mechanical VTE Prophylaxis in Place: Yes    Patient Centered Rounds: I have performed bedside rounds with nursing staff today  Discussions with Specialists or Other Care Team Provider: yes- renal    Education and Discussions with Family / Patient: yes - Chris Mac    Time Spent for Care: 30 min  More than 50% of total time spent on counseling and coordination of care as described above      Current Length of Stay: 4 day(s)    Current Patient Status: Inpatient   Certification Statement: The patient will continue to require additional inpatient hospital stay due to hyponatremia, Right hip fracture    Discharge Plan: STR    Code Status: Level 1 - Full Code      Subjective:     Patient states pain is better with pain medication    Objective:     Vitals:   Temp (24hrs), Av 6 °F (36 4 °C), Min:97 °F (36 1 °C), Max:98 1 °F (36 7 °C)    Temp:  [97 °F (36 1 °C)-98 1 °F (36 7 °C)] 98 1 °F (36 7 °C)  HR:  [71-74] 74  Resp:  [16-20] 20  BP: (135-149)/(52-64) 149/60  SpO2:  [91 %-94 %] 93 %  Body mass index is 25 72 kg/m²  Input and Output Summary (last 24 hours): Intake/Output Summary (Last 24 hours) at 3/28/2021 0853  Last data filed at 3/28/2021 0501  Gross per 24 hour   Intake --   Output 1160 ml   Net -1160 ml       Physical Exam:     Physical Exam  Constitutional:       General: She is not in acute distress  Appearance: She is not diaphoretic  HENT:      Head: Normocephalic and atraumatic  Eyes:      General: No scleral icterus  Cardiovascular:      Rate and Rhythm: Normal rate and regular rhythm  Pulmonary:      Effort: Pulmonary effort is normal  No respiratory distress  Breath sounds: Normal breath sounds  No wheezing or rales  Abdominal:      General: Bowel sounds are normal  There is no distension  Palpations: Abdomen is soft  Tenderness: There is no abdominal tenderness  Musculoskeletal:      Comments: Right hip dressing C/D/I  Mild tenderness around right hip region   Neurological:      Mental Status: She is alert and oriented to person, place, and time  Additional Data:     Labs:    Results from last 7 days   Lab Units 03/28/21  0517 03/27/21  0541   WBC Thousand/uL  --  7 23   HEMOGLOBIN g/dL 8 0* 8 2*   HEMATOCRIT % 25 8* 26 4*   PLATELETS Thousands/uL  --  214   NEUTROS PCT %  --  68   LYMPHS PCT %  --  17   MONOS PCT %  --  12   EOS PCT %  --  2     Results from last 7 days   Lab Units 03/28/21  0517  03/25/21  1430   POTASSIUM mmol/L 5 0   < > 4 2   CHLORIDE mmol/L 100   < > 102   CO2 mmol/L 22   < > 20*   BUN mg/dL 36*   < > 31*   CREATININE mg/dL 0 86   < > 0 94   CALCIUM mg/dL 8 6   < > 7 0*   ALK PHOS U/L  --   --  58   ALT U/L  --   --  21   AST U/L  --   --  14    < > = values in this interval not displayed  Results from last 7 days   Lab Units 03/24/21  1132   INR  0 97       * I Have Reviewed All Lab Data Listed Above  * Additional Pertinent Lab Tests Reviewed:  [place] [facility] Admission Reviewed      Imaging:  Imaging Reports Reviewed Today Include: CXR, right hip xray  Imaging Personally Reviewed by Myself Includes:  N/A    Recent Cultures (last 7 days):     Results from last 7 days   Lab Units 03/25/21  2208 03/24/21  1813 03/24/21  1805   BLOOD CULTURE   --  No Growth at 72 hrs  No Growth at 72 hrs  URINE CULTURE  No Growth <1000 cfu/mL  --   --        Last 24 Hours Medication List:   Current Facility-Administered Medications   Medication Dose Route Frequency Provider Last Rate    acetaminophen  650 mg Oral Q6H PRN Marrie Covert, PA-C      docusate sodium  100 mg Oral BID Adelia Revankar, DO      fenofibrate  168 mg Oral QPM Marrie Covert, PA-C      heparin (porcine)  5,000 Units Subcutaneous [facility] Marrie Covert, PA-C      insulin lispro  1-5 Units Subcutaneous TID AC Marrie Covert, PA-C      insulin lispro  1-5 Units Subcutaneous HS Marrie Covert, PA-C      lacosamide  100 mg Oral Q12H [facility] Marrie Covert, PA-C      levothyroxine  50 mcg Oral Early Morning Marrie Covert, PA-C      metoprolol succinate  25 mg Oral Daily Marrie Covert, PA-C      morphine injection  2 mg Intravenous Q4H PRN Marrie Covert, PA-C      multivitamin-minerals  1 tablet Oral Daily With Breakfast Marrie Covert, PA-C      ondansetron  4 mg Intravenous Q6H PRN Marrie Covert, PA-C      oxyCODONE  2 5 mg Oral Q4H PRN Adelia Revankar, DO      oxyCODONE  5 mg Oral Q4H PRN Adelia Revankar, DO      polyethylene glycol  17 g Oral Daily Adelia Revankar, DO      sodium chloride  1 g Oral TID With Meals Emmanuelle Rm MD            Today, Patient Was Seen By: Wendy Bell DO    ** Please Note: "This note has been constructed using a voice recognition system  Therefore there may be syntax, spelling, and/or grammatical errors   Please call if you have any questions  "**

## 2021-03-28 NOTE — PLAN OF CARE
Problem: Potential for Falls  Goal: Patient will remain free of falls  Description: INTERVENTIONS:  - Assess patient frequently for physical needs  -  Identify cognitive and physical deficits and behaviors that affect risk of falls    -  [place] fall precautions as indicated by assessment   - Educate patient/family on patient safety including physical limitations  - Instruct patient to call for assistance with activity based on assessment  - Modify environment to reduce risk of injury  - Consider OT/PT consult to assist with strengthening/mobility  Outcome: Progressing     Problem: Prexisting or High Potential for Compromised Skin Integrity  Goal: Skin integrity is maintained or improved  Description: INTERVENTIONS:  - Identify patients at risk for skin breakdown  - Assess and monitor skin integrity  - Assess and monitor nutrition and hydration status  - Monitor labs   - Assess for incontinence   - Turn and reposition patient  - Assist with mobility/ambulation  - Relieve pressure over bony prominences  - Avoid friction and shearing  - Provide appropriate hygiene as needed including keeping skin clean and dry  - Evaluate need for skin moisturizer/barrier cream  - Collaborate with interdisciplinary team   - Patient/family teaching  - Consider wound care consult   Outcome: Progressing     Problem: PAIN - ADULT  Goal: Verbalizes/displays adequate comfort level or baseline comfort level  Description: Interventions:  - Encourage patient to monitor pain and request assistance  - Assess pain using appropriate pain scale  - Administer analgesics based on type and severity of pain and evaluate response  - Implement non-pharmacological measures as appropriate and evaluate response  - Consider cultural and social influences on pain and pain management  - Notify physician/advanced practitioner if interventions unsuccessful or patient reports new pain  Outcome: Progressing

## 2021-03-29 NOTE — PLAN OF CARE
Problem: Potential for Falls  Goal: Patient will remain free of falls  Description: INTERVENTIONS:  - Assess patient frequently for physical needs  -  Identify cognitive and physical deficits and behaviors that affect risk of falls    -  [place] fall precautions as indicated by assessment   - Educate patient/family on patient safety including physical limitations  - Instruct patient to call for assistance with activity based on assessment  - Modify environment to reduce risk of injury  - Consider OT/PT consult to assist with strengthening/mobility  Outcome: Progressing     Problem: Prexisting or High Potential for Compromised Skin Integrity  Goal: Skin integrity is maintained or improved  Description: INTERVENTIONS:  - Identify patients at risk for skin breakdown  - Assess and monitor skin integrity  - Assess and monitor nutrition and hydration status  - Monitor labs   - Assess for incontinence   - Turn and reposition patient  - Assist with mobility/ambulation  - Relieve pressure over bony prominences  - Avoid friction and shearing  - Provide appropriate hygiene as needed including keeping skin clean and dry  - Evaluate need for skin moisturizer/barrier cream  - Collaborate with interdisciplinary team   - Patient/family teaching  - Consider wound care consult   Outcome: Progressing     Problem: PAIN - ADULT  Goal: Verbalizes/displays adequate comfort level or baseline comfort level  Description: Interventions:  - Encourage patient to monitor pain and request assistance  - Assess pain using appropriate pain scale  - Administer analgesics based on type and severity of pain and evaluate response  - Implement non-pharmacological measures as appropriate and evaluate response  - Consider cultural and social influences on pain and pain management  - Notify physician/advanced practitioner if interventions unsuccessful or patient reports new pain  Outcome: Progressing

## 2021-03-29 NOTE — OCCUPATIONAL THERAPY NOTE
OT TREATMENT       03/29/21 5745   Note Type   Note Type Treatment   Restrictions/Precautions   RLE Weight Bearing Per Order WBAT   Other Precautions Chair Alarm; Bed Alarm; Fall Risk;Pain   Pain Assessment   Pain Assessment Tool 0-10   Pain Score Worst Possible Pain   Pain Location/Orientation Orientation: Right;Location: Leg   Hospital Pain Intervention(s) Repositioned; Ambulation/increased activity; Emotional support   ADL   Where Assessed Chair   Eating Assistance 7  Independent   Grooming Assistance 5  Supervision/Setup   Grooming Deficit Teeth care;Brushing hair;Wash/dry hands; Wash/dry face;Setup; Increased time to complete   Grooming Comments sitting in recliner chair; pt with increased pain when RLE is not elevated   UB Bathing Assistance 4  Minimal Assistance   UB Bathing Deficit Setup;Verbal cueing; Increased time to complete   LB Bathing Assistance 2  Maximal Assistance   LB Bathing Deficit Setup;Verbal cueing; Increased time to complete   Transfers   Sit to Stand 2  Maximal assistance   Additional items Assist x 1;Verbal cues   Stand to Sit 2  Maximal assistance   Additional items Assist x 1;Verbal cues   Cognition   Overall Cognitive Status [facility]   Arousal/Participation Alert; Cooperative   Attention Attends with cues to redirect   Orientation Level Oriented X4   Memory Decreased short term memory   Following Commands Follows multistep commands with increased time or repetition   Comments anxious due to pain   Activity Tolerance   Activity Tolerance Patient limited by pain; Patient limited by fatigue   Medical Staff Made Aware Jonathon Richardson RN   Assessment   Assessment Pt seen for ADL training  Education begun with introducing pt to adaptive equipment options (long-handled reacher, LH shoe horn, LH bath sponge, sock aide, shower chair) to maximize independence with ADLs , but pt declined training with AE due to c/o dizziness and fatigue after pain medication   Pt  able to to verbalize understanding and perform energy conservation/proper body mechanics during seated grooming and bathing ADLS in recliner at least 50% of the time with minimal cueing to decrease signs of fatigue and increase stamina to return to prior level of function  See above chart for specific functional status  Pt reporting 10/10 pain at right hip/leg with movement  Very anxious, but willing to participate in most therapeutic activities with max encouragement and increased time  Patient left OOB in chair with all needs within reach, SCD pumps and tab alarm in place  The patient's raw score on the AM-PAC Daily Activity inpatient short form is 15, standardized score is 34 69, less than 39 4  Patients at this level are likely to benefit from discharge to post-acute rehabilitation services  Please refer to the recommendation of the Occupational Therapist for safe discharge planning  Cont OT per POC  Plan   Treatment Interventions ADL retraining;Functional transfer training;UE strengthening/ROM; Endurance training;Patient/family training;Equipment evaluation/education; Activityengagement; Compensatory technique education   OT Frequency 5x/wk   Recommendation   OT Discharge Recommendation Post-Acute Rehabilitation Services   AM-PAC Daily Activity Inpatient   Lower Body Dressing 2   Bathing 2   Toileting 2   Upper Body Dressing 3   Grooming 3   Eating 3   Daily Activity Raw Score 15   Daily Activity Standardized Score (Calc for Raw Score >=11) 34 69   AM-PAC Applied Cognition Inpatient   Following a Speech/Presentation 4   Understanding Ordinary Conversation 4   Taking Medications 4   Remembering Where Things Are Placed or Put Away 4   Remembering List of 4-5 Errands 4   Taking Care of Complicated Tasks 4   Applied Cognition Raw Score 24   Applied Cognition Standardized Score 42 82   Licensure   [place] License Number  Nafisa Catalan [address], OTR/L, [place] Lic# [id number]

## 2021-03-29 NOTE — NURSING NOTE
Pt discharged from [address] [place] [address]  IV removed prior to discharge  Pt left with all their belongings  Pt left via stretcher accompanied by transport team  Discharge instructions gone over with receiving facility  No prescriptions written  All questions answered

## 2021-03-29 NOTE — PLAN OF CARE
Problem: Potential for Falls  Goal: Patient will remain free of falls  Description: INTERVENTIONS:  - Assess patient frequently for physical needs  -  Identify cognitive and physical deficits and behaviors that affect risk of falls    -  [place] fall precautions as indicated by assessment   - Educate patient/family on patient safety including physical limitations  - Instruct patient to call for assistance with activity based on assessment  - Modify environment to reduce risk of injury  - Consider OT/PT consult to assist with strengthening/mobility  3/29/2021 1738 by Curtis Bird RN  Outcome: Adequate for Discharge  3/29/2021 1001 by Curtis Bird RN  Outcome: Progressing     Problem: Prexisting or High Potential for Compromised Skin Integrity  Goal: Skin integrity is maintained or improved  Description: INTERVENTIONS:  - Identify patients at risk for skin breakdown  - Assess and monitor skin integrity  - Assess and monitor nutrition and hydration status  - Monitor labs   - Assess for incontinence   - Turn and reposition patient  - Assist with mobility/ambulation  - Relieve pressure over bony prominences  - Avoid friction and shearing  - Provide appropriate hygiene as needed including keeping skin clean and dry  - Evaluate need for skin moisturizer/barrier cream  - Collaborate with interdisciplinary team   - Patient/family teaching  - Consider wound care consult   3/29/2021 1738 by Curtis Bird RN  Outcome: Adequate for Discharge  3/29/2021 1001 by Curtis Bird RN  Outcome: Progressing     Problem: PAIN - ADULT  Goal: Verbalizes/displays adequate comfort level or baseline comfort level  Description: Interventions:  - Encourage patient to monitor pain and request assistance  - Assess pain using appropriate pain scale  - Administer analgesics based on type and severity of pain and evaluate response  - Implement non-pharmacological measures as appropriate and evaluate response  - Consider cultural and social influences on pain and pain management  - Notify physician/advanced practitioner if interventions unsuccessful or patient reports new pain  3/29/2021 1738 by Jason Ruggiero RN  Outcome: Adequate for Discharge  3/29/2021 1001 by Jason Ruggiero RN  Outcome: Progressing

## 2021-03-29 NOTE — DISCHARGE INSTRUCTIONS
Right hip:  Leave Dressing on for 7 days post op, then may remove and get wound wet  No soaking in water until 3 weeks post op  WBAT right lower extremity    DVT prophylaxis 6 weeks post op  FU in office in 1 week  Follow-up with your cardiologist after discharge        Olmesartan (By mouth)   Olmesartan Medoxomil (gv-ns-TJL-tan ph-XIT-dl-mil)  Treats high blood pressure  This medicine is an angiotensin receptor blocker (ARB)  Brand Name(s): Benicar   There may be other brand names for this medicine  When This Medicine Should Not Be Used: This medicine is not right for everyone  Do not use it if you had an allergic reaction to olmesartan, or if you are pregnant  How to Use This Medicine:   Tablet  · Take your medicine as directed  Your dose may need to be changed several times to find what works best for you  · Oral liquid: Shake it well before each use  Measure the oral liquid medicine with a marked measuring spoon, oral syringe, or medicine cup  · Missed dose: Take a dose as soon as you remember  If it is almost time for your next dose, wait until then and take a regular dose  Do not take extra medicine to make up for a missed dose  · Store the medicine in a closed container at room temperature, away from heat, moisture, and direct light  Store the oral liquid in the refrigerator  Throw away any unused medicine after 4 weeks  Drugs and Foods to Avoid:   Ask your doctor or pharmacist before using any other medicine, including over-the-counter medicines, vitamins, and herbal products  · Do not use this medicine together with aliskiren if you have diabetes or kidney problems  · Some medicines can affect how olmesartan works  Tell your doctor if you are using any of the following:  ? Lithium  ? ACE inhibitor blood pressure medicine  ? Diuretic (water pill)  ?  NSAID pain or arthritis medicine (including aspirin, celecoxib, diclofenac, ibuprofen, naproxen)  · If you also use colesevelam, take it at least 4 hours after you take olmesartan  · Ask your doctor before you use medicines, supplements, or salt substitutes that contain potassium  Warnings While Using This Medicine:   · It is not safe to take this medicine during pregnancy  It could harm an unborn baby  Tell your doctor right away if you become pregnant  · Tell your doctor if you are breastfeeding, or if you have kidney disease, liver disease, heart failure, or low potassium or sodium in the blood  · This medicine may cause severe chronic diarrhea with weight loss  This could occur months to years after you start taking this medicine  · This medicine could lower your blood pressure too much, especially when you first use it or if you are dehydrated  Stand or sit up slowly if you feel lightheaded or dizzy  · Drink plenty of fluids if you exercise, sweat more than usual, or have diarrhea or vomiting while you are using this medicine  · Your doctor will do lab tests at regular visits to check on the effects of this medicine  Keep all appointments  · Keep all medicine out of the reach of children  Never share your medicine with anyone  Possible Side Effects While Using This Medicine:   Call your doctor right away if you notice any of these side effects:  · Allergic reaction: Itching or hives, swelling in your face or hands, swelling or tingling in your mouth or throat, chest tightness, trouble breathing  · Change in how much or how often you urinate, bloody or cloudy urine  · Lightheadedness, dizziness, or fainting  · Rapid weight gain, swelling in your hands, ankles, or feet  · Severe diarrhea with weight loss  If you notice other side effects that you think are caused by this medicine, tell your doctor  Call your doctor for medical advice about side effects   You may report side effects to FDA at 9-298-FDA-1753  © Copyright [address] [place] [address] Drive Information is for End User's use only and may not be sold, redistributed or otherwise used for commercial purposes  The above information is an  only  It is not intended as medical advice for individual conditions or treatments  Talk to your doctor, nurse or pharmacist before following any medical regimen to see if it is safe and effective for you  Oxycodone, Rapid Release (By mouth)   Oxycodone Hydrochloride (gl-f-UEC-done cesar-droe-KLOR-ricki)  Treats moderate to severe pain  This medicine is a narcotic pain reliever  Brand Name(s): Oxaydo, Oxy IR, Roxicodone   There may be other brand names for this medicine  When This Medicine Should Not Be Used: This medicine is not right for everyone  Do not use it if you had an allergic reaction to oxycodone, codeine, hydrocodone, dihydrocodeine, or morphine, or if you have severe lung or breathing problems, or stomach or bowel blockage (including paralytic ileus)  How to Use This Medicine:   Capsule, Liquid, Tablet  · Take your medicine as directed  Your dose may need to be changed several times to find what works best for you  · An overdose can be dangerous  Follow directions carefully so you do not get too much medicine at one time  · Oral liquid: Measure the oral liquid medicine with a marked measuring spoon, oral syringe, or medicine cup  · Oxaydo® tablet: Swallow it whole with enough water to swallow it completely  Do not break, crush, chew, or dissolve it  Do not wet the tablet before you put it in your mouth  · While taking the Roxybond tablet, part of it may pass into your stool  This is normal and nothing to worry about  · This medicine should come with a Medication Guide  Ask your pharmacist for a copy if you do not have one  · Missed dose: Take a dose as soon as you remember  If it is almost time for your next dose, wait until then and take a regular dose  Do not take extra medicine to make up for a missed dose   If you miss a dose of Roxybond or Roxicodone®, skip the missed dose and go back to your regular dosing schedule  · Store the medicine in a closed container at room temperature, away from heat, moisture, and direct light  Store the medicine in a secure place to prevent others from getting it  Drop off any unused narcotic medicine at a drug take-back location right away  If you do not have a drug take-back location near you, flush any unused narcotic medicine down the toilet  Check your local drug store and clinics for take-back locations  You can also check the [employer] web site for locations  Here is the link to the FDA safe disposal of medicines website: www fda gov/drugs/resourcesforyou/consumers/buyingusingmedicinesafely/ensuringsafeuseofmedicine/safedisposalofmedicines/eio939392 htm  Drugs and Foods to Avoid:   Ask your doctor or pharmacist before using any other medicine, including over-the-counter medicines, vitamins, and herbal products  · Do not use this medicine if you are using or have used an MAO inhibitor within the past 14 days  · Some medicines can affect how oxycodone works  Tell your doctor if you are using any of the following:  ? Amiodarone, carbamazepine, cyclobenzaprine, erythromycin, ketoconazole, metaxalone, mirtazapine, phenytoin, quinidine, rifampin, ritonavir, tramadol, trazodone  ? Diuretic (water pill)  ? Medicine to treat depression, anxiety, or mental health  ? Medicine to treat migraine headaches  ? Phenothiazine medicine  · Tell your doctor if you use anything else that makes you sleepy  Some examples are allergy medicine, narcotic pain medicine, and alcohol  Tell your doctor if you are also using buprenorphine, butorphanol, nalbuphine, pentazocine, or a muscle relaxer  · Do not drink alcohol while you are using this medicine    Warnings While Using This Medicine:   · Tell your doctor if you are pregnant or breastfeeding, or if you have kidney disease, liver disease, heart disease, low blood pressure, lung disease or breathing problems (including asthma, COPD, sleep apnea), scoliosis, an enlarged prostate, trouble urinating or swallowing, thyroid problems, [place] disease, gallbladder or pancreas problems, or digestion problems  Tell your doctor if you have a history of head injury, brain tumor, mental health problems, seizures, or alcohol or drug addiction  · This medicine may cause the following problems:  ? Increased risk of overdose, which can lead to death  ? Respiratory depression (serious breathing problem that can be life-threatening)  ? Sleep-related breathing problems (including sleep apnea, sleep-related hypoxemia)  ? Serotonin syndrome, when used with certain medicines  · This medicine may make you dizzy, drowsy, or lightheaded  Do not drive or do anything else that could be dangerous until you know how this medicine affects you  Sit or lie down if you feel dizzy  Stand up carefully  · This medicine can be habit-forming  Do not use more than your prescribed dose  Call your doctor if you think your medicine is not working  · Do not stop using this medicine suddenly  Your doctor will need to slowly decrease your dose before you stop it completely  · This medicine may cause constipation, especially with long-term use  Ask your doctor if you should use a laxative to prevent and treat constipation  Drink plenty of liquids to help avoid constipation  · This medicine could cause infertility  Talk with your doctor before using this medicine if you plan to have children  · Keep all medicine out of the reach of children  Never share your medicine with anyone    Possible Side Effects While Using This Medicine:   Call your doctor right away if you notice any of these side effects:  · Allergic reaction: Itching or hives, swelling in your face or hands, swelling or tingling in your mouth or throat, chest tightness, trouble breathing  · Anxiety, restlessness, fast heartbeat, fever, sweating, muscle spasms, twitching, nausea, vomiting, diarrhea, seeing or hearing things that are not there  · [facility] [place] lips, fingernails, or skin, trouble breathing  · Extreme dizziness or weakness, shallow breathing, slow heartbeat, sweating, cold or clammy skin, seizures  · Lightheadedness, dizziness, fainting  · Severe constipation, stomach pain  If you notice these less serious side effects, talk with your doctor:   · Mild constipation  · Sleepiness, tiredness  If you notice other side effects that you think are caused by this medicine, tell your doctor  Call your doctor for medical advice about side effects  You may report side effects to FDA at 1-382-FDA-2911  © Copyright [address] Information is for End User's use only and may not be sold, redistributed or otherwise used for commercial purposes  The above information is an  only  It is not intended as medical advice for individual conditions or treatments  Talk to your doctor, nurse or pharmacist before following any medical regimen to see if it is safe and effective for you

## 2021-03-29 NOTE — PROGRESS NOTES
NEPHROLOGY PROGRESS NOTE   Preston Gonzalez 79 y o  female MRN: [id number]  Unit/Bed#: [address] [place] [address] Encounter: [id number]    ASSESSMENT & PLAN:  Hyponatremia  -likely due to use of HCTZ with olmesartan HCTZ plus postoperative SIADH  -workup showed serum osmolality 284, urine osmolality 578, urine sodium 75, a m  Cortisol 3 7 and on repeat was still low, TSH was within normal limit at 3 33  -admission sodium was 127  -was started on salt tablets, dose was increased to 1 g t i d  On 03/28 and sodium level improved to 131 mEq/liter on 03/29  -plan continue salt tablets at 1 g t i d , continue fluid restriction 1500 mL per day  Checking cosyntropin stimulation test   If cosyntropin stimulation test comes back negative, okay to discharge with salt tablets 1 g t i d  And fluid restriction 1500 mL per day with repeat BMP in 3 days and will arrange for outpatient follow-up  Renal function is stable at creatinine 0 8  Hold HCTZ on discharge    Primary hypertension:  Blood pressure slightly elevated, continue current medication, continue to monitor blood pressure while on salt tablets  Currently on metoprolol  Okay to restart olmesartan on discharge but would hold off on HCTZ    Low cortisol level:  Checking cosyntropin stimulation test    Anemia:  Status post PRBC this hospital admission  Hemoglobin currently stable  Hemoglobin today 8 3    Right hip fracture status post IM nailing on 03/25  Continue orthopedic care  Others:  Hypothyroidism on Synthroid    Discussed with Dr Irma Perez  SUBJECTIVE:  Complaining of constipation  No nausea vomiting    Pain has improved    OBJECTIVE:  Current Weight: Weight - Scale: 79 kg (174 lb 2 6 oz)  Vitals:    03/29/21 0753   BP: 157/65   Pulse: 72   Resp: 17   Temp: 97 9 °F (36 6 °C)   SpO2: 91%       Intake/Output Summary (Last 24 hours) at 3/29/2021 0931  Last data filed at 3/29/2021 0631  Gross per 24 hour   Intake 480 ml   Output 2400 ml   Net -1920 ml       Physical Exam  General:  Ill looking, awake  Eyes: Conjunctivae pink,  Sclera anicteric  ENT: lips and mucous membranes moist  Neck: supple   Chest: Clear to Auscultation both lungs,  no crackles, ronchus or wheezing  CVS: S1 & S2 present, normal rate, regular rhythm, no murmur    Abdomen: soft, non-tender, non-distended, Bowel sounds normoactive  Extremities: no edema of  legs  Skin: no rash  Neuro: awake, alert, oriented  Psych: Mood and affect appropriate     Medications:    Current Facility-Administered Medications:     acetaminophen (TYLENOL) tablet 650 mg, 650 mg, Oral, Q6H PRN, Shu Grant PA-C, 650 mg at 03/26/21 0545    cosyntropin (CORTROSYN) injection 0 25 mg, 0 25 mg, Intravenous, Once, Komal Ugarte MD    docusate sodium (COLACE) capsule 100 mg, 100 mg, Oral, BID, Adelia Revankar, DO, 100 mg at 03/29/21 0820    fenofibrate (TRICOR) tablet 168 mg, 168 mg, Oral, QPM, Shu Grant PA-C, 168 mg at 03/28/21 1747    heparin (porcine) subcutaneous injection 5,000 Units, 5,000 Units, Subcutaneous, Q8H [address], 5,000 Units at 03/29/21 0627 **AND** [CANCELED] Platelet count, , , Once, Adelia Revankar, DO    insulin lispro (HumaLOG) 100 units/mL subcutaneous injection 1-5 Units, 1-5 Units, Subcutaneous, TID AC, 1 Units at 03/28/21 1744 **AND** Fingerstick Glucose (POCT), , , TID AC, Shu Grant PA-C    insulin lispro (HumaLOG) 100 units/mL subcutaneous injection 1-5 Units, 1-5 Units, Subcutaneous, HS, Shu Grant PA-C, 2 Units at 03/28/21 2219    lacosamide (VIMPAT) tablet 100 mg, 100 mg, Oral, Q12H [address], Shu Grant PA-C, 100 mg at 03/29/21 0820    levothyroxine tablet 50 mcg, 50 mcg, Oral, Early Morning, Shu Grant PA-C, 50 mcg at 03/29/21 9810    metoprolol succinate (TOPROL-XL) 24 hr tablet 25 mg, 25 mg, Oral, Once, ROSE Nolasco    [START ON 3/30/2021] metoprolol succinate (TOPROL-XL) 24 hr tablet 50 mg, 50 mg, Oral, Daily, ROSE Nolasco    morphine injection 2 mg, 2 mg, Intravenous, Q4H PRN, Marrialis Mittalt, PA-C, 2 mg at 03/28/21 1946    multivitamin-minerals (CENTRUM) tablet 1 tablet, 1 tablet, Oral, Daily With Breakfast, Marrie Covert, PA-C, 1 tablet at 03/29/21 0820    ondansetron [facility]) injection 4 mg, 4 mg, Intravenous, Q6H PRN, Marrie Covert, PA-C    oxyCODONE (ROXICODONE) IR tablet 2 5 mg, 2 5 mg, Oral, Q4H PRN, Adelia Revankar, DO    oxyCODONE (ROXICODONE) IR tablet 5 mg, 5 mg, Oral, Q4H PRN, Adelia Revankar, DO, 5 mg at 03/29/21 0820    polyethylene glycol (MIRALAX) packet 17 g, 17 g, Oral, Daily, Adelia Revankar, DO, 17 g at 03/29/21 0820    sodium chloride tablet 1 g, 1 g, Oral, TID With Meals, Emmanuelle Rm MD, 1 g at 03/29/21 0820    Invasive Devices:        Lab Results:   Results from last 7 days   Lab Units 03/29/21  0451 03/28/21  0517 03/27/21  0541 03/26/21  2144 03/26/21  0740  03/25/21  1430 03/25/21  0606 03/25/21  0605  03/24/21  1132   WBC Thousand/uL  --   --  7 23  --  9 84  --   --  10 49*  --   --  10 57*   HEMOGLOBIN g/dL 8 3* 8 0* 8 2*  --  7 5*  --   --  10 6*  --   --  12 1   HEMATOCRIT % 27 2* 25 8* 26 4*  --  23 9*  --   --  33 9*  --   --  38 0   PLATELETS Thousands/uL  --   --  214  --  213  --   --  248  --   --  284   POTASSIUM mmol/L 4 8 5 0 5 1  --   --    < > 4 2  --  5 2   < > 4 6   CHLORIDE mmol/L 99* 100 100  --   --    < > 102  --  96*   < > 93*   CO2 mmol/L 23 22 22  --   --    < > 20*  --  20*   < > 24   BUN mg/dL 27* 36* 45*  --   --    < > 31*  --  38*   < > 30*   CREATININE mg/dL 0 83 0 86 1 10  --   --    < > 0 94  --  1 38*   < > 1 14   CALCIUM mg/dL 8 5 8 6 8 4  --   --    < > 7 0*  --  8 5   < > 8 8   MAGNESIUM mg/dL  --   --   --  2 2  --   --   --   --  2 0  --   --    ALK PHOS U/L  --   --   --   --   --   --  58  --   --   --  88   ALT U/L  --   --   --   --   --   --  21  --   --   --  27   AST U/L  --   --   --   --   --   --  14  --   --   --  14    < > = values in this interval not displayed         Previous work up:         Portions of the record may have been created with voice recognition software  Occasional wrong word or "sound a like" substitutions may have occurred due to the inherent limitations of voice recognition software  Read the chart carefully and recognize, using context, where substitutions have occurred  If you have any questions, please contact the dictating provider

## 2021-03-29 NOTE — CASE MANAGEMENT
LOS - 5 days    Bundle-Seizures    SW following to assist with DCP  STR placement is being recommended  SW spoke with pt's son, Davy Rodgers, to review plans and obtain rehab facility choices  Pt's son requested referrals be made to [facility] and [facility]  Son aware third choice is needed and will consider  Referrals have been made to chosen facilities  IMM reviewed with son  Son anticipating discussion with physicians about pt's progress   SW will continue to follow to monitor progress and assist with planning/transfer when ready

## 2021-03-29 NOTE — [place] UNIVERSAL TRANSFER FORM
[place] UNIVERSAL TRANSFER FORM  (ALL ITEMS MUST BE COMPLETED)    1  TRANSFER FROM: [address]      TRANSFER TO: [address]     2  DATE OF TRANSFER: 3/29/2021                        TIME OF TRANSFER: 1800    3  PATIENT NAME: ORQUIDEA Marie      YOB: 1940                             GENDER: female    4  LANGUAGE:   English    5  PHYSICIAN NAME:  Omid Delgado DO                   PHONE: [phone]    6  CODE STATUS: Level 1 - Full Code        Out of Hospital DNR Attached: No    7  :                                      :  Extended Emergency Contact Information  Primary Emergency Contact: Denys Bernabe   [facility]  Mobile Phone: [phone]  Relation: [address] Representative/Proxy:  Yes           Legal Guardian:  No             NAME OF:           HEALTH CARE REPRESENTATIVE/PROXY:                                         OR           LEGAL GUARDIAN, IF NOT :                                               PHONE:  (Day)           (Night)                        (Cell)    8  REASON FOR TRANSFER: (Must include brief medical history and recent changes in physical function or cognition ) STR            V/S: /67   Pulse 70   Temp 98 3 °F (36 8 °C)   Resp 17   Ht 5' 9" (1 753 m)   Wt 79 kg (174 lb 2 6 oz)   SpO2 94%   BMI 25 72 kg/m²           PAIN:     9  PRIMARY DIAGNOSIS: Closed right hip fracture (HCC)      Secondary Diagnosis:         Pacemaker: No      Internal Defib: No          Mental Health Diagnosis (if Applicable):    10  RESTRAINTS: No     11  RESPIRATORY NEEDS: None    12  ISOLATION/PRECAUTION: None    13  ALLERGY: Amoxicillin, Lactose, Sulfa antibiotics, and Lidocaine    14  SENSORY:       Vision Good and Glasses, Hearing Good  and Speech Clear    15  SKIN CONDITION: Yes:  Surgical    16  DIET: Regular    17  IV ACCESS: None    18   PERSONAL ITEMS SENT WITH PATIENT: Glasses, clothing, electronics    19  ATTACHED DOCUMENTS: MUST ATTACH CURRENT MEDICATION INFORMATION Face Sheet, Medication Reconciliation, Diagnostic Studies, Operative Report, Code Status, Discharge Summary, PT Note and OT Note    20  AT RISK ALERTS:Falls        HARM TO: N/A    21  WEIGHT BEARING STATUS:         Left Leg: Limited        Right Leg: Limited    22  MENTAL STATUS:Alert, Oriented and Forgetful    23  FUNCTION:        Walk: With Help        Transfer: With Help        Toilet: With Help        Feed: Self    24  IMMUNIZATIONS/SCREENING:     Immunization History   Administered Date(s) Administered    Influenza, high dose seasonal 0 7 mL 10/15/2020    Influenza, injectable, quadrivalent, preservative free 0 5 mL 10/31/2019    Pneumococcal Conjugate 13-Valent 02/06/2021       25  BOWEL: Date Last BM3/29/21    32  BLADDER: Incontinent    27   SENDING FACILITY CONTACT: [facility]          Unit: [address] [place] [address]         Phone: [phone] [address] (if known):        Title:        Unit:         Phone:         FORM PREFILLED BY (if applicable)       Title:       Unit:        Phone:         FORM COMPLETED BY Ashleigh Sparks RN      Title: [facility]    Phone: [phone]

## 2021-03-29 NOTE — CASE MANAGEMENT
SW following to assist with DCP  STR placement is planned  Pt has been accepted by [facility] and bed is available  Pt and son aware of above and both are requesting transfer to [facility] upon discharge  Notified Dr Jovon Bagley of plan and availability  SW will continue to follow to monitor progress and assist with transfer when ordered

## 2021-03-29 NOTE — PHYSICAL THERAPY NOTE
PT TREATMENT     03/29/21 1000   PT Last Visit   PT Visit Date 03/29/21   Note Type   Note Type Treatment   Pain Assessment   Pain Assessment Tool 0-10   Pain Score Worst Possible Pain   Pain Location/Orientation Orientation: Right;Location: Hip;Location: Leg   Restrictions/Precautions   RLE Weight Bearing Per Order WBAT   Other Precautions Pain; Fall Risk;Bed Alarm; Chair Alarm   General   Chart Reviewed Yes   Subjective   Subjective Patient reports 10/10 pain right lower extremity   Bed Mobility   Supine to Sit 2  Maximal assistance   Additional items Assist x 1;Verbal cues   Transfers   Sit to Stand 2  Maximal assistance   Additional items Assist x 1;Verbal cues   Stand to Sit 2  Maximal assistance   Additional items Assist x 1;Verbal cues   Ambulation/Elevation   Gait pattern Decreased R stance  (assist to advance RLE)   Gait Assistance 2  Maximal assist   Additional items Assist x 1;Verbal cues; Tactile cues   Assistive Device Rolling walker   Distance 5 feet and then commode  brought up behind patient, patient sat on the commode  Patient is max assist for hygiene after urination and BM  Patient transfers to stand from commode with max assist of 1  Patient ambulated 3 ft and chair brought up behind patient  Patient sat out of bed in chair with all needs in reach and a chair alarm  Balance   Static Sitting Fair +   Static Standing Fair -   Dynamic Standing Poor +   Ambulatory Poor +   Activity Tolerance   Activity Tolerance Patient limited by fatigue;Patient limited by pain   Assessment   Assessment Patient is making progress with transfers, and short distance ambulation needing assist of 1 person now instead of 2  Pain is patient's primary limiting factor with the a functional mobility with a roller walker  The patient's AM-[facility] Basic Mobility Inpatient Short Form Raw Score is 11, Standardized Score is 30 25   A standardized score less than 42 9 suggests the patient may benefit from discharge to post-acute rehabilitation services  Please also refer to the recommendation of the Physical Therapist for safe discharge planning  Plan   Treatment/Interventions ADL retraining;Functional transfer training;LE strengthening/ROM; Therapeutic exercise; Endurance training;Patient/family training;Equipment eval/education; Bed mobility;Gait training; Compensatory technique education   PT Frequency Once a day   Recommendation   PT Discharge Recommendation [id number] Basic Mobility Inpatient   Turning in Bed Without Bedrails 2   Lying on Back to Sitting on Edge of Flat Bed 2   Moving Bed to Chair 2   Standing Up From Chair 2   Walk in Room 2   Climb 3-5 Stairs 1   Basic Mobility Inpatient Raw Score 11   Basic Mobility Standardized Score 08 29   Licensure   [place] License Number  [place], [place] [id number]

## 2021-03-29 NOTE — PROGRESS NOTES
Progress Note - Cardiology   [facility]     Nathaniel Morelos 79 y o  female MRN: [id number]  : 1940  Unit/Bed#: [address] [place] [address] Encounter: [id number]    Assessment and Plan:   1  Mechanical fall with fractured right hip:  Patient underwent insertion of IM femoral nail yesterday with orthopedics  Patient doing fairly well postoperatively  Care per Ortho and PT/OT      2  Hypertension:  Patient hypotensive in the OR yesterday  Losartan and Norvasc held postoperatively  Will continue to monitor and adjust medications as she needs it      3  Postoperative anemia:  Hemoglobin 12 on admission, today it is 8 3    patient received 1 unit packed red blood cells on 2021      4  Dyslipidemia:  Patient currently on fenofibrate     5  Depression:  Patient recently lost her  10 months ago and still becomes weak be when she discusses it  Discussed with primary team consider addition of antidepressant  Patient appears to be stable from a cardiac standpoint  We will sign off at this time  Please not hesitate to call if needed again  Subjective / Objective:   Patient seen and examined  She states that she does not normally have high blood pressure, but she is extremely anxious and having a lot of pain from her surgery  She denies any chest pain, pressure or palpitations  Plan is for discharge to subacute rehab for therapy  Vitals: Blood pressure 157/65, pulse 72, temperature 97 9 °F (36 6 °C), temperature source Oral, resp  rate 17, height 5' 9" (1 753 m), weight 79 kg (174 lb 2 6 oz), SpO2 91 %, not currently breastfeeding  Vitals:    21 1620   Weight: 79 kg (174 lb 2 6 oz)     Body mass index is 25 72 kg/m²    BP Readings from Last 3 Encounters:   21 157/65   21 162/74   21 140/64     Orthostatic Blood Pressures      Most Recent Value   Blood Pressure  157/65 filed at 2021 1676   Patient Position - Orthostatic VS  Lying filed at 03/29/2021 0753        I/O       03/27 0701 - 03/28 0700 03/28 0701 - 03/29 0700 03/29 0701 - 03/30 0700    P  O   720     I V  (mL/kg)       Blood       Total Intake(mL/kg)  720 (9 1)     Urine (mL/kg/hr) 1160 (0 6) 2400 (1 3)     Total Output 1160 2400     Net -1160 -1680            Unmeasured Urine Occurrence  1 x         Invasive Devices     Peripheral Intravenous Line            Peripheral IV 03/24/21 Right Antecubital 5 days    Peripheral IV 03/25/21 Left Antecubital 3 days    Peripheral IV 03/25/21 Left Arm 3 days          Drain            External Urinary Catheter 2 days                  Intake/Output Summary (Last 24 hours) at 3/29/2021 1220  Last data filed at 3/29/2021 0631  Gross per 24 hour   Intake 480 ml   Output 800 ml   Net -320 ml         Physical Exam:   Physical Exam  Vitals signs and nursing note reviewed  Constitutional:       Appearance: Normal appearance  She is well-developed  She is obese  HENT:      Head: Normocephalic  Right Ear: External ear normal       Left Ear: External ear normal       Nose: Nose normal    Eyes:      General: No scleral icterus  Right eye: No discharge  Left eye: No discharge  Conjunctiva/sclera: Conjunctivae normal       Pupils: Pupils are equal, round, and reactive to light  Neck:      Musculoskeletal: Normal range of motion and neck supple  Thyroid: No thyromegaly  Cardiovascular:      Rate and Rhythm: Normal rate and regular rhythm  Pulmonary:      Effort: Pulmonary effort is normal  No respiratory distress  Breath sounds: Normal breath sounds  No wheezing, rhonchi or rales  Abdominal:      General: Bowel sounds are normal  There is no distension  Palpations: Abdomen is soft  Musculoskeletal:      Right lower leg: No edema  Left lower leg: No edema  Skin:     General: Skin is warm and dry  Capillary Refill: Capillary refill takes less than 2 seconds     Neurological:      General: No focal deficit present  Mental Status: She is alert and oriented to person, place, and time  Mental status is at baseline     Psychiatric:         Mood and Affect: Mood normal          Behavior: Behavior normal                    Medications/ Allergies:     Current Facility-Administered Medications   Medication Dose Route Frequency Provider Last Rate    acetaminophen  650 mg Oral Q6H PRN Roni Zavala PA-C      docusate sodium  100 mg Oral BID Adelia Revankar, DO      fenofibrate  168 mg Oral QPM Roni Zavala PA-C      heparin (porcine)  5,000 Units Subcutaneous [facility] Roni Zavala PA-C      insulin lispro  1-5 Units Subcutaneous TID AC Roni Zavala PA-C      insulin lispro  1-5 Units Subcutaneous HS Roni Zavala PA-C      lacosamide  100 mg Oral Q12H [address] Roni Zavala PA-C      levothyroxine  50 mcg Oral Early Morning Rnoi Zavala PA-C      [START ON 3/30/2021] metoprolol succinate  50 mg Oral Daily ROSE Curiel      morphine injection  2 mg Intravenous Q4H PRN Roni Zavala PA-C      multivitamin-minerals  1 tablet Oral Daily With Breakfast Roni Zavala PA-C      ondansetron  4 mg Intravenous Q6H PRN Roni Zavala PA-C      oxyCODONE  2 5 mg Oral Q4H PRN Adelia Revankar, DO      oxyCODONE  5 mg Oral Q4H PRN Adelia Revankar, DO      polyethylene glycol  17 g Oral Daily Adelia Revankar, DO      sodium chloride  1 g Oral TID With Meals Christie Morales MD       acetaminophen, 650 mg, Q6H PRN  morphine injection, 2 mg, Q4H PRN  ondansetron, 4 mg, Q6H PRN  oxyCODONE, 2 5 mg, Q4H PRN  oxyCODONE, 5 mg, Q4H PRN      Allergies   Allergen Reactions    Amoxicillin GI Intolerance    Lactose GI Intolerance    Sulfa Antibiotics GI Intolerance    Lidocaine Rash     Lidocaine patch       VTE Pharmacologic Prophylaxis:   Sequential compression device (Venodyne)     Labs:   Troponins:  Results from last 7 days   Lab Units 03/27/21  0541 03/27/21  0106 03/26/21  2144   TROPONIN I ng/mL <0 02 0 02 <0 02     CBC with diff:  Results from last 7 days   Lab Units 03/29/21  0451 03/28/21  0517 03/27/21  0541 03/26/21  0740 03/25/21  0606 03/24/21  1132   WBC Thousand/uL  --   --  7 23 9 84 10 49* 10 57*   HEMOGLOBIN g/dL 8 3* 8 0* 8 2* 7 5* 10 6* 12 1   HEMATOCRIT % 27 2* 25 8* 26 4* 23 9* 33 9* 38 0   MCV fL  --   --  88 90 88 88   PLATELETS Thousands/uL  --   --  214 213 248 284   MCH pg  --   --  27 3 28 3 27 5 27 9   MCHC g/dL  --   --  31 1* 31 4 31 3* 31 8   RDW %  --   --  14 9 14 4 14 4 14 1   MPV fL  --   --  10 0 10 4 10 4 10 1   NRBC AUTO /100 WBCs  --   --  0 0 0 0     CMP:  Results from last 7 days   Lab Units 03/29/21  0451 03/28/21  0517 03/27/21  0541 03/26/21  0502 03/25/21  1430 03/25/21  0605 03/24/21  1813 03/24/21  1132   SODIUM mmol/L 131* 130* 130* 128* 131* 128* 127* 126*   POTASSIUM mmol/L 4 8 5 0 5 1 5 3 4 2 5 2 5 0 4 6   CHLORIDE mmol/L 99* 100 100 99* 102 96* 95* 93*   CO2 mmol/L 23 22 22 19* 20* 20* 22 24   ANION GAP mmol/L 9 8 8 10 9 12 10 9   BUN mg/dL 27* 36* 45* 39* 31* 38* 34* 30*   CREATININE mg/dL 0 83 0 86 1 10 1 03 0 94 1 38* 1 12 1 14   CALCIUM mg/dL 8 5 8 6 8 4 8 1* 7 0* 8 5 8 9 8 8   AST U/L  --   --   --   --  14  --   --  14   ALT U/L  --   --   --   --  21  --   --  27   ALK PHOS U/L  --   --   --   --  58  --   --  88   TOTAL PROTEIN g/dL  --   --   --   --  5 2*  --   --  7 0   ALBUMIN g/dL  --   --   --   --  2 6*  --   --  3 4*   TOTAL BILIRUBIN mg/dL  --   --   --   --  0 30  --   --  0 50   EGFR ml/min/1 73sq m 66 64 47 51 57 36 46 45     Magnesium:  Results from last 7 days   Lab Units 03/26/21  2144 03/25/21  0605   MAGNESIUM mg/dL 2 2 2 0     Coags:  Results from last 7 days   Lab Units 03/24/21  1132   PTT seconds 27   INR  0 97     TSH:  Results from last 7 days   Lab Units 03/29/21  0451   TSH 3RD GENERATON uIU/mL 1 036       Imaging & Testing   I have personally reviewed pertinent reports      Xr Chest 1 View Portable    Result Date: 3/24/2021  Narrative: CHEST INDICATION:   pre op  COMPARISON:  2/2/2021 EXAM PERFORMED/VIEWS:  XR CHEST PORTABLE Single view FINDINGS: Cardiomediastinal silhouette appears unremarkable  The lungs are clear  No pneumothorax or pleural effusion  Osseous structures appear within normal limits for patient age  Impression: No acute cardiopulmonary disease  Findings are stable Workstation performed: [id number]     Xr Hip/pelv 2-3 Vws Right If Performed    Result Date: 3/26/2021  Narrative: C-ARM - right hip INDICATION: ORIF right hip in OR  Procedure guidance  COMPARISON:  Right hip radiographs 3/24/2021 TECHNIQUE: FLUOROSCOPY TIME:   157 9 seconds 6 FLUOROSCOPIC IMAGES FINDINGS: Fluoroscopic guidance provided for procedure guidance  ORIF of proximal right femoral subtrochanteric fracture  Osseous and soft tissue detail limited by technique  Impression: Fluoroscopic guidance provided for procedure guidance  Please refer to the separate procedure notes for additional details  Workstation performed: [id number]     Xr Hip/pelv 2-3 Vws Right If Performed    Result Date: 3/24/2021  Narrative: RIGHT HIP INDICATION:   pain  COMPARISON:  None VIEWS:  XR HIP/PELV 2-3 VWS RIGHT W PELVIS IF PERFORMED Images: 3 FINDINGS: Acute comminuted intertrochanteric/subtrochanteric fracture right hip with mild displacement Mild degenerative arthritis both hips No lytic or blastic osseous lesion    Scattered vascular calcifications Left central pelvic calcification, likely degenerating fibroid Mild multilevel degenerative spondylosis     Impression: Acute comminuted  fracture right hip Workstation performed: [id number]      Cardiac testing:   Results for orders placed during the hospital encounter of 02/02/21   Echo complete with contrast if indicated    Narrative [address]  [address] [address]  [phone]    Echocardiogram  2D, M-mode, Doppler, and Color Doppler    Study date: 2021    Patient: Ramón Rock  MR number: [id number]  Account number: [de-identified]  : 1940  Age: 79 years  Gender: Female  Status: Inpatient  Location: Bedside  Height: 69 in  Weight: 172 lb  BP: 184/ 79 mmHg    Indications: TIA    Diagnoses: G45 9 - Transient cerebral ischemic attack, unspecified    Sonographer:  FRED Gloria  Primary Physician: Kiana Abernathy MD  Referring Physician:  Edda Chou PA-C  Group:  [id number]  Interpreting Physician:  Edgardo Toro MD    SUMMARY    LEFT VENTRICLE:  Systolic function was normal  Ejection fraction was estimated in the range of 55 % to 60 % to be 60 %  There were no regional wall motion abnormalities  Wall thickness was mildly increased  There was mild concentric hypertrophy  Doppler parameters were consistent with abnormal left ventricular relaxation (grade 1 diastolic dysfunction)  LEFT ATRIUM:  The atrium was mildly to moderately dilated  ATRIAL SEPTUM:  No atrial septal defect or large patent PFO seen by color contrast by bubble study    MITRAL VALVE:  There was mild to moderate annular calcification  There was mild to moderate regurgitation  TRICUSPID VALVE:  There was trace regurgitation  The tricuspid jet envelope definition was inadequate for estimation of RV systolic pressure  HISTORY: PRIOR HISTORY: High Triglycerides,Seizure,Edema,Former smoker,MR,GERD,HTN,HLD,DM,MONSTER    PROCEDURE: The procedure was performed at the bedside  This was a routine study  The study included complete 2D imaging, M-mode, complete spectral Doppler, and color Doppler  The heart rate was 71 bpm, at the start of the study  Intravenous contrast (agitated saline) was administered  Echocardiographic views were limited due to poor patient compliance and lung interference  Image quality was adequate      LEFT VENTRICLE: Size was normal  Systolic function was normal  Ejection fraction was estimated in the range of 55 % to 60 % to be 60 %  There were no regional wall motion abnormalities  Wall thickness was mildly increased  There was mild  concentric hypertrophy  DOPPLER: Doppler parameters were consistent with abnormal left ventricular relaxation (grade 1 diastolic dysfunction)  RIGHT VENTRICLE: The size was normal  Systolic function was normal     LEFT ATRIUM: The atrium was mildly to moderately dilated  ATRIAL SEPTUM: No atrial septal defect or large patent PFO seen by color contrast by bubble study    RIGHT ATRIUM: Size was normal     MITRAL VALVE: There was mild to moderate annular calcification  There was normal leaflet separation  DOPPLER: The transmitral velocity was within the normal range  There was no evidence for stenosis  There was mild to moderate  regurgitation  AORTIC VALVE: The valve was trileaflet  Leaflets exhibited mildly to moderately increased thickness, mild to moderate calcification, and normal cuspal separation  Calcification thickening mostly of non coronary cusp  DOPPLER: Transaortic  velocity was within the normal range  There was no evidence for stenosis  There was no regurgitation  TRICUSPID VALVE: DOPPLER: There was trace regurgitation  The tricuspid jet envelope definition was inadequate for estimation of RV systolic pressure  PULMONIC VALVE: DOPPLER: There was trace regurgitation  PERICARDIUM: There was no thickening or calcification  There was no pericardial effusion  AORTA: The root exhibited normal size  SYSTEMIC VEINS: IVC: The inferior vena cava was not well visualized      SYSTEM MEASUREMENT TABLES    2D  Ao Diam: 3 61 cm  LA Area: 27 55 cm2  LA Diam: 3 9 cm  LVOT Diam: 2 1 cm  RA Area: 15 84 cm2  RVIDd: 3 28 cm    PW  MV E/A Ratio: 0 89    [employer] Accredited Echocardiography Laboratory    Prepared and electronically signed by    Tyra France MD  Signed 03-Feb-2021 16:53:06         Ej Estrada        "This note has been constructed using a voice recognition system  Therefore there may be syntax, spelling, and/or grammatical errors   Please call if you have any questions  "

## 2021-03-29 NOTE — DISCHARGE SUMMARY
Discharge Summary - [facility]    Patient Information: Benjamin Ochoa 79 y o  female MRN: [id number]  Unit/Bed#: [address] [place] [address] Encounter: [id number]    Discharging Physician / Practitioner: Shubham Perez DO  PCP: Natalie Blank MD  Admission Date: 3/24/2021  Discharge Date: 03/29/21    Reason for Admission: Fall (patient was picking a pee cup and accidentally fell and hurt her right side specifically the leg and ankle with pain radiating to her right groin)      Discharge Diagnoses:     Principal Problem:    Closed right hip fracture ([facility] )  Active Problems:    Hyponatremia    Anemia    Benign essential hypertension    Diabetes mellitus ([place] [address] )    Mixed hyperlipidemia    Other specified hypothyroidism    Seizure ([place] [address] )  Resolved Problems:    UTI (urinary tract infection)        * Closed right hip fracture [facility])  Assessment & Plan  Patient presented with a fall at home and fell on her right side  X-ray showed comminuted right hip fracture  Status post IM nail insertion on 3/25  Patient became hypotensive during surgery requiring pressors  Patient seen by Cardiology and considered low risk for perioperative complications  Heparin for DVT prophylaxis while here  Switched to Lovenox on discharge and patient to continue for 6 weeks postop  Follow-up with ortho after discharge  Continue oxycodone p r n  Along with Tylenol p r n    Anemia  Assessment & Plan  Hemoglobin 7 5 on 03/26, partly dilutional in nature and postoperative anemia  1 unit PRBC transfusion on 03/26 after which hemoglobin has improved   Repeat lab work after discharge    Hyponatremia  Assessment & Plan  Sodium 126 in the ER  Received 500 cc normal saline bolus  lab work 131 from today  Appreciate Nephrology input   Likely due to hydrochlorothiazide use along with postoperative SIADH  Received 0 45 normal saline with 75 mEq of sodium bicarb x1 L on 03/26  Continue fluid restriction on discharge  Continue salt tabs 1 g t i d   With meals  Okay to restart Benicar but no hydrochlorothiazide on discharge  Cortisol low at 3 7 --> 6 1  stim test WNL  Repeat lab work after discharge and follow up with Nephrology  urine sodium 75, osm 578, serum osm 284    UTI (urinary tract infection)-resolved as of 3/28/2021  Assessment & Plan  Patient and son reported that she was recently diagnosed with UTI and was started on some antibiotic by Dr Kimberley Butterfield  Patient stated that she has taken about 3 days of it  Patient febrile on the floors  Fever resolved  Discontinued IV Rocephin she has completed 3 day course of it here  Chest x-ray negative for pneumonia  UA negative here  Seizure [facility])  Assessment & Plan  Continue Vimpat  Other specified hypothyroidism  Assessment & Plan  Continue levothyroxine  tSH WNL    Mixed hyperlipidemia  Assessment & Plan  Continue Tricor  Diabetes mellitus [facility])  Assessment & Plan  Lab Results   Component Value Date    HGBA1C 7 2 (H) 02/03/2021       Recent Labs     03/28/21  2045 03/29/21  0724 03/29/21  1121 03/29/21  1553   POCGLU 237* 121 189* 210*     Held metformin here but okay to restart on discharge  Benign essential hypertension  Assessment & Plan  Continue Toprol-XL  Norvasc and losartan was discontinued while here due to hypotension but BPs now running high  Restart Norvasc and Benicar  Continue to hold hydrochlorothiazide on discharge      Consultations During Hospital Stay:  [address] TO CARDIOLOGY  IP CONSULT TO NEPHROLOGY    Procedures Performed:     IM nail insertion    Significant Findings:     · Refer to hospital course and above listed diagnosis related plan for details    Imaging while in hospital:    Xr Chest 1 View Portable    Result Date: 3/24/2021  Narrative: CHEST INDICATION:   pre op  COMPARISON:  2/2/2021 EXAM PERFORMED/VIEWS:  XR CHEST PORTABLE Single view FINDINGS: Cardiomediastinal silhouette appears unremarkable  The lungs are clear    No pneumothorax or pleural effusion  Osseous structures appear within normal limits for patient age  Impression: No acute cardiopulmonary disease  Findings are stable Workstation performed: [id number]     Xr Hip/pelv 2-3 Vws Right If Performed    Result Date: 3/26/2021  Narrative: C-ARM - right hip INDICATION: ORIF right hip in OR  Procedure guidance  COMPARISON:  Right hip radiographs 3/24/2021 TECHNIQUE: FLUOROSCOPY TIME:   157 9 seconds 6 FLUOROSCOPIC IMAGES FINDINGS: Fluoroscopic guidance provided for procedure guidance  ORIF of proximal right femoral subtrochanteric fracture  Osseous and soft tissue detail limited by technique  Impression: Fluoroscopic guidance provided for procedure guidance  Please refer to the separate procedure notes for additional details  Workstation performed: [id number]     Xr Hip/pelv 2-3 Vws Right If Performed    Result Date: 3/24/2021  Narrative: RIGHT HIP INDICATION:   pain  COMPARISON:  None VIEWS:  XR HIP/PELV 2-3 VWS RIGHT W PELVIS IF PERFORMED Images: 3 FINDINGS: Acute comminuted intertrochanteric/subtrochanteric fracture right hip with mild displacement Mild degenerative arthritis both hips No lytic or blastic osseous lesion  Scattered vascular calcifications Left central pelvic calcification, likely degenerating fibroid Mild multilevel degenerative spondylosis     Impression: Acute comminuted  fracture right hip Workstation performed: [id number]       Incidental Findings:   · none    Test Results Pending at Discharge (will require follow up):   · As per After Visit Summary     Outpatient Tests Requested:  · BMP, CBC    Complications:  Refer to hospital course and above listed diagnosis related plan, if any    Hospital Course:     Preston Gonzalez is a 79 y o  female patient who originally presented to the hospital on 3/24/2021 due to fall at home  Patient stated that she was bending to pick something up off the floor and fell onto her right side    She pressed her Life Alert button and was brought in by EMS as she was not able to get herself off the floor  She denied any lightheadedness, dizziness, chest pain prior to, during or after the fall  No head trauma or LOC  Patient noted to have right hip fracture and was admitted and seen by Orthopedics and Cardiology while here  She underwent IM nail insertion  Patient was also seen by Nephrology for hyponatremia  Patient cleared by all consultants involved in her care prior to discharge  Discharge plan discussed with patient and her son over the phone    Please see above list of diagnoses and related plan for additional information  Condition at Discharge: stable     Discharge Day Visit / Exam:     Subjective:  Reports pain in her right leg with movements otherwise denies any pain    Vitals: Blood Pressure: 154/67 (03/29/21 1508)  Pulse: 70 (03/29/21 1508)  Temperature: 98 3 °F (36 8 °C) (03/29/21 1508)  Temp Source: Oral (03/29/21 0753)  Respirations: 17 (03/29/21 1508)  Height: 5' 9" (175 3 cm) (03/24/21 1620)  Weight - Scale: 79 kg (174 lb 2 6 oz) (03/24/21 1620)  SpO2: 94 % (03/29/21 1508)  Exam:   Physical Exam  Constitutional:       General: She is not in acute distress  Appearance: She is not diaphoretic  HENT:      Head: Normocephalic and atraumatic  Eyes:      General: No scleral icterus  Cardiovascular:      Rate and Rhythm: Normal rate and regular rhythm  Pulmonary:      Effort: Pulmonary effort is normal  No respiratory distress  Breath sounds: Normal breath sounds  No wheezing or rales  Abdominal:      General: Bowel sounds are normal  There is no distension  Palpations: Abdomen is soft  Tenderness: There is no abdominal tenderness  Musculoskeletal:      Comments: Right thigh with dressings in place which is C/D/I  Right thigh is nontender   Neurological:      Mental Status: She is alert           Discharge instructions/Information to patient and family:(Discharge Medications and Follow up):   See after visit summary for information provided to patient and family  Provisions for Follow-Up Care:  See after visit summary for information related to follow-up care and any pertinent home health orders  Disposition: STR at [facility]    Planned Readmission:  No     Discharge Statement:  I spent > 30 minutes discharging the patient  This time was spent on the day of discharge  I had direct contact with the patient on the day of discharge  Greater than 50% of the total time was spent examining patient, answering all patient questions, arranging and discussing plan of care with patient as well as directly providing post-discharge instructions  Additional time then spent on discharge activities  Discharge Medications:  See after visit summary for reconciled discharge medications provided to patient and family  ** Please Note: "This note has been constructed using a voice recognition system  Therefore there may be syntax, spelling, and/or grammatical errors   Please call if you have any questions  "**

## 2021-03-30 NOTE — TELEPHONE ENCOUNTER
Sent email to MYCHAL Jackson    Can you please put the following patient on Dr Amy Vega schedule ASAP  She is a PO patient, hip sx 3/25, fell on her hip  Any office is fine   She is currently in a nursing facility        [facility] # [phone]

## 2021-03-30 NOTE — PROGRESS NOTES
This CM Assistant received a inbasket from [facility] indicating the patient was discharged from [facility] to [facility] on 3/29/21  Email with bundle communication tool sent to facility with bundle dates, DRG, and LOS  This care manager assistant will continue to monitor via chart and CarePort review throughout bundle episode  This CM Assistant received a email indicating the patient has a discharge of4/18/21  This care manager assistant will continue to monitor via chart review throughout bundle episode

## 2021-03-30 NOTE — PROGRESS NOTES
Medicare Bundle Patient discharged from hospital yesterday to [facility]  BPCI form and Care Coordination note updated  Removed self from care team and sent Inbasket message to centralized triage CM for patient handoff

## 2021-03-30 NOTE — TELEPHONE ENCOUNTER
Patient sees Dr Jose Juan Blevins at [facility] calling confirming appointments

## 2021-03-31 NOTE — TELEPHONE ENCOUNTER
----- Message from Mario Alberto Cloud MD sent at 3/30/2021 10:10 AM EDT -----  Patient was seen for hyponatremia and was discharged on 3/29  Please order a BMP to be done in 5-7 days for hyponatremia  Please arrange for advanced practitioner office follow-up in 2 weeks

## 2021-04-06 NOTE — PROGRESS NOTES
This CM Assistant received a email from [facility] with a update on the patient  Lucita's email reads as follows:    Hollis Perdomo Test date & results: weekly- negative    COVID Vaccination?date given:   She also received both doses of the Moderna vaccine on 2/21 and 3/21   Projected Length of Stay: 4/18/21   EDOD discussed with patient or family:  discussed with son upon admission and IDT meeting with family is going to be on 4/8/21   DC Plan: d/c back to home in [place], [place] with  in-home services for PT, OT, nursing and HHA [place] [address] [facility] [place]   [facility], OT, nursing and HHA   Nursing skill: weekly COVID testing, vitals, medication management, santyl    Wound Care:  Measurements and Orders: Santyl Ointment 250 UNIT/GM (Collagenase)  Apply to sacrum topically every evening shift for pressure area cover with Mepilex dressing   Confirm receiving: PT - OT - ST - Other: Receiving daily PT, OT   OOB to sit at edge of bed Mod A   Bed to [facility] Max A with FWW   Sit to  stand max A with FWW   Ambulated 3 steps with FWW and Max A Barrier to discharge pain L LE 7/10 and new pain to B hips    Toilet transfer from [facility] with grab bar max a , min A for hygiene, max a for clothing management   Discharged set for 4/18/21 to Home with Josephine Waterman  This care manager assistant will continue to monitor via chart review throughout bundle episode

## 2021-04-08 NOTE — PROGRESS NOTES
Virtual Regular Visit      Assessment/Plan:    Problem List Items Addressed This Visit     None               Reason for visit is   Chief Complaint   Patient presents with    Virtual Regular Visit        Encounter provider ROSE Michele    Provider located at [address] [place] [id number]      Recent Visits  No visits were found meeting these conditions  Showing recent visits within past 7 days and meeting all other requirements     Today's Visits  Date Type Provider Dept   04/09/21 Telemedicine ROSE Mic[facility]   Showing today's visits and meeting all other requirements     Future Appointments  No visits were found meeting these conditions  Showing future appointments within next 150 days and meeting all other requirements        The patient was identified by name and date of birth  Aimee Johnston was informed that this is a telemedicine visit and that the visit is being conducted through [facility] [place] and patient was informed that this is not a secure, HIPAA-compliant platform  She agrees to proceed     My office door was closed  No one else was in the room  She acknowledged consent and understanding of privacy and security of the video platform  The patient has agreed to participate and understands they can discontinue the visit at any time  Patient is aware this is a billable service  Subjective  Aimee Johnston is a 79 y o  female  who presents for virtual visit  She was recently hospitalized at [facility] from 03/24 to 3/29/2021 after falling and sustaining a right hip fracture  She is status post IM nail insertion on 03/25  She was hypotensive during surgery requiring pressor support  She was seen by our service for management of hyponatremia  She was seen by Dr Khalida Drew at the [facility]  I was able to speak with Jony Lynn via face time    She is frustrated with her lack of progress although the facility is telling her she is making some progress  She has considerable pain and is not feeling well  I was able to speak with her nurse and review lab work  She denies lower extremity edema, unusual shortness of breath  Tolerating salt tablets  Last blood work was done on 04/05/2021 reviewed with patient and staff at the facility  Assessment and plan:     1  Hyponatremia:  Somewhat chronic in nature with low sodium level noted in the blood work as far back as 2018  · Baseline sodium level generally in the low 130s  · Sodium level 126 on hospital admission increasing to 131 by discharge on 03/29  · Follow-up sodium level 133  · Current creatinine 0 6  · Etiology:  Felt to be likely due to HCTZ/olmesartan/postoperative SIADH/pain  · Workup:  Serum osmolality 284, urine osmolality 578, urine sodium 75, TSH normal, a m  cortisol was low with repeat value still low with therefore cosyntropin stimulation test performed with adequate response  · Treatment plan:   · On salt tabs 1 g t i d  fluid restriction 1500 mL a day  HCTZ permanently on hold  · No changes at this time  · Sodium level acceptable  2  Hypertension:   · Olmesartan resumed at discharge  HCTZ permanently held   (previously on a combination olmesartan/HCTZ)  · HCTZ added to allergy list as a medication intolerance due to hyponatremia  · Also on amlodipine  · Reports adequate blood pressure control with blood pressure usually near 130/70  · No unusual edema  3  Anemia:    · Required transfusion during hospitalization likely related to surgical blood loss  · Last Hemoglobin 7 9  · On iron supplement, received 1 dose of CHRISTELLE recently at the rehab facility  4  Right hip fracture status post IM nailing 3/25: In rehab  5   Other:    · Hypothyroidism on Synthroid  · Diabetes mellitus,   · Mixed hyperlipidemia,   · Seizure disorder          Past Medical History:   Diagnosis Date    AMS (altered mental status)     Anemia     Anxiety     Arthritis  Chronic UTI     better since urethral stretching    [facility] ) 9/2/2019    Closed right hip fracture ([facility] ) 3/24/2021    Current moderate episode of major depressive disorder without prior episode ([address] ) 1/14/2020    [facility]     type 2    GERD (gastroesophageal reflux disease)     diet controlled    High triglycerides     Hypertension     Irritable bowel syndrome     Other specified hypothyroidism 5/13/2020    Primary osteoarthritis of both knees 1/20/2016    [facility]        Past Surgical History:   Procedure Laterality Date    APPENDECTOMY      age 14   [facility]      tumor removal benign x3    [place] OPEN RX FEMUR FX+INTRAMED RENÉ Right 3/25/2021    Procedure: INSERTION NAIL IM FEMUR ANTEGRADE (TROCHANTERIC) - long;  Surgeon: Crista Gamez MD;  Location: [address] [place] [address];  Service: Orthopedics    [place] XCAPSL CTRC RMVL INSJ IO LENS PROSTH W/O ECP Left 5/15/2017    Procedure: EXTRACTION EXTRACAPSULAR CATARACT PHACO INTRAOCULAR LENS (IOL); Surgeon: Elliott Barcenas MD;  Location: [facility] MAIN OR;  Service: Ophthalmology    [place] XCAPSL CTRC RMVL INSJ IO LENS PROSTH W/O ECP Right 6/26/2017    Procedure: EXTRACTION EXTRACAPSULAR CATARACT PHACO INTRAOCULAR LENS (IOL);   Surgeon: Elliott Barcenas MD;  Location: [facility] OR;  Service: Ophthalmology    TONSILLECTOMY         Current Outpatient Medications   Medication Sig Dispense Refill    acetaminophen (TYLENOL) 325 mg tablet Take 2 tablets (650 mg total) by mouth every 6 (six) hours as needed for mild pain, headaches or fever  0    amLODIPine (NORVASC) 5 mg tablet Take 1 tablet (5 mg total) by mouth every morning 90 tablet 1    aspirin (ECOTRIN LOW STRENGTH) 81 mg EC tablet Take 81 mg by mouth daily with breakfast      Blood Glucose Monitoring Suppl (ONE TOUCH ULTRA 2) w/Device KIT by Does not apply route daily 1 each 0    docusate sodium (COLACE) 100 mg capsule Take 1 capsule (100 mg total) by mouth 2 (two) times a day 10 capsule 0    enoxaparin (LOVENOX) 40 mg/0 4 mL Inject 0 4 mL (40 mg total) under the skin daily  0    fenofibrate (TRIGLIDE) 160 MG tablet Take 1 tablet (160 mg total) by mouth every evening 90 tablet 1    glucose blood (OneTouch Ultra) test strip Use as instructed 50 each 6    Glycerin-Polysorbate 80 (REFRESH DRY EYE THERAPY OP) Apply to eye      lacosamide (VIMPAT) 100 mg tablet Take 1 tablet (100 mg total) by mouth every 12 (twelve) hours 60 tablet 2    Lancets (onetouch ultrasoft) lancets Use as instructed 50 each 6    levothyroxine 50 mcg tablet Take 1 tablet (50 mcg total) by mouth daily 90 tablet 1    metFORMIN (GLUCOPHAGE) 500 mg tablet Take 1 tablet (500 mg total) by mouth 2 (two) times a day with meals 180 tablet 1    metoprolol succinate (TOPROL-XL) 25 mg 24 hr tablet Take 25 mg by mouth daily       Multiple Vitamins-Minerals (CENTRUM SILVER ADULT 50+ PO) Take by mouth daily with breakfast      olmesartan (BENICAR) 40 mg tablet Take 1 tablet (40 mg total) by mouth daily  0    oxyCODONE (ROXICODONE) 5 mg immediate release tablet Take 0 5 tablets (2 5 mg total) by mouth every 4 (four) hours as needed for moderate painMax Daily Amount: 15 mg  0    oxyCODONE (ROXICODONE) 5 mg immediate release tablet Take 1 tablet (5 mg total) by mouth every 4 (four) hours as needed for severe painMax Daily Amount: 30 mg  0    polyethylene glycol (MIRALAX) 17 g packet Take 17 g by mouth daily  0    sodium chloride 1 g tablet Take 1 tablet (1 g total) by mouth 3 (three) times a day with meals  0     No current facility-administered medications for this visit           Allergies   Allergen Reactions    Hydrochlorothiazide Other (See Comments)     Hyponatremia/low sodium level    Amoxicillin GI Intolerance    Lactose - Food Allergy GI Intolerance    Sulfa Antibiotics GI Intolerance    Lidocaine Rash     Lidocaine patch       Review of Systems   Constitutional: Positive for activity change (Due to recent hip fracture/surgery) and fatigue  Negative for chills and fever  HENT: Negative for congestion  Eyes: Negative for visual disturbance  Respiratory: Negative for cough and shortness of breath  Cardiovascular: Negative for chest pain, palpitations and leg swelling  Gastrointestinal: Negative for abdominal distention, abdominal pain, diarrhea, nausea and vomiting  Genitourinary: Negative for difficulty urinating  Musculoskeletal: Positive for arthralgias and gait problem  Skin: Negative for color change and rash  Neurological: Positive for weakness  Negative for dizziness, syncope and light-headedness  Video Exam    There were no vitals filed for this visit  Physical Exam  Vitals signs reviewed  Exam conducted with a chaperone present (Nurse at facility present and assisted with HPI and exam)  Constitutional:       General: She is not in acute distress  Appearance: She is not ill-appearing or toxic-appearing  HENT:      Head: Normocephalic and atraumatic  Nose: No congestion  Mouth/Throat:      Mouth: Mucous membranes are moist       Comments: Voice clear, no congestion or hoarseness  Eyes:      Extraocular Movements: Extraocular movements intact  Conjunctiva/sclera: Conjunctivae normal    Neck:      Musculoskeletal: Normal range of motion  Cardiovascular:      Rate and Rhythm: Normal rate  Pulmonary:      Effort: Pulmonary effort is normal  No respiratory distress  Breath sounds: No stridor  No wheezing  Comments: Able to speak in complete sentences  No audible wheezes or stridor  No conversational dyspnea  Normal effort at rest   No cough  Abdominal:      General: There is no distension  Musculoskeletal:      Right lower leg: No edema  Left lower leg: No edema  Skin:     Coloration: Skin is not jaundiced  Findings: No rash  Neurological:      Mental Status: She is alert and oriented to person, place, and time     Psychiatric: Mood and Affect: Mood normal          Behavior: Behavior normal          Thought Content: Thought content normal          Judgment: Judgment normal           I spent 20 minutes directly with the patient during this visit additional time was spent in chart review and preparation      VIRTUAL VISIT DISCLAIMER    Nelia Kay acknowledges that she has consented to an online visit or consultation  She understands that the online visit is based solely on information provided by her, and that, in the absence of a face-to-face physical evaluation by the physician, the diagnosis she receives is both limited and provisional in terms of accuracy and completeness  This is not intended to replace a full medical face-to-face evaluation by the physician  Nelia Kay understands and accepts these terms

## 2021-04-08 NOTE — PROGRESS NOTES
Assessment/Plan:  1  Closed fracture of right hip, initial encounter ([place] [address] )  XR hip/pelv 2-3 vws right if performed     I did review the patients xrays and physical exam findings today with Dr Alonso Garcia  He did think that the xrays may have show some mild migration of the lateral fracture fragment, and feels we should monitor this closely with xrays  The patient iwll continue PT with focus on strengthening hip abductors  She will follow-up in 1 week once Dr Jimbo Boss is back from vacation with repeat xrays at that time  Subjective:   Nathaniel Morelos is a 79 y o  female who presents today for follow-up of her right hip, now about 2 weeks status post ORIF subtrochanteric femur fx with long IM nail  She notes that she has been doing PT in rehab with some weightbearing  She still notes pain about the right hip, but notes good sensation of the right lower extremity  Her main complaint is persistent internal rotation of the hip  She significant difficulty with trying to hold the hip in a neutral position  They have been using an abduction type pillow at her care center to help prevent her hip from internally rotating/adducting         Review of Systems      Past Medical History:   Diagnosis Date    AMS (altered mental status)     Anemia     Anxiety     Arthritis     Chronic UTI     better since urethral stretching    Cirrhosis of liver ([place] [address] ) 9/2/2019    Closed right hip fracture ([place] [address] ) 3/24/2021    Current moderate episode of major depressive disorder without prior episode [facility] ) 1/14/2020    Diabetes mellitus ([place] [address] )     type 2    GERD (gastroesophageal reflux disease)     diet controlled    High triglycerides     Hypertension     Irritable bowel syndrome     Other specified hypothyroidism 5/13/2020    Primary osteoarthritis of both knees 1/20/2016    Seizures ([place] [address] )        Past Surgical History:   Procedure Laterality Date    APPENDECTOMY      age 14   [facility]      tumor removal benign x3    [place] OPEN RX FEMUR FX+INTRAMED RENÉ Right 3/25/2021    Procedure: INSERTION NAIL IM FEMUR ANTEGRADE (TROCHANTERIC) - long;  Surgeon: Shahida Ríos MD;  Location: [address] [place] [address];  Service: Orthopedics    OR XCAPSL CTRC RMVL INSJ IO LENS PROSTH W/O ECP Left 5/15/2017    Procedure: EXTRACTION EXTRACAPSULAR CATARACT PHACO INTRAOCULAR LENS (IOL); Surgeon: Eduarda Ortega MD;  Location: [facility] MAIN OR;  Service: Ophthalmology    OR XCAPSL CTRC RMVL INSJ IO LENS PROSTH W/O ECP Right 2017    Procedure: EXTRACTION EXTRACAPSULAR CATARACT PHACO INTRAOCULAR LENS (IOL);   Surgeon: Eduarda Ortega MD;  Location: [facility] MAIN OR;  Service: Ophthalmology    TONSILLECTOMY         Family History   Problem Relation Age of Onset    Early death Mother         CHF    Early death Father         MI    Cancer Sister         stomach lymphoma    No Known Problems Son        Social History     Occupational History    Not on file   Tobacco Use    Smoking status: Former Smoker     Packs/day:      Years: 10 00     Pack years: 10 00     Types: Cigarettes     Quit date:      Years since quittin 2    Smokeless tobacco: Never Used   Substance and Sexual Activity    Alcohol use: Not Currently     Alcohol/week: 1 0 standard drinks     Types: 1 Standard drinks or equivalent per week     Comment: rarely    Drug use: No    Sexual activity: Not Currently         Current Outpatient Medications:     acetaminophen (TYLENOL) 325 mg tablet, Take 2 tablets (650 mg total) by mouth every 6 (six) hours as needed for mild pain, headaches or fever, Disp:  , Rfl: 0    amLODIPine (NORVASC) 5 mg tablet, Take 1 tablet (5 mg total) by mouth every morning, Disp: 90 tablet, Rfl: 1    Blood Glucose Monitoring Suppl (ONE TOUCH ULTRA 2) w/Device KIT, by Does not apply route daily, Disp: 1 each, Rfl: 0    fenofibrate (TRIGLIDE) 160 MG tablet, Take 1 tablet (160 mg total) by mouth every evening, Disp: 90 tablet, Rfl: 1    glucose blood (OneTouch Ultra) test strip, Use as instructed, Disp: 50 each, Rfl: 6    Glycerin-Polysorbate 80 (REFRESH DRY EYE THERAPY OP), Apply to eye, Disp: , Rfl:     lacosamide (VIMPAT) 100 mg tablet, Take 1 tablet (100 mg total) by mouth every 12 (twelve) hours, Disp: 60 tablet, Rfl: 2    Lancets (onetouch ultrasoft) lancets, Use as instructed, Disp: 50 each, Rfl: 6    levothyroxine 50 mcg tablet, Take 1 tablet (50 mcg total) by mouth daily, Disp: 90 tablet, Rfl: 1    metFORMIN (GLUCOPHAGE) 500 mg tablet, Take 1 tablet (500 mg total) by mouth 2 (two) times a day with meals, Disp: 180 tablet, Rfl: 1    metoprolol succinate (TOPROL-XL) 25 mg 24 hr tablet, Take 25 mg by mouth daily , Disp: , Rfl:     Multiple Vitamins-Minerals (CENTRUM SILVER ADULT 50+ PO), Take by mouth daily with breakfast, Disp: , Rfl:     olmesartan (BENICAR) 40 mg tablet, Take 1 tablet (40 mg total) by mouth daily, Disp:  , Rfl: 0    oxyCODONE (ROXICODONE) 5 mg immediate release tablet, Take 0 5 tablets (2 5 mg total) by mouth every 4 (four) hours as needed for moderate painMax Daily Amount: 15 mg, Disp: , Rfl: 0    oxyCODONE (ROXICODONE) 5 mg immediate release tablet, Take 1 tablet (5 mg total) by mouth every 4 (four) hours as needed for severe painMax Daily Amount: 30 mg, Disp: , Rfl: 0    polyethylene glycol (MIRALAX) 17 g packet, Take 17 g by mouth daily, Disp:  , Rfl: 0    aspirin (ECOTRIN LOW STRENGTH) 81 mg EC tablet, Take 81 mg by mouth daily with breakfast, Disp: , Rfl:     docusate sodium (COLACE) 100 mg capsule, Take 1 capsule (100 mg total) by mouth 2 (two) times a day, Disp: 10 capsule, Rfl: 0    enoxaparin (LOVENOX) 40 mg/0 4 mL, Inject 0 4 mL (40 mg total) under the skin daily, Disp: , Rfl: 0    sodium chloride 1 g tablet, Take 1 tablet (1 g total) by mouth 3 (three) times a day with meals, Disp:  , Rfl: 0    Allergies   Allergen Reactions    Amoxicillin GI Intolerance    Lactose - Food Allergy GI Intolerance    Sulfa Antibiotics GI Intolerance    Lidocaine Rash     Lidocaine patch       Objective: There were no vitals filed for this visit  Ortho Exam    Physical Exam    Right hip: INcisions CDI  No erythema  Minimal pain with gentle ROM of the hip  Patient able to stand with 2 person assist today  She does hold her leg in a internally rotated and adducted position and has trouble hold the hip in a neutral position once I passive correct this  No calf tenderness  Sensation intact  I have personally reviewed pertinent films in PACS and my interpretation is as follows:  Xrays right femur: Status post ORIF with long IM nail  Hardware intact  Questionable slight lateral migration of greater trochanter fracture fragment

## 2021-04-09 NOTE — PATIENT INSTRUCTIONS
1  No change in medications at this time   2  Check BMP monthly  3  Follow-up with Dr Dyan Barnard in approximately 3-4 months  4  If possible offer patient Pedialyte instead of plain water  Substitute a portion of her daily water intake with Pedialyte or an electrolyte based water  5  No further HCTZ   6  Continue current fluid restriction of 1500 mL a day  If patient is on a liquid diet supplement do not count in fluid restriction  7  Please call with any questions or concerns: Please call the office if patient seems weaker than usual or not mentating appropriately  Watch for sudden change in appetite or nausea

## 2021-04-13 NOTE — TELEPHONE ENCOUNTER
I spoke with the physician at the rehabilitation facility just now and he sent me a picture of the x-rays  These x-rays look approximately the same as the ones taken last week in the office  The prosthesis appears stable and the fracture has not changed significantly and alignment and is acceptable  She apparently has little to no pain according to the physician I spoke with mj who is Dr Chilo Lan  She has been walking on this and working with therapy  He was appreciative of the call

## 2021-04-13 NOTE — TELEPHONE ENCOUNTER
Madina Philippe is calling back to check on the status of this   The director of nursing would like to know if the patient should be sent to the hospital         CB: [phone]

## 2021-04-13 NOTE — TELEPHONE ENCOUNTER
Dr Larissa Boogie from [place] [facility] @ [facility], requesting a call back from Dr Andrea Ashley or PA   3/25 surgery R upper leg, fracture is getting worse  Cell  #[phone] or [phone]    Sent a page to Dr Andrea Ashley, on call this evening

## 2021-04-13 NOTE — TELEPHONE ENCOUNTER
Patient sees Dr Fara Heard called from [facility] to notify Dr Pedro Bergman of patient's xray results   Marybeth Dupree states "greater trochanteri/subtrochanteri is displaced laterally in relation to the shaft of the femur, and the patient has a 4 cm calcified fibroid in the pelvis "    Call back # [phone]  Marybeth Dupree

## 2021-04-14 NOTE — PROGRESS NOTES
This CM Assistant received a update from [employer] with a update on the patient  The update stated the patient is functining at the current levels:     Date of Report:  4/14/21 (SP)   COVID Test date & results: weekly- negative    COVID Vaccination?date given:  Carl Rausch vaccines 2021    Projected Length of Stay: 4/24/21   EDOD discussed with patient or family:  discussed with son upon admission and IDT meeting with family was held on 4/8/21   DC Plan: The d/c plan for her to return to her senior living apartment in [place], [place] with in-home services with [facility] for PT, OT, nursing and HHA   DME recommended: TBD   Referrals: [facility] for PT, OT, nursing and HHA   Nursing skill: weekly COVID testing, vitals, medication management  Retacrit injection weekly  Lovenox injection daily  Monitoring for seizure activity  1500ml fluid restriction/day   Wound Care:  Measurements and Orders: Santyl on sacrum daily  for ST III  1 5x1x0 1cm and monitoring of surgical incisions x 3 incisions   Confirm receiving: PT - OT - ST - Other: Receiving daily PT, OT             OOB to sitting - device - assistance:  MAX A WITH RW             # ft ambulating - device - assistance:  18 FEET with RW and MOD A             # steps - device - assistance: UNABLE             Toileting - device - assistance: COMMODE via WC with MAX A   Barrier to progressing to next level of care: ANXIOUS, LIMITED STANDING TOLERANCE , PAIN AT SURGICAL SITE, PREVIOUS BILATERAL KNEE DEFORMITIES AND PAIN  This care manager assistant will continue to monitor via chart review throughout bundle episode

## 2021-04-14 NOTE — PROGRESS NOTES
Assessment/Plan:  1  Closed fracture of right hip, initial encounter [facility])         The patient is doing well and will continue PT/OT  She can continue to [facility]  We had a long discussion with the patient and her son that her xays look fine today and her rené is well aligned  She will follow-up in 4 weeks with repeat xrays at that time  Subjective:   Siena Jackson is a 79 y o  female who presents today for follow-up of her right hip, now about 3 weeks status post ORIF subtrochanteric femur fracture with long IM nail  She did have xrays at her care center yesterday which were unchanged compared to images 1 week ago  She has been up ambulating with PT  SHe still notes pain about the hip and some internal rotation of the hip  Review of Systems      Past Medical History:   Diagnosis Date    AMS (altered mental status)     Anemia     Anxiety     Arthritis     Chronic UTI     better since urethral stretching    Cirrhosis of liver ([facility] ) 9/2/2019    Closed right hip fracture ([facility] ) 3/24/2021    Current moderate episode of major depressive disorder without prior episode ([facility] ) 1/14/2020    Diabetes mellitus ([facility] )     type 2    GERD (gastroesophageal reflux disease)     diet controlled    High triglycerides     Hypertension     Irritable bowel syndrome     Other specified hypothyroidism 5/13/2020    Primary osteoarthritis of both knees 1/20/2016    Seizures ([facility] )        Past Surgical History:   Procedure Laterality Date    APPENDECTOMY      age 14   [facility] GUM SURGERY      tumor removal benign x3    NC OPEN RX FEMUR FX+INTRAMED RENÉ Right 3/25/2021    Procedure: INSERTION NAIL IM FEMUR ANTEGRADE (TROCHANTERIC) - long;  Surgeon: Anat Ortega MD;  Location: [address] [place] [address];  Service: Orthopedics    NC XCAPSL CTRC RMVL INSJ IO LENS PROSTH W/O ECP Left 5/15/2017    Procedure: EXTRACTION EXTRACAPSULAR CATARACT PHACO INTRAOCULAR LENS (IOL);   Surgeon: Cornell Gamez MD;  Location: [facility] MAIN OR;  Service: Prachi from [facility] Ophthalmology    IN XCAPSL CTRC RMVL INSJ IO LENS PROSTH W/O ECP Right 2017    Procedure: EXTRACTION EXTRACAPSULAR CATARACT PHACO INTRAOCULAR LENS (IOL);   Surgeon: Khalida Billings MD;  Location: [facility] MAIN OR;  Service: Ophthalmology    TONSILLECTOMY         Family History   Problem Relation Age of Onset    Early death Mother         CHF    Early death Father         MI    Cancer Sister         stomach lymphoma    No Known Problems Son        Social History     Occupational History    Not on file   Tobacco Use    Smoking status: Former Smoker     Packs/day: 1 00     Years: 10      Pack years: 10 00     Types: Cigarettes     Quit date:      Years since quittin 2    Smokeless tobacco: Never Used   Substance and Sexual Activity    Alcohol use: Not Currently     Alcohol/week: 1 0 standard drinks     Types: 1 Standard drinks or equivalent per week     Comment: rarely    Drug use: No    Sexual activity: Not Currently         Current Outpatient Medications:     acetaminophen (TYLENOL) 325 mg tablet, Take 2 tablets (650 mg total) by mouth every 6 (six) hours as needed for mild pain, headaches or fever, Disp:  , Rfl: 0    amLODIPine (NORVASC) 5 mg tablet, Take 1 tablet (5 mg total) by mouth every morning, Disp: 90 tablet, Rfl: 1    aspirin (ECOTRIN LOW STRENGTH) 81 mg EC tablet, Take 81 mg by mouth daily with breakfast, Disp: , Rfl:     Blood Glucose Monitoring Suppl (ONE TOUCH ULTRA 2) w/Device KIT, by Does not apply route daily, Disp: 1 each, Rfl: 0    docusate sodium (COLACE) 100 mg capsule, Take 1 capsule (100 mg total) by mouth 2 (two) times a day, Disp: 10 capsule, Rfl: 0    enoxaparin (LOVENOX) 40 mg/0 4 mL, Inject 0 4 mL (40 mg total) under the skin daily, Disp: , Rfl: 0    fenofibrate (TRIGLIDE) 160 MG tablet, Take 1 tablet (160 mg total) by mouth every evening, Disp: 90 tablet, Rfl: 1    gabapentin (NEURONTIN) 100 mg capsule, Take 100 mg by mouth 3 (three) times a day, Disp: , Rfl:     glucose blood (OneTouch Ultra) test strip, Use as instructed, Disp: 50 each, Rfl: 6    Glycerin-Polysorbate 80 (REFRESH DRY EYE THERAPY OP), Apply to eye, Disp: , Rfl:     lacosamide (VIMPAT) 100 mg tablet, Take 1 tablet (100 mg total) by mouth every 12 (twelve) hours, Disp: 60 tablet, Rfl: 2    Lancets (onetouch ultrasoft) lancets, Use as instructed, Disp: 50 each, Rfl: 6    levothyroxine 50 mcg tablet, Take 1 tablet (50 mcg total) by mouth daily, Disp: 90 tablet, Rfl: 1    metFORMIN (GLUCOPHAGE) 500 mg tablet, Take 1 tablet (500 mg total) by mouth 2 (two) times a day with meals, Disp: 180 tablet, Rfl: 1    metoprolol succinate (TOPROL-XL) 25 mg 24 hr tablet, Take 25 mg by mouth daily , Disp: , Rfl:     Multiple Vitamins-Minerals (CENTRUM SILVER ADULT 50+ PO), Take by mouth daily with breakfast, Disp: , Rfl:     olmesartan (BENICAR) 40 mg tablet, Take 1 tablet (40 mg total) by mouth daily, Disp:  , Rfl: 0    oxyCODONE (ROXICODONE) 5 mg immediate release tablet, Take 0 5 tablets (2 5 mg total) by mouth every 4 (four) hours as needed for moderate painMax Daily Amount: 15 mg, Disp: , Rfl: 0    oxyCODONE (ROXICODONE) 5 mg immediate release tablet, Take 1 tablet (5 mg total) by mouth every 4 (four) hours as needed for severe painMax Daily Amount: 30 mg, Disp: , Rfl: 0    polyethylene glycol (MIRALAX) 17 g packet, Take 17 g by mouth daily, Disp:  , Rfl: 0    sodium chloride 1 g tablet, Take 1 tablet (1 g total) by mouth 3 (three) times a day with meals, Disp:  , Rfl: 0    Allergies   Allergen Reactions    Hydrochlorothiazide Other (See Comments)     Hyponatremia/low sodium level    Amoxicillin GI Intolerance    Lactose - Food Allergy GI Intolerance    Sulfa Antibiotics GI Intolerance    Lidocaine Rash     Lidocaine patch       Objective: There were no vitals filed for this visit  Ortho Exam    Physical Exam   Right hip: Patient holds hip in internal rotation   Minimal pain with rotation of the hip  Sensation intact  Incision CDI  I have personally reviewed pertinent films in PACS and my interpretation is as follows:  Xrays right hip: Status post ORIF with long IM nail  Unchanged compared to images 1 week ago

## 2021-04-21 NOTE — PROGRESS NOTES
This CM Assistant received a email from [employer] with a update on the patient  Per her email on 4/21/21 at [phone] the patient is functioning at the current levels   Date of Report:  4/21/21 (SP)   COVID Test date & results: weekly- negative    COVID Vaccination?date given:  Carl Rausch vaccines 2021     Projected Length of Stay: 5/4/21 (extended for stage III wound)   EDOD discussed with patient or family:  discussed with son upon admission and IDT meeting with family was held on 4/8/21   DC Plan: The d/c plan for her to return to her senior living apartment in [place], [place] with in-home services with [facility] for PT, OT, nursing and HHA  She needs to be independent to return home  There is no one at home at can do the woundcare  Patient cannot do the wound care herself  [facility] will only come every other day for wound care   DME recommended: TBD   Referrals: [facility] for PT, OT, nursing and HHA   Nursing skill: weekly COVID testing, vitals, medication management  Retacrit injection weekly  Lovenox injection daily  Monitoring for seizure activity  1500ml fluid restriction/day   Wound Care:  Measurements and Orders: Santyl on sacrum daily  for ST III  1 5x1x0 1cm and monitoring of surgical incisions x 3 incisions   Confirm receiving: PT - OT - ST - Other: Receiving daily PT, OT   OOB to sitting - device - assistance: Mod A x 1 or 2    # ft ambulating - device - Ambulation fluctuates for 5 feet to 20 feet using a front wheeled walker with Min A to CGA  A LCD is set for 5/4/21  This care manager assistant will continue to monitor via chart review throughout bundle episode

## 2021-04-29 PROBLEM — E11.51 TYPE 2 DIABETES MELLITUS WITH DIABETIC PERIPHERAL ANGIOPATHY WITHOUT GANGRENE (HCC): Status: ACTIVE | Noted: 2018-05-24

## 2021-04-29 PROBLEM — I73.9 PERIPHERAL VASCULAR DISEASE (HCC): Status: ACTIVE | Noted: 2021-01-01

## 2021-04-30 NOTE — PROGRESS NOTES
This CM Assistant received a email from [employer] with a update on the patient  She will not be d/c on 5/4/21  She just started two IVs that is supposed to run through 5/7/21  (Vancomycin and Cefepime HCl)  Measurements and Orders: Santyl on sacrum daily  for ST III (0 8 x 0 6 x 0 2)  She continues with weekly COVID testing, vitals, medication management  Retacrit injection weekly  Monitoring for seizure activity  She will be d/c rehab on 5/4/21  This care manager assistant will continue to monitor via chart review throughout bundle episode

## 2021-05-06 NOTE — PROGRESS NOTES
Chart review complete  Bundle program ends today  BPCI form updated, care coordination note removed and bundle episode resolved  CM removed self from care team and sent Inbasket message to FABRIZIO Marie regarding bundle closure

## 2021-05-12 NOTE — PROGRESS NOTES
Assessment/Plan:  1  Closed fracture of right hip, initial encounter ([facility] [address] )  XR hip/pelv 2-3 vws right if performed       Scribe Attestation    I,:  Halima Justice MA am acting as a scribe while in the presence of the attending physician :       I,:  Criselda George MD personally performed the services described in this documentation    as scribed in my presence :             Delvin Ellison is doing well postoperatively  X-rays were reviewed which demonstrate increased callus formation and stable orthopedic hardware  She has fluid hip range of motion on exam without pain  I explained to her and her son the clicking about her knee is secondary to arthritis  Patient instructed to continue with therapy and continue to work on ambulating  Patient instructed to perform therapy at least 2 x's a day  I discussed this can be a 6-12 month recovery  She will follow up in 6 weeks for repeat evaluation and repeat x-ray  Subjective:   Steve May is a 79 y o  female who presents to the office today for follow up evaluation of her right hip  Patient is now 7 weeks s/p ORIF subtrochanteric femur with long IM nail performed on 3/25/21  Patient's son is present for the appointment today  Patient's son states for appx 2 weeks she was significantly ill with a UTI and pneumonia  He states she was bed bound for 2 weeks  Patient denies any pain about her hip today  She did have a doppler performed at the nursing facility which was negative  Patient also notes clicking about her knees bilaterally  Patient states she is not ambulating much with therapy  Review of Systems   Constitutional: Negative for chills and fever  HENT: Negative for drooling and sneezing  Eyes: Negative for redness  Respiratory: Negative for cough and wheezing  Gastrointestinal: Negative for nausea and vomiting  Musculoskeletal: Negative for arthralgias, joint swelling and myalgias  Neurological: Negative for weakness and numbness  Psychiatric/Behavioral: Negative for behavioral problems  The patient is not nervous/anxious  Past Medical History:   Diagnosis Date    AMS (altered mental status)     Anemia     Anxiety     Arthritis     Chronic UTI     better since urethral stretching    [facility] 9[facility]2/2019    Closed [facility] fracture [facility] 3/24/2021    Current moderate episode of major depressive disorder without prior episode ([facility] ) 1/14/2020    Diabetes mellitus (HCC)     type 2    GERD (gastroesophageal reflux disease)     diet controlled    High triglycerides     Hypertension     Irritable bowel syndrome     Other specified hypothyroidism 5/13/2020    Primary osteoarthritis of both knees 1/20/2016    Seizures ([facility] )        Past Surgical History:   Procedure Laterality Date    APPENDECTOMY      age 14   [facility] GUM [facility]GERY      tumor removal benign x3    NC OPEN RX FEMUR FX+INTRAMED RENÉ Right 3/25/2021    Procedure: INSERTION NAIL IM FEMUR ANTEGRADE (TROCHANTERIC) - long;  Surgeon: Maryuri Black MD;  Location: [address] [place] [address];  Service: Orthopedics    NC XCAPSL CTRC RMVL INSJ IO LENS PROSTH W/O ECP Left 5/15/2017    Procedure: EXTRACTION EXTRACAPSULAR CATARACT PHACO INTRAOCULAR LENS (IOL); Surgeon: Geovanna Rivers MD;  Location: [facility] OR;  Service: Ophthalmology    NC XCAPSL CTRC RMVL INSJ IO LENS PROSTH W/O ECP Right 6/26/2017    Procedure: EXTRACTION EXTRACAPSULAR CATARACT PHACO INTRAOCULAR LENS (IOL);   Surgeon: Geovanna Rivers MD;  Location: [facility] OR;  Service: Ophthalmology    TONSILLECTOMY         Family History   Problem Relation Age of Onset    Early death Mother         CHF    Early death Father         MI    Cancer Sister         stomach lymphoma    No Known Problems Son        Social History     Occupational History    Not on file   Tobacco Use    Smoking status: Former Smoker     Packs/day: 1 00     Years: 10 00     Pack years: 10 00     Types: Cigarettes     Quit date: 2010 Years since quittin 3    Smokeless tobacco: Never Used   Substance and Sexual Activity    Alcohol use: Not Currently     Alcohol/week: 1 0 standard drinks     Types: 1 Standard drinks or equivalent per week     Comment: rarely    Drug use: No    Sexual activity: Not Currently         Current Outpatient Medications:     acetaminophen (TYLENOL) 325 mg tablet, Take 2 tablets (650 mg total) by mouth every 6 (six) hours as needed for mild pain, headaches or fever, Disp:  , Rfl: 0    amLODIPine (NORVASC) 5 mg tablet, Take 1 tablet (5 mg total) by mouth every morning, Disp: 90 tablet, Rfl: 1    aspirin (ECOTRIN LOW STRENGTH) 81 mg EC tablet, Take 81 mg by mouth daily with breakfast, Disp: , Rfl:     Blood Glucose Monitoring Suppl (ONE TOUCH ULTRA 2) w/Device KIT, by Does not apply route daily, Disp: 1 each, Rfl: 0    docusate sodium (COLACE) 100 mg capsule, Take 1 capsule (100 mg total) by mouth 2 (two) times a day, Disp: 10 capsule, Rfl: 0    enoxaparin (LOVENOX) 40 mg/0 4 mL, Inject 0 4 mL (40 mg total) under the skin daily, Disp: , Rfl: 0    fenofibrate (TRIGLIDE) 160 MG tablet, Take 1 tablet (160 mg total) by mouth every evening, Disp: 90 tablet, Rfl: 1    gabapentin (NEURONTIN) 100 mg capsule, Take 100 mg by mouth 3 (three) times a day, Disp: , Rfl:     glucose blood (OneTouch Ultra) test strip, Use as instructed, Disp: 50 each, Rfl: 6    Glycerin-Polysorbate 80 (REFRESH DRY EYE THERAPY OP), Apply to eye, Disp: , Rfl:     lacosamide (VIMPAT) 100 mg tablet, Take 1 tablet (100 mg total) by mouth every 12 (twelve) hours, Disp: 60 tablet, Rfl: 2    Lancets (onetouch ultrasoft) lancets, Use as instructed, Disp: 50 each, Rfl: 6    levothyroxine 50 mcg tablet, Take 1 tablet (50 mcg total) by mouth daily, Disp: 90 tablet, Rfl: 1    metFORMIN (GLUCOPHAGE) 500 mg tablet, Take 1 tablet (500 mg total) by mouth 2 (two) times a day with meals, Disp: 180 tablet, Rfl: 1    metoprolol succinate (TOPROL-XL) 25 mg 24 hr tablet, Take 25 mg by mouth daily , Disp: , Rfl:     Multiple Vitamins-Minerals (CENTRUM SILVER ADULT 50+ PO), Take by mouth daily with breakfast, Disp: , Rfl:     olmesartan (BENICAR) 40 mg tablet, Take 1 tablet (40 mg total) by mouth daily, Disp:  , Rfl: 0    oxyCODONE (ROXICODONE) 5 mg immediate release tablet, Take 0 5 tablets (2 5 mg total) by mouth every 4 (four) hours as needed for moderate painMax Daily Amount: 15 mg, Disp: , Rfl: 0    oxyCODONE (ROXICODONE) 5 mg immediate release tablet, Take 1 tablet (5 mg total) by mouth every 4 (four) hours as needed for severe painMax Daily Amount: 30 mg, Disp: , Rfl: 0    polyethylene glycol (MIRALAX) 17 g packet, Take 17 g by mouth daily, Disp:  , Rfl: 0    sodium chloride 1 g tablet, Take 1 tablet (1 g total) by mouth 3 (three) times a day with meals, Disp:  , Rfl: 0    Allergies   Allergen Reactions    Hydrochlorothiazide Other (See Comments)     Hyponatremia/low sodium level    Amoxicillin GI Intolerance    Lactose - Food Allergy GI Intolerance    Sulfa Antibiotics GI Intolerance    Lidocaine Rash     Lidocaine patch       Objective: There were no vitals filed for this visit  Right Knee Exam     Comments:  Crepitus with ROM      Right Hip Exam     Other   Erythema: absent  Sensation: normal  Pulse: present    Comments:  Fluid hip ROM without pain  Can correct deformity             Physical Exam  Constitutional:       Appearance: She is well-developed  HENT:      Head: Normocephalic and atraumatic  Eyes:      General:         Right eye: No discharge  Left eye: No discharge  Conjunctiva/sclera: Conjunctivae normal    Neck:      Musculoskeletal: Normal range of motion and neck supple  Cardiovascular:      Rate and Rhythm: Normal rate  Pulmonary:      Effort: Pulmonary effort is normal  No respiratory distress  Musculoskeletal:      Comments: As noted in HPI   Skin:     General: Skin is warm and dry     Neurological: Mental Status: She is alert and oriented to person, place, and time  Psychiatric:         Behavior: Behavior normal          Thought Content: Thought content normal          Judgment: Judgment normal          I have personally reviewed pertinent films in PACS and my interpretation is as follows:X-ray right hip performed in the office today demonstrates increased callus formation and stable orthopedic hardware

## 2021-06-30 NOTE — PROGRESS NOTES
Assessment/Plan:  1  Closed fracture of right hip, initial encounter ([facility] [address] )  XR hip/pelv 2-3 vws right if performed     Scribe Attestation    I,:  Ivis Salgado am acting as a scribe while in the presence of the attending physician :       I,:  Donna Osorio MD personally performed the services described in this documentation    as scribed in my presence :         Donavan  is a pleasant 79year old who presents to the office today 14 weeks s/p ORIF subtrochanteric femur with long IM nail, date of surgery 3/25/2021  X-rays were reviewed with the patient and her son which demonstrates a healing fracture with increased callus formation and stable orthopedic hardware  I discussed with the patient and her son that at this time I recommend therapy 2-3 times per week to work on strengthening and gait training  A new script for physical therapy was provided to the patient at today's visit  I did provide her with a physician directed home exercise program to work on leg lifts  I discussed with the patient that her swelling will continue to decrease  I discussed the patient that I would like her to continue with the compression stockings  I will follow up with her in 6 weeks for a clinical re-evaluation and repeat x-rays  She understood and had no further questions  Subjective:   Patient ID: Ai Becerra is a 79 y o  female who presents to the office today 14 weeks s/p ORIF subtrochanteric femur with long IM nail, date of surgery 3/25/2021  Overall, she states that she is doing well postoperatively  She denies any hip pain attoday's visit  She is currently residing at  [address]  She states that she is still experiencing swelling into her right ankle  She states that she is still using a wheelchair for ambulation  She states that she is only receiving therapy 1 day per week  Review of Systems   Constitutional: Negative for chills, fever and unexpected weight change     HENT: Negative for hearing loss, nosebleeds and sore throat  Eyes: Negative for pain, redness and visual disturbance  Respiratory: Negative for cough, shortness of breath and wheezing  Cardiovascular: Negative for chest pain, palpitations and leg swelling  Gastrointestinal: Negative for abdominal pain, nausea and vomiting  Endocrine: Negative for polyphagia and polyuria  Genitourinary: Negative for dysuria and hematuria  Musculoskeletal:        As noted in HPI   Skin: Negative for rash and wound  Neurological: Negative for dizziness, numbness and headaches  Psychiatric/Behavioral: Negative for decreased concentration and suicidal ideas  The patient is not nervous/anxious  Past Medical History:   Diagnosis Date    AMS (altered mental status)     Anemia     Anxiety     Arthritis     Chronic UTI     better since urethral stretching    Cirrhosis of liver ([facility] [facility] ) 9/2/2019    Closed right hip fracture ([facility] [address] ) 3/24/2021    Current moderate episode of major depressive disorder without prior episode ([facility] [address] ) 1/14/2020    Diabetes mellitus (HCC)     type 2    GERD (gastroesophageal reflux disease)     diet controlled    High triglycerides     Hypertension     Irritable bowel syndrome     Other specified hypothyroidism 5/13/2020    Primary osteoarthritis of both knees 1/20/2016    Seizures ([facility] [address] )        Past Surgical History:   Procedure Laterality Date    APPENDECTOMY      age 14   [facility] GUM SURGERY      tumor removal benign x3    OR OPEN RX FEMUR FX+INTRAMED RENÉ Right 3/25/2021    Procedure: INSERTION NAIL IM FEMUR ANTEGRADE (TROCHANTERIC) - long;  Surgeon: Donna Osorio MD;  Location: [address] [place] [address];  Service: Orthopedics    OR XCAPSL CTRC RMVL INSJ IO LENS PROSTH W/O ECP Left 5/15/2017    Procedure: EXTRACTION EXTRACAPSULAR CATARACT PHACO INTRAOCULAR LENS (IOL);   Surgeon: Julissa Krishnamurthy MD;  Location: [facility] MAIN OR;  Service: Ophthalmology    OR XCAPSL CTRC RMVL INSJ IO LENS PROSTH W/O ECP Right 2017    Procedure: EXTRACTION EXTRACAPSULAR CATARACT PHACO INTRAOCULAR LENS (IOL);   Surgeon: Karel Domingo MD;  Location: [facility] MAIN OR;  Service: Ophthalmology    TONSILLECTOMY         Family History   Problem Relation Age of Onset    Early death Mother         CHF    Early death Father         MI    Cancer Sister         stomach lymphoma    No Known Problems Son        Social History     Occupational History    Not on file   Tobacco Use    Smoking status: Former Smoker     Packs/day: 1 00     Years: 10 00     Pack years: 10 00     Types: Cigarettes     Quit date:      Years since quittin 5    Smokeless tobacco: Never Used   Vaping Use    Vaping Use: Never used   Substance and Sexual Activity    Alcohol use: Not Currently     Alcohol/week: 1 0 standard drinks     Types: 1 Standard drinks or equivalent per week     Comment: rarely    Drug use: No    Sexual activity: Not Currently         Current Outpatient Medications:     acetaminophen (TYLENOL) 325 mg tablet, Take 2 tablets (650 mg total) by mouth every 6 (six) hours as needed for mild pain, headaches or fever, Disp:  , Rfl: 0    amLODIPine (NORVASC) 5 mg tablet, Take 1 tablet (5 mg total) by mouth every morning, Disp: 90 tablet, Rfl: 1    aspirin (ECOTRIN LOW STRENGTH) 81 mg EC tablet, Take 81 mg by mouth daily with breakfast, Disp: , Rfl:     bisacodyl (DULCOLAX) 10 mg suppository, Insert 10 mg into the rectum daily, Disp: , Rfl:     Blood Glucose Monitoring Suppl (ONE TOUCH ULTRA 2) w/Device KIT, by Does not apply route daily (Patient not taking: Reported on 2021), Disp: 1 each, Rfl: 0    carboxymethylcellulose 0 5 % SOLN, 1 drop 3 (three) times a day as needed for dry eyes, Disp: , Rfl:     collagenase (SANTYL) ointment, Apply topically daily, Disp: , Rfl:     docusate sodium (COLACE) 100 mg capsule, Take 1 capsule (100 mg total) by mouth 2 (two) times a day, Disp: 10 capsule, Rfl: 0    enoxaparin (LOVENOX) 40 mg/0 4 mL, Inject 0 4 mL (40 mg total) under the skin daily, Disp: , Rfl: 0    fenofibrate (TRIGLIDE) 160 MG tablet, Take 1 tablet (160 mg total) by mouth every evening, Disp: 90 tablet, Rfl: 1    ferrous sulfate 325 (65 Fe) mg tablet, Take 325 mg by mouth daily with breakfast, Disp: , Rfl:     gabapentin (NEURONTIN) 100 mg capsule, Take 100 mg by mouth 3 (three) times a day, Disp: , Rfl:     glucose blood (OneTouch Ultra) test strip, Use as instructed (Patient not taking: Reported on 5/12/2021), Disp: 50 each, Rfl: 6    Glycerin-Polysorbate 80 (REFRESH DRY EYE THERAPY OP), Apply to eye, Disp: , Rfl:     lacosamide (VIMPAT) 100 mg tablet, Take 1 tablet (100 mg total) by mouth every 12 (twelve) hours, Disp: 60 tablet, Rfl: 2    lactulose (CEPHULAC) 10 g packet, Take 10 g by mouth 3 (three) times a day, Disp: , Rfl:     Lancets (onetouch ultrasoft) lancets, Use as instructed (Patient not taking: Reported on 5/12/2021), Disp: 50 each, Rfl: 6    levothyroxine 50 mcg tablet, Take 1 tablet (50 mcg total) by mouth daily, Disp: 90 tablet, Rfl: 1    Magnesium Oxide 250 MG TABS, Take by mouth, Disp: , Rfl:     metFORMIN (GLUCOPHAGE) 500 mg tablet, Take 1 tablet (500 mg total) by mouth 2 (two) times a day with meals, Disp: 180 tablet, Rfl: 1    metoprolol succinate (TOPROL-XL) 25 mg 24 hr tablet, Take 25 mg by mouth daily , Disp: , Rfl:     Multiple Vitamins-Minerals (CENTRUM SILVER ADULT 50+ PO), Take by mouth daily with breakfast, Disp: , Rfl:     olmesartan (BENICAR) 40 mg tablet, Take 1 tablet (40 mg total) by mouth daily, Disp:  , Rfl: 0    ondansetron (ZOFRAN) 4 mg tablet, Take 4 mg by mouth every 8 (eight) hours as needed for nausea or vomiting, Disp: , Rfl:     oxyCODONE (ROXICODONE) 5 mg immediate release tablet, Take 0 5 tablets (2 5 mg total) by mouth every 4 (four) hours as needed for moderate painMax Daily Amount: 15 mg (Patient not taking: Reported on 5/12/2021), Disp: , Rfl: 0    oxyCODONE (ROXICODONE) 5 mg immediate release tablet, Take 1 tablet (5 mg total) by mouth every 4 (four) hours as needed for severe painMax Daily Amount: 30 mg (Patient not taking: Reported on 5/12/2021), Disp: , Rfl: 0    pantoprazole (PROTONIX) 40 mg tablet, Take 40 mg by mouth daily, Disp: , Rfl:     polyethylene glycol (MIRALAX) 17 g packet, Take 17 g by mouth daily, Disp:  , Rfl: 0    potassium chloride (K-DUR,KLOR-CON) 10 mEq tablet, , Disp: , Rfl:     potassium chloride (MICRO-K) 8 mEq CR capsule, Take 8 mEq by mouth 2 (two) times a day, Disp: , Rfl:     Premarin vaginal cream, , Disp: , Rfl:     sodium chloride 1 g tablet, Take 1 tablet (1 g total) by mouth 3 (three) times a day with meals, Disp:  , Rfl: 0    Sodium Phosphates (ENEMA RE), Insert into the rectum, Disp: , Rfl:     torsemide (DEMADEX) 10 mg tablet, Take 10 mg by mouth daily, Disp: , Rfl:     Allergies   Allergen Reactions    Hydrochlorothiazide Other (See Comments)     Hyponatremia/low sodium level    Amoxicillin GI Intolerance    Lactose - Food Allergy GI Intolerance    Sulfa Antibiotics GI Intolerance    Lidocaine Rash     Lidocaine patch       Objective: There were no vitals filed for this visit  Right Ankle Exam   Swelling: mild    Muscle Strength   Dorsiflexion:  4/5  Plantar flexion:  4/5      Right Hip Exam     Tenderness   The patient is experiencing no tenderness  Other   Erythema: absent  Scars: present (well healed incision)  Sensation: normal  Pulse: present    Comments:  Fluid hip ROM without pain  Can correct deformity             Physical Exam  Vitals reviewed  Constitutional:       Appearance: Normal appearance  She is well-developed  HENT:      Head: Normocephalic and atraumatic  Eyes:      General:         Right eye: No discharge  Left eye: No discharge  Extraocular Movements: Extraocular movements intact  Conjunctiva/sclera: Conjunctivae normal    Cardiovascular:      Rate and Rhythm: Normal rate  Pulmonary:      Effort: Pulmonary effort is normal  No respiratory distress  Musculoskeletal:      Cervical back: Normal range of motion and neck supple  Skin:     General: Skin is warm and dry  Neurological:      General: No focal deficit present  Mental Status: She is alert and oriented to person, place, and time  Psychiatric:         Mood and Affect: Mood normal          Behavior: Behavior normal          I have personally reviewed pertinent films in PACS and my interpretation is as follows:  X-rays performed in the office today of her right hip demonstrates a healing femur fracture with callus formation

## 2021-07-02 NOTE — TELEPHONE ENCOUNTER
Called pt to confirm appt  For 7/7/2021 with Dr Nelson Eye  Patient will check with son to see if he can bring her  They will get back to us

## 2021-07-07 NOTE — PROGRESS NOTES
Return NeuroOutpatient Note        Rafaela Krishnamurthy  [id number]  79 y o   1940       CC: seizure       History obtained from:  Patient     HPI/Subjective:    Rafaela Krishnamurthy is an 80 yo F with recent h/o seizure presents as f/u  Per my previous history, patient was noted to say abnormal things  In ED, patient was witnessed to have seizure twitching, eyes rolling and became unresponsive  Patient was repeating next day  She couldn't answer any simple questions  We had started her on Vimpat and next day she had returned to her baseline  Her EEG was abnormal with rare right temporal discharges  She has remained seizure free since initial episode on vimpat 100mg bid  Sometime in April, patient had developed pneumonia, UTI and was in hospital for 5 days again  She was then in rehab and now at nursing facility  She had a fall once and had fracture of femur  Patient reports difficulty walking now  Son today states that her short term memory is poor now  She easily forgets  She can't always recall her old address  During encounter, she forgot about her femur fracture operation        Her mri brain was negative for stroke       Son states that patient lost her  early this year  Patient has been feeling depressed and is less active       Patient would have had problem with dates even prior to Jan        Past Medical History:   Diagnosis Date    AMS (altered mental status)     Anemia     Anxiety     Arthritis     Chronic UTI     better since urethral stretching    Cirrhosis of liver ([facility] [facility] ) 9/2/2019    Closed right hip fracture ([facility] [address] ) 3/24/2021    Current moderate episode of major depressive disorder without prior episode ([facility] [address] ) 1/14/2020    Diabetes mellitus (HCC)     type 2    GERD (gastroesophageal reflux disease)     diet controlled    High triglycerides     Hypertension     Irritable bowel syndrome     Other specified hypothyroidism 5/13/2020    Primary osteoarthritis of both knees 1/20/2016    Seizures ([facility] )      Social History     Socioeconomic History    Marital status:      Spouse name: Not on file    Number of children: 1    Years of education: Not on file    Highest education level: Not on file   Occupational History    Not on file   Tobacco Use    Smoking status: Former Smoker     Packs/day: 1 00     Years: 10 00     Pack years: 10 00     Types: Cigarettes     Quit date:      Years since quittin 5    Smokeless tobacco: Never Used   Vaping Use    Vaping Use: Never used   Substance and Sexual Activity    Alcohol use: Not Currently     Alcohol/week: 1 0 standard drinks     Types: 1 Standard drinks or equivalent per week     Comment: rarely    Drug use: No    Sexual activity: Not Currently   Other Topics Concern    Not on file   Social History Narrative    Not on file     Social Determinants of Health     Financial Resource Strain: Low Risk     Difficulty of Paying Living Expenses: Not hard at all   Food Insecurity: No Food Insecurity    Worried About Running Out of Food in the Last Year: Never true    Rasheed of Food in the Last Year: Never true   Transportation Needs: No Transportation Needs    Lack of Transportation (Medical): No    Lack of Transportation (Non-Medical): No   Physical Activity: Inactive    Days of Exercise per Week: 0 days    Minutes of Exercise per Session: 0 min   Stress: No Stress Concern Present    Feeling of Stress : Not at all   Social Connections: Socially Isolated    Frequency of Communication with Friends and Family: More than three times a week    Frequency of Social Gatherings with Friends and Family: More than three times a week    Attends Restoration Services: Never    Active Member of Clubs or Organizations: No    Attends Club or Organization Meetings: Never    Marital Status:     Intimate Partner Violence:     Fear of Current or Ex-Partner:     Emotionally Abused:     Physically Abused:     Sexually Abused:      Family History   Problem Relation Age of Onset   Bruce Olmedo Early death Mother         CHF    Early death Father         MI    Cancer Sister         stomach lymphoma    No Known Problems Son      Allergies   Allergen Reactions    Hydrochlorothiazide Other (See Comments)     Hyponatremia/low sodium level    Amoxicillin GI Intolerance    Lactose - Food Allergy GI Intolerance    Sulfa Antibiotics GI Intolerance    Lidocaine Rash     Lidocaine patch     Current Outpatient Medications on File Prior to Visit   Medication Sig Dispense Refill    acetaminophen (TYLENOL) 325 mg tablet Take 2 tablets (650 mg total) by mouth every 6 (six) hours as needed for mild pain, headaches or fever  0    amLODIPine (NORVASC) 5 mg tablet Take 1 tablet (5 mg total) by mouth every morning 90 tablet 1    ascorbic acid (VITAMIN C) 500 MG tablet Take 500 mg by mouth daily      Balsam [place]-Castor Oil (Venelex) OINT Apply topically      cholecalciferol (VITAMIN D3) 1,000 units tablet Take 2,000 Units by mouth daily      fenofibrate (TRIGLIDE) 160 MG tablet Take 1 tablet (160 mg total) by mouth every evening 90 tablet 1    ferrous sulfate 325 (65 Fe) mg tablet Take 325 mg by mouth daily with breakfast      gabapentin (NEURONTIN) 100 mg capsule Take 100 mg by mouth every 8 (eight) hours       lacosamide (VIMPAT) 100 mg tablet Take 1 tablet (100 mg total) by mouth every 12 (twelve) hours 60 tablet 2    levothyroxine 50 mcg tablet Take 1 tablet (50 mcg total) by mouth daily 90 tablet 1    loperamide (IMODIUM) 2 mg capsule Take 2 mg by mouth every 2 (two) hours as needed for diarrhea      losartan (COZAAR) 50 mg tablet Take 50 mg by mouth daily      Magnesium Oxide 250 MG TABS Take by mouth      metFORMIN (GLUCOPHAGE) 500 mg tablet Take 1 tablet (500 mg total) by mouth 2 (two) times a day with meals 180 tablet 1    metoprolol succinate (TOPROL-XL) 25 mg 24 hr tablet Take 25 mg by mouth daily       Multiple Vitamins-Minerals (CENTRUM SILVER ADULT 50+ PO) Take by mouth daily with breakfast      oxyCODONE (ROXICODONE) 5 mg immediate release tablet Take 0 5 tablets (2 5 mg total) by mouth every 4 (four) hours as needed for moderate painMax Daily Amount: 15 mg  0    oxyCODONE (ROXICODONE) 5 mg immediate release tablet Take 1 tablet (5 mg total) by mouth every 4 (four) hours as needed for severe painMax Daily Amount: 30 mg  0    pantoprazole (PROTONIX) 40 mg tablet Take 40 mg by mouth daily      polyethylene glycol (MIRALAX) 17 g packet Take 17 g by mouth daily  0    potassium chloride (K-DUR,KLOR-CON) 10 mEq tablet       Premarin vaginal cream       Skin Protectants, Misc   (PeriGuard) OINT Apply topically      torsemide (DEMADEX) 10 mg tablet Take 10 mg by mouth daily      aspirin (ECOTRIN LOW STRENGTH) 81 mg EC tablet Take 81 mg by mouth daily with breakfast      bisacodyl (DULCOLAX) 10 mg suppository Insert 10 mg into the rectum daily      Blood Glucose Monitoring Suppl (ONE TOUCH ULTRA 2) w/Device KIT by Does not apply route daily (Patient not taking: Reported on 5/12/2021) 1 each 0    carboxymethylcellulose 0 5 % SOLN 1 drop 3 (three) times a day as needed for dry eyes      collagenase (SANTYL) ointment Apply topically daily      docusate sodium (COLACE) 100 mg capsule Take 1 capsule (100 mg total) by mouth 2 (two) times a day 10 capsule 0    enoxaparin (LOVENOX) 40 mg/0 4 mL Inject 0 4 mL (40 mg total) under the skin daily  0    glucose blood (OneTouch Ultra) test strip Use as instructed (Patient not taking: Reported on 5/12/2021) 50 each 6    Glycerin-Polysorbate 80 (REFRESH DRY EYE THERAPY OP) Apply to eye      lactulose (CEPHULAC) 10 g packet Take 10 g by mouth 3 (three) times a day      Lancets (onetouch ultrasoft) lancets Use as instructed (Patient not taking: Reported on 5/12/2021) 50 each 6    olmesartan (BENICAR) 40 mg tablet Take 1 tablet (40 mg total) by mouth daily  0    ondansetron (ZOFRAN) 4 mg tablet Take 4 mg by mouth every 8 (eight) hours as needed for nausea or vomiting      potassium chloride (MICRO-K) 8 mEq CR capsule Take 8 mEq by mouth 2 (two) times a day      sodium chloride 1 g tablet Take 1 tablet (1 g total) by mouth 3 (three) times a day with meals  0    Sodium Phosphates (ENEMA RE) Insert into the rectum       No current facility-administered medications on file prior to visit  Review of Systems   Refer to positive review of systems in HPI  Review of Systems    Constitutional- No fever  Eyes- No visual change  ENT- Hearing normal  CV- No chest pain  Resp- No Shortness of breath  GI- No diarrhea  - Bladder normal  MS- No Arthritis   Skin- No rash  Psych- No depression  Endo- No DM  Heme- No nodes    Vitals:    07/07/21 1007   BP: 115/71   BP Location: Right arm   Patient Position: Sitting   Cuff Size: Adult   Pulse: 65   Temp: (!) 96 3 °F (35 7 °C)       PHYSICAL EXAM:  Appearance: No Acute Distress  Ophthalmoscopic: Disc Flat, Normal fundus  Mental status:  Orientation: Awake, Alert, and Orientedx3  Patient couldn't think of her wedding year but later though of date     Memory: Registation 3/3 Recall 1/3  Attention: normal  Knowledge: poor  Language: No aphasia  Speech: No dysarthria  Cranial Nerves:  2 No Visual Defect on Confrontation, Pupils round, equal, reactive to light  3,4,6 Extraocular Movements Intact, no nystagmus  5 Facial Sensation Intact  7 No facial asymmetry  8 Intact hearing  9,10 Palate symmetric, normal gag  11 Good shoulder shrug  12 Tongue Midline  Gait: rachel  Coordination: No ataxia with finger to nose testing, and heel to shin  Sensory: Intact, Symmetric to pinprick, light touch, vibration, and joint position  Muscle Tone: Normal              Muscle exam:  Arm Right Left Leg Right Left   Deltoid 5/5 5/5 Iliopsoas 3/5 4/5   Biceps 5/5 5/5 Quads 3/5 4/5   Triceps 5/5 5/5 Hamstrings 3/5 4/5   Wrist Extension 5/5 5/5 Ankle Dorsi Flexion 4+/5 4+/5   Wrist Flexion 5/5 5/5 Ankle Plantar Flexion 4+/5 4+/5   Interossei 5/5 5/5 Ankle Eversion 4+/5 4+/5   APB 5/5 5/5 Ankle Inversion 4+/5 4+/5       Reflexes   RJ BJ TJ KJ AJ Plantars Shelley's   Right 2+ 2+ 2+ 2+ 2+ Downgoing Not present   Left 2+ 2+ 2+ 2+ 2+ Downgoing Not present     Personal review of  Labs:                  Diagnoses and all orders for this visit:      1  History of seizure     2  History of femur fracture     3  Short-term memory loss  MRI brain NeuroQuant wo and w contrast    Ambulatory referral to Neuropsychology    Vitamin B12    Vitamin D 25 hydroxy    RPR    Methylmalonic acid, serum    Vitamin E    donepezil (ARICEPT) 5 mg tablet    donepezil (ARICEPT) 10 mg tablet   4  Physical deconditioning     5  Lack of motivation     6  Vitamin D deficiency, unspecified   Vitamin D 25 hydroxy         Discussed at length that patient has had a lot of environmental changes with her surrounding to explain worsening of dementia  Death of her  early this year has contributed a lot as well  There is also lack of motivation and depression  Will check for reversible causes  Will get MRI brain neuroquant to evaluate for memory  Will refer her to neuropsych testing  Will start memory enhancer in meantime  Encouraged physical exercise, cognitive exercises               Total time of encounter:  45 min  More than 50% of the time was used in counseling and/or coordination of care  Extent of counseling and/or coordination of care        Dafne Barry MD  [facility]  [address]  [address]  [phone]

## 2024-02-26 NOTE — PROGRESS NOTES
"Daily Note     Today's date: 2024  Patient name: Ritu Rodriguez  : 1971  MRN: [id number]  Referring provider: Phani Vences PA-C  Dx:   Encounter Diagnosis     ICD-10-CM    1. Pain in right hip  M25.551       2. Hip arthritis  M16.10                      Subjective: Patient reports really feeling a difference from missing therapy the other day.       Objective: See treatment diary below      Assessment: Tolerated treatment well. She cont to feel less discomfort and more mobile after manuals and MWM. Patient demonstrated fatigue post treatment and would benefit from continued PT      Plan: Progress treatment as tolerated.        POC Expires Auth Status Start Date Expiration Date PT Visit Limit    24 60   Date 2/1 2/12 2/15 2/19 2/26 FOTO NV   Used 1 2 3 4 5   Remaining 59 [phone] 55      Diagnosis: R hip pain (Hip impingement and hip OA) - hip hypomobility   Precautions: Recent removal parathyroid   Manuals 2/1 2/12 2/15 2/19 2/26   Hip mobilizations RS RS RS RS SP   LAD grade V     SP                           There Ex        Upright Bike  5'  5'  5'  5'    Self hip mobilizations 10x       DKTC with ball ER bias  5\"x15       Hip flexor stretch   10x10\" EOB with strap Stair 10x10\"    Hip flexion mobilization on stair  10x 1/2 kneeling on foam   MWM  5\"x20            Heel to shin   2x10 ea              Leg Press    SL 35# 2x10 ea             Neuro Re-Ed                bridges  Red 2x10  Green abd cue  5\"x10 Green abd cue  5\"x20 Green abd cue 5\"x20   clamshells   Green 2x10      SLR flexion, abduction   Flx 3x5             Modified piriformis stretch ER     10x10\"  10\"x10            TKE   Blue 5\"x20      X-walks    Red 1x                                      Ther Act              sit to stands    2x5 1 foam                       Modalities                                                               " Virtual Regular Visit      Assessment/Plan:  See other note  Problem List Items Addressed This Visit        Digestive    GERD (gastroesophageal reflux disease) - Primary       Cardiovascular and Mediastinum    Hypertension    MR (mitral regurgitation)       Other    Mixed hyperlipidemia    MONSTER (generalized anxiety disorder)    Grief    Memory impairment    Urinary frequency               Reason for visit is   Chief Complaint   Patient presents with    Hypertension    Hyperlipidemia    Diabetes    Anxiety    Depression    GERD    Arthritis    Virtual Regular Visit        Encounter provider Nilam Nelson MD    Provider located at [address] [address]      Recent Visits  No visits were found meeting these conditions  Showing recent visits within past 7 days and meeting all other requirements     Today's Visits  Date Type Provider Dept   02/02/21 Telemedicine Nilam Nelson MD [facility] Internal Med   Showing today's visits and meeting all other requirements     Future Appointments  No visits were found meeting these conditions  Showing future appointments within next 150 days and meeting all other requirements        The patient was identified by name and date of birth  Christie Rush was informed that this is a telemedicine visit and that the visit is being conducted through [facility] and patient was informed that this is not a secure, HIPAA-compliant platform  She agrees to proceed     My office door was closed  No one else was in the room  She acknowledged consent and understanding of privacy and security of the video platform  The patient has agreed to participate and understands they can discontinue the visit at any time  Patient is aware this is a billable service       Subjective  Christie Rush is a 79 y o  female chronic disease mangement        HPI see other note that was closed    Past Medical History:   Diagnosis Date    Anemia     Anxiety     Arthritis     Chronic UTI     better since urethral stretching    Cirrhosis of liver ([facility] [address] ) 9/2/2019    Current moderate episode of major depressive disorder without prior episode ([facility] [address] ) 1/14/2020    Diabetes mellitus ([facility] [address] )     type 2    GERD (gastroesophageal reflux disease)     diet controlled    High triglycerides     Hypertension     Irritable bowel syndrome     Other specified hypothyroidism 5/13/2020    Primary osteoarthritis of both knees 1/20/2016       Past Surgical History:   Procedure Laterality Date    APPENDECTOMY      age 14   [facility] SURGERY      tumor removal benign x3    UT XCAPSL CTRC RMVL INSJ IO LENS PROSTH W/O ECP Left 5/15/2017    Procedure: EXTRACTION EXTRACAPSULAR CATARACT PHACO INTRAOCULAR LENS (IOL); Surgeon: Iman Juarez MD;  Location: [facility] MAIN OR;  Service: Ophthalmology    UT XCAPSL CTRC RMVL INSJ IO LENS PROSTH W/O ECP Right 6/26/2017    Procedure: EXTRACTION EXTRACAPSULAR CATARACT PHACO INTRAOCULAR LENS (IOL);   Surgeon: Iman Juarez MD;  Location: [facility] MAIN OR;  Service: Ophthalmology    TONSILLECTOMY         Current Outpatient Medications   Medication Sig Dispense Refill    amLODIPine (NORVASC) 5 mg tablet Take 1 tablet (5 mg total) by mouth every morning 90 tablet 1    aspirin (ECOTRIN LOW STRENGTH) 81 mg EC tablet Take 81 mg by mouth daily with breakfast      Blood Glucose Monitoring Suppl (ONE TOUCH ULTRA 2) w/Device KIT by Does not apply route daily 1 each 0    fenofibrate (TRIGLIDE) 160 MG tablet Take 1 tablet (160 mg total) by mouth every evening 90 tablet 1    glucose blood (OneTouch Ultra) test strip Use as instructed 50 each 6    Glycerin-Polysorbate 80 (REFRESH DRY EYE THERAPY OP) Apply to eye      Lancets (onetouch ultrasoft) lancets Use as instructed 50 each 6    levothyroxine 50 mcg tablet Take 1 tablet (50 mcg total) by mouth daily 90 tablet 1    meloxicam (MOBIC) 15 mg tablet       metFORMIN (GLUCOPHAGE) 500 mg tablet Take one in AM and two in PM (Patient taking differently: Take 500 mg by mouth 2 (two) times a day with meals ) 270 tablet 1    metoprolol succinate (TOPROL-XL) 25 mg 24 hr tablet Take 25 mg by mouth daily       Multiple Vitamins-Minerals (CENTRUM SILVER ADULT 50+ PO) Take by mouth daily with breakfast      olmesartan-hydrochlorothiazide (BENICAR HCT) 40-25 MG per tablet Take 1 tablet by mouth every morning 90 tablet 1     No current facility-administered medications for this visit  Allergies   Allergen Reactions    Amoxicillin GI Intolerance    Lactose GI Intolerance    Sulfa Antibiotics GI Intolerance    Lidocaine Rash     Lidocaine patch       Review of Systems    Video Exam    Vitals:    02/02/21 1714   Weight: 78 kg (172 lb)   Height: 5' 9" (1 753 m)       Physical Exam     I spent 15 minutes directly with the patient during this visit      VIRTUAL VISIT DISCLAIMER    Aman Nicholas acknowledges that she has consented to an online visit or consultation  She understands that the online visit is based solely on information provided by her, and that, in the absence of a face-to-face physical evaluation by the physician, the diagnosis she receives is both limited and provisional in terms of accuracy and completeness  This is not intended to replace a full medical face-to-face evaluation by the physician  Aman Peterson understands and accepts these terms

## 2025-02-28 NOTE — ASSESSMENT & PLAN NOTE
Hypertension( High Blood Pressure ):    Continue Home BP check daily and bring log, if you are not checking consider checking daily    Take your Blood Pressure medicine as advised    Do not take your Blood pressure medicine if systolic Blood Pressure (top number) is less than 100 or heart rate less than 60  Notify you physician  r/b/a explained to patient again  consent reviewed  all questions answered

## (undated) DEVICE — 0.9MM MICROSMOOTH NULTRA INFUSION SLEEVE KIT: Brand: INFINIT, MICROSMOOTH, ALCON

## (undated) DEVICE — FABRIC REINFORCED, SURGICAL GOWN, XL: Brand: CONVERTORS

## (undated) DEVICE — DRAPE ISOLATION

## (undated) DEVICE — B-H IRRIGATING CAN 19GA FLAT ANGLED 8MM: Brand: OPHTHALMIC CANNULA

## (undated) DEVICE — GLOVE INDICATOR PI UNDERGLOVE SZ 6.5 BLUE

## (undated) DEVICE — 45° KELMAN®, 0.9 MM TURBOSONICS® MINI-FLARED ABS® TIP: Brand: ALCON, KELMAN, TURBOSONICS, MINI-FLARED ABS

## (undated) DEVICE — PROXIMATE PLUS MD MULTI-DIRECTIONAL RELEASE SKIN STAPLERS CONTAINS 35 STAINLESS STEEL STAPLES APPROXIMATE CLOSED DIMENSIONS: 6.9MM X 3.9MM WIDE: Brand: PROXIMATE

## (undated) DEVICE — THE MONARCH® "D" CARTRIDGE IS A SINGLE-USE POLYPROPYLENE CARTRIDGE FOR POSTERIOR CHAMBER IOL DELIVERY: Brand: MONARCH® III

## (undated) DEVICE — BASIC ULTRASOUND: Brand: ALCON, INFINITI

## (undated) DEVICE — GLOVE INDICATOR PI UNDERGLOVE SZ 7.5 BLUE

## (undated) DEVICE — EYE PACK CUSTOM -FINNEGAN

## (undated) DEVICE — 3.2MM GUIDE WIRE 400MM

## (undated) DEVICE — INTENDED FOR TISSUE SEPARATION, AND OTHER PROCEDURES THAT REQUIRE A SHARP SURGICAL BLADE TO PUNCTURE OR CUT.: Brand: BARD-PARKER SAFETY BLADES SIZE 15, STERILE

## (undated) DEVICE — ANTIBACTERIAL UNDYED BRAIDED (POLYGLACTIN 910), SYNTHETIC ABSORBABLE SUTURE: Brand: COATED VICRYL

## (undated) DEVICE — CHLORAPREP HI-LITE 26ML ORANGE

## (undated) DEVICE — PACK GENERAL LF

## (undated) DEVICE — GLOVE SRG BIOGEL 7.5

## (undated) DEVICE — 4.2MM THREE-FLUTED DRILL BIT QC/NEEDLE POINT/145MM

## (undated) DEVICE — AIR INJECT CANNULA 27GA: Brand: OPHTHALMIC CANNULA

## (undated) DEVICE — GLOVE SRG BIOGEL 6.5

## (undated) DEVICE — SPONGE LAP 18 X 18 IN

## (undated) DEVICE — ADHESIVE SKIN HIGH VISCOSITY EXOFIN 1ML

## (undated) DEVICE — 30° KELMAN®, 0.9 MM TURBOSONICS® ABS® MICROTIP™ TIP: Brand: ALCON, TURBOSONICS, ABS, MICROTIP

## (undated) DEVICE — ANTIBACTERIAL VIOLET BRAIDED (POLYGLACTIN 910), SYNTHETIC ABSORBABLE SURGICAL SUTURE: Brand: COATED VICRYL

## (undated) DEVICE — DRESSING MEPILEX AG BORDER 3 X 3 IN

## (undated) DEVICE — HEAVY DUTY TABLE COVER: Brand: CONVERTORS

## (undated) DEVICE — CLEARCUT® SLIT KNIFE INTREPID MICRO-COAXIAL SYSTEM 2.4 SB: Brand: CLEARCUT®; INTREPID

## (undated) DEVICE — 2.5MM REAMING ROD WITH BALL TIP/950MM-STERILE

## (undated) DEVICE — BASIC DOUBLE BASIN 2-LF: Brand: MEDLINE INDUSTRIES, INC.